# Patient Record
Sex: FEMALE | Race: WHITE | NOT HISPANIC OR LATINO | Employment: FULL TIME | URBAN - METROPOLITAN AREA
[De-identification: names, ages, dates, MRNs, and addresses within clinical notes are randomized per-mention and may not be internally consistent; named-entity substitution may affect disease eponyms.]

---

## 2017-01-17 ENCOUNTER — ALLSCRIPTS OFFICE VISIT (OUTPATIENT)
Dept: OTHER | Facility: OTHER | Age: 27
End: 2017-01-17

## 2017-02-14 ENCOUNTER — ALLSCRIPTS OFFICE VISIT (OUTPATIENT)
Dept: OTHER | Facility: OTHER | Age: 27
End: 2017-02-14

## 2017-02-14 DIAGNOSIS — E66.01 MORBID (SEVERE) OBESITY DUE TO EXCESS CALORIES (HCC): ICD-10-CM

## 2017-02-14 DIAGNOSIS — E04.9 NONTOXIC GOITER: ICD-10-CM

## 2017-02-14 DIAGNOSIS — N97.9 FEMALE INFERTILITY: ICD-10-CM

## 2017-02-14 DIAGNOSIS — R60.9 EDEMA: ICD-10-CM

## 2017-02-14 DIAGNOSIS — F41.9 ANXIETY DISORDER: ICD-10-CM

## 2017-02-14 DIAGNOSIS — R74.8 ABNORMAL LEVELS OF OTHER SERUM ENZYMES: ICD-10-CM

## 2017-02-14 DIAGNOSIS — Z11.1 ENCOUNTER FOR SCREENING FOR RESPIRATORY TUBERCULOSIS: ICD-10-CM

## 2017-02-14 DIAGNOSIS — R53.83 OTHER FATIGUE: ICD-10-CM

## 2017-02-14 DIAGNOSIS — E28.2 POLYCYSTIC OVARIAN SYNDROME: ICD-10-CM

## 2017-02-14 DIAGNOSIS — E78.2 MIXED HYPERLIPIDEMIA: ICD-10-CM

## 2017-02-14 DIAGNOSIS — Z32.00 ENCOUNTER FOR PREGNANCY TEST: ICD-10-CM

## 2017-02-16 ENCOUNTER — ALLSCRIPTS OFFICE VISIT (OUTPATIENT)
Dept: OTHER | Facility: OTHER | Age: 27
End: 2017-02-16

## 2017-03-16 ENCOUNTER — APPOINTMENT (OUTPATIENT)
Dept: LAB | Facility: HOSPITAL | Age: 27
End: 2017-03-16
Attending: OBSTETRICS & GYNECOLOGY
Payer: COMMERCIAL

## 2017-03-16 ENCOUNTER — TRANSCRIBE ORDERS (OUTPATIENT)
Dept: LAB | Facility: HOSPITAL | Age: 27
End: 2017-03-16

## 2017-03-16 DIAGNOSIS — Z32.00 ENCOUNTER FOR PREGNANCY TEST: ICD-10-CM

## 2017-03-16 LAB — B-HCG SERPL-ACNC: <2 MIU/ML

## 2017-03-16 PROCEDURE — 84702 CHORIONIC GONADOTROPIN TEST: CPT

## 2017-03-16 PROCEDURE — 36415 COLL VENOUS BLD VENIPUNCTURE: CPT

## 2017-03-21 ENCOUNTER — ALLSCRIPTS OFFICE VISIT (OUTPATIENT)
Dept: OTHER | Facility: OTHER | Age: 27
End: 2017-03-21

## 2017-04-01 ENCOUNTER — APPOINTMENT (EMERGENCY)
Dept: RADIOLOGY | Facility: HOSPITAL | Age: 27
End: 2017-04-01
Payer: COMMERCIAL

## 2017-04-01 ENCOUNTER — HOSPITAL ENCOUNTER (EMERGENCY)
Facility: HOSPITAL | Age: 27
Discharge: HOME | End: 2017-04-01
Attending: EMERGENCY MEDICINE | Admitting: EMERGENCY MEDICINE
Payer: COMMERCIAL

## 2017-04-01 VITALS
OXYGEN SATURATION: 97 % | RESPIRATION RATE: 18 BRPM | SYSTOLIC BLOOD PRESSURE: 113 MMHG | BODY MASS INDEX: 38.51 KG/M2 | TEMPERATURE: 98 F | HEIGHT: 69 IN | DIASTOLIC BLOOD PRESSURE: 53 MMHG | WEIGHT: 260 LBS | HEART RATE: 92 BPM

## 2017-04-01 DIAGNOSIS — R11.2 NAUSEA AND VOMITING: ICD-10-CM

## 2017-04-01 DIAGNOSIS — R10.9 FLANK PAIN: ICD-10-CM

## 2017-04-01 DIAGNOSIS — R10.32 LEFT LOWER QUADRANT PAIN: Primary | ICD-10-CM

## 2017-04-01 LAB
ALBUMIN SERPL BCP-MCNC: 3.9 G/DL (ref 3.5–5)
ALP SERPL-CCNC: 51 U/L (ref 46–116)
ALT SERPL W P-5'-P-CCNC: 47 U/L (ref 12–78)
ANION GAP SERPL CALCULATED.3IONS-SCNC: 8 MMOL/L (ref 4–13)
AST SERPL W P-5'-P-CCNC: 29 U/L (ref 5–45)
BACTERIA UR QL AUTO: ABNORMAL /HPF
BASOPHILS # BLD AUTO: 0.09 THOUSANDS/ΜL (ref 0–0.1)
BASOPHILS NFR BLD AUTO: 1 % (ref 0–1)
BILIRUB SERPL-MCNC: 1.37 MG/DL (ref 0.2–1)
BILIRUB UR QL STRIP: NEGATIVE
BUN SERPL-MCNC: 11 MG/DL (ref 5–25)
CALCIUM SERPL-MCNC: 9.2 MG/DL (ref 8.3–10.1)
CHLORIDE SERPL-SCNC: 106 MMOL/L (ref 100–108)
CLARITY UR: ABNORMAL
CO2 SERPL-SCNC: 27 MMOL/L (ref 21–32)
COLOR UR: YELLOW
CREAT SERPL-MCNC: 0.74 MG/DL (ref 0.6–1.3)
EOSINOPHIL # BLD AUTO: 0.24 THOUSAND/ΜL (ref 0–0.61)
EOSINOPHIL NFR BLD AUTO: 2 % (ref 0–6)
ERYTHROCYTE [DISTWIDTH] IN BLOOD BY AUTOMATED COUNT: 12.4 % (ref 11.6–15.1)
GFR SERPL CREATININE-BSD FRML MDRD: >60 ML/MIN/1.73SQ M
GLUCOSE SERPL-MCNC: 91 MG/DL (ref 65–140)
GLUCOSE UR STRIP-MCNC: NEGATIVE MG/DL
HCG UR QL: NEGATIVE
HCT VFR BLD AUTO: 39.5 % (ref 34.8–46.1)
HGB BLD-MCNC: 13.5 G/DL (ref 11.5–15.4)
HGB UR QL STRIP.AUTO: ABNORMAL
HYALINE CASTS #/AREA URNS LPF: ABNORMAL /LPF
KETONES UR STRIP-MCNC: NEGATIVE MG/DL
LEUKOCYTE ESTERASE UR QL STRIP: ABNORMAL
LIPASE SERPL-CCNC: 152 U/L (ref 73–393)
LYMPHOCYTES # BLD AUTO: 2.82 THOUSANDS/ΜL (ref 0.6–4.47)
LYMPHOCYTES NFR BLD AUTO: 22 % (ref 14–44)
MCH RBC QN AUTO: 29.4 PG (ref 26.8–34.3)
MCHC RBC AUTO-ENTMCNC: 34.2 G/DL (ref 31.4–37.4)
MCV RBC AUTO: 86 FL (ref 82–98)
MONOCYTES # BLD AUTO: 0.84 THOUSAND/ΜL (ref 0.17–1.22)
MONOCYTES NFR BLD AUTO: 7 % (ref 4–12)
NEUTROPHILS # BLD AUTO: 8.81 THOUSANDS/ΜL (ref 1.85–7.62)
NEUTS SEG NFR BLD AUTO: 68 % (ref 43–75)
NITRITE UR QL STRIP: NEGATIVE
NON-SQ EPI CELLS URNS QL MICRO: ABNORMAL /HPF
NRBC BLD AUTO-RTO: 0 /100 WBCS
PH UR STRIP.AUTO: 6.5 [PH] (ref 4.5–8)
PLATELET # BLD AUTO: 378 THOUSANDS/UL (ref 149–390)
PMV BLD AUTO: 9.8 FL (ref 8.9–12.7)
POTASSIUM SERPL-SCNC: 3.8 MMOL/L (ref 3.5–5.3)
PROT SERPL-MCNC: 8.1 G/DL (ref 6.4–8.2)
PROT UR STRIP-MCNC: ABNORMAL MG/DL
RBC # BLD AUTO: 4.59 MILLION/UL (ref 3.81–5.12)
RBC #/AREA URNS AUTO: ABNORMAL /HPF
SODIUM SERPL-SCNC: 141 MMOL/L (ref 136–145)
SP GR UR STRIP.AUTO: 1.02 (ref 1–1.03)
UROBILINOGEN UR QL STRIP.AUTO: 0.2 E.U./DL
WBC # BLD AUTO: 12.83 THOUSAND/UL (ref 4.31–10.16)
WBC #/AREA URNS AUTO: ABNORMAL /HPF

## 2017-04-01 PROCEDURE — 36415 COLL VENOUS BLD VENIPUNCTURE: CPT | Performed by: EMERGENCY MEDICINE

## 2017-04-01 PROCEDURE — 74177 CT ABD & PELVIS W/CONTRAST: CPT

## 2017-04-01 PROCEDURE — 87086 URINE CULTURE/COLONY COUNT: CPT

## 2017-04-01 PROCEDURE — 83690 ASSAY OF LIPASE: CPT | Performed by: EMERGENCY MEDICINE

## 2017-04-01 PROCEDURE — 93005 ELECTROCARDIOGRAM TRACING: CPT | Performed by: EMERGENCY MEDICINE

## 2017-04-01 PROCEDURE — 96375 TX/PRO/DX INJ NEW DRUG ADDON: CPT

## 2017-04-01 PROCEDURE — 96361 HYDRATE IV INFUSION ADD-ON: CPT

## 2017-04-01 PROCEDURE — 81025 URINE PREGNANCY TEST: CPT | Performed by: EMERGENCY MEDICINE

## 2017-04-01 PROCEDURE — 81001 URINALYSIS AUTO W/SCOPE: CPT

## 2017-04-01 PROCEDURE — 96374 THER/PROPH/DIAG INJ IV PUSH: CPT

## 2017-04-01 PROCEDURE — 80053 COMPREHEN METABOLIC PANEL: CPT | Performed by: EMERGENCY MEDICINE

## 2017-04-01 PROCEDURE — 85025 COMPLETE CBC W/AUTO DIFF WBC: CPT | Performed by: EMERGENCY MEDICINE

## 2017-04-01 PROCEDURE — 99284 EMERGENCY DEPT VISIT MOD MDM: CPT

## 2017-04-01 PROCEDURE — 81002 URINALYSIS NONAUTO W/O SCOPE: CPT | Performed by: EMERGENCY MEDICINE

## 2017-04-01 RX ORDER — METOCLOPRAMIDE HYDROCHLORIDE 5 MG/ML
10 INJECTION INTRAMUSCULAR; INTRAVENOUS ONCE
Status: COMPLETED | OUTPATIENT
Start: 2017-04-01 | End: 2017-04-01

## 2017-04-01 RX ORDER — FUROSEMIDE 20 MG/1
20 TABLET ORAL DAILY PRN
COMMUNITY
End: 2018-05-14 | Stop reason: SDUPTHER

## 2017-04-01 RX ORDER — KETOROLAC TROMETHAMINE 30 MG/ML
15 INJECTION, SOLUTION INTRAMUSCULAR; INTRAVENOUS ONCE
Status: COMPLETED | OUTPATIENT
Start: 2017-04-01 | End: 2017-04-01

## 2017-04-01 RX ADMIN — IOHEXOL 100 ML: 350 INJECTION, SOLUTION INTRAVENOUS at 19:12

## 2017-04-01 RX ADMIN — METOCLOPRAMIDE 10 MG: 5 INJECTION, SOLUTION INTRAMUSCULAR; INTRAVENOUS at 18:07

## 2017-04-01 RX ADMIN — SODIUM CHLORIDE 1000 ML: 0.9 INJECTION, SOLUTION INTRAVENOUS at 17:40

## 2017-04-01 RX ADMIN — KETOROLAC TROMETHAMINE 15 MG: 30 INJECTION, SOLUTION INTRAMUSCULAR at 18:07

## 2017-04-02 LAB — BACTERIA UR CULT: NORMAL

## 2017-04-03 LAB
ATRIAL RATE: 107 BPM
P AXIS: 58 DEGREES
PR INTERVAL: 142 MS
QRS AXIS: 58 DEGREES
QRSD INTERVAL: 90 MS
QT INTERVAL: 334 MS
QTC INTERVAL: 445 MS
T WAVE AXIS: 9 DEGREES
VENTRICULAR RATE: 107 BPM

## 2017-04-28 DIAGNOSIS — N97.9 FEMALE INFERTILITY: ICD-10-CM

## 2017-07-25 ENCOUNTER — ALLSCRIPTS OFFICE VISIT (OUTPATIENT)
Dept: OTHER | Facility: OTHER | Age: 27
End: 2017-07-25

## 2017-07-25 DIAGNOSIS — M25.473 EFFUSION OF ANKLE: ICD-10-CM

## 2017-07-25 DIAGNOSIS — E66.01 MORBID (SEVERE) OBESITY DUE TO EXCESS CALORIES (HCC): ICD-10-CM

## 2017-07-25 DIAGNOSIS — E78.2 MIXED HYPERLIPIDEMIA: ICD-10-CM

## 2017-07-25 DIAGNOSIS — M79.89 OTHER DISORDERS OF SOFT TISSUE: ICD-10-CM

## 2017-09-05 ENCOUNTER — ALLSCRIPTS OFFICE VISIT (OUTPATIENT)
Dept: OTHER | Facility: OTHER | Age: 27
End: 2017-09-05

## 2017-09-05 DIAGNOSIS — E04.9 NONTOXIC GOITER: ICD-10-CM

## 2017-10-10 ENCOUNTER — APPOINTMENT (OUTPATIENT)
Dept: LAB | Facility: HOSPITAL | Age: 27
End: 2017-10-10
Payer: COMMERCIAL

## 2017-10-10 ENCOUNTER — TRANSCRIBE ORDERS (OUTPATIENT)
Dept: LAB | Facility: HOSPITAL | Age: 27
End: 2017-10-10

## 2017-10-10 DIAGNOSIS — M25.473 EFFUSION OF ANKLE: ICD-10-CM

## 2017-10-10 DIAGNOSIS — E78.2 MIXED HYPERLIPIDEMIA: ICD-10-CM

## 2017-10-10 DIAGNOSIS — M79.89 OTHER DISORDERS OF SOFT TISSUE: ICD-10-CM

## 2017-10-10 DIAGNOSIS — E66.01 MORBID (SEVERE) OBESITY DUE TO EXCESS CALORIES (HCC): ICD-10-CM

## 2017-10-10 LAB
ALBUMIN SERPL BCP-MCNC: 3.5 G/DL (ref 3.5–5)
ALP SERPL-CCNC: 56 U/L (ref 46–116)
ALT SERPL W P-5'-P-CCNC: 36 U/L (ref 12–78)
ANION GAP SERPL CALCULATED.3IONS-SCNC: 6 MMOL/L (ref 4–13)
AST SERPL W P-5'-P-CCNC: 29 U/L (ref 5–45)
BILIRUB SERPL-MCNC: 1.05 MG/DL (ref 0.2–1)
BUN SERPL-MCNC: 11 MG/DL (ref 5–25)
CALCIUM SERPL-MCNC: 8.4 MG/DL (ref 8.3–10.1)
CHLORIDE SERPL-SCNC: 106 MMOL/L (ref 100–108)
CHOLEST SERPL-MCNC: 129 MG/DL (ref 50–200)
CO2 SERPL-SCNC: 26 MMOL/L (ref 21–32)
CREAT SERPL-MCNC: 0.78 MG/DL (ref 0.6–1.3)
GFR SERPL CREATININE-BSD FRML MDRD: 103 ML/MIN/1.73SQ M
GLUCOSE P FAST SERPL-MCNC: 91 MG/DL (ref 65–99)
HDLC SERPL-MCNC: 30 MG/DL (ref 40–60)
LDLC SERPL CALC-MCNC: 54 MG/DL (ref 0–100)
POTASSIUM SERPL-SCNC: 4 MMOL/L (ref 3.5–5.3)
PROT SERPL-MCNC: 7.7 G/DL (ref 6.4–8.2)
SODIUM SERPL-SCNC: 138 MMOL/L (ref 136–145)
TRIGL SERPL-MCNC: 224 MG/DL
TSH SERPL DL<=0.05 MIU/L-ACNC: 2.19 UIU/ML (ref 0.36–3.74)

## 2017-10-10 PROCEDURE — 36415 COLL VENOUS BLD VENIPUNCTURE: CPT

## 2017-10-10 PROCEDURE — 80053 COMPREHEN METABOLIC PANEL: CPT

## 2017-10-10 PROCEDURE — 84443 ASSAY THYROID STIM HORMONE: CPT

## 2017-10-10 PROCEDURE — 80061 LIPID PANEL: CPT

## 2017-10-19 ENCOUNTER — APPOINTMENT (OUTPATIENT)
Dept: LAB | Facility: HOSPITAL | Age: 27
End: 2017-10-19
Payer: COMMERCIAL

## 2017-10-19 ENCOUNTER — ALLSCRIPTS OFFICE VISIT (OUTPATIENT)
Dept: OTHER | Facility: OTHER | Age: 27
End: 2017-10-19

## 2017-10-19 DIAGNOSIS — R31.9 HEMATURIA: ICD-10-CM

## 2017-10-19 LAB
BILIRUB UR QL STRIP: NORMAL
CLARITY UR: NORMAL
COLOR UR: YELLOW
GLUCOSE (HISTORICAL): NORMAL
HGB UR QL STRIP.AUTO: NORMAL
KETONES UR STRIP-MCNC: NORMAL MG/DL
LEUKOCYTE ESTERASE UR QL STRIP: NORMAL
NITRITE UR QL STRIP: NORMAL
PH UR STRIP.AUTO: 6 [PH]
PROT UR STRIP-MCNC: NORMAL MG/DL
SP GR UR STRIP.AUTO: 1.03
UROBILINOGEN UR QL STRIP.AUTO: NORMAL

## 2017-10-19 PROCEDURE — 87086 URINE CULTURE/COLONY COUNT: CPT

## 2017-10-20 ENCOUNTER — GENERIC CONVERSION - ENCOUNTER (OUTPATIENT)
Dept: OTHER | Facility: OTHER | Age: 27
End: 2017-10-20

## 2017-10-20 LAB — BACTERIA UR CULT: NORMAL

## 2017-10-20 NOTE — PROGRESS NOTES
Assessment  1  Blood in urine (889 27) (R31 9)    Plan  Ankle swelling, Swelling of both hands    · Furosemide 20 MG Oral Tablet; TAKE 1 TABLET BY MOUTH ONCE DAILY  Anxiety    · ALPRAZolam 0 25 MG Oral Tablet; TAKE ONE TABLET BY MOUTH EVERY  DAY AS NEEDED FOR ANXIETY  Blood in urine    · Ciprofloxacin HCl - 250 MG Oral Tablet; TAKE 1 TABLET EVERY 12 HOURS DAILY   · (1) URINE CULTURE; Source:Urine, Clean Catch; Status:Active; Requested  WLB:75OVV2031;    · * XR ABDOMEN 1 VIEW KUB; Status:Active; Requested YRL:24BML5552;    · Urine Dip Automated- POC; Status:Complete - Retrospective Authorization;   Done:  18GCX6138 03:40PM  Hyperlipidemia, mixed    · Atorvastatin Calcium 10 MG Oral Tablet; TAKE 1 TABLET DAILY AS  DIRECTED   · Fish Oil 1000 MG Oral Capsule; Take 1 caps BID  Mild vitamin D deficiency    · Vitamin D 2000 UNIT Oral Tablet; One tablet daily  Polycystic ovarian syndrome    · MetFORMIN HCl - 1000 MG Oral Tablet; TAKE 1 TABLET TWICE DAILY   · MetFORMIN HCl  MG Oral Tablet Extended Release 24 Hour    Discussion/Summary    UA today showed +/- leuk and 3+ blood  send for culture  check KUB r/o kidney stones  cipro  SE educated pt  office if symptoms no improving or worse  Possible side effects of new medications were reviewed with the patient/guardian today  The treatment plan was reviewed with the patient/guardian  The patient/guardian understands and agrees with the treatment plan      Chief Complaint  Patient presents due to blood in her urine, both sides of her back hurt  It started today and it hurt her to void  History of Present Illness  HPI: Pt is here by herself  blood in urine today  Burning sensation while urinate  Saw bright red blood while wiping this morning  back spasm  fever, SOB, CP, nausea, vomiting or abdominal pain  hx of kidney stone  Mother has kidney stone  10/6/2017        Review of Systems    Constitutional: No fever, no chills, feels well, no tiredness, no recent weight gain or loss  Cardiovascular: no complaints of slow or fast heart rate, no chest pain, no palpitations, no leg claudication or lower extremity edema  Respiratory: no complaints of shortness of breath, no wheezing, no dyspnea on exertion, no orthopnea or PND  Gastrointestinal: no complaints of abdominal pain, no constipation, no nausea or diarrhea, no vomiting, no bloody stools  Genitourinary: as noted in HPI  Musculoskeletal: as noted in HPI  Active Problems  1  Abnormal Pap smear of cervix (795 00) (R87 619)   2  Ankle swelling (719 07) (M25 473)   3  Anxiety (300 00) (F41 9)   4  Edema (782 3) (R60 9)   5  Elevated liver enzymes (790 5) (R74 8)   6  Encounter for gynecological examination (V72 31) (Z01 419)   7  Eustachian tube dysfunction (381 81) (H69 80)   8  Fatigue (780 79) (R53 83)   9  Hyperlipidemia, mixed (272 2) (E78 2)   10  Infertility, female (628 9) (N97 9)   6  Mild vitamin D deficiency (268 9) (E55 9)   12  Morbid obesity (278 01) (E66 01)   13  Nontoxic goiter, 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)   14  Polycystic ovarian syndrome (256 4) (E28 2)   15  Polycystic ovarian syndrome (256 4) (E28 2)   16  Screening for tuberculosis (V74 1) (Z11 1)   17  Swelling of both hands (729 81) (M79 89)   18  Unconfirmed pregnancy (V72 40) (Z32 00)    Past Medical History  1  History of Acute suppurative otitis media of left ear without spontaneous rupture of   tympanic membrane, recurrence not specified (382 00) (H66 002)   2  History of Chronic diarrhea of unknown origin (787 91) (K52 9)   3  History of Dyspepsia (536 8) (K30)   4  History of Early satiety (780 94) (R68 81)   5  History of abdominal pain (V13 89) (Z87 898)   6  History of conjunctivitis (V12 49) (Z86 69)   7  History of hidradenitis suppurativa (V13 3) (Z87 2)   8  History of obesity (V13 89) (Z86 39)   9  History of Other acute sinusitis (461 8) (J01 80)    Family History  Father    1   No pertinent family history  Maternal Grandmother 2  Family history of Thyroid Disorder (V18 19)  Paternal Grandmother    3  Family history of Diabetes Mellitus (V18 0)    Social History   · Being A Social Drinker   · Caffeine use (V49 89) (F15 90)   · Denied: History of Current Smoker   · Former smoker (V1 82) (N11 006)   · No drug use   · Occupation   · Stress at work (V62 1) (Z56 6)    Surgical History  1  History of  Section    Current Meds   1  ALPRAZolam 0 25 MG Oral Tablet; TAKE ONE TABLET BY MOUTH EVERY DAY AS   NEEDED FOR ANXIETY; Therapy: 32FPR2815 to (Last Rx:18Qas5280)  Requested for: 59Tne5570 Ordered   2  Atorvastatin Calcium 10 MG Oral Tablet; TAKE 1 TABLET DAILY AS DIRECTED; Therapy: 57Lvy0636 to (Evaluate:76Okp9790)  Requested for: 64GWE0648; Last   Rx:81Jkz7131; Status: ACTIVE - Renewal Denied Ordered   3  Fish Oil 1000 MG Oral Capsule; Take 1 caps BID; Therapy: 45FHU0287 to (Last Rx:2017) Ordered   4  Furosemide 20 MG Oral Tablet; TAKE 1 TABLET BY MOUTH ONCE DAILY; Therapy: 64XIK0877 to (YCDKPRBN:94MBC0980)  Requested for: 14SHA4739; Last   Rx:76Ueq7785 Ordered   5  MetFORMIN HCl - 1000 MG Oral Tablet; TAKE 1 TABLET TWICE DAILY; Therapy: 75JQM0368 to (Derrick Alex)  Requested for: 80Wlr1864; Last   Rx:07Pvw9420 Ordered   6  MetFORMIN HCl  MG Oral Tablet Extended Release 24 Hour; TAKE TWO   TABLETS BY MOUTH TWICE DAILY; Therapy: 84XFC5255 to (Evaluate:86Yhu0978)  Requested for: 22EBJ5655; Last   Rx:40Wbc3663 Ordered   7  Vitamin D 2000 UNIT Oral Tablet; One tablet daily; Therapy: 96Swz9725 to (Last Rx:61Hrb5425) Ordered    Allergies  1  A + D Personal Care Lotion LOTN   2  Calamine LOTN   3  Caladryl   4  Zinc  Denied    5   Womens Multi CAPS    Vitals   Recorded: 86YQG5717 03:21PM   Temperature 98 6 F, Tympanic   Heart Rate 74, R Radial   Pulse Quality Normal, R Radial   Respiration Quality Normal   Respiration 16   Systolic 288, LUE, Sitting   Diastolic 70, LUE, Sitting   Height 5 ft 9 in   Weight 270 lb BMI Calculated 39 87   BSA Calculated 2 35   Pain Scale 5     Physical Exam    Constitutional   General appearance: No acute distress, well appearing and well nourished  Pulmonary   Respiratory effort: No increased work of breathing or signs of respiratory distress  Auscultation of lungs: Clear to auscultation  Cardiovascular   Auscultation of heart: Normal rate and rhythm, normal S1 and S2, without murmurs  Examination of extremities for edema and/or varicosities: Normal     Carotid pulses: Normal     Abdomen   Abdomen: Non-tender, no masses  -- No CVA tenderness  Liver and spleen: No hepatomegaly or splenomegaly  Lymphatic   Palpation of lymph nodes in neck: No lymphadenopathy  Musculoskeletal   Gait and station: Normal          Future Appointments    Date/Time Provider Specialty Site   03/06/2018 01:00 PM Favio Rosado     Signatures   Electronically signed by :  Ingrid Harris MD; Oct 19 2017  3:41PM EST                       (Author)

## 2018-01-09 NOTE — PROGRESS NOTES
Assessment    1  Screening for tuberculosis (V74 1) (Z11 1)   2  Encounter for preventive health examination (V70 0) (Z00 00)   3  Anxiety (300 00) (F41 9)   4  Hyperlipidemia, mixed (272 2) (E78 2)   5  Infertility, female (628 9) (N97 9)   6  Morbid obesity (278 01) (E66 01)   7  Polycystic ovarian syndrome (256 4) (E28 2)   8  Acute suppurative otitis media of left ear without spontaneous rupture of tympanic   membrane, recurrence not specified (382 00) (H66 002)    Plan  Acute suppurative otitis media of left ear without spontaneous rupture of tympanic  membrane, recurrence not specified    · Amoxicillin-Pot Clavulanate 875-125 MG Oral Tablet; Take one tablet by mouth  Q12 hours  Anxiety, Edema, Elevated liver enzymes, Fatigue, Hyperlipidemia, mixed, Infertility,  female, Polycystic ovarian syndrome    · (1) COMPREHENSIVE METABOLIC PANEL; Status:Active; Requested for:99Lnd2421; Anxiety, Edema, Fatigue, Hyperlipidemia, mixed, Thyromegaly    · (1) TSH WITH FT4 REFLEX; Status:Active; Requested for:92Szh1472;   Edema, Elevated liver enzymes, Hyperlipidemia, mixed, Morbid obesity, Polycystic  ovarian syndrome, Screening for tuberculosis, Thyromegaly    · (1) CBC/PLT/DIFF; Status:Active; Requested for:48Qth5142;   Elevated liver enzymes, Hyperlipidemia, mixed, Morbid obesity, Screening for  tuberculosis    · (1) LIPID PANEL FASTING W DIRECT LDL REFLEX; Status:Active; Requested  for:29Hsu9914;   Screening for tuberculosis    · PPD  Unlinked    · Spironolactone TABS    Discussion/Summary  health maintenance visit Currently, she eats a poor diet and has an inadequate exercise regimen  cervical cancer screening is managed by Dr Lara Khanna, GYN Breast cancer screening: self breast exam technique was taught and monthly self breast exam was advised  Colorectal cancer screening: colorectal cancer screening is not indicated  Osteoporosis screening: bone mineral density testing is not indicated   Screening lab work includes hemoglobin, glucose, lipid profile and thyroid function testing  She was advised to be evaluated by a dentist  Advice and education were given regarding nutrition, aerobic exercise, weight bearing exercise, weight loss, reproductive health, cardiovascular risk reduction, sunscreen use, self skin examination and seat belt use  Patient discussion: discussed with the patient  1  PE- forms completed today  PPD was administered today, will need to be read in 48-72 hours  2  Anxiety- stable  Report she has Xanax at home from 2015, rarely ever uses  3  Hyperlipidemia- lipid panel ordered today  Advised to follow up with endocrinology  Follow a low fat/ low cholesterol diet  4  Infertility/ PCOS- follow up with endocrinology and GYN as planned  Continue Metformin and Clomid per endocrinology and GYN  She was advised to stop Spironolactone as it can cause birth defects (see endocrinology note 8/23/16)  5  Obesity- advised to exercise 30 minutes 5 x per week, weight loss  6  Left AOM- to start Augmentin as discussed  Follow up with ENT as she reports recurrent ear infections  No water directly hitting ear, no Qtips in inner ear canals  Call office if symptoms worsen or do not improve  To get blood work done now- we will call with results  F/u 6 months  Possible side effects of new medications were reviewed with the patient/guardian today  The treatment plan was reviewed with the patient/guardian  The patient/guardian understands and agrees with the treatment plan      Chief Complaint  patient is here for a physical to be able to do foster care, patient needs a PPD test, possible med refills      History of Present Illness  HM, Adult Female: The patient is being seen for a health maintenance evaluation  The last health maintenance visit was several years ago  Social History: Household members include spouse and children  She is   Work status: working full time   The patient is a former cigarette smoker and Quit one year ago, smoked on and off for several years, 4-5 cigarettes per day  She reports never drinking alcohol  She has never used illicit drugs  General Health: The patient's health since the last visit is described as good  She does not have regular dental visits  The patient reports her last dental visit was > 1 year ago and Reports appt next week  She denies vision problems  She denies hearing loss  Immunizations status: up to date   last Tdap during pregnancies (children ages 3 and 11)  Lifestyle:  She has weight concerns  Weight control issues: obesity, but no serious weight loss attempts  She does not exercise regularly  She does not use tobacco  She denies alcohol use  She denies drug use  Reproductive health:  she reports normal menses  she uses no contraception  she is sexually active  pregnancy history: G 2P 2, 2  Screening: Cervical cancer screening includes a pap smear performed within the past year  Breast cancer screening includes no previous mammogram, a clinical breast exam performed witin past year and irregular breast self-exams performed  She hasn't been previously screened for colorectal cancer  Metabolic screening includes lipid profile performed 6/2016, glucose screening performed 6/2016, thyroid function test performed 6/2016 and no previous DEXA  Cardiovascular risk factors: high LDL cholesterol, low HDL cholesterol, obesity and sedentary lifestyle, but no hypertension, no diabetes, no stress, no tobacco use, no illicit drug use and no family history of cardiovascular disease  General health risks: abnormal cervical cytology  Safety elements used: seat belt, sunscreen and smoke detector  HPI: Pt presents by herself today for a physical exam to become a   She is planning to foster her stepdaughter's half brother who is 4 year ago  She has two children herself, ages 3 and 11  Anxiety- stable   Was taking Xanax prn, last filled 6/2015  Family is supportive  Hyperlipidemia- was following with endocrinology, Dr Conchita Ambrose, last seen 8/2016  Was taking Atorvastatin but was not refilled via endo d/t her missing her follow up appointment/ not getting blood work done    PCOS/ infertility- endo is following this as well  Also sees GYN, Dr Leif Wilde  Is taking Metformin, Spironolactone, Clomid  She reports she isn't overly compliant with the Metformin, forgets to take at least twice per week  Per endocrinology's last note, she was advised to stop Spironolactone as it can cause birth defects and she is actively trying to conceive- she reports she has not yet stopped this medication     Left ear pain- reports she gets frequent ear infections  Has seen ENT in the past but she does not recall who she has seen  Last seen several months ago per patient  No ear drainage  Feels like water is in her ear  No F/C, radiation of pain  Review of Systems    Constitutional: no fever, not feeling poorly, no chills and not feeling tired  ENT: earache, but as noted in HPI, no sore throat and no nasal discharge  Cardiovascular: no chest pain, no palpitations and no lower extremity edema  Respiratory: no shortness of breath, no cough, no wheezing and no shortness of breath during exertion  Gastrointestinal: no abdominal pain, no nausea, no constipation and no blood in stools    The patient presents with complaints of diarrhea (when restarting Metformin after she misses a few days)  Genitourinary: no dysuria  Integumentary: no rashes and no skin wound  Neurological: no headache, no numbness, no tingling and no dizziness  Psychiatric: anxiety, but not suicidal and no sleep disturbances  no feelings of weakness   Hematologic/Lymphatic: no swollen glands, no tendency for easy bleeding and no tendency for easy bruising  ROS reviewed  Active Problems    1  Abnormal Pap smear of cervix (795 00) (R87 619)   2  Anxiety (300 00) (F41 9)   3   Chronic diarrhea of unknown origin (787 91) (K52 9)   4  Edema (782 3) (R60 9)   5  Elevated liver enzymes (790 5) (R74 8)   6  Encounter for gynecological examination (V72 31) (Z01 419)   7  Eustachian tube dysfunction (381 81) (H69 80)   8  Fatigue (780 79) (R53 83)   9  Hidradenitis suppurativa (705 83) (L73 2)   10  Hyperlipidemia, mixed (272 2) (E78 2)   11  Infertility, female (628 9) (N97 9)   15  Mild vitamin D deficiency (268 9) (E55 9)   13  Morbid obesity (278 01) (E66 01)   14  Polycystic ovarian syndrome (256 4) (E28 2)   15  Polycystic ovarian syndrome (256 4) (E28 2)   16  Thyromegaly (240 9) (E04 9)    Past Medical History    · History of Dyspepsia (536 8) (K30)   · History of Early satiety (780 94) (R68 81)   · History of abdominal pain (V13 89) (B93 338)   · History of conjunctivitis (V12 49) (Z86 69)   · History of obesity (V13 89) (Z86 39)   · History of Other acute sinusitis (461 8) (J01 80)    Surgical History    · History of  Section    Family History  Maternal Grandmother    · Family history of Thyroid Disorder (V18 19)  Paternal Grandmother    · Family history of Diabetes Mellitus (V18 0)    Social History    · Being A Social Drinker   · Caffeine use (V49 89) (F15 90)   · Current Smoker (305 1)   · Former smoker (V15 82) (M54 390)   · No drug use   · Occupation   · Stress at work (V62 1) (Z56 6)    Current Meds   1  ALPRAZolam 0 25 MG Oral Tablet; TAKE 1 TABLET EVERY 6 HOURS AS NEEDED FOR   ANXIETY; Therapy: 39MMS0522 to (Evaluate:39Lhd8432)  Requested for: 60XHU7444; Last   Rx:2015 Ordered   2  Atorvastatin Calcium 10 MG Oral Tablet; TAKE 1 TABLET DAILY AS DIRECTED; Therapy: 73Jiv9701 to (Evaluate:42Zmc9763)  Requested for: 85HPO9290; Last   Rx:02Xsq4726; Status: ACTIVE - Renewal Denied Ordered   3  ClomiPHENE Citrate 50 MG Oral Tablet; TAKE 1 TABLET Twice daily Please take one   tablet twice a day beginning on day 4 of menstrual cycle;    Therapy: 84BZP1924 to (Evaluate:38Aoh3721) Requested for: 64KCU2222; Last   Rx:17Jan2017 Ordered   4  Furosemide 20 MG Oral Tablet; TAKE 1/2 TABLET DAILY AS NEEDED ANKLE   SWELLING; Therapy: 21Jun2016 to (Evaluate:18Feb2017)  Requested for: 20Dec2016; Last   Rx:20Dec2016 Ordered   5  MetFORMIN HCl  MG Oral Tablet Extended Release 24 Hour; TAKE TWO   TABLETS BY MOUTH TWICE DAILY; Therapy: 29THF4621 to (Evaluate:49Zzm1245)  Requested for: 21Jun2016; Last   Rx:21Jun2016 Ordered   6  Spironolactone TABS; Therapy: (Recorded:99Tkx0730) to Recorded   7  Vitamin D 2000 UNIT Oral Tablet; One tablet daily; Therapy: 01Sep2016 to (Last Rx:01Sep2016) Ordered    Allergies    1  A + D Personal Care Lotion LOTN   2  Calamine LOTN   3  Caladryl   4  Zinc  Denied    5  Womens Multi CAPS    Vitals   Recorded: 48CEK5930 01:02PM   Temperature 98 4 F   Heart Rate 80   Respiration 16   Systolic 961   Diastolic 68   Height 5 ft 9 in   Weight 265 lb    BMI Calculated 39 13   BSA Calculated 2 33     Physical Exam    Constitutional   General appearance: No acute distress, well appearing and well nourished  obese  Head and Face   Head and face: Normal     Palpation of the face and sinuses: No sinus tenderness  Eyes   Conjunctiva and lids: No swelling, erythema or discharge  Pupils and irises: Equal, round, reactive to light  Ears, Nose, Mouth, and Throat   External inspection of ears and nose: Normal     Otoscopic examination: Abnormal   The right tympanic membrane was normal  The left tympanic membrane was red and was bulging  Exam of the left middle ear showed a middle ear effusion that was mucoid  Hearing: Normal     Nasal mucosa, septum, and turbinates: Normal without edema or erythema  Lips, teeth, and gums: Normal, good dentition  Oropharynx: Normal with no erythema, edema, exudate or lesions  Neck   Neck: Supple, symmetric, trachea midline, no masses  Thyroid: Normal, no thyromegaly      Pulmonary   Respiratory effort: No increased work of breathing or signs of respiratory distress  Auscultation of lungs: Clear to auscultation  Cardiovascular   Auscultation of heart: Normal rate and rhythm, normal S1 and S2, no murmurs  Carotid pulses: 2+ bilaterally  Peripheral vascular exam: Normal     Examination of extremities for edema and/or varicosities: Normal     Abdomen   Abdomen: Non-tender, no masses  The abdomen was obese  Bowel sounds were normal  The abdomen was soft and nontender  Liver and spleen: No hepatomegaly or splenomegaly  Lymphatic   Palpation of lymph nodes in neck: No lymphadenopathy  Musculoskeletal   Gait and station: Normal     Digits and nails: Normal without clubbing or cyanosis  Joints, bones, and muscles: Normal     Skin   Skin and subcutaneous tissue: Normal without rashes or lesions  Neurologic   Cranial nerves: Cranial nerves II-XII intact  Sensation: No sensory loss  Psychiatric   Judgment and insight: Normal     Orientation to person, place, and time: Normal     Mood and affect: Normal        Attending Note  Collaborating Physician Note: Collaborating Physician: I did not interview and examine the patient and I agree with the Advanced Practitioner note  Future Appointments    Date/Time Provider Specialty Site   08/15/2017 01:00 PM Terrace Dancer, [address]   02/16/2017 06:30 PM [facility], Nurse Schedule  [address] [facility]   03/21/2017 01:15 PM JESSENIA Dickson  Obstetrics/Gynecology [facility] OB[facility]GYN [facility]     Signatures   Electronically signed by : Tank Rangel [facility]; Feb 14 2017  2:08PM EST                       (Author)    Electronically signed by :  Brando French MD; Feb 14 2017  3:23PM EST                       (Author)

## 2018-01-09 NOTE — RESULT NOTES
Message   Testosterone is high d/t PCOS  Start vitamin D 2000 units a day  Thyroid function is normal and abs are negative  Repeat am cortisol before next visit  A1c is good 5 5%, cont metformin, other hormones are wnl  Verified Results  (1) T4, FREE 07Srp4051 09:11AM [facility] Order Number: [id number]     Test Name Result Flag Reference   T4,FREE 1 30 ng/dL  0 76-1 46     (1) THYROID MICROSOMAL ANTIBODY 75Rdi4101 09:11AM [facility] Order Number: [id number]     Test Name Result Flag Reference   [facility] MICROSOM AB 14 IU/mL  0 - 34   Performed at:  [facility] [address]  [id number]  : Marika Weir MD, Phone:  [phone]     (1) TSH 62KSW2113 09:11AM Dee Dee Vika   TW Order Number: [id number]     Test Name Result Flag Reference   TSH 3 080 uIU/mL  0 358-3 740   - Patient Instructions: This bloodwork is non-fasting  Please drink two glasses of water morning of bloodwork  - Patient Instructions: This is a non fasting blood test  Please drink two glasses of water the morning of test - Patient Instructions: This bloodwork is non-fasting  Please drink two glasses of water morning of bloodwork  Patients undergoing fluorescein dye angiography may retain small amounts of fluorescein in the body for 48-72 hours post procedure  Samples containing fluorescein can produce falsely depressed TSH values  If the patient had this procedure,a specimen should be resubmitted post fluorescein clearance            The recommended reference ranges for TSH during pregnancy are as follows:  First trimester 0 1 to 2 5 uIU/mL  Second trimester  0 2 to 3 0 uIU/mL  Third trimester 0 3 to 3 0 uIU/m     (1) DHEA-S 30Aug2016 09:11AM Dee [facility] [place] [facility] Order Number: [id number]     Test Name Result Flag Reference   DHEA-SULFATE 230 7 ug/dL  84 8 - 378 0   Performed at:  [facility] [address]  [id number]  : Holly Baez MD, Phone:  [phone]     (1) ESTRADIOL 53VKS8072 09:11AM [facility] Order Number: [id number]     Test Name Result Flag Reference   ESTRADIOL 165 0 pg/mL     ESTRADIOL:    Mentruating Females: Follicular phase:  03 1-853 3  pg/mL      Mid-cycle phase:   49 9- 367 2 pg/mL      Luteal phase:      40 2-259    pg/mL     Postmenopausal females (untreated):  <11-58 3  pg/mL  On Menopausal Hormone Therapy (MHT): < 1 pg/mL    Women taking the drug Fulvestrant (Faslodex) may have falsely elevated Estradiol results  Suggest ordering [facility] Estradiol, LC/MS -test number [id number], to monitor these patients  - Patient Instructions: This is a non fasting blood test  Please drink two glasses of water the morning of test - Patient Instructions: This bloodwork is non-fasting  Please drink two glasses of water morning of bloodwork  (1) TESTOSTERONE, FREE (DIRECT) AND TOTAL 30Aug2016 09:11AM [facility] Order Number: [id number]     Test Name Result Flag Reference   FREE TESTOSTERONE, DIRECT 3 8 pg/mL  0 0 - 4 2   TESTOSTERONE (TOTAL) 68 ng/dL H 8 - 48   Performed at:  [address] [place] [address]  [id number]  : Holly Baez MD, Phone:  [phone]     (1) INSULIN 59OSN7026 09:11AM [facility] Order Number: [id number]     Test Name Result Flag Reference   INSULIN 53 7 mU/L H 3 0-25 0     (1) HEMOGLOBIN A1C 55Wma1768 09:11AM [facility] Order Number: [id number]     Test Name Result Flag Reference   HEMOGLOBIN A1C 5 5 %  4 2-6 3   EST  AVG  GLUCOSE 111 mg/dl       (1) HEPATIC FUNCTION PANEL 29Bgl3129 09:11AM [facility] Order Number: [id number]     Test Name Result Flag Reference   ALBUMIN 3 8 g/dL  3 5-5 0   - Patient Instructions: This is a non fasting blood test  Please drink two glasses of water the morning of test     - Patient Instructions:  This is a non fasting blood test  Please drink two glasses of water the morning of test - Patient Instructions: This bloodwork is non-fasting  Please drink two glasses of water morning of bloodwork  ALK PHOSPHATAS 50 U/L     ALT (SGPT) 29 U/L  12-78   AST(SGOT) 19 U/L  5-45   BILI, DIRECT 0 31 mg/dL H 0 00-0 20   BILI, TOTAL 1 54 mg/dL H 0 20-1 00   TOTAL PROTEIN 7 3 g/dL  6 4-8 2     (1) RENAL FUNCTION PANEL 30Aug2016 09:11AM  [employer]    Order Number: [id number]     Test Name Result Flag Reference   ANION GAP (CALC) 10 mmol/L  4-13   BLOOD UREA NITROGEN 11 mg/dL  5-25   CALCIUM 9 1 mg/dL  8 3-10 1   CHLORIDE 102 mmol/L  100-108   CARBON DIOXIDE 25 mmol/L  21-32   CREATININE 0 79 mg/dL  0 60-1 30   Standardized to IDMS reference method   GLUCOSE,RANDM 90 mg/dL     If the patient is fasting, the ADA then defines impaired fasting glucose as > 100 mg/dL and diabetes as > or equal to 123 mg/dL  POTASSIUM 4 1 mmol/L  3 5-5 3   eGFR Non-African American      >60 0 ml/min/1 73sq m   - Patient Instructions: This is a non fasting blood test  Please drink two glasses of water the morning of test - Patient Instructions: This bloodwork is non-fasting  Please drink two glasses of water morning of bloodwork  National Kidney Disease Education Program recommendations are as follows:  GFR calculation is accurate only with a steady state creatinine  Chronic Kidney disease less than 60 ml/min/1 73 sq  meters  Kidney failure less than 15 ml/min/1 73 sq  meters  SODIUM 137 mmol/L  136-145   PHOSPHORUS 3 5 mg/dL  2 7-4 5   - Patient Instructions: This is a non fasting blood test  Please drink two glasses of water the morning of test - Patient Instructions: This bloodwork is non-fasting  Please drink two glasses of water morning of bloodwork       (1) PTH N-TERMINAL (INTACT) 30Aug2016 09:11AM [facility]    Order Number: [id number]     Test Name Result Flag Reference   PARATHYROID HORMONE INTACT 42 7 pg/mL  14 0-72 0     (1) VITAMIN D 25-HYDROXY 30Aug2016 09:11AM Nancy Gambino    Order Number: [id number]     Test Name Result Flag Reference   VIT D 25-HYDROX 27 5 ng/mL L 30 0-100 0   This assay is a certified procedure of the CDC Vitamin D Standardization Certification Program (VDSCP)     Deficiency <20ng/ml   Insufficiency 20-30ng/ml   Sufficient  ng/ml     *Patients undergoing fluorescein dye angiography may retain small amounts of fluorescein in the body for 48-72 hours post procedure  Samples containing fluorescein can produce falsely elevated Vitamin D values  If the patient had this procedure, a specimen should be resubmitted post fluorescein clearance  (1) [facility] 30Aug2016 09:11AM Nacny Gambino    Order Number: [id number]     Test Name Result Flag Reference   FOLLICLE STIMULATING HORMONE 8 1 mIU/mL     - Patient Instructions: This is a non fasting blood test  Please drink two glasses of water the morning of test - Patient Instructions: This bloodwork is non-fasting  Please drink two glasses of water morning of bloodwork  FSH:  Menstruating Females: Follicular Phase  6 3-15 7 mIU/mL    Mid-Cycle Phase   5 2-17 5 mIU/mL    Luteal Phase      1 7-9 5  mIU/mL  Postmenopausal Females    On Menopausal Hormone Therapy(HRT) 5 9-72 8   mIU/mL    Untreated                          12 7-132 2 mIU/mL     (1) LH (LEUTINIZING HORMONE) 30Aug2016 09:11AM [facility] Number: [id number]     Test Name Result Flag Reference   LUTEINIZING HORMONE 34 0 mIU/mL     LH:   Menstruating Females:   ? Follicular Phase ? 1 9-12 8 ? mIU/mL   ? Mid-Cycle Phase ? 22 8-76 1 mIU/mL   ? Luteal Phase ? ? ?0 6-13 5 ? mIU/mL   Postmenopausal Females:   ? On Menopausal Hormone Therapy(MHT) 1 1-52 4 mIU/mL   ? Untreated ? ? ? ? ? ? ? ? ? ? ? ? ?8 6-61 8 mIU/mL    - Patient Instructions: This is a non fasting blood test  Please drink two glasses of water the morning of test - Patient Instructions:  This bloodwork is non-fasting  Please drink two glasses of water morning of bloodwork  (1) PROGESTERONE 30Aug2016 09:11AM [facility] Order Number: [id number]     Test Name Result Flag Reference   PROGESTERONE 0 80 ng/mL     PROGESTERONE:     Serum progesterone 5-25 ng/mL should not be the sole determinant in excluding pregnancy  Menstruating Females: Follicular phase 9 418-3 06 ng/mL   Luteal phase 2 25-24 2 ng/mL   Mid-luteal phase 8 76-21 6 ng/mL   Postmenopausal Females <0 200-0 901 ng/mL     Pregnant Females:   First trimester of pregnancy 11 4-41 0 ng/mL   Second trimester of pregnancy 13 8-156 ng/mL   Third trimester of pregnancy 51 4->200 ng/mL     - Patient Instructions: This is a non fasting blood test  Please drink two glasses of water the morning of test - Patient Instructions: This bloodwork is non-fasting  Please drink two glasses of water morning of bloodwork  (1) PROLACTIN 30Aug2016 09:11AM [facility] Order Number: [id number]     Test Name Result Flag Reference   PROLACTIN 12 6 ng/mL     PROLACTIN:     Females:   ? Non-pregnant ? ?2 2-30 3 ?ng/mL   ? Pregnant ? ? ? ?8 1-347 6 ng/mL   ? Post-menopausal 0 7-31 5 ?ng/mL     (1) CORTISOL AM SPECIMEN 30Aug2016 09:11AM [facility] Order Number: [id number]     Test Name Result Flag Reference   CORTISO AM SPEC 7 7 ug/mL  4 2-22 4   Reference ranges established for specimens drawn between 7 and 9 am  Results may be inaccurate if timing is not correct  (1) ADRENOCORTICOTROPHIN 30Aug2016 09:11AM [facility] Order Number: [id number]     Test Name Result Flag Reference   ADRENOCORT  TROP 14 2 pg/mL  7 2 - 63 3   ACTH reference interval for samples collected between 7 and 10 AM     Performed at:  [address] [place] [address]  [id number]  : Lino Costello MD, Phone:  [phone]

## 2018-01-10 NOTE — PROGRESS NOTES
Chief Complaint  Patient here to have PPD read: Negative 0 mm  Active Problems    1  Abnormal Pap smear of cervix (795 00) (R87 619)   2  Acute suppurative otitis media of left ear without spontaneous rupture of tympanic   membrane, recurrence not specified (382 00) (H66 002)   3  Anxiety (300 00) (F41 9)   4  Chronic diarrhea of unknown origin (787 91) (K52 9)   5  Edema (782 3) (R60 9)   6  Elevated liver enzymes (790 5) (R74 8)   7  Encounter for gynecological examination (V72 31) (Z01 419)   8  Eustachian tube dysfunction (381 81) (H69 80)   9  Fatigue (780 79) (R53 83)   10  Hidradenitis suppurativa (705 83) (L73 2)   11  Hyperlipidemia, mixed (272 2) (E78 2)   12  Infertility, female (628 9) (N97 9)   15  Mild vitamin D deficiency (268 9) (E55 9)   14  Morbid obesity (278 01) (E66 01)   15  Polycystic ovarian syndrome (256 4) (E28 2)   16  Polycystic ovarian syndrome (256 4) (E28 2)   17  Screening for tuberculosis (V74 1) (Z11 1)   18  Thyromegaly (240 9) (E04 9)    Current Meds   1  ALPRAZolam 0 25 MG Oral Tablet; TAKE 1 TABLET EVERY 6 HOURS AS NEEDED FOR   ANXIETY; Therapy: 82EKX7219 to (Evaluate:58Zkx9356)  Requested for: 56UJT3964; Last   Rx:26Jun2015 Ordered   2  Amoxicillin-Pot Clavulanate 875-125 MG Oral Tablet; Take one tablet by mouth Q12   hours; Therapy: 76OFA9304 to (Evaluate:59Yuo6334)  Requested for: 35ZRH7215; Last   Rx:41Fix7985 Ordered   3  Atorvastatin Calcium 10 MG Oral Tablet; TAKE 1 TABLET DAILY AS DIRECTED; Therapy: 62Jrh4640 to (Evaluate:19Yds4238)  Requested for: 50VOG8699; Last   Rx:08Rzh5895; Status: ACTIVE - Renewal Denied Ordered   4  ClomiPHENE Citrate 50 MG Oral Tablet; TAKE 1 TABLET Twice daily Please take one   tablet twice a day beginning on day 4 of menstrual cycle; Therapy: 49EEJ6960 to (Evaluate:94Ist5551)  Requested for: 95EBR8869; Last   Rx:17Jan2017 Ordered   5  Furosemide 20 MG Oral Tablet; TAKE 1/2 TABLET DAILY AS NEEDED ANKLE   SWELLING;    Therapy: 38ZZE5716 to (Evaluate:18Feb2017)  Requested for: 80Vvg2245; Last   Rx:11Myl5147 Ordered   6  MetFORMIN HCl  MG Oral Tablet Extended Release 24 Hour; TAKE TWO   TABLETS BY MOUTH TWICE DAILY; Therapy: 34WBV3738 to (Evaluate:34Utl8867)  Requested for: 21Jun2016; Last   Rx:21Jun2016 Ordered   7  Vitamin D 2000 UNIT Oral Tablet; One tablet daily; Therapy: 43Tty0700 to (Last Rx:70Mkp1349) Ordered    Allergies    1  A + D Personal Care Lotion LOTN   2  Calamine LOTN   3  Caladryl   4  Zinc  Denied    5  Womens Multi CAPS    Plan  Screening for tuberculosis    · PPD    Future Appointments    Date/Time Provider Specialty Site   08/15/2017 01:00 PM Chikis Mariano   03/21/2017 01:15 PM JESSENIA Selby  Obstetrics/Gynecology [facility] [address]     Signatures   Electronically signed by : ABI Lozano; Feb 17 2017  8:58AM EST                       (Author)    Electronically signed by :  Francisca Napier MD; Feb 17 2017 11:57AM EST                       (Review)

## 2018-01-10 NOTE — RESULT NOTES
Message   Please let the patient know I reviewed her blood work  Her total cholesterol was normal at 151  Triglycerides were a little elevated at 208 (should be less than 208)  HDL (good cholesterol) was ok at 39  LDL (bad cholesterol) was good at 70  Try to follow a low fat diet to help triglycerides  Electrolytes, kidney function were all normal    Liver enzymes were normal    Totol bilirubin was slighlty elevated at 1 3 (should be <1 2)  We will monitor this  Blood counts (WBC, hemoglobin, platelets, etc ) were all normal       Thank you  Verified Results  (1) CBC/PLT/DIFF 21Jun2016 11:01AM Maria Esther Aspen     Test Name Result Flag Reference   WBC 7 5 x10E3/uL  3 4-10 8   RBC 4 61 x10E6/uL  3 77-5 28   Hemoglobin 12 9 g/dL  11 1-15 9   Hematocrit 39 0 %  34 0-46  6   MCV 85 fL  79-97   MCH 28 0 pg  26 6-33 0   MCHC 33 1 g/dL  31 5-35 7   RDW 12 8 %  12 3-15 4   Platelets 909 F26C0/DI  150-379   Neutrophils 60 %     Lymphs 32 %     Monocytes 5 %     Eos 2 %     Basos 1 %     Neutrophils (Absolute) 4 5 x10E3/uL  1 4-7 0   Lymphs (Absolute) 2 4 x10E3/uL  0 7-3 1   Monocytes(Absolute) 0 4 x10E3/uL  0 1-0 9   Eos (Absolute) 0 2 x10E3/uL  0 0-0 4   Baso (Absolute) 0 1 x10E3/uL  0 0-0 2   Immature Granulocytes 0 %     Immature Grans (Abs) 0 0 x10E3/uL  0 0-0 1     (1) COMPREHENSIVE METABOLIC PANEL 84WJK0786 36:92XD Maria Esther Aspen     Test Name Result Flag Reference   Glucose, Serum 89 mg/dL  65-99   BUN 11 mg/dL  6-20   Creatinine, Serum 0 69 mg/dL  0 57-1 00   eGFR If NonAfricn Am 119 mL/min/1 73  >59   eGFR If Africn Am 137 mL/min/1 73  >59   BUN/Creatinine Ratio 16  8-20   Sodium, Serum 139 mmol/L  134-144   Potassium, Serum 4 5 mmol/L  3 5-5 2   Chloride, Serum 99 mmol/L     Carbon Dioxide, Total 21 mmol/L  18-29   Calcium, Serum 9 2 mg/dL  8 7-10 2   Protein, Total, Serum 7 2 g/dL  6 0-8 5   Albumin, Serum 4 3 g/dL  3 5-5 5   Globulin, Total 2 9 g/dL  1 5-4 5   A/G Ratio 1 5  1 1-2 5   Bilirubin, Total 1 3 mg/dL H 0 0-1 2   Alkaline Phosphatase, S 48 IU/L     AST (SGOT) 21 IU/L  0-40   ALT (SGPT) 19 IU/L  0-32     (LC) LP 05PKV8949 11:01AM Venkat Hamilton     Test Name Result Flag Reference   Cholesterol, Total 151 mg/dL  100-199   Triglycerides 208 mg/dL H 0-149   HDL Cholesterol 39 mg/dL L >39   According to ATP-III Guidelines, HDL-C >59 mg/dL is considered a  negative risk factor for CHD     VLDL Cholesterol Rocky 42 mg/dL H 5-40   LDL Cholesterol Calc 70 mg/dL  0-99

## 2018-01-10 NOTE — RESULT NOTES
Verified Results  (1) URINE CULTURE 19Oct2017 04:54PM Andree AGUILAR Order Number: [id number]     Test Name Result Flag Reference   CLINICAL REPORT (Report)     Test:        Urine culture  Specimen Type:   Urine  Specimen Date:   10/19/2017 4:54 PM  Result Date:    10/20/2017 2:24 PM  Result Status:   Final result  Resulting Lab:   [place] [address]            Tel: [phone]      CULTURE                                       ------------------                                   20,000-29,000 cfu/ml      *** Mixed Contaminants X3 ***

## 2018-01-11 NOTE — RESULT NOTES
Verified Results  (LC) TSH Rfx on Abnormal to Free T4 21Jun2016 11:01AM General Syed     Test Name Result Flag Reference   TSH 2 350 uIU/mL  0 450-4 500

## 2018-01-11 NOTE — MISCELLANEOUS
Provider Comments  Provider Comments:   Patient no show, reschedule follow-up        Signatures   Electronically signed by : Tank Stewart; Nov 2 2016  2:35PM EST                       (Author)

## 2018-01-12 VITALS
RESPIRATION RATE: 16 BRPM | WEIGHT: 273.25 LBS | SYSTOLIC BLOOD PRESSURE: 118 MMHG | HEIGHT: 69 IN | DIASTOLIC BLOOD PRESSURE: 86 MMHG | HEART RATE: 84 BPM | TEMPERATURE: 98.2 F | BODY MASS INDEX: 40.47 KG/M2

## 2018-01-12 VITALS
RESPIRATION RATE: 16 BRPM | WEIGHT: 265 LBS | TEMPERATURE: 98.4 F | SYSTOLIC BLOOD PRESSURE: 130 MMHG | HEART RATE: 80 BPM | BODY MASS INDEX: 39.25 KG/M2 | HEIGHT: 69 IN | DIASTOLIC BLOOD PRESSURE: 68 MMHG

## 2018-01-12 VITALS
WEIGHT: 268 LBS | DIASTOLIC BLOOD PRESSURE: 86 MMHG | SYSTOLIC BLOOD PRESSURE: 120 MMHG | HEIGHT: 69 IN | BODY MASS INDEX: 39.69 KG/M2

## 2018-01-13 VITALS
DIASTOLIC BLOOD PRESSURE: 90 MMHG | HEIGHT: 69 IN | SYSTOLIC BLOOD PRESSURE: 132 MMHG | WEIGHT: 273.38 LBS | BODY MASS INDEX: 40.49 KG/M2

## 2018-01-14 VITALS
SYSTOLIC BLOOD PRESSURE: 128 MMHG | WEIGHT: 263 LBS | BODY MASS INDEX: 38.95 KG/M2 | HEIGHT: 69 IN | DIASTOLIC BLOOD PRESSURE: 84 MMHG

## 2018-01-14 VITALS
HEART RATE: 74 BPM | WEIGHT: 270 LBS | BODY MASS INDEX: 39.99 KG/M2 | SYSTOLIC BLOOD PRESSURE: 122 MMHG | TEMPERATURE: 98.6 F | RESPIRATION RATE: 16 BRPM | DIASTOLIC BLOOD PRESSURE: 70 MMHG | HEIGHT: 69 IN

## 2018-01-15 VITALS
HEIGHT: 69 IN | DIASTOLIC BLOOD PRESSURE: 82 MMHG | HEART RATE: 76 BPM | TEMPERATURE: 98.2 F | WEIGHT: 271.13 LBS | SYSTOLIC BLOOD PRESSURE: 118 MMHG | RESPIRATION RATE: 16 BRPM | BODY MASS INDEX: 40.16 KG/M2

## 2018-01-17 NOTE — MISCELLANEOUS
Message  Return to work or school:   Venkat Cowan is under my professional care  She was seen in my office on 10/19/2017   She is able to return to work on  10/20/2017       JESSENIA Echevarria /keturah  Signatures   Electronically signed by :  Katlin Vail MD; Oct 19 2017  3:48PM EST                       (Author)

## 2018-01-23 NOTE — PROCEDURES
Assessment    1  Abnormal Pap smear of cervix (795 00) (F86 674)    Discussion/Summary  Discussion Summary: This is a 61-year-old female  Her last Pap smear showed low-grade ELLE  She is now consented to having a colposcopy performed  She denies any history of abnormal Pap smears  After consent was obtained  The entire transformation zone was seen  There is evidence of leukoplakia/white epithelium  Labs were taken at the 6:00 9:00 and 12:00 positions respectively  The bleeding was well-controlled with Monsel solution  Also noted an ECC was performed  Impression probably RAMSEY-1  The patient return the office in 2 weeks for discussion of pathology  She will also have a discussion about her infertility  It should be noted there was evidence of recent ovulation doing the colposcopy  There was cervical dilatation and increasing vaginal secretions  Active Problems    1  Abnormal Pap smear of cervix (795 00) (R87 619)   2  Anxiety (300 00) (F41 9)   3  Chronic diarrhea of unknown origin (787 91) (K52 9)   4  Edema (782 3) (R60 9)   5  Elevated liver enzymes (790 5) (R74 8)   6  Encounter for gynecological examination (V72 31) (Z01 419)   7  Eustachian tube dysfunction (381 81) (H69 80)   8  Fatigue (780 79) (R53 83)   9  Hidradenitis suppurativa (705 83) (L73 2)   10  Hyperlipidemia, mixed (272 2) (E78 2)   11  Infertility, female (628 9) (N97 9)   15  Mild vitamin D deficiency (268 9) (E55 9)   13  Morbid obesity (278 01) (E66 01)   14  Polycystic ovarian syndrome (256 4) (E28 2)   15  Thyromegaly (240 9) (E04 9)    Current Meds    1  ALPRAZolam 0 25 MG Oral Tablet; TAKE 1 TABLET EVERY 6 HOURS AS NEEDED FOR   ANXIETY; Therapy: 13FPR6199 to (Evaluate:43Jlc5851)  Requested for: 16OMY1195; Last   Rx:26Jun2015 Ordered    2  Furosemide 20 MG Oral Tablet; TAKE 0 5 TABLET Daily PRN ankle swelling; Therapy: 21Jun2016 to (Evaluate:44Szd3635)  Requested for: 21Jun2016; Last   Rx:21Jun2016 Ordered    3   Atorvastatin Calcium 10 MG Oral Tablet; TAKE 1 TABLET DAILY AS DIRECTED; Therapy: 03Qrz7193 to (Evaluate:97Ezm2561)  Requested for: 24Ebg0422; Last   Rx:14Rdd1310 Ordered    4  Vitamin D 2000 UNIT Oral Tablet; One tablet daily; Therapy: 84Wvn5652 to (Last Rx:21Upz5366) Ordered    5  MetFORMIN HCl  MG Oral Tablet Extended Release 24 Hour; TAKE TWO   TABLETS BY MOUTH TWICE DAILY; Therapy: 19MBN7361 to (Evaluate:68Elp9854)  Requested for: 21Jun2016; Last   Rx:21Jun2016 Ordered    6  Spironolactone TABS; Therapy: (Recorded:68Ztb9079) to Recorded    Allergies    1  A + D Personal Care Lotion LOTN   2  Calamine LOTN   3  Caladryl   4  Zinc  Denied    5  Womens Multi CAPS    Procedure    Procedure: colposcopy  Indication: low grade squamous intraepithelial lesion  Were discussed with the patient  Written consent was obtained prior to the procedure  Procedure Note:  The squamocolumnar junction was fully visualized  Observation without staining showed leukoplakia at, but no mosaicism and no atypical vessels  After bathing the cervix in acetic acid, evaluation showed no acetowhite changes, no mosaicism and no atypical vessels  Vaginal Vault: no abnormalities seen  Cervical Biopsy: 3 biopsies taken of the cervix  the biopsies were taken at 6,9,12 o'clock  Hemostasis was obtained with Monsel's solution  Patient Status: the patient tolerated the procedure well  Complications: there were no complications          Future Appointments    Date/Time Provider Specialty Site   11/01/2016 11:15 AM [facility]     Signatures   Electronically signed by : JESSENIA Olmstead ; Sep 27 2016  9:29AM EST                       (Author)

## 2018-03-05 RX ORDER — CHLORAL HYDRATE 500 MG
CAPSULE ORAL 2 TIMES DAILY
COMMUNITY
Start: 2017-10-10 | End: 2020-04-21 | Stop reason: ALTCHOICE

## 2018-03-05 RX ORDER — ALPRAZOLAM 0.25 MG/1
TABLET ORAL
COMMUNITY
Start: 2012-01-11 | End: 2018-03-13 | Stop reason: SDUPTHER

## 2018-03-05 RX ORDER — CHOLECALCIFEROL (VITAMIN D3) 50 MCG
1 TABLET ORAL DAILY
COMMUNITY
Start: 2016-09-01 | End: 2018-09-11 | Stop reason: ALTCHOICE

## 2018-03-05 RX ORDER — ATORVASTATIN CALCIUM 10 MG/1
1 TABLET, FILM COATED ORAL DAILY
COMMUNITY
Start: 2016-08-23 | End: 2018-03-13 | Stop reason: ALTCHOICE

## 2018-03-09 RX ORDER — TOBRAMYCIN 3 MG/ML
SOLUTION/ DROPS OPHTHALMIC
Refills: 0 | COMMUNITY
Start: 2018-02-06 | End: 2018-09-11 | Stop reason: ALTCHOICE

## 2018-03-13 ENCOUNTER — OFFICE VISIT (OUTPATIENT)
Dept: FAMILY MEDICINE CLINIC | Facility: CLINIC | Age: 28
End: 2018-03-13
Payer: COMMERCIAL

## 2018-03-13 VITALS
TEMPERATURE: 97.9 F | RESPIRATION RATE: 16 BRPM | HEART RATE: 64 BPM | BODY MASS INDEX: 40.61 KG/M2 | HEIGHT: 69 IN | WEIGHT: 274.2 LBS | DIASTOLIC BLOOD PRESSURE: 88 MMHG | SYSTOLIC BLOOD PRESSURE: 124 MMHG

## 2018-03-13 DIAGNOSIS — E66.01 MORBID OBESITY (HCC): ICD-10-CM

## 2018-03-13 DIAGNOSIS — E55.9 VITAMIN D DEFICIENCY: ICD-10-CM

## 2018-03-13 DIAGNOSIS — F41.9 ANXIETY: ICD-10-CM

## 2018-03-13 DIAGNOSIS — E04.9 NONTOXIC GOITER, UNSPECIFIED: ICD-10-CM

## 2018-03-13 DIAGNOSIS — R60.0 LOCALIZED EDEMA: ICD-10-CM

## 2018-03-13 DIAGNOSIS — E78.2 HYPERLIPIDEMIA, MIXED: Primary | ICD-10-CM

## 2018-03-13 DIAGNOSIS — E28.2 POLYCYSTIC OVARIAN SYNDROME: ICD-10-CM

## 2018-03-13 PROCEDURE — 99214 OFFICE O/P EST MOD 30 MIN: CPT | Performed by: NURSE PRACTITIONER

## 2018-03-13 RX ORDER — ALPRAZOLAM 0.25 MG/1
0.25 TABLET ORAL DAILY PRN
Qty: 30 TABLET | Refills: 0 | Status: SHIPPED | OUTPATIENT
Start: 2018-03-13 | End: 2019-05-21 | Stop reason: SDUPTHER

## 2018-03-13 NOTE — PATIENT INSTRUCTIONS
Polycystic Ovarian Syndrome   WHAT YOU NEED TO KNOW:   What is polycystic ovarian syndrome? Polycystic ovarian syndrome (PCOS) is a hormone disorder that may cause cysts to form on your ovaries  Cysts are bumps that are filled with fluid  The cysts can prevent your ovaries from working correctly  What causes PCOS? The exact cause of PCOS is not known  It is believed that increased insulin levels may cause the ovaries to produce higher than normal amounts of male hormones  Insulin is produced in the pancreas and helps your body use sugar  Your risk may be increased if you have a family member with PCOS or other ovarian disease  What are the signs and symptoms of PCOS? · Irregular or absent monthly periods    · Increased hair growth on the face, chest, around the nipples, or lower abdomen    · Thinning of scalp hair    · Weight gain and fatigue    · High blood sugar levels or high blood pressure    · Infertility (problem getting pregnant)    · Acne, darkening of the skin, or skin tags    · Pelvic or abdominal pain  How is PCOS diagnosed? Your healthcare provider will ask about your symptoms and when they began  He will ask if you have any family members with PCOS  He may ask about your menstrual history, pregnancies, and medicines  You may also have one or more of the following tests:  · Blood tests: These can test your hormone and blood sugar levels  · Pelvic exam:  This exam lets your healthcare provider check the size and shape of your uterus, cervix, and ovaries  · Vaginal ultrasound: An ultrasound uses sound waves to show pictures of your ovaries on a monitor so your healthcare provider can check for cysts  A small tube is placed into your vagina  How is PCOS treated? · Medicines:      ¨ Birth control pills: These medicines have female hormones, and may decrease male hormone levels  Birth control pills may control your periods, prevent cysts, or cause them to shrink   They also help decrease your risk of endometrial cancer and correct abnormal bleeding  ¨ Hypoglycemic medicines: These help to lower your blood sugar levels and decrease insulin resistance  They are also used to lower male hormone levels and help you ovulate  ¨ Antiandrogen medicines: These may help decrease male hormone levels, excess hair growth, and thinning scalp hair  ¨ Steroids: These may help lower the release of male hormones  ¨ NSAIDs:  These medicines decrease swelling and pain  You can buy NSAIDs without a doctor's order  Ask your healthcare provider which medicine is right for you, and how much to take  Take as directed  NSAIDs can cause stomach bleeding or kidney problems if not taken correctly  · Surgery: Your healthcare provider may do surgery to see your ovaries or to take a biopsy (tissue sample)  He may remove cysts or part of your ovaries  What are the risks of PCOS? You may get an infection or bleed too much after surgery to remove the cysts on your ovaries  Even with treatment, PCOS may return or get worse  PCOS may increase your risk of diabetes, high blood pressure, or heart disease  PCOS may decrease your chances of getting pregnant  Problems with your ovulation may further lead to abnormal uterine bleeding or endometrial cancer  How can I manage my symptoms? · Manage your blood sugar and blood pressure: Your healthcare provider may want you to check your blood sugar levels and blood pressure at home  Keep a record and bring this to your follow-up visits  Blood sugar is measured with a glucose monitor  The monitor tests a small drop of blood  Blood pressure is measured with a cuff that you put on your arm and tighten  Ask for more information on how to measure your blood sugar and blood pressure  · Maintain a healthy weight:  Ask your healthcare provider how much you should weigh  Ask him to help you create a weight loss plan if you are overweight   Weight loss may help reduce the complications of PCOS  · Exercise:  Ask your healthcare provider about the best exercise plan for you  Exercise can help decrease blood sugar and blood pressure  It may also help with weight loss  · Eat a variety of healthy foods:  Healthy foods include fruits, vegetables, whole-grain breads, low-fat dairy products, beans, lean meats, and fish  A dietitian may help you plan meals that are lower in carbohydrates to help you manage your blood sugar levels  Too much carbohydrate at one time can raise your blood sugar to a high level  Where can I find more information? · [employer] [facility] [employer]   O  [address]  Phone: [phone]- [phone]  Phone: 4- [phone]  Web Address: http://[employer]/  org  · [address]  [place] [address]  Web Address: www hormone  org  When should I contact my healthcare provider? · You have a fever  · You feel weak or tired  · You have pain during sex  · Your pain is worse or does not go away after you take your pain medicine  · You have trouble urinating or emptying your bladder completely  · You have questions or concerns about your condition or care  When should I seek immediate care or call 911? · You have a severe headache or feel dizzy  · You vomit multiple times and cannot keep food or liquids down  · You have blurred or double vision  · Your breath has a fruity sweet smell, or you feel short of breath  · You have severe lower abdominal or pelvic pain  CARE AGREEMENT:   You have the right to help plan your care  Learn about your health condition and how it may be treated  Discuss treatment options with your caregivers to decide what care you want to receive  You always have the right to refuse treatment  The above information is an  only  It is not intended as medical advice for individual conditions or treatments   Talk to your doctor, nurse or pharmacist before following any medical regimen to see if it is safe and effective for you  © 2017 [employer] Information is for End User's use only and may not be sold, redistributed or otherwise used for commercial purposes  All illustrations and images included in CareNotes® are the copyrighted property of [employer]  or Barney Zendejas

## 2018-03-13 NOTE — PROGRESS NOTES
Chief Complaint   Patient presents with    Follow-up     6 month follow up for Edema, Elevated liver enzymes, Hyperlipidemia, mixed, Morbid obesity, Nontoxic goiter, unspecified, and Polycystic ovarian syndrome  Assessment/Plan:      Diagnoses and all orders for this visit:    Hyperlipidemia, mixed  Comments:  Lipid panel ordered  Atorvastatin D/C'd by Miky in 2016 per pt? Follow a low fat/ low cholesterol diet  Orders:  -     CBC and differential; Future  -     Comprehensive metabolic panel; Future  -     Lipid panel; Future  -     TSH, 3rd generation with T4 reflex; Future  -     Vitamin D 25 hydroxy; Future    Polycystic ovarian syndrome  Comments:  May continue Metformin 1000mg BID  She was referred back to Endocrinology (last seen 2016)  She does not want to see infertility specialists at this time  Orders:  -     Ambulatory referral to Endocrinology; Future  -     CBC and differential; Future  -     Comprehensive metabolic panel; Future  -     TSH, 3rd generation with T4 reflex; Future    Nontoxic goiter, unspecified  Comments:  Thyroid U/S order reprinted today  I recommended she have this done prior to seeing Endocrinology  TSH ordered   Orders:  -     Ambulatory referral to Endocrinology; Future  -     CBC and differential; Future  -     Comprehensive metabolic panel; Future  -     TSH, 3rd generation with T4 reflex; Future    Morbid obesity ([address] )  Comments:  Advised regular exercise, work on weight loss  Orders:  -     CBC and differential; Future  -     Comprehensive metabolic panel; Future  -     Lipid panel; Future  -     TSH, 3rd generation with T4 reflex; Future    Localized edema  Comments:  No edema today  May continue Lasix 20mg 1/2 to 1 tab daily prn  Follow a low sodium diet   Orders:  -     CBC and differential; Future  -     Comprehensive metabolic panel; Future  -     TSH, 3rd generation with T4 reflex; Future    Anxiety  Comments:  Stable  Xanax refilled today    The [place]MP site was checked and is appropriate  Orders:  -     CBC and differential; Future  -     Comprehensive metabolic panel; Future  -     TSH, 3rd generation with T4 reflex; Future  -     ALPRAZolam (XANAX) 0 25 mg tablet; Take 1 tablet (0 25 mg total) by mouth daily as needed for anxiety    Vitamin D deficiency  Comments:  Vitamin D lab ordered today  Not on any current supplements   Orders:  -     Vitamin D 25 hydroxy; Future      Advised to schedule a f/u with GYN, Dr Dorena Osgood (abnormal PAP from 9/2016 with no repeats following this)    Subjective:      Patient ID: Olive Washington is a 25 y o  female  HPI   Pt presents by herself today for a routine follow up of chronic medical conditions    Hyperlipidemia- lipid panel from 10/2017 with / LDL 54/ / HDL 30  She reports taking Atorvastatin in the past but this was stopped by Endocrinology? LE Edema- takes Lasix 20 mg 1/2 to 1 tab prn which does help her LE edema  Edema is worse after being on her feet all day at work       Morbid Obesity- no current exercise but she is trying to do a low carb, high protein diet for the past few weeks     Nontoxic thyroid goiter-  Was told by Endocrinology she needed a thyroid U/S, but she never had this done  Most recent TSH from 10/2017 at 2 190    PCOS/ Infertility- She was following with Endocrinology, Dr Camilo Baca, last seen 2016  Currently taking Metformin 1000 mg BID  She did a few rounds of Clomid with GYN, Dr Dorena Osgood but was unsuccesful  She does not want to see an infertility specialist yet    Anxiety- taking Xanax 0 25 prn, last filled 9/2017  Mood is stable  Has some anxiety related to her PCOS and irregular menstrual cycles  Denies depression, SI/HI     GYN - Dr Dorena Osgood, last seen 7/2017    Her last PAP is from 9/2016 which was abnormal   She notes she was supposed to return 6 months from that time for a repeat PAP which she has not done     The following portions of the patient's history were reviewed and updated as appropriate: allergies, current medications, past medical history, past social history and problem list     Review of Systems   Constitutional: Negative for chills, fatigue and fever  Respiratory: Negative for cough, shortness of breath and wheezing  Cardiovascular: Positive for leg swelling  Negative for chest pain and palpitations  Gastrointestinal: Negative for abdominal pain, constipation and diarrhea  Genitourinary: Negative for dysuria  Musculoskeletal: Negative for myalgias  Skin: Negative for rash and wound  Neurological: Negative for dizziness and headaches  Hematological: Negative for adenopathy  Does not bruise/bleed easily  Psychiatric/Behavioral: Negative for sleep disturbance  The patient is nervous/anxious  Objective:      /88 (BP Location: Left arm, Patient Position: Sitting, Cuff Size: Large)   Pulse 64   Temp 97 9 °F (36 6 °C) (Tympanic)   Resp 16   Ht 5' 9" (1 753 m)   Wt 124 kg (274 lb 3 2 oz)   BMI 40 49 kg/m²          Physical Exam   Constitutional: She is oriented to person, place, and time  She appears well-developed  Morbidly obese   HENT:   Head: Normocephalic and atraumatic  Eyes: Pupils are equal, round, and reactive to light  Neck: Normal range of motion  Neck supple  No thyromegaly present  Cardiovascular: Normal rate, regular rhythm and normal heart sounds  No LE edema B/L    Pulmonary/Chest: Effort normal and breath sounds normal  She has no wheezes  Abdominal: Soft  Bowel sounds are normal  There is no tenderness  Lymphadenopathy:     She has no cervical adenopathy  Neurological: She is alert and oriented to person, place, and time  Skin: Skin is warm and dry  Psychiatric: She has a normal mood and affect

## 2018-05-07 ENCOUNTER — TELEPHONE (OUTPATIENT)
Dept: FAMILY MEDICINE CLINIC | Facility: CLINIC | Age: 28
End: 2018-05-07

## 2018-05-07 NOTE — TELEPHONE ENCOUNTER
The referral was just for [address] [place] [address] Endocrinology  I didn't pick a specific provider because it's more difficult to schedule then    The main office phone number is [phone]

## 2018-05-14 DIAGNOSIS — M79.89 BILATERAL HAND SWELLING: ICD-10-CM

## 2018-05-14 DIAGNOSIS — M25.473 ANKLE SWELLING, UNSPECIFIED LATERALITY: Primary | ICD-10-CM

## 2018-05-14 DIAGNOSIS — E28.2 POLYCYSTIC OVARIAN SYNDROME: ICD-10-CM

## 2018-05-14 RX ORDER — FUROSEMIDE 20 MG/1
20 TABLET ORAL DAILY
Qty: 30 TABLET | Refills: 5 | Status: SHIPPED | OUTPATIENT
Start: 2018-05-14 | End: 2018-09-11 | Stop reason: SDUPTHER

## 2018-05-14 RX ORDER — FUROSEMIDE 20 MG/1
TABLET ORAL
Qty: 30 TABLET | Refills: 2 | OUTPATIENT
Start: 2018-05-14

## 2018-05-14 RX ORDER — METFORMIN HYDROCHLORIDE EXTENDED-RELEASE TABLETS 1000 MG/1
1000 TABLET, FILM COATED, EXTENDED RELEASE ORAL 2 TIMES DAILY
Qty: 60 TABLET | Refills: 5 | Status: SHIPPED | OUTPATIENT
Start: 2018-05-14 | End: 2018-09-11 | Stop reason: SDUPTHER

## 2018-06-05 ENCOUNTER — OFFICE VISIT (OUTPATIENT)
Dept: ENDOCRINOLOGY | Facility: CLINIC | Age: 28
End: 2018-06-05
Payer: COMMERCIAL

## 2018-06-05 VITALS
SYSTOLIC BLOOD PRESSURE: 118 MMHG | HEIGHT: 69 IN | DIASTOLIC BLOOD PRESSURE: 80 MMHG | BODY MASS INDEX: 39.41 KG/M2 | HEART RATE: 90 BPM | WEIGHT: 266.1 LBS

## 2018-06-05 DIAGNOSIS — E28.2 PCOS (POLYCYSTIC OVARIAN SYNDROME): Primary | ICD-10-CM

## 2018-06-05 DIAGNOSIS — R63.5 ABNORMAL WEIGHT GAIN: ICD-10-CM

## 2018-06-05 DIAGNOSIS — E01.0 THYROMEGALY: ICD-10-CM

## 2018-06-05 DIAGNOSIS — E55.9 VITAMIN D DEFICIENCY: ICD-10-CM

## 2018-06-05 PROCEDURE — 99214 OFFICE O/P EST MOD 30 MIN: CPT | Performed by: INTERNAL MEDICINE

## 2018-06-05 RX ORDER — DEXAMETHASONE 1 MG
TABLET ORAL
Qty: 1 TABLET | Refills: 0 | Status: SHIPPED | OUTPATIENT
Start: 2018-06-05 | End: 2018-09-11 | Stop reason: ALTCHOICE

## 2018-06-05 NOTE — ASSESSMENT & PLAN NOTE
Patient counseled on diet medications and weight loss, offered nutrition evaluation that she has currently declined  Also discussed weight management program but she has declined    Continue metformin as she is tolerating now

## 2018-06-05 NOTE — PROGRESS NOTES
Renetta Dhillon 25 y o  female MRN: [id number]    Encounter: [id number]      Assessment/Plan     Problem List Items Addressed This Visit     PCOS (polycystic ovarian syndrome) - Primary     Patient counseled on diet medications and weight loss, offered nutrition evaluation that she has currently declined  Also discussed weight management program but she has declined  Continue metformin as she is tolerating now          Relevant Medications    dexamethasone (DECADRON) 1 mg tablet    Other Relevant Orders    T4, free Lab Collect    Vitamin D deficiency     Continue vitamin-D supplementations         Relevant Orders    Vitamin D 25 hydroxy Lab Collect    Abnormal weight gain     Will screen for Cushing's disease-dexamethasone suppression test         Relevant Medications    dexamethasone (DECADRON) 1 mg tablet    Other Relevant Orders    Cortisol Level, AM Specimen Lab Collect    Thyromegaly     Thyroid function test within normal range-will order a thyroid ultrasound         Relevant Medications    dexamethasone (DECADRON) 1 mg tablet    Other Relevant Orders    US thyroid        CC:   PCOS     History of Present Illness     HPI:  The old woman with past medical he lost polycystic ovarian syndrome, obesity, hirsutism and vitamin-D deficiency here for follow-up  She continues to complain irregular cycles, facial hirsutism   She is currently on metformin 1000 mg bid she was on extended-release metformin however states that she was having GI upset so switched to regular metformin   She complains of weight gain, fatigue, sleep disturbances, anxiety  For vitamin-D deficiency-she is on supplementations  Review of Systems   Constitutional: Positive for fatigue and unexpected weight change  Eyes: Negative for visual disturbance  Respiratory: Negative for cough and shortness of breath  Cardiovascular: Negative for palpitations and leg swelling  Gastrointestinal: Positive for diarrhea   Negative for constipation, nausea and vomiting  Endocrine: Negative for polydipsia and polyuria  Skin: Negative for color change, pallor, rash and wound  + hirsutism   Psychiatric/Behavioral: Positive for sleep disturbance  The patient is nervous/anxious          Historical Information   Past Medical History:   Diagnosis Date    Chronic diarrhea of unknown origin     last assessed 6/26/15    Dyspepsia     last assessed  10/29/14    Early satiety     last assessed  10/29/14    Hidradenitis suppurativa     last assessed  14    Obesity     PCOS (polycystic ovarian syndrome)      Past Surgical History:   Procedure Laterality Date    ABDOMINAL SURGERY      c-sections x2     SECTION       and  [facility]    WISDOM TOOTH EXTRACTION      all 4     Social History   History   Alcohol Use No     Comment: rare     History   Drug Use No     History   Smoking Status    Former Smoker    Packs/day: 0 00    Years: 0 00    Quit date: 2014   Smokeless Tobacco    Never Used     Comment: "off and on"     Family History:   Family History   Problem Relation Age of Onset    No Known Problems Father     Thyroid disease Maternal Grandmother     Diabetes Paternal Grandmother        Meds/Allergies   Current Outpatient Prescriptions   Medication Sig Dispense Refill    ALPRAZolam (XANAX) 0 25 mg tablet Take 1 tablet (0 25 mg total) by mouth daily as needed for anxiety 30 tablet 0    Cholecalciferol (VITAMIN D) 2000 units tablet Take 1 tablet by mouth daily      furosemide (LASIX) 20 mg tablet Take 1 tablet (20 mg total) by mouth daily for 30 days 30 tablet 5    metFORMIN (FORTAMET) 1000 MG (OSM) 24 hr tablet Take 1 tablet (1,000 mg total) by mouth 2 (two) times a day for 30 days 60 tablet 5    Omega-3 Fatty Acids (FISH OIL) 1,000 mg Take by mouth 2 (two) times a day      dexamethasone (DECADRON) 1 mg tablet 1 tab at 11 pm the night before labs in morning ( am cortisol) 1 tablet 0    tobramycin (TOBREX) 0 3 % SOLN 2 DROP(S) IN RIGHT EYE EVERY 6 HOURS AROUND THE CLOCK  0     No current facility-administered medications for this visit  Allergies   Allergen Reactions    Pramoxine-Calamine     A + D Personal Care Lotion  [Dimethicone] Rash    Calamine-Zinc Oxide Rash    Zinc Rash     No med alert bracelet no epi pen       Objective   Vitals: Blood pressure 118/80, pulse 90, height 5' 9" (1 753 m), weight 121 kg (266 lb 1 6 oz)  Physical Exam   Constitutional: She is oriented to person, place, and time  She appears well-developed and well-nourished  No distress  HENT:   Head: Normocephalic and atraumatic  Mouth/Throat: No oropharyngeal exudate  Eyes: Conjunctivae and EOM are normal  No scleral icterus  Neck: Normal range of motion  Neck supple  Thyromegaly present  Cardiovascular: Normal rate, regular rhythm and normal heart sounds  No murmur heard  Pulmonary/Chest: Effort normal and breath sounds normal  No respiratory distress  She has no wheezes  She has no rales  Abdominal: Soft  Bowel sounds are normal  She exhibits no distension  There is no tenderness  There is no rebound  Musculoskeletal: Normal range of motion  She exhibits no edema or deformity  Lymphadenopathy:     She has no cervical adenopathy  Neurological: She is alert and oriented to person, place, and time  Skin: Skin is warm and dry  No rash noted  No erythema  No pallor  Psychiatric: Judgment and thought content normal    anxious        The history was obtained from the review of the chart, patient  Lab Results:   Lab Results   Component Value Date/Time    TSH 3RD GENERATON 2 190 10/10/2017 09:02 AM         Portions of the record may have been created with voice recognition software  Occasional wrong word or "sound a like" substitutions may have occurred due to the inherent limitations of voice recognition software   Read the chart carefully and recognize, using context, where substitutions have occurred

## 2018-06-12 ENCOUNTER — TELEPHONE (OUTPATIENT)
Dept: ENDOCRINOLOGY | Facility: CLINIC | Age: 28
End: 2018-06-12

## 2018-06-12 ENCOUNTER — LAB (OUTPATIENT)
Dept: LAB | Facility: HOSPITAL | Age: 28
End: 2018-06-12
Attending: INTERNAL MEDICINE
Payer: COMMERCIAL

## 2018-06-12 ENCOUNTER — TRANSCRIBE ORDERS (OUTPATIENT)
Dept: LAB | Facility: HOSPITAL | Age: 28
End: 2018-06-12

## 2018-06-12 ENCOUNTER — HOSPITAL ENCOUNTER (OUTPATIENT)
Dept: RADIOLOGY | Facility: HOSPITAL | Age: 28
Discharge: HOME | End: 2018-06-12
Attending: INTERNAL MEDICINE
Payer: COMMERCIAL

## 2018-06-12 DIAGNOSIS — E01.0 THYROMEGALY: ICD-10-CM

## 2018-06-12 DIAGNOSIS — R63.5 ABNORMAL WEIGHT GAIN: ICD-10-CM

## 2018-06-12 LAB — CORTIS AM PEAK SERPL-MCNC: <0.5 UG/DL (ref 4.2–22.4)

## 2018-06-12 PROCEDURE — 76536 US EXAM OF HEAD AND NECK: CPT

## 2018-06-12 PROCEDURE — 82533 TOTAL CORTISOL: CPT

## 2018-06-12 PROCEDURE — 36415 COLL VENOUS BLD VENIPUNCTURE: CPT

## 2018-06-12 NOTE — TELEPHONE ENCOUNTER
----- Message from Kymberly Archibald MD sent at 6/12/2018  9:52 AM EDT -----  Please call the patient regarding her abnormal result   Low Cortisol - Appropriate response to dexamethasone suppression test

## 2018-06-12 NOTE — PROGRESS NOTES
Please call the patient regarding her abnormal result   Low Cortisol - Appropriate response to dexamethasone suppression test

## 2018-06-14 ENCOUNTER — TELEPHONE (OUTPATIENT)
Dept: ENDOCRINOLOGY | Facility: CLINIC | Age: 28
End: 2018-06-14

## 2018-06-14 NOTE — TELEPHONE ENCOUNTER
----- Message from Andrey Rodrigues MD sent at 6/14/2018  2:27 PM EDT -----  Please call the patient regarding her abnormal result   Small thyroid nodule - which doesn't meet criteria for biopsy - will monitor and repeat sono in 6 months

## 2018-06-14 NOTE — PROGRESS NOTES
Please call the patient regarding her abnormal result   Small thyroid nodule - which doesn't meet criteria for biopsy - will monitor and repeat sono in 6 months

## 2018-06-15 ENCOUNTER — TELEPHONE (OUTPATIENT)
Dept: FAMILY MEDICINE CLINIC | Facility: CLINIC | Age: 28
End: 2018-06-15

## 2018-06-15 ENCOUNTER — APPOINTMENT (OUTPATIENT)
Dept: LAB | Facility: HOSPITAL | Age: 28
End: 2018-06-15
Payer: COMMERCIAL

## 2018-06-15 DIAGNOSIS — R60.0 LOCALIZED EDEMA: ICD-10-CM

## 2018-06-15 DIAGNOSIS — E78.2 HYPERLIPIDEMIA, MIXED: ICD-10-CM

## 2018-06-15 DIAGNOSIS — E55.9 VITAMIN D DEFICIENCY: ICD-10-CM

## 2018-06-15 DIAGNOSIS — E28.2 POLYCYSTIC OVARIAN SYNDROME: ICD-10-CM

## 2018-06-15 DIAGNOSIS — F41.9 ANXIETY: ICD-10-CM

## 2018-06-15 DIAGNOSIS — E04.9 NONTOXIC GOITER, UNSPECIFIED: ICD-10-CM

## 2018-06-15 DIAGNOSIS — E28.2 PCOS (POLYCYSTIC OVARIAN SYNDROME): ICD-10-CM

## 2018-06-15 DIAGNOSIS — E66.01 MORBID OBESITY (HCC): ICD-10-CM

## 2018-06-15 LAB
25(OH)D3 SERPL-MCNC: 21.3 NG/ML (ref 30–100)
ALBUMIN SERPL BCP-MCNC: 3.4 G/DL (ref 3.5–5)
ALP SERPL-CCNC: 51 U/L (ref 46–116)
ALT SERPL W P-5'-P-CCNC: 35 U/L (ref 12–78)
ANION GAP SERPL CALCULATED.3IONS-SCNC: 7 MMOL/L (ref 4–13)
AST SERPL W P-5'-P-CCNC: 22 U/L (ref 5–45)
BASOPHILS # BLD AUTO: 0.11 THOUSANDS/ΜL (ref 0–0.1)
BASOPHILS NFR BLD AUTO: 1 % (ref 0–1)
BILIRUB SERPL-MCNC: 1.71 MG/DL (ref 0.2–1)
BUN SERPL-MCNC: 11 MG/DL (ref 5–25)
CALCIUM SERPL-MCNC: 9 MG/DL (ref 8.3–10.1)
CHLORIDE SERPL-SCNC: 105 MMOL/L (ref 100–108)
CHOLEST SERPL-MCNC: 136 MG/DL (ref 50–200)
CO2 SERPL-SCNC: 26 MMOL/L (ref 21–32)
CREAT SERPL-MCNC: 0.85 MG/DL (ref 0.6–1.3)
EOSINOPHIL # BLD AUTO: 0.24 THOUSAND/ΜL (ref 0–0.61)
EOSINOPHIL NFR BLD AUTO: 2 % (ref 0–6)
ERYTHROCYTE [DISTWIDTH] IN BLOOD BY AUTOMATED COUNT: 11.8 % (ref 11.6–15.1)
GFR SERPL CREATININE-BSD FRML MDRD: 92 ML/MIN/1.73SQ M
GLUCOSE P FAST SERPL-MCNC: 91 MG/DL (ref 65–99)
HCT VFR BLD AUTO: 40.4 % (ref 34.8–46.1)
HDLC SERPL-MCNC: 35 MG/DL (ref 40–60)
HGB BLD-MCNC: 13.4 G/DL (ref 11.5–15.4)
IMM GRANULOCYTES # BLD AUTO: 0.03 THOUSAND/UL (ref 0–0.2)
IMM GRANULOCYTES NFR BLD AUTO: 0 % (ref 0–2)
LDLC SERPL CALC-MCNC: 48 MG/DL (ref 0–100)
LYMPHOCYTES # BLD AUTO: 2.44 THOUSANDS/ΜL (ref 0.6–4.47)
LYMPHOCYTES NFR BLD AUTO: 23 % (ref 14–44)
MCH RBC QN AUTO: 28.9 PG (ref 26.8–34.3)
MCHC RBC AUTO-ENTMCNC: 33.2 G/DL (ref 31.4–37.4)
MCV RBC AUTO: 87 FL (ref 82–98)
MONOCYTES # BLD AUTO: 0.57 THOUSAND/ΜL (ref 0.17–1.22)
MONOCYTES NFR BLD AUTO: 6 % (ref 4–12)
NEUTROPHILS # BLD AUTO: 7.04 THOUSANDS/ΜL (ref 1.85–7.62)
NEUTS SEG NFR BLD AUTO: 68 % (ref 43–75)
NONHDLC SERPL-MCNC: 101 MG/DL
NRBC BLD AUTO-RTO: 0 /100 WBCS
PLATELET # BLD AUTO: 289 THOUSANDS/UL (ref 149–390)
PMV BLD AUTO: 9.9 FL (ref 8.9–12.7)
POTASSIUM SERPL-SCNC: 4.2 MMOL/L (ref 3.5–5.3)
PROT SERPL-MCNC: 7.5 G/DL (ref 6.4–8.2)
RBC # BLD AUTO: 4.64 MILLION/UL (ref 3.81–5.12)
SODIUM SERPL-SCNC: 138 MMOL/L (ref 136–145)
T4 FREE SERPL-MCNC: 1.35 NG/DL (ref 0.76–1.46)
TRIGL SERPL-MCNC: 267 MG/DL
TSH SERPL DL<=0.05 MIU/L-ACNC: 2.92 UIU/ML (ref 0.36–3.74)
WBC # BLD AUTO: 10.43 THOUSAND/UL (ref 4.31–10.16)

## 2018-06-15 PROCEDURE — 80061 LIPID PANEL: CPT

## 2018-06-15 PROCEDURE — 85025 COMPLETE CBC W/AUTO DIFF WBC: CPT

## 2018-06-15 PROCEDURE — 84443 ASSAY THYROID STIM HORMONE: CPT

## 2018-06-15 PROCEDURE — 80053 COMPREHEN METABOLIC PANEL: CPT

## 2018-06-15 PROCEDURE — 82306 VITAMIN D 25 HYDROXY: CPT

## 2018-06-15 PROCEDURE — 36415 COLL VENOUS BLD VENIPUNCTURE: CPT

## 2018-06-15 PROCEDURE — 84439 ASSAY OF FREE THYROXINE: CPT

## 2018-06-15 NOTE — TELEPHONE ENCOUNTER
Patient returned call for bloodwork results  Also, the message said she is deficient in vitamin D and should take 5,000 mg a day-she already takes that amount  Will taking 5,000 more help her level? Please call patient at [phone]

## 2018-09-11 ENCOUNTER — OFFICE VISIT (OUTPATIENT)
Dept: FAMILY MEDICINE CLINIC | Facility: CLINIC | Age: 28
End: 2018-09-11
Payer: COMMERCIAL

## 2018-09-11 ENCOUNTER — OFFICE VISIT (OUTPATIENT)
Dept: ENDOCRINOLOGY | Facility: CLINIC | Age: 28
End: 2018-09-11
Payer: COMMERCIAL

## 2018-09-11 VITALS
BODY MASS INDEX: 40.46 KG/M2 | SYSTOLIC BLOOD PRESSURE: 138 MMHG | WEIGHT: 273.2 LBS | HEIGHT: 69 IN | HEART RATE: 102 BPM | DIASTOLIC BLOOD PRESSURE: 88 MMHG

## 2018-09-11 VITALS
RESPIRATION RATE: 16 BRPM | TEMPERATURE: 98.5 F | WEIGHT: 274.8 LBS | HEIGHT: 69 IN | BODY MASS INDEX: 40.7 KG/M2 | HEART RATE: 80 BPM | DIASTOLIC BLOOD PRESSURE: 70 MMHG | SYSTOLIC BLOOD PRESSURE: 120 MMHG

## 2018-09-11 DIAGNOSIS — R60.0 LOCALIZED EDEMA: ICD-10-CM

## 2018-09-11 DIAGNOSIS — E66.01 MORBID OBESITY (HCC): ICD-10-CM

## 2018-09-11 DIAGNOSIS — F41.9 ANXIETY: ICD-10-CM

## 2018-09-11 DIAGNOSIS — E04.1 THYROID NODULE: ICD-10-CM

## 2018-09-11 DIAGNOSIS — E28.2 PCOS (POLYCYSTIC OVARIAN SYNDROME): Primary | ICD-10-CM

## 2018-09-11 DIAGNOSIS — E55.9 VITAMIN D DEFICIENCY: ICD-10-CM

## 2018-09-11 DIAGNOSIS — E28.2 PCOS (POLYCYSTIC OVARIAN SYNDROME): ICD-10-CM

## 2018-09-11 DIAGNOSIS — E04.9 NONTOXIC GOITER, UNSPECIFIED: Primary | ICD-10-CM

## 2018-09-11 DIAGNOSIS — E78.2 HYPERLIPIDEMIA, MIXED: ICD-10-CM

## 2018-09-11 PROCEDURE — 99213 OFFICE O/P EST LOW 20 MIN: CPT | Performed by: NURSE PRACTITIONER

## 2018-09-11 PROCEDURE — 99214 OFFICE O/P EST MOD 30 MIN: CPT | Performed by: NURSE PRACTITIONER

## 2018-09-11 RX ORDER — METFORMIN HYDROCHLORIDE EXTENDED-RELEASE TABLETS 1000 MG/1
1000 TABLET, FILM COATED, EXTENDED RELEASE ORAL 2 TIMES DAILY
COMMUNITY
Start: 2018-08-26 | End: 2019-04-23 | Stop reason: SDUPTHER

## 2018-09-11 RX ORDER — MELATONIN
5000 DAILY
COMMUNITY
End: 2020-06-13 | Stop reason: HOSPADM

## 2018-09-11 RX ORDER — FUROSEMIDE 20 MG/1
TABLET ORAL
COMMUNITY
Start: 2018-08-20 | End: 2018-09-11 | Stop reason: CLARIF

## 2018-09-11 RX ORDER — SPIRONOLACTONE 50 MG/1
50 TABLET, FILM COATED ORAL DAILY
Qty: 30 TABLET | Refills: 6 | Status: SHIPPED | OUTPATIENT
Start: 2018-09-11 | End: 2019-04-23 | Stop reason: SDUPTHER

## 2018-09-11 NOTE — PROGRESS NOTES
Chief Complaint   Patient presents with    Follow-up     6 month follow up     Assessment/Plan:    Nontoxic goiter, unspecified  Managed by Endocrinology   U/S from 6/2018 with mild thyromegaly, 1 cm right lower pole nodule    PCOS (polycystic ovarian syndrome)  Managed by Endocrinology   Continue Metformin   She will be seeing Endo today and will inquire about restarting Spironolactone (rather that Lasix)    Anxiety  Well controlled  Continue Xanax 0 25 mg prn    Edema  Stable  Continue Lasix (if Endo agrees to start Spironolactone for PCOS symptoms, would discontinue Lasix)  Elevate legs  Limit sodium intake  Work on weight loss     Hyperlipidemia, mixed  Lipid panel from June 2018 with / LDL 48/ / HDL 35  Continue Omega 3   Repeat lipid panel ordered  Follow a low fat/ low cholesterol diet  Work on weight loss    Morbid obesity ([address] )  Advised regular exercise 30 minutes 5 x per week, work on weight loss  Declined a referral to our  Weight Management Program     Vitamin D deficiency  Continue Vitamin D 5000IU one tab daily  Vitamin D level ordered today     Advised to follow up with Dr Araceli Flores, GYN for routine GYN exam/ PAP  Declined the flu vaccination today   Labs ordered today   RTO 6 months     Diagnoses and all orders for this visit:    Nontoxic goiter, unspecified  -     CBC and differential; Future  -     Comprehensive metabolic panel; Future  -     TSH, 3rd generation with Free T4 reflex; Future    PCOS (polycystic ovarian syndrome)  -     CBC and differential; Future  -     Comprehensive metabolic panel; Future  -     Lipid panel; Future  -     TSH, 3rd generation with Free T4 reflex; Future    Anxiety  -     CBC and differential; Future  -     Comprehensive metabolic panel; Future  -     TSH, 3rd generation with Free T4 reflex; Future    Localized edema  -     CBC and differential; Future  -     Comprehensive metabolic panel;  Future    Hyperlipidemia, mixed  -     CBC and differential; Future  -     Comprehensive metabolic panel; Future  -     Lipid panel; Future    Morbid obesity (HCC)  -     CBC and differential; Future  -     Comprehensive metabolic panel; Future  -     Lipid panel; Future    Vitamin D deficiency  -     CBC and differential; Future  -     Comprehensive metabolic panel; Future  -     Vitamin D 25 hydroxy; Future    Other orders  -     furosemide (LASIX) 20 mg tablet;   -     metFORMIN (FORTAMET) 1000 MG (OSM) 24 hr tablet;   -     cholecalciferol (VITAMIN D3) 1,000 units tablet; Take 5,000 Units by mouth daily          Subjective:      Patient ID: Jarek Denise is a 25 y o  female      HPI     Pt presents by herself today for a 6 month follow up of chronic medical conditions    Hyperlipidemia- Lipid panel from June 2018 with / LDL 48/ / HDL 35  Currently taking Omega 3, 2000 mg daily     PCOS- managed by Endocrinology  Currently taking Metformin 1000mg BID  Last Menstrual Cycle was 67 days ago   Reports menstrual cycles range from 30-70 days   She is considering going back on OCP to regulate this   Follows with GYN, Dr Tevin Cotto   Last PAP 8/2016 which was abnormal with low grade squamous intraepithelial lesion  Endometrial biopsies taken 9/2016 with CIN1  No f/u PAPs since then     Thyroid Goiter- also managed by Endocrinology   U/S from June 2018 with mild thyromegaly, a 1 cm nodule in the right lower pole     Edema- takes Lasix 20 mg 1/2 tab prn   Reports she doesn't take this very often   She is inquiring about switching back to Spironolactone to help manage her PCOS and also the edema (she will be seeing Endocrinology this afternoon)    Morbid Obesity- no current, regular exercise  BMI at 40 58     Anxiety- takes Xanax 0 25 mg sparingly, about once every few weeks   Feels her anxiety is well controlled  Denies depression     Vitamin D deficiency- last vitamin D level at 21 3 from June 2018, currently taking 5000IU daily       The following portions of the patient's history were reviewed and updated as appropriate: allergies, current medications, past medical history, past social history and problem list     Review of Systems   Constitutional: Negative for activity change, appetite change, chills, diaphoresis, fatigue, fever and unexpected weight change  HENT: Negative for tinnitus, trouble swallowing and voice change  Eyes: Negative for visual disturbance  Respiratory: Negative for cough, chest tightness, shortness of breath and wheezing  Cardiovascular: Negative for chest pain, palpitations and leg swelling  Gastrointestinal: Negative for abdominal pain, blood in stool, constipation, diarrhea and nausea  Occasional GI upset with Metformin   Genitourinary: Positive for menstrual problem  Negative for dysuria  Musculoskeletal: Negative for arthralgias  Skin: Negative for rash and wound  Neurological: Negative for dizziness and headaches  Hematological: Negative for adenopathy  Does not bruise/bleed easily  Psychiatric/Behavioral: Negative for dysphoric mood, self-injury, sleep disturbance and suicidal ideas  The patient is nervous/anxious  Objective:      /70   Pulse 80   Temp 98 5 °F (36 9 °C) (Tympanic)   Resp 16   Ht 5' 9" (1 753 m)   Wt 125 kg (274 lb 12 8 oz)   LMP  (LMP Unknown)   BMI 40 58 kg/m²          Physical Exam   Constitutional: She is oriented to person, place, and time  She appears well-developed and well-nourished  No distress  HENT:   Head: Normocephalic and atraumatic  Eyes: Conjunctivae are normal  Pupils are equal, round, and reactive to light  Neck: Normal range of motion  Neck supple  Cardiovascular: Normal rate, regular rhythm and normal heart sounds  No murmur heard  No LE edema B/L  No carotid bruits   Pulmonary/Chest: Effort normal and breath sounds normal  No respiratory distress  She has no wheezes  Abdominal: Soft  Bowel sounds are normal  She exhibits no distension   There is no tenderness  Musculoskeletal: Normal range of motion  Lymphadenopathy:     She has no cervical adenopathy  Neurological: She is alert and oriented to person, place, and time  Skin: Skin is warm and dry  No rash noted  She is not diaphoretic  Psychiatric: She has a normal mood and affect

## 2018-09-11 NOTE — ASSESSMENT & PLAN NOTE
Continue metformin  Will d/c lasix and resume her spironolactone 50 mg daily for her hirsutism  Will check cmp  She was given lab slips from PCP today   Referral given for weight management program

## 2018-09-11 NOTE — PROGRESS NOTES
Established Patient Progress Note       Chief Complaint   Patient presents with    Polycystic Ovary Syndrome        History of Present Illness:     Tabitha Hebert is a 25 y o  female with a history of PCOS, vitamin d deficiency, and thyroid nodule  For the PCOS she is currently taking Metformin 1,000 mg BID  She reports she is currently on lasix but would like to go back on spironolactone as it helped with her hirsutism  She reports she her PCP would be ok with the switch  She reports she was previously on 50 mg daily  For the vitamin d deficiency she is currently taking 5,000 units daily  For the thyroid nodules she recently had thyroid US performed due to thyromegaly which showed a 1 cm right lower pole thyroid nodule  She denies family history of thyroid cancer or thyroid nodules  She reports family history of hypothyroidism  Her thyroid function tests have been normal  She denies neck pain, dysphonia, dysphagia, or dyspnea            Patient Active Problem List   Diagnosis    PCOS (polycystic ovarian syndrome)    Nontoxic goiter, unspecified    Morbid obesity ([address] )    Vitamin D deficiency    Infertility, female    Hyperlipidemia, mixed    Elevated liver enzymes    Edema    Anxiety    Abnormal weight gain    Thyromegaly    Thyroid nodule      Past Medical History:   Diagnosis Date    Chronic diarrhea of unknown origin     last assessed 6/26/15    Dyspepsia     last assessed  10/29/14    Early satiety     last assessed  10/29/14    Hidradenitis suppurativa     last assessed  14    Obesity     PCOS (polycystic ovarian syndrome)       Past Surgical History:   Procedure Laterality Date    ABDOMINAL SURGERY      c-sections x2     SECTION       and  [address]    WISDOM TOOTH EXTRACTION      all 4      Family History   Problem Relation Age of Onset    No Known Problems Father     Thyroid disease Maternal Grandmother     Diabetes Paternal Grandmother Social History   Substance Use Topics    Smoking status: Former Smoker     Packs/day: 0 00     Years: 0 00     Quit date: 03/2014    Smokeless tobacco: Never Used      Comment: "off and on"    Alcohol use No      Comment: rare     Allergies   Allergen Reactions    Pramoxine-Calamine     A + D Personal Care Lotion  [Dimethicone] Rash    Calamine-Zinc Oxide Rash    Zinc Rash     No med alert bracelet no epi pen       Current Outpatient Prescriptions:     ALPRAZolam (XANAX) 0 25 mg tablet, Take 1 tablet (0 25 mg total) by mouth daily as needed for anxiety, Disp: 30 tablet, Rfl: 0    cholecalciferol (VITAMIN D3) 1,000 units tablet, Take 5,000 Units by mouth daily, Disp: , Rfl:     metFORMIN (FORTAMET) 1000 MG (OSM) 24 hr tablet, , Disp: , Rfl:     Omega-3 Fatty Acids (FISH OIL) 1,000 mg, Take by mouth 2 (two) times a day, Disp: , Rfl:     spironolactone (ALDACTONE) 50 mg tablet, Take 1 tablet (50 mg total) by mouth daily for 30 days, Disp: 30 tablet, Rfl: 6    Review of Systems   Constitutional: Negative for chills and fever  HENT: Negative for sore throat and trouble swallowing  Eyes: Negative for visual disturbance  Respiratory: Negative for shortness of breath  Cardiovascular: Negative for chest pain and palpitations  Gastrointestinal: Negative for abdominal pain, constipation and diarrhea  Endocrine: Negative for cold intolerance, heat intolerance, polydipsia, polyphagia and polyuria  Genitourinary: Negative for frequency  Musculoskeletal: Negative for arthralgias and myalgias  Skin: Negative for rash  Neurological: Negative for dizziness and tremors  Hematological: Negative for adenopathy  Psychiatric/Behavioral: Negative for sleep disturbance  All other systems reviewed and are negative  Physical Exam:  Body mass index is 40 34 kg/m²    /88   Pulse 102   Ht 5' 9" (1 753 m)   Wt 124 kg (273 lb 3 2 oz)   LMP  (LMP Unknown)   BMI 40 34 kg/m²    Wt Readings from Last 3 Encounters:   09/11/18 124 kg (273 lb 3 2 oz)   09/11/18 125 kg (274 lb 12 8 oz)   06/05/18 121 kg (266 lb 1 6 oz)       Physical Exam   Constitutional: She is oriented to person, place, and time  She appears well-developed and well-nourished  No distress  HENT:   Head: Normocephalic and atraumatic  Eyes: Conjunctivae are normal  Pupils are equal, round, and reactive to light  Neck: Normal range of motion  Neck supple  No thyromegaly present  Cardiovascular: Normal rate, regular rhythm and normal heart sounds  Pulmonary/Chest: Effort normal and breath sounds normal  No respiratory distress  She has no wheezes  She has no rales  Abdominal: Soft  Bowel sounds are normal  She exhibits no distension  There is no tenderness  Musculoskeletal: Normal range of motion  She exhibits no edema  Neurological: She is alert and oriented to person, place, and time  Skin: Skin is warm and dry  Psychiatric: She has a normal mood and affect  Vitals reviewed        Labs:     Component      Latest Ref Rng & Units 6/15/2018   Vit D, 25-Hydroxy      30 0 - 100 0 ng/mL 21 3 (L)       Component      Latest Ref Rng & Units 6/15/2018   TSH 3RD GENERATON      0 358 - 3 740 uIU/mL 2 920   Free T4      0 76 - 1 46 ng/dL 1 35     Component      Latest Ref Rng & Units 6/15/2018   Sodium      136 - 145 mmol/L 138   Potassium      3 5 - 5 3 mmol/L 4 2   Chloride      100 - 108 mmol/L 105   CO2      21 - 32 mmol/L 26   Anion Gap      4 - 13 mmol/L 7   BUN      5 - 25 mg/dL 11   Creatinine      0 60 - 1 30 mg/dL 0 85   GLUCOSE FASTING      65 - 99 mg/dL 91   Calcium      8 3 - 10 1 mg/dL 9 0   AST (SGOT)      5 - 45 U/L 22   ALT      12 - 78 U/L 35   ALK PHOS      46 - 116 U/L 51   Total Protein      6 4 - 8 2 g/dL 7 5   Albumin      3 5 - 5 0 g/dL 3 4 (L)   TOTAL BILIRUBIN      0 20 - 1 00 mg/dL 1 71 (H)   eGFR      ml/min/1 73sq m 92       THYROID ULTRASOUND     INDICATION:    E01 0: Iodine-deficiency related diffuse (endemic) goiter  Enlarged gland on exam       COMPARISON:  None     TECHNIQUE:   Ultrasound of the thyroid was performed with a high frequency linear transducer in transverse and sagittal planes including volumetric imaging sweeps as well as traditional still imaging technique      FINDINGS:  Mild thyromegaly  Overall mildly heterogeneous echotexture and relative decreased echogenicity  Running suggesting underlying thyroiditis      Right lobe:  5 9 x 1 6 x 1 6 cm  Left lobe:  6 0 x 1 3 x 1 6 cm  Isthmus:  0 4 cm      Nodule #1  RIGHT lower pole nodule measuring 1 0 x 0 7 x 0 8 cm  COMPOSITION:  2 points, solid or almost completely solid   ECHOGENICITY:  1 point, hyperechoic or isoechoic axial     SHAPE:  0 points, wider-than-tall  MARGIN: 0 points, ill-defined   ECHOGENIC FOCI:  0 points, none or large comet-tail artifacts  TI-RADS Score: TR 3 (3 points), Mildly suspicious  FNA if >2 5 cm  Follow if >1 5 cm      IMPRESSION:     Mild thyromegaly  Ti-RAD 3 nodule 1 cm in size, does not meet criteria for sampling or follow-up at this time  Impression & Plan:    Problem List Items Addressed This Visit     PCOS (polycystic ovarian syndrome) - Primary     Continue metformin  Will d/c lasix and resume her spironolactone 50 mg daily for her hirsutism  Will check cmp  She was given lab slips from PCP today  Referral given for weight management program           Relevant Medications    spironolactone (ALDACTONE) 50 mg tablet    Other Relevant Orders    Ambulatory referral to [address] [place] [address] [address] and [employer]    Vitamin D deficiency     Continue vitamin d supplementation          Thyroid nodule     Repeat US in 6 months for continued surveillance          Relevant Orders    US thyroid          Orders Placed This Encounter   Procedures    US thyroid     Standing Status:   Future     Standing Expiration Date:   9/11/2019     Scheduling Instructions:      No prep required   Please bring your physician order, insurance cards, a form of photo ID and a list of your medications with you  Arrive 15 minutes prior to your appointment time in order to register  To schedule appointment please call Central Scheduling at [phone]  Order Specific Question:   Reason for Exam:     Answer:   thyroid nodule     Order Specific Question:   Is the patient pregnant? Answer:   No    Ambulatory referral to [address] and [employer]     Standing Status:   Future     Standing Expiration Date:   3/11/2019     Referral Priority:   Routine     Referral Type:   Program     Referral Reason:   Specialty Services Required     Number of Visits Requested:   1     Expiration Date:   9/11/2019       Discussed with the patient and all questioned fully answered  She will call me if any problems arise  Follow-up appointment in 6 months       Counseled patient on diagnostic results, prognosis, risk and benefit of treatment options, instruction for management, importance of treatment compliance, Risk  factor reduction and impressions      Stephanie Tolentino [address]

## 2018-09-11 NOTE — ASSESSMENT & PLAN NOTE
Lipid panel from June 2018 with / LDL 48/ / HDL 35  Continue Omega 3   Repeat lipid panel ordered  Follow a low fat/ low cholesterol diet  Work on weight loss

## 2018-09-11 NOTE — ASSESSMENT & PLAN NOTE
Managed by Endocrinology   Continue Metformin   She will be seeing Endo today and will inquire about restarting Spironolactone (rather that Lasix)

## 2018-09-11 NOTE — ASSESSMENT & PLAN NOTE
Stable  Continue Lasix (if Endo agrees to start Spironolactone for PCOS symptoms, would discontinue Lasix)  Elevate legs  Limit sodium intake  Work on weight loss

## 2018-09-11 NOTE — ASSESSMENT & PLAN NOTE
Advised regular exercise 30 minutes 5 x per week, work on weight loss  Declined a referral to our [address] [place] [address] Weight Management [facility]

## 2018-09-14 ENCOUNTER — OFFICE VISIT (OUTPATIENT)
Dept: OBGYN CLINIC | Facility: CLINIC | Age: 28
End: 2018-09-14
Payer: COMMERCIAL

## 2018-09-14 ENCOUNTER — APPOINTMENT (OUTPATIENT)
Dept: LAB | Facility: HOSPITAL | Age: 28
End: 2018-09-14
Payer: COMMERCIAL

## 2018-09-14 VITALS
WEIGHT: 268 LBS | BODY MASS INDEX: 39.69 KG/M2 | DIASTOLIC BLOOD PRESSURE: 80 MMHG | SYSTOLIC BLOOD PRESSURE: 122 MMHG | HEIGHT: 69 IN

## 2018-09-14 DIAGNOSIS — E66.01 MORBID OBESITY (HCC): ICD-10-CM

## 2018-09-14 DIAGNOSIS — F41.9 ANXIETY: ICD-10-CM

## 2018-09-14 DIAGNOSIS — R60.0 LOCALIZED EDEMA: ICD-10-CM

## 2018-09-14 DIAGNOSIS — E55.9 VITAMIN D DEFICIENCY: ICD-10-CM

## 2018-09-14 DIAGNOSIS — N91.2 AMENORRHEA: Primary | ICD-10-CM

## 2018-09-14 DIAGNOSIS — E04.9 NONTOXIC GOITER, UNSPECIFIED: ICD-10-CM

## 2018-09-14 DIAGNOSIS — Z32.00 POSSIBLE PREGNANCY: ICD-10-CM

## 2018-09-14 DIAGNOSIS — E28.2 PCOS (POLYCYSTIC OVARIAN SYNDROME): ICD-10-CM

## 2018-09-14 DIAGNOSIS — E78.2 HYPERLIPIDEMIA, MIXED: ICD-10-CM

## 2018-09-14 LAB
25(OH)D3 SERPL-MCNC: 27.4 NG/ML (ref 30–100)
ALBUMIN SERPL BCP-MCNC: 3.6 G/DL (ref 3.5–5)
ALP SERPL-CCNC: 50 U/L (ref 46–116)
ALT SERPL W P-5'-P-CCNC: 55 U/L (ref 12–78)
ANION GAP SERPL CALCULATED.3IONS-SCNC: 9 MMOL/L (ref 4–13)
AST SERPL W P-5'-P-CCNC: 38 U/L (ref 5–45)
BASOPHILS # BLD AUTO: 0.1 THOUSANDS/ΜL (ref 0–0.1)
BASOPHILS NFR BLD AUTO: 1 % (ref 0–1)
BILIRUB SERPL-MCNC: 1.69 MG/DL (ref 0.2–1)
BUN SERPL-MCNC: 10 MG/DL (ref 5–25)
CALCIUM SERPL-MCNC: 8.9 MG/DL (ref 8.3–10.1)
CHLORIDE SERPL-SCNC: 106 MMOL/L (ref 100–108)
CHOLEST SERPL-MCNC: 127 MG/DL (ref 50–200)
CO2 SERPL-SCNC: 24 MMOL/L (ref 21–32)
CREAT SERPL-MCNC: 0.78 MG/DL (ref 0.6–1.3)
EOSINOPHIL # BLD AUTO: 0.18 THOUSAND/ΜL (ref 0–0.61)
EOSINOPHIL NFR BLD AUTO: 2 % (ref 0–6)
ERYTHROCYTE [DISTWIDTH] IN BLOOD BY AUTOMATED COUNT: 11.8 % (ref 11.6–15.1)
GFR SERPL CREATININE-BSD FRML MDRD: 102 ML/MIN/1.73SQ M
GLUCOSE P FAST SERPL-MCNC: 91 MG/DL (ref 65–99)
HCT VFR BLD AUTO: 40.2 % (ref 34.8–46.1)
HDLC SERPL-MCNC: 42 MG/DL (ref 40–60)
HGB BLD-MCNC: 13.3 G/DL (ref 11.5–15.4)
IMM GRANULOCYTES # BLD AUTO: 0.04 THOUSAND/UL (ref 0–0.2)
IMM GRANULOCYTES NFR BLD AUTO: 0 % (ref 0–2)
LDLC SERPL CALC-MCNC: 62 MG/DL (ref 0–100)
LYMPHOCYTES # BLD AUTO: 2.27 THOUSANDS/ΜL (ref 0.6–4.47)
LYMPHOCYTES NFR BLD AUTO: 25 % (ref 14–44)
MCH RBC QN AUTO: 29 PG (ref 26.8–34.3)
MCHC RBC AUTO-ENTMCNC: 33.1 G/DL (ref 31.4–37.4)
MCV RBC AUTO: 88 FL (ref 82–98)
MONOCYTES # BLD AUTO: 0.51 THOUSAND/ΜL (ref 0.17–1.22)
MONOCYTES NFR BLD AUTO: 6 % (ref 4–12)
NEUTROPHILS # BLD AUTO: 6.09 THOUSANDS/ΜL (ref 1.85–7.62)
NEUTS SEG NFR BLD AUTO: 66 % (ref 43–75)
NONHDLC SERPL-MCNC: 85 MG/DL
NRBC BLD AUTO-RTO: 0 /100 WBCS
PLATELET # BLD AUTO: 343 THOUSANDS/UL (ref 149–390)
PMV BLD AUTO: 10 FL (ref 8.9–12.7)
POTASSIUM SERPL-SCNC: 4 MMOL/L (ref 3.5–5.3)
PROT SERPL-MCNC: 7.8 G/DL (ref 6.4–8.2)
RBC # BLD AUTO: 4.59 MILLION/UL (ref 3.81–5.12)
SL AMB POCT URINE HCG: NEGATIVE
SODIUM SERPL-SCNC: 139 MMOL/L (ref 136–145)
TRIGL SERPL-MCNC: 114 MG/DL
TSH SERPL DL<=0.05 MIU/L-ACNC: 2.92 UIU/ML (ref 0.36–3.74)
WBC # BLD AUTO: 9.19 THOUSAND/UL (ref 4.31–10.16)

## 2018-09-14 PROCEDURE — 82306 VITAMIN D 25 HYDROXY: CPT

## 2018-09-14 PROCEDURE — 84443 ASSAY THYROID STIM HORMONE: CPT

## 2018-09-14 PROCEDURE — 81025 URINE PREGNANCY TEST: CPT | Performed by: OBSTETRICS & GYNECOLOGY

## 2018-09-14 PROCEDURE — 80053 COMPREHEN METABOLIC PANEL: CPT

## 2018-09-14 PROCEDURE — 36415 COLL VENOUS BLD VENIPUNCTURE: CPT

## 2018-09-14 PROCEDURE — 80061 LIPID PANEL: CPT

## 2018-09-14 PROCEDURE — 99213 OFFICE O/P EST LOW 20 MIN: CPT | Performed by: OBSTETRICS & GYNECOLOGY

## 2018-09-14 PROCEDURE — 85025 COMPLETE CBC W/AUTO DIFF WBC: CPT

## 2018-09-14 RX ORDER — MEDROXYPROGESTERONE ACETATE 10 MG/1
10 TABLET ORAL EVERY 12 HOURS
Qty: 10 TABLET | Refills: 0 | Status: SHIPPED | OUTPATIENT
Start: 2018-09-14 | End: 2019-01-21 | Stop reason: ALTCHOICE

## 2018-09-14 RX ORDER — NORGESTIMATE AND ETHINYL ESTRADIOL 0.25-0.035
1 KIT ORAL DAILY
Qty: 28 TABLET | Refills: 6 | Status: SHIPPED | OUTPATIENT
Start: 2018-09-14 | End: 2019-01-21 | Stop reason: ALTCHOICE

## 2018-09-14 NOTE — PROGRESS NOTES
GYN PROBLEM VISIT    HPI:   Inocencia Wren is a 25 y o  female  who presents for evaluation of amenorrhea  LMP 70 days  Bleeding was heavy  Periods were not regular in the past occurring every 30-41 days  Patient has history of morbid obesity, PCOS, and hirsutism  Is there a chance of pregnancy? yes  HCG lab test done? yes, negative  Factors that may be contributory to menstrual abnormalities include: diet (low carb) and discontinuation of her spironolactone  Previous treatments for menstrual abnormalities include: OCP's and clomid  Currently NOT trying to conceive; trying to "get my body back in order "  Notes conceiving her two children without difficulty after discontinuation of pill  Menstrual History:  As above; no new sexual partners       OB History      Para Term  AB Living    2 2       2    SAB TAB Ectopic Multiple Live Births            2              The following portions of the patient's history were reviewed and updated as appropriate: allergies, current medications, past family history, past medical history, past social history, past surgical history and problem list     Review of Systems:  +cramping and dull LLQ pain  No vaginal discharge  No galactorrhea  Objective      /80   Ht 5' 9" (1 753 m)   Wt 122 kg (268 lb)   LMP 2018   Breastfeeding? No   BMI 39 58 kg/m²     General:   alert and oriented, in no acute distress           Abdomen: soft, non-tender, without masses or organomegaly   Vulva: normal   Vagina: normal mucosa   Cervix: wnl, no blood   Uterus: normal size, anteverted, mobile, non-tender   Adnexa: normal adnexa     Lab Review  Urine pregnancy test negative      Assessment/Plan:  1 )  Amenorrhea - urine hCG negative  Plan provera withdrawal   2 )  Oligomenorrhea/PCOS - continue metformin and spironolactone per Endocrine  Will restart Sprintec  Has order for labs per Endocrine      Follow up with me in two months for annual/Pap and review of labs/menstrual diary

## 2019-01-21 ENCOUNTER — OFFICE VISIT (OUTPATIENT)
Dept: FAMILY MEDICINE CLINIC | Facility: CLINIC | Age: 29
End: 2019-01-21
Payer: COMMERCIAL

## 2019-01-21 VITALS
DIASTOLIC BLOOD PRESSURE: 80 MMHG | TEMPERATURE: 98.5 F | HEART RATE: 110 BPM | OXYGEN SATURATION: 97 % | SYSTOLIC BLOOD PRESSURE: 124 MMHG | RESPIRATION RATE: 16 BRPM | BODY MASS INDEX: 41.05 KG/M2 | WEIGHT: 278 LBS

## 2019-01-21 DIAGNOSIS — B37.2 YEAST DERMATITIS: ICD-10-CM

## 2019-01-21 DIAGNOSIS — Z23 NEED FOR INFLUENZA VACCINATION: ICD-10-CM

## 2019-01-21 DIAGNOSIS — G56.03 BILATERAL CARPAL TUNNEL SYNDROME: Primary | ICD-10-CM

## 2019-01-21 PROCEDURE — 90471 IMMUNIZATION ADMIN: CPT

## 2019-01-21 PROCEDURE — 99214 OFFICE O/P EST MOD 30 MIN: CPT | Performed by: FAMILY MEDICINE

## 2019-01-21 PROCEDURE — 90682 RIV4 VACC RECOMBINANT DNA IM: CPT

## 2019-01-21 RX ORDER — CLOTRIMAZOLE AND BETAMETHASONE DIPROPIONATE 10; .64 MG/G; MG/G
CREAM TOPICAL 2 TIMES DAILY
Qty: 30 G | Refills: 0 | Status: SHIPPED | OUTPATIENT
Start: 2019-01-21 | End: 2019-05-21

## 2019-01-22 NOTE — PROGRESS NOTES
Chief Complaint   Patient presents with    Numbness     Left arm x1 week     Health Maintenance   Topic Date Due    DTaP,Tdap,and Td Vaccines (1 - Tdap) 01/12/2009    INFLUENZA VACCINE  07/01/2018    Depression Screening PHQ  09/11/2019     Assessment/Plan:    Carpal tunnel syndrome---will check EMG and refer to orthopedics  Yeast dermatitis---give lotrisone cream    Give flu shot today  Call office if symptoms no improving or worse  Diagnoses and all orders for this visit:    Bilateral carpal tunnel syndrome  -     EMG 2 Limb Upper Extremity; Future  -     Ambulatory referral to Orthopedic Surgery; Future    Yeast dermatitis  -     clotrimazole-betamethasone (LOTRISONE) 1-0 05 % cream; Apply topically 2 (two) times a day    Need for influenza vaccination  -     PREFERRED: influenza vaccine, 4316-3133, quadrivalent, recombinant, PF, 0 5 mL (FLUBLOK)          Subjective:      Patient ID: Justen Mckenzie is a 30 y o  female  HPI    Pt is here with her   C/o b/l hands and fingers numbness for a while  Worse in last week  Numbness/tingling spreading from fingers to hands to lower arm  Left side worse than right side  Constant  Worse if driving or holding a phone  Denies fever, SOB, chest pain, n/v/abd pain  Hx of carpal tunnel syndrome years ago  C/o rash in left armpit for 1 week  Itchy  Pt had antibiotics recently for a cold  Pt states she usually got yeast infection after she took antibiotics  Denies new lotion, cream or soap etc              The following portions of the patient's history were reviewed and updated as appropriate: allergies, current medications, past family history, past medical history, past social history, past surgical history and problem list     Review of Systems   Constitutional: Negative for appetite change, chills and fever  HENT: Negative for congestion, ear pain, sinus pain and sore throat  Eyes: Negative for discharge and itching  Respiratory: Negative for apnea, cough, chest tightness, shortness of breath and wheezing  Cardiovascular: Negative for chest pain, palpitations and leg swelling  Gastrointestinal: Negative for abdominal pain, anal bleeding, constipation, diarrhea, nausea and vomiting  Endocrine: Negative for cold intolerance, heat intolerance and polyuria  Genitourinary: Negative for difficulty urinating and dysuria  Musculoskeletal: Positive for arthralgias  Negative for back pain and myalgias  Skin: Positive for rash  Neurological: Negative for dizziness and headaches  Psychiatric/Behavioral: Negative for agitation  Objective:      /80 (BP Location: Left arm, Patient Position: Sitting, Cuff Size: Adult)   Pulse (!) 110   Temp 98 5 °F (36 9 °C) (Tympanic)   Resp 16   Wt 126 kg (278 lb)   SpO2 97%   BMI 41 05 kg/m²          Physical Exam   Constitutional: She appears well-developed  No distress  Neck: Normal range of motion  No thyromegaly present  Cardiovascular: Normal rate, regular rhythm and normal heart sounds  Exam reveals no gallop and no friction rub  No murmur heard  Pulmonary/Chest: Effort normal and breath sounds normal  No respiratory distress  She has no wheezes  She has no rales  She exhibits no tenderness  Abdominal: Soft  Bowel sounds are normal    Musculoskeletal: Normal range of motion  Positive Tinel's and Phalen's   Lymphadenopathy:     She has no cervical adenopathy  Neurological: She is alert  Skin: Rash noted  Macular lesions under left armpit, size 5cm, mild erythema, no exudate   Psychiatric: She has a normal mood and affect

## 2019-02-05 ENCOUNTER — ANNUAL EXAM (OUTPATIENT)
Dept: OBGYN CLINIC | Facility: CLINIC | Age: 29
End: 2019-02-05
Payer: COMMERCIAL

## 2019-02-05 VITALS
BODY MASS INDEX: 41.47 KG/M2 | HEIGHT: 69 IN | SYSTOLIC BLOOD PRESSURE: 130 MMHG | DIASTOLIC BLOOD PRESSURE: 80 MMHG | WEIGHT: 280 LBS

## 2019-02-05 DIAGNOSIS — Z01.419 WOMEN'S ANNUAL ROUTINE GYNECOLOGICAL EXAMINATION: Primary | ICD-10-CM

## 2019-02-05 PROCEDURE — 99395 PREV VISIT EST AGE 18-39: CPT | Performed by: NURSE PRACTITIONER

## 2019-02-05 RX ORDER — NORGESTIMATE AND ETHINYL ESTRADIOL 0.25-0.035
KIT ORAL
COMMUNITY
Start: 2018-10-02 | End: 2019-02-05 | Stop reason: ALTCHOICE

## 2019-02-05 NOTE — PROGRESS NOTES
Subjective  HPI:     Esperanza Ruiz is a 30 y o  female  She is a  2 Para 2, with 2 prior  section  Her menstrual cycles are every 24 days  Her current method of contraception includes none  They would like to have another baby  She has tried to contact [facility] and has not received a return call  She denies issues with intimacy  She denies /GI complaints  She complains of recurrent yeast infections for which she uses monistat otc 7 days  She complains of anxiety due to PCOS  She was prescribed xanax and rarely takes it  She complains of family stress  A thyroid nodule was diagnosed she is following with serial US  There are no changes to surgical or family history  Her dental care is not done  She is not happy with her weight  Gynecologic History    Patient's last menstrual period was 2019  Last Pap: 2016   Results were: abnormal (LGSIL)  Colpo was done in 2016 - LGSIL RAMSEY I; Mild dysplasia with HPV effect  Patient never returned for repeat pap  Obstetric History    OB History    Para Term  AB Living   2 2       2   SAB TAB Ectopic Multiple Live Births           2      # Outcome Date GA Lbr Yuriy/2nd Weight Sex Delivery Anes PTL Lv   2 Para            1 Para                   The following portions of the patient's history were reviewed and updated as appropriate: allergies, current medications, past family history, past medical history, past social history, past surgical history and problem list     Review of Systems    Pertinent items are noted in HPI  Objective    Physical Exam   Constitutional: Vital signs are normal  She appears well-developed and well-nourished  Genitourinary: Vagina normal and uterus normal  Pelvic exam was performed with patient supine  There is no rash, tenderness, lesion or Bartholin's cyst on the right labia  There is no rash, tenderness, lesion or Bartholin's cyst on the left labia   Right adnexum does not display mass, does not display tenderness and does not display fullness  Left adnexum does not display mass, does not display tenderness and does not display fullness  Cervix is not parous  Cervix does not exhibit motion tenderness, lesion, discharge, friability, polyp or nabothian cyst      Uterus is anteverted  HENT:   Head: Normocephalic and atraumatic  Neck: Neck supple  No thyromegaly present  Cardiovascular: Normal rate, regular rhythm, S1 normal, S2 normal and normal heart sounds  Pulmonary/Chest: Effort normal and breath sounds normal  Right breast exhibits no inverted nipple, no mass, no nipple discharge, no skin change and no tenderness  Left breast exhibits no inverted nipple, no mass, no nipple discharge, no skin change and no tenderness  Abdominal: Soft  Bowel sounds are normal  She exhibits no distension and no mass  There is no tenderness  There is no guarding  Lymphadenopathy:     She has no cervical adenopathy  She has no axillary adenopathy  Neurological: She is alert  Skin: Skin is warm  Psychiatric: She has a normal mood and affect  Nursing note and vitals reviewed  Assessment and Plan    Lisa Orozco was seen today for gynecologic exam     Diagnoses and all orders for this visit:    Women's annual routine gynecological examination  -     Thinprep Tis Pap and HPV mRNA E6/E7 Reflex HPV 16,18/45  -     Sureswab(R), Vaginosis/Vaginitis Plus      Patient informed of a Stable GYN exam  A pap smear was performed  We will call her with results of her pap  She was provided the information for [facility] and Dr Baxter Procornelio  She will return to the office as indicated pending pap results  All questions and concerns addressed and patient verbalized understanding

## 2019-02-11 DIAGNOSIS — B37.3 VAGINAL CANDIDA: Primary | ICD-10-CM

## 2019-02-11 LAB
A VAGINAE DNA VAG NAA+PROBE-LOG#: NOT DETECTED
C GLABRATA DNA VAG QL NAA+PROBE: NOT DETECTED
C TRACH RRNA SPEC QL NAA+PROBE: NOT DETECTED
CANDIDA DNA VAG QL NAA+PROBE: DETECTED
CLINICAL INFO: NORMAL
CYTO CVX: NORMAL
CYTOLOGY CMNT CVX/VAG CYTO-IMP: NORMAL
DATE PREVIOUS BX: NORMAL
G VAGINALIS DNA VAG NAA+PROBE-LOG#: <4.7 LOG (CELLS/ML)
HPV E6+E7 MRNA CVX QL NAA+PROBE: NOT DETECTED
LACTOBACILLUS DNA VAG NAA+PROBE-LOG#: 6.6 LOG (CELLS/ML)
LMP START DATE: NORMAL
MEGASPHAERA SP DNA VAG NAA+PROBE-LOG#: NOT DETECTED
MICROORGANISM CVX/VAG CYTO: NORMAL
N GONORRHOEA RRNA SPEC QL NAA+PROBE: NOT DETECTED
SL AMB BV CATEGORY:: ABNORMAL
SL AMB C. PARAPSILOSIS, DNA: NOT DETECTED
SL AMB C. TROPICALIS, DNA: NOT DETECTED
SL AMB PREV. PAP:: NORMAL
SPECIMEN SOURCE CVX/VAG CYTO: NORMAL
T VAGINALIS RRNA SPEC QL NAA+PROBE: NOT DETECTED

## 2019-02-11 RX ORDER — FLUCONAZOLE 100 MG/1
100 TABLET ORAL DAILY
Qty: 2 TABLET | Refills: 0 | Status: SHIPPED | OUTPATIENT
Start: 2019-02-11 | End: 2019-02-13

## 2019-02-13 ENCOUNTER — TELEPHONE (OUTPATIENT)
Dept: OBGYN CLINIC | Facility: CLINIC | Age: 29
End: 2019-02-13

## 2019-04-23 ENCOUNTER — APPOINTMENT (OUTPATIENT)
Dept: LAB | Facility: CLINIC | Age: 29
End: 2019-04-23
Payer: COMMERCIAL

## 2019-04-23 ENCOUNTER — OFFICE VISIT (OUTPATIENT)
Dept: ENDOCRINOLOGY | Facility: CLINIC | Age: 29
End: 2019-04-23
Payer: COMMERCIAL

## 2019-04-23 ENCOUNTER — HOSPITAL ENCOUNTER (OUTPATIENT)
Dept: NEUROLOGY | Facility: AMBULATORY SURGERY CENTER | Age: 29
Discharge: HOME | End: 2019-04-23
Payer: COMMERCIAL

## 2019-04-23 VITALS
HEIGHT: 69 IN | BODY MASS INDEX: 40.88 KG/M2 | DIASTOLIC BLOOD PRESSURE: 100 MMHG | WEIGHT: 276 LBS | HEART RATE: 89 BPM | SYSTOLIC BLOOD PRESSURE: 132 MMHG

## 2019-04-23 DIAGNOSIS — E55.9 VITAMIN D DEFICIENCY: ICD-10-CM

## 2019-04-23 DIAGNOSIS — B37.9 YEAST INFECTION: ICD-10-CM

## 2019-04-23 DIAGNOSIS — G56.03 BILATERAL CARPAL TUNNEL SYNDROME: ICD-10-CM

## 2019-04-23 DIAGNOSIS — E28.2 PCOS (POLYCYSTIC OVARIAN SYNDROME): Primary | ICD-10-CM

## 2019-04-23 DIAGNOSIS — E04.1 THYROID NODULE: ICD-10-CM

## 2019-04-23 LAB
25(OH)D3 SERPL-MCNC: 34.9 NG/ML (ref 30–100)
ALBUMIN SERPL BCP-MCNC: 4.2 G/DL (ref 3.5–5)
ALP SERPL-CCNC: 57 U/L (ref 46–116)
ALT SERPL W P-5'-P-CCNC: 49 U/L (ref 12–78)
ANION GAP SERPL CALCULATED.3IONS-SCNC: 1 MMOL/L (ref 4–13)
AST SERPL W P-5'-P-CCNC: 32 U/L (ref 5–45)
BASOPHILS # BLD AUTO: 0.13 THOUSANDS/ΜL (ref 0–0.1)
BASOPHILS NFR BLD AUTO: 1 % (ref 0–1)
BILIRUB SERPL-MCNC: 1.72 MG/DL (ref 0.2–1)
BUN SERPL-MCNC: 13 MG/DL (ref 5–25)
CALCIUM SERPL-MCNC: 9.5 MG/DL (ref 8.3–10.1)
CHLORIDE SERPL-SCNC: 104 MMOL/L (ref 100–108)
CO2 SERPL-SCNC: 29 MMOL/L (ref 21–32)
CREAT SERPL-MCNC: 0.87 MG/DL (ref 0.6–1.3)
EOSINOPHIL # BLD AUTO: 0.22 THOUSAND/ΜL (ref 0–0.61)
EOSINOPHIL NFR BLD AUTO: 2 % (ref 0–6)
ERYTHROCYTE [DISTWIDTH] IN BLOOD BY AUTOMATED COUNT: 11.9 % (ref 11.6–15.1)
EST. AVERAGE GLUCOSE BLD GHB EST-MCNC: 111 MG/DL
GFR SERPL CREATININE-BSD FRML MDRD: 89 ML/MIN/1.73SQ M
GLUCOSE P FAST SERPL-MCNC: 97 MG/DL (ref 65–99)
HBA1C MFR BLD: 5.5 % (ref 4.2–6.3)
HCT VFR BLD AUTO: 42.6 % (ref 34.8–46.1)
HGB BLD-MCNC: 14.1 G/DL (ref 11.5–15.4)
IMM GRANULOCYTES # BLD AUTO: 0.02 THOUSAND/UL (ref 0–0.2)
IMM GRANULOCYTES NFR BLD AUTO: 0 % (ref 0–2)
LYMPHOCYTES # BLD AUTO: 2.55 THOUSANDS/ΜL (ref 0.6–4.47)
LYMPHOCYTES NFR BLD AUTO: 25 % (ref 14–44)
MCH RBC QN AUTO: 28.8 PG (ref 26.8–34.3)
MCHC RBC AUTO-ENTMCNC: 33.1 G/DL (ref 31.4–37.4)
MCV RBC AUTO: 87 FL (ref 82–98)
MONOCYTES # BLD AUTO: 0.62 THOUSAND/ΜL (ref 0.17–1.22)
MONOCYTES NFR BLD AUTO: 6 % (ref 4–12)
NEUTROPHILS # BLD AUTO: 6.59 THOUSANDS/ΜL (ref 1.85–7.62)
NEUTS SEG NFR BLD AUTO: 66 % (ref 43–75)
NRBC BLD AUTO-RTO: 0 /100 WBCS
PLATELET # BLD AUTO: 296 THOUSANDS/UL (ref 149–390)
PMV BLD AUTO: 10.6 FL (ref 8.9–12.7)
POTASSIUM SERPL-SCNC: 4.7 MMOL/L (ref 3.5–5.3)
PROT SERPL-MCNC: 8.4 G/DL (ref 6.4–8.2)
RBC # BLD AUTO: 4.89 MILLION/UL (ref 3.81–5.12)
SODIUM SERPL-SCNC: 134 MMOL/L (ref 136–145)
T4 FREE SERPL-MCNC: 1.56 NG/DL (ref 0.76–1.46)
TSH SERPL DL<=0.05 MIU/L-ACNC: 3.11 UIU/ML (ref 0.36–3.74)
WBC # BLD AUTO: 10.13 THOUSAND/UL (ref 4.31–10.16)

## 2019-04-23 PROCEDURE — 95886 MUSC TEST DONE W/N TEST COMP: CPT | Performed by: PSYCHIATRY & NEUROLOGY

## 2019-04-23 PROCEDURE — 99214 OFFICE O/P EST MOD 30 MIN: CPT | Performed by: NURSE PRACTITIONER

## 2019-04-23 PROCEDURE — 85025 COMPLETE CBC W/AUTO DIFF WBC: CPT

## 2019-04-23 PROCEDURE — 3044F HG A1C LEVEL LT 7.0%: CPT | Performed by: NURSE PRACTITIONER

## 2019-04-23 PROCEDURE — 82306 VITAMIN D 25 HYDROXY: CPT | Performed by: NURSE PRACTITIONER

## 2019-04-23 PROCEDURE — 95911 NRV CNDJ TEST 9-10 STUDIES: CPT | Performed by: PSYCHIATRY & NEUROLOGY

## 2019-04-23 PROCEDURE — 84443 ASSAY THYROID STIM HORMONE: CPT | Performed by: NURSE PRACTITIONER

## 2019-04-23 PROCEDURE — 83036 HEMOGLOBIN GLYCOSYLATED A1C: CPT | Performed by: NURSE PRACTITIONER

## 2019-04-23 PROCEDURE — 80053 COMPREHEN METABOLIC PANEL: CPT | Performed by: NURSE PRACTITIONER

## 2019-04-23 PROCEDURE — 84439 ASSAY OF FREE THYROXINE: CPT | Performed by: NURSE PRACTITIONER

## 2019-04-23 PROCEDURE — 36415 COLL VENOUS BLD VENIPUNCTURE: CPT | Performed by: NURSE PRACTITIONER

## 2019-04-23 RX ORDER — METFORMIN HYDROCHLORIDE EXTENDED-RELEASE TABLETS 1000 MG/1
1000 TABLET, FILM COATED, EXTENDED RELEASE ORAL 2 TIMES DAILY
Qty: 60 TABLET | Refills: 6 | Status: SHIPPED | OUTPATIENT
Start: 2019-04-23 | End: 2019-11-05 | Stop reason: SDUPTHER

## 2019-04-23 RX ORDER — SPIRONOLACTONE 50 MG/1
50 TABLET, FILM COATED ORAL DAILY
Qty: 30 TABLET | Refills: 6 | Status: SHIPPED | OUTPATIENT
Start: 2019-04-23 | End: 2019-11-05 | Stop reason: SDUPTHER

## 2019-04-25 ENCOUNTER — TELEPHONE (OUTPATIENT)
Dept: ENDOCRINOLOGY | Facility: CLINIC | Age: 29
End: 2019-04-25

## 2019-05-08 DIAGNOSIS — E04.1 THYROID NODULE: Primary | ICD-10-CM

## 2019-05-21 ENCOUNTER — OFFICE VISIT (OUTPATIENT)
Dept: FAMILY MEDICINE CLINIC | Facility: CLINIC | Age: 29
End: 2019-05-21
Payer: COMMERCIAL

## 2019-05-21 VITALS
RESPIRATION RATE: 16 BRPM | HEIGHT: 69 IN | SYSTOLIC BLOOD PRESSURE: 122 MMHG | HEART RATE: 68 BPM | TEMPERATURE: 98 F | WEIGHT: 272 LBS | BODY MASS INDEX: 40.29 KG/M2 | DIASTOLIC BLOOD PRESSURE: 88 MMHG

## 2019-05-21 DIAGNOSIS — E78.2 HYPERLIPIDEMIA, MIXED: ICD-10-CM

## 2019-05-21 DIAGNOSIS — E28.2 PCOS (POLYCYSTIC OVARIAN SYNDROME): ICD-10-CM

## 2019-05-21 DIAGNOSIS — E55.9 VITAMIN D DEFICIENCY: ICD-10-CM

## 2019-05-21 DIAGNOSIS — E66.01 MORBID OBESITY (HCC): ICD-10-CM

## 2019-05-21 DIAGNOSIS — L30.9 DERMATITIS: Primary | ICD-10-CM

## 2019-05-21 DIAGNOSIS — E04.9 NONTOXIC GOITER, UNSPECIFIED: ICD-10-CM

## 2019-05-21 DIAGNOSIS — R60.0 LOCALIZED EDEMA: ICD-10-CM

## 2019-05-21 DIAGNOSIS — F41.9 ANXIETY: ICD-10-CM

## 2019-05-21 PROCEDURE — 1036F TOBACCO NON-USER: CPT | Performed by: NURSE PRACTITIONER

## 2019-05-21 PROCEDURE — 99214 OFFICE O/P EST MOD 30 MIN: CPT | Performed by: NURSE PRACTITIONER

## 2019-05-21 PROCEDURE — 3008F BODY MASS INDEX DOCD: CPT | Performed by: NURSE PRACTITIONER

## 2019-05-21 RX ORDER — TRIAMCINOLONE ACETONIDE 1 MG/G
CREAM TOPICAL 2 TIMES DAILY
Qty: 30 G | Refills: 0 | Status: SHIPPED | OUTPATIENT
Start: 2019-05-21 | End: 2019-12-23 | Stop reason: ALTCHOICE

## 2019-05-21 RX ORDER — ALPRAZOLAM 0.25 MG/1
0.25 TABLET ORAL DAILY PRN
Qty: 30 TABLET | Refills: 0 | Status: SHIPPED | OUTPATIENT
Start: 2019-05-21 | End: 2020-04-21 | Stop reason: ALTCHOICE

## 2019-05-21 RX ORDER — ALPRAZOLAM 0.25 MG/1
0.25 TABLET ORAL DAILY PRN
Qty: 30 TABLET | Refills: 0 | Status: SHIPPED | OUTPATIENT
Start: 2019-05-21 | End: 2019-05-21 | Stop reason: SDUPTHER

## 2019-05-28 ENCOUNTER — HOSPITAL ENCOUNTER (OUTPATIENT)
Dept: ULTRASOUND IMAGING | Facility: HOSPITAL | Age: 29
Discharge: HOME | End: 2019-05-28
Payer: COMMERCIAL

## 2019-05-28 DIAGNOSIS — E04.1 THYROID NODULE: ICD-10-CM

## 2019-05-28 PROCEDURE — 76536 US EXAM OF HEAD AND NECK: CPT

## 2019-05-31 ENCOUNTER — TELEPHONE (OUTPATIENT)
Dept: ENDOCRINOLOGY | Facility: CLINIC | Age: 29
End: 2019-05-31

## 2019-06-13 ENCOUNTER — TELEPHONE (OUTPATIENT)
Dept: OBGYN CLINIC | Facility: CLINIC | Age: 29
End: 2019-06-13

## 2019-07-17 ENCOUNTER — OFFICE VISIT (OUTPATIENT)
Dept: FAMILY MEDICINE CLINIC | Facility: CLINIC | Age: 29
End: 2019-07-17
Payer: COMMERCIAL

## 2019-07-17 VITALS
SYSTOLIC BLOOD PRESSURE: 120 MMHG | WEIGHT: 273 LBS | RESPIRATION RATE: 16 BRPM | HEIGHT: 69 IN | TEMPERATURE: 98.6 F | DIASTOLIC BLOOD PRESSURE: 80 MMHG | BODY MASS INDEX: 40.43 KG/M2 | HEART RATE: 80 BPM

## 2019-07-17 DIAGNOSIS — Z23 NEED FOR TDAP VACCINATION: ICD-10-CM

## 2019-07-17 DIAGNOSIS — S91.312A FOOT LACERATION, LEFT, INITIAL ENCOUNTER: Primary | ICD-10-CM

## 2019-07-17 PROCEDURE — 3008F BODY MASS INDEX DOCD: CPT | Performed by: NURSE PRACTITIONER

## 2019-07-17 PROCEDURE — 90471 IMMUNIZATION ADMIN: CPT

## 2019-07-17 PROCEDURE — 99213 OFFICE O/P EST LOW 20 MIN: CPT | Performed by: NURSE PRACTITIONER

## 2019-07-17 PROCEDURE — 1036F TOBACCO NON-USER: CPT | Performed by: NURSE PRACTITIONER

## 2019-07-17 PROCEDURE — 90715 TDAP VACCINE 7 YRS/> IM: CPT

## 2019-07-17 RX ORDER — CEPHALEXIN 500 MG/1
CAPSULE ORAL
COMMUNITY
Start: 2019-07-14 | End: 2019-12-23 | Stop reason: ALTCHOICE

## 2019-07-17 NOTE — PROGRESS NOTES
Assessment/Plan:     Left foot laceration- the wound was cleaned and redressed today  Bacitracin ointment applied  No signs of infection today  Keep site clean and dry, clean daily with a gentle soap and warm water  May apply an OTC triple antibiotic ointment to site  Complete course of Keflex  She is aware that she can return to our office (10 days following suture placement) for suture removal   OTC Tylenol prn, max of 3g/24 hours for pain  Tdap administered today     Diagnoses and all orders for this visit:    Foot laceration, left, initial encounter  -     TDAP VACCINE GREATER THAN OR EQUAL TO 6YO IM    Need for Tdap vaccination  -     TDAP VACCINE GREATER THAN OR EQUAL TO 6YO IM    Other orders  -     cephalexin (KEFLEX) 500 mg capsule          Subjective:     Patient ID: Estrada Wall is a 30 y o  female  HPI     Pt presents by herself today for an  follow up   Evaluated at [facility] on 7/14/19 ([id number]) after stepping backwards onto a broken ryan flower pot   The wound was cleaned and had 9 sutures placed while at   She was started on Keflex 500 mg one tab BID x 7 days  Has instructions to return to  in 10-12 days for suture removal    She has been cleaning the wound daily, small amount of yellowish drainage which seems to be stopping  No bleeding, redness, warmth  No F/C  The site is painful as it is located in the arch of her foot  Taking OTC Tylenol prn  She is requesting a work note as she works on her feet all day and is required to use a ladder occasionally     She thought her Tdap was UTD but now she isn't sure     Review of Systems   Constitutional: Negative for chills, fatigue and fever  Respiratory: Negative for cough, shortness of breath and wheezing  Cardiovascular: Negative for chest pain, palpitations and leg swelling  Gastrointestinal: Negative for abdominal pain, blood in stool, constipation, diarrhea and nausea  Genitourinary: Negative for dysuria  Musculoskeletal: Negative for arthralgias and myalgias  Skin: Positive for wound  Negative for rash  Neurological: Negative for dizziness, weakness, numbness and headaches  Psychiatric/Behavioral: Negative for sleep disturbance and suicidal ideas  Objective:     Physical Exam   Constitutional: She is oriented to person, place, and time  She appears well-developed and well-nourished  No distress  HENT:   Head: Normocephalic and atraumatic  Eyes: Pupils are equal, round, and reactive to light  Conjunctivae are normal    Neck: Normal range of motion  Neck supple  Cardiovascular: Normal rate, regular rhythm and normal heart sounds  No murmur heard  Pulmonary/Chest: Effort normal and breath sounds normal  No respiratory distress  She has no wheezes  Abdominal: Soft  Bowel sounds are normal  She exhibits no distension  There is no tenderness  Musculoskeletal: Normal range of motion  Neurological: She is alert and oriented to person, place, and time  Skin: Skin is warm and dry  She is not diaphoretic    5 cm laceration to the plantar aspect of the left foot  Scant amount of dried purulent drainage, no bleeding  9 intact sutures in place  No erythema, warmth  Area is tender to palpation    Psychiatric: She has a normal mood and affect

## 2019-07-17 NOTE — LETTER
July 17, 2019     Patient: Valentine Matthews   YOB: 1990   Date of Visit: 7/17/2019       To Whom it May Concern:    Valentine Matthews is under my professional care  She was seen in my office on 7/17/2019  She may return to work on 7/24/19  If you have any questions or concerns, please don't hesitate to call           Sincerely,          ABI Marley        CC: No Recipients

## 2019-08-20 ENCOUNTER — APPOINTMENT (OUTPATIENT)
Dept: LAB | Facility: CLINIC | Age: 29
End: 2019-08-20
Payer: COMMERCIAL

## 2019-08-20 DIAGNOSIS — E04.1 THYROID NODULE: ICD-10-CM

## 2019-08-20 LAB
T4 FREE SERPL-MCNC: 1.45 NG/DL (ref 0.76–1.46)
TSH SERPL DL<=0.05 MIU/L-ACNC: 2 UIU/ML (ref 0.36–3.74)

## 2019-08-20 PROCEDURE — 84439 ASSAY OF FREE THYROXINE: CPT

## 2019-08-20 PROCEDURE — 36415 COLL VENOUS BLD VENIPUNCTURE: CPT

## 2019-08-20 PROCEDURE — 84443 ASSAY THYROID STIM HORMONE: CPT

## 2019-08-21 ENCOUNTER — TELEPHONE (OUTPATIENT)
Dept: ENDOCRINOLOGY | Facility: CLINIC | Age: 29
End: 2019-08-21

## 2019-08-21 NOTE — TELEPHONE ENCOUNTER
----- Message from [place] sent at 8/21/2019  9:46 AM EDT -----  Please call the patient regarding her abnormal result   Normal thyroid function test

## 2019-10-31 NOTE — PROGRESS NOTES
Established Patient Progress Note       Chief Complaint   Patient presents with    Polycystic Ovary Syndrome        History of Present Illness:     Teri Padilla is a 30 y o  female with a history of PCOS, vitamin d deficiency, and thyroid nodule  For the PCOS she is currently taking Metformin 1,000 mg BID and Spironolactone 50 mg  She reports her hirsutism has not been improving, feels it has worsened  For the vitamin d deficiency she is currently taking 5,000 units daily  For the thyroid nodule her most recent showed stable right lower pole nodule  She denies family history of thyroid cancer or thyroid nodules  She reports family history of hypothyroidism   Her thyroid function tests have been normal  She denies neck pain, dysphonia, dysphagia, or dyspnea         Patient Active Problem List   Diagnosis    PCOS (polycystic ovarian syndrome)    Nontoxic goiter, unspecified    Morbid obesity ([address] )    Vitamin D deficiency    Infertility, female    Hyperlipidemia, mixed    Elevated liver enzymes    Edema    Anxiety    Abnormal weight gain    Thyromegaly    Thyroid nodule    Amenorrhea    Yeast infection    Bilateral carpal tunnel syndrome      Past Medical History:   Diagnosis Date    Chronic diarrhea of unknown origin     last assessed 6/26/15    Dyspepsia     last assessed  10/29/14    Early satiety     last assessed  10/29/14    Hidradenitis suppurativa     last assessed  14    Obesity     PCOS (polycystic ovarian syndrome)       Past Surgical History:   Procedure Laterality Date    ABDOMINAL SURGERY      c-sections x2     SECTION       and  [address]    WISDOM TOOTH EXTRACTION      all 4      Family History   Problem Relation Age of Onset    No Known Problems Father     Thyroid disease Maternal Grandmother     Diabetes Paternal Grandmother      Social History     Tobacco Use    Smoking status: Former Smoker     Packs/day: 0 00     Years: 0 00 Pack years: 0 00     Last attempt to quit: 2014     Years since quittin 6    Smokeless tobacco: Never Used    Tobacco comment: "off and on"   Substance Use Topics    Alcohol use: No     Comment: rare     Allergies   Allergen Reactions    Pramoxine-Calamine     A + D Personal Care Lotion  [Dimethicone] Rash    Calamine-Zinc Oxide Rash    Zinc Rash     No med alert bracelet no epi pen       Current Outpatient Medications:     ALPRAZolam (XANAX) 0 25 mg tablet, Take 1 tablet (0 25 mg total) by mouth daily as needed for anxiety, Disp: 30 tablet, Rfl: 0    cholecalciferol (VITAMIN D3) 1,000 units tablet, Take 5,000 Units by mouth daily, Disp: , Rfl:     metFORMIN (FORTAMET) 1000 MG (OSM) 24 hr tablet, Take 1 tablet (1,000 mg total) by mouth 2 (two) times a day, Disp: 60 tablet, Rfl: 6    Omega-3 Fatty Acids (FISH OIL) 1,000 mg, Take by mouth 2 (two) times a day, Disp: , Rfl:     spironolactone (ALDACTONE) 50 mg tablet, Take 1 tablet (50 mg total) by mouth 2 (two) times a day, Disp: 60 tablet, Rfl: 6    cephalexin (KEFLEX) 500 mg capsule, , Disp: , Rfl:     triamcinolone (KENALOG) 0 1 % cream, Apply topically 2 (two) times a day, Disp: 30 g, Rfl: 0    Review of Systems   Constitutional: Negative for chills and fever  HENT: Negative for sore throat and trouble swallowing  Eyes: Negative for visual disturbance  Respiratory: Negative for shortness of breath  Cardiovascular: Negative for chest pain and palpitations  Gastrointestinal: Negative for abdominal pain, constipation and diarrhea  Endocrine: Negative for cold intolerance, heat intolerance, polydipsia, polyphagia and polyuria  Genitourinary: Negative for frequency  Musculoskeletal: Negative for arthralgias and myalgias  Skin: Negative for rash  Neurological: Negative for dizziness and tremors  Hematological: Negative for adenopathy  Psychiatric/Behavioral: Negative for sleep disturbance     All other systems reviewed and are negative  Physical Exam:  Body mass index is 41 32 kg/m²  /98   Pulse 96   Ht 5' 9" (1 753 m)   Wt 127 kg (279 lb 12 8 oz)   BMI 41 32 kg/m²    Wt Readings from Last 3 Encounters:   11/05/19 127 kg (279 lb 12 8 oz)   07/17/19 124 kg (273 lb)   05/21/19 123 kg (272 lb)       Physical Exam   Constitutional: She is oriented to person, place, and time  She appears well-developed and well-nourished  No distress  HENT:   Head: Normocephalic and atraumatic  Eyes: Pupils are equal, round, and reactive to light  Conjunctivae are normal    Neck: Normal range of motion  Neck supple  No thyromegaly present  Cardiovascular: Normal rate, regular rhythm and normal heart sounds  Pulmonary/Chest: Effort normal and breath sounds normal  No respiratory distress  She has no wheezes  She has no rales  Abdominal: Soft  Bowel sounds are normal  She exhibits no distension  There is no tenderness  Musculoskeletal: Normal range of motion  She exhibits no edema  Neurological: She is alert and oriented to person, place, and time  Skin: Skin is warm and dry  Psychiatric: She has a normal mood and affect  Vitals reviewed        Labs:     Component      Latest Ref Rng & Units 4/23/2019 8/20/2019   Sodium      136 - 145 mmol/L 134 (L)    Potassium      3 5 - 5 3 mmol/L 4 7    Chloride      100 - 108 mmol/L 104    CO2      21 - 32 mmol/L 29    Anion Gap      4 - 13 mmol/L 1 (L)    BUN      5 - 25 mg/dL 13    Creatinine      0 60 - 1 30 mg/dL 0 87    GLUCOSE FASTING      65 - 99 mg/dL 97    Calcium      8 3 - 10 1 mg/dL 9 5    AST      5 - 45 U/L 32    ALT      12 - 78 U/L 49    Alkaline Phosphatase      46 - 116 U/L 57    Total Protein      6 4 - 8 2 g/dL 8 4 (H)    Albumin      3 5 - 5 0 g/dL 4 2    TOTAL BILIRUBIN      0 20 - 1 00 mg/dL 1 72 (H)    eGFR      ml/min/1 73sq m 89    Hemoglobin A1C      4 2 - 6 3 % 5 5    EAG      mg/dl 111    TSH 3RD GENERATON      0 358 - 3 740 uIU/mL 3 110 2 000   Free T4 0 76 - 1 46 ng/dL 1 56 (H) 1 45   Vit D, 25-Hydroxy      30 0 - 100 0 ng/mL 34 9      THYROID ULTRASOUND     INDICATION:  Evaluate thyroid nodule  E04 1: Nontoxic single thyroid nodule          COMPARISON:  Prior thyroid ultrasound dated 6/12/2018 was reviewed      TECHNIQUE:   Ultrasound of the thyroid was performed with a high frequency linear transducer in transverse and sagittal planes including volumetric imaging sweeps as well as traditional still imaging technique      FINDINGS:  Thyroid parenchyma is diffusely heterogeneous in echotexture with focal nodule as described below      Right lobe:  5 8 x 2 1 x 1 9 cm  Left lobe:  6 1 x 1 7 x 1 9 cm  Isthmus:  0 5 cm      Nodule #1  Image 7 / 8  RIGHT lower pole nodule measuring 1 1 x 0 9 x 1 0 cm  Given differences in measuring technique, no significant change from prior dated 6/12/2018 when it measured 1 0 x 0 7 x 0 8 cm  COMPOSITION:  2 points, solid or almost completely solid   ECHOGENICITY:  2 points, hypoechoic  SHAPE:  0 points, wider-than-tall  MARGIN: 0 points, smooth  ECHOGENIC FOCI:  0 points, none or large comet-tail artifacts  TI-RADS Classification: TR 4 (4-6 points), Moderately suspicious  FNA if > 1 5 cm  Follow if > 1cm  Recommend follow-up ultrasound in 1 year      IMPRESSION:     Diffusely heterogeneous thyroid gland with a stable solitary right lower pole nodule which does not meet current ACR criteria for requiring biopsy  Follow-up ultrasound is recommended in 1 year           Impression & Plan:    Problem List Items Addressed This Visit        Endocrine    PCOS (polycystic ovarian syndrome) - Primary     Continue current dose of Metformin  Increase Spironolactone to 50 mg BID for treatment of her hirsutism  CMP has been stable  Focus on dietary and lifestyle modifications             Relevant Medications    metFORMIN (FORTAMET) 1000 MG (OSM) 24 hr tablet    spironolactone (ALDACTONE) 50 mg tablet    Other Relevant Orders Comprehensive metabolic panel    Thyroid nodule     Stable nodule does not require biopsy  Recommend repeat US in one year          Relevant Orders    T4, free    TSH, 3rd generation       Other    Vitamin D deficiency     Continue vitamin d supplementation          Relevant Orders    Vitamin D 25 hydroxy          Orders Placed This Encounter   Procedures    Comprehensive metabolic panel     This is a patient instruction: Patient fasting for 8 hours or longer recommended   T4, free    TSH, 3rd generation     This is a patient instruction: This test is non-fasting  Please drink two glasses of water morning of bloodwork   Vitamin D 25 hydroxy         Discussed with the patient and all questioned fully answered  She will call me if any problems arise        Counseled patient on diagnostic results, prognosis, risk and benefit of treatment options, instruction for management, importance of treatment compliance, Risk  factor reduction and impressions      Gemma Paz

## 2019-11-05 ENCOUNTER — OFFICE VISIT (OUTPATIENT)
Dept: ENDOCRINOLOGY | Facility: CLINIC | Age: 29
End: 2019-11-05
Payer: COMMERCIAL

## 2019-11-05 VITALS
HEIGHT: 69 IN | SYSTOLIC BLOOD PRESSURE: 120 MMHG | DIASTOLIC BLOOD PRESSURE: 98 MMHG | HEART RATE: 96 BPM | BODY MASS INDEX: 41.44 KG/M2 | WEIGHT: 279.8 LBS

## 2019-11-05 DIAGNOSIS — E28.2 PCOS (POLYCYSTIC OVARIAN SYNDROME): Primary | ICD-10-CM

## 2019-11-05 DIAGNOSIS — E55.9 VITAMIN D DEFICIENCY: ICD-10-CM

## 2019-11-05 DIAGNOSIS — E04.1 THYROID NODULE: ICD-10-CM

## 2019-11-05 PROCEDURE — 99214 OFFICE O/P EST MOD 30 MIN: CPT | Performed by: NURSE PRACTITIONER

## 2019-11-05 RX ORDER — SPIRONOLACTONE 50 MG/1
50 TABLET, FILM COATED ORAL 2 TIMES DAILY
Qty: 60 TABLET | Refills: 6 | Status: SHIPPED | OUTPATIENT
Start: 2019-11-05 | End: 2019-12-23

## 2019-11-05 RX ORDER — METFORMIN HYDROCHLORIDE EXTENDED-RELEASE TABLETS 1000 MG/1
1000 TABLET, FILM COATED, EXTENDED RELEASE ORAL 2 TIMES DAILY
Qty: 60 TABLET | Refills: 6 | Status: SHIPPED | OUTPATIENT
Start: 2019-11-05 | End: 2020-03-20 | Stop reason: CLARIF

## 2019-11-05 NOTE — ASSESSMENT & PLAN NOTE
Continue current dose of Metformin  Increase Spironolactone to 50 mg BID for treatment of her hirsutism  CMP has been stable  Focus on dietary and lifestyle modifications

## 2019-11-14 DIAGNOSIS — E28.2 PCOS (POLYCYSTIC OVARIAN SYNDROME): Primary | ICD-10-CM

## 2019-11-14 DIAGNOSIS — N97.9 INFERTILITY, FEMALE: ICD-10-CM

## 2019-11-19 ENCOUNTER — APPOINTMENT (OUTPATIENT)
Dept: LAB | Facility: CLINIC | Age: 29
End: 2019-11-19
Payer: COMMERCIAL

## 2019-11-19 DIAGNOSIS — E28.2 PCOS (POLYCYSTIC OVARIAN SYNDROME): ICD-10-CM

## 2019-11-19 DIAGNOSIS — N97.9 INFERTILITY, FEMALE: ICD-10-CM

## 2019-11-19 LAB
25(OH)D3 SERPL-MCNC: 20.1 NG/ML (ref 30–100)
ALBUMIN SERPL BCP-MCNC: 3.5 G/DL (ref 3.5–5)
ALP SERPL-CCNC: 45 U/L (ref 46–116)
ALT SERPL W P-5'-P-CCNC: 22 U/L (ref 12–78)
ANION GAP SERPL CALCULATED.3IONS-SCNC: 7 MMOL/L (ref 4–13)
AST SERPL W P-5'-P-CCNC: 16 U/L (ref 5–45)
BILIRUB SERPL-MCNC: 1.3 MG/DL (ref 0.2–1)
BUN SERPL-MCNC: 8 MG/DL (ref 5–25)
CALCIUM SERPL-MCNC: 8.9 MG/DL (ref 8.3–10.1)
CHLORIDE SERPL-SCNC: 108 MMOL/L (ref 100–108)
CO2 SERPL-SCNC: 23 MMOL/L (ref 21–32)
CREAT SERPL-MCNC: 0.69 MG/DL (ref 0.6–1.3)
GFR SERPL CREATININE-BSD FRML MDRD: 116 ML/MIN/1.73SQ M
GLUCOSE P FAST SERPL-MCNC: 88 MG/DL (ref 65–99)
POTASSIUM SERPL-SCNC: 3.9 MMOL/L (ref 3.5–5.3)
PROLACTIN SERPL-MCNC: 32.6 NG/ML
PROT SERPL-MCNC: 7.7 G/DL (ref 6.4–8.2)
SODIUM SERPL-SCNC: 138 MMOL/L (ref 136–145)
T4 FREE SERPL-MCNC: 1.46 NG/DL (ref 0.76–1.46)
TSH SERPL DL<=0.05 MIU/L-ACNC: 1.71 UIU/ML (ref 0.36–3.74)

## 2019-11-19 PROCEDURE — 82306 VITAMIN D 25 HYDROXY: CPT | Performed by: NURSE PRACTITIONER

## 2019-11-19 PROCEDURE — 84146 ASSAY OF PROLACTIN: CPT

## 2019-11-19 PROCEDURE — 84439 ASSAY OF FREE THYROXINE: CPT | Performed by: NURSE PRACTITIONER

## 2019-11-19 PROCEDURE — 36415 COLL VENOUS BLD VENIPUNCTURE: CPT | Performed by: NURSE PRACTITIONER

## 2019-11-19 PROCEDURE — 84443 ASSAY THYROID STIM HORMONE: CPT | Performed by: NURSE PRACTITIONER

## 2019-11-19 PROCEDURE — 80053 COMPREHEN METABOLIC PANEL: CPT | Performed by: NURSE PRACTITIONER

## 2019-11-21 ENCOUNTER — TELEPHONE (OUTPATIENT)
Dept: ENDOCRINOLOGY | Facility: CLINIC | Age: 29
End: 2019-11-21

## 2019-11-21 DIAGNOSIS — R79.89 ELEVATED PROLACTIN LEVEL: Primary | ICD-10-CM

## 2019-11-21 NOTE — TELEPHONE ENCOUNTER
Prolactin level slightly elevated  Repeat Prolactin level in three days  Morning fasting, no sexual intercourse  If on spirolactone showed be on two forms of oral contraceptive  Spirolactone can increase risk for birth defects   LM for patient

## 2019-11-26 ENCOUNTER — TELEPHONE (OUTPATIENT)
Dept: ENDOCRINOLOGY | Facility: CLINIC | Age: 29
End: 2019-11-26

## 2019-11-26 ENCOUNTER — APPOINTMENT (OUTPATIENT)
Dept: LAB | Facility: CLINIC | Age: 29
End: 2019-11-26
Payer: COMMERCIAL

## 2019-11-26 LAB — PROLACTIN SERPL-MCNC: 32.9 NG/ML

## 2019-11-26 PROCEDURE — 84146 ASSAY OF PROLACTIN: CPT | Performed by: NURSE PRACTITIONER

## 2019-11-26 PROCEDURE — 36415 COLL VENOUS BLD VENIPUNCTURE: CPT | Performed by: NURSE PRACTITIONER

## 2019-11-26 NOTE — TELEPHONE ENCOUNTER
----- Message from [place] sent at 11/26/2019 12:30 PM EST -----  Please call the patient regarding her abnormal result  Prolactin remains slightly elevated   Saw it was completed at 9 am  Usually recommend between 7-8 am  Please confirm she fasted and abstained from any form of sexual intercourse for three days prior and day of test

## 2019-11-26 NOTE — TELEPHONE ENCOUNTER
Patient called and left message stating that she was fasting and did not have any form of sexual intercourse for three days prior and day of test  She was not able to have blood work during that time frame due to having to take her kids to school

## 2019-11-26 NOTE — TELEPHONE ENCOUNTER
Left message for pt and asked her to call back and let us know if she did test fasting, and abstained from sexual intercourse for 3 days prior to test

## 2019-12-03 ENCOUNTER — TELEPHONE (OUTPATIENT)
Dept: ENDOCRINOLOGY | Facility: CLINIC | Age: 29
End: 2019-12-03

## 2019-12-03 NOTE — TELEPHONE ENCOUNTER
----- Message from [place] sent at 12/3/2019  3:05 PM EST -----  Regarding: FW: Test Results Question  Contact: [phone]  Prolactin level remains mildly elevated  At this point recommend patient scheduling appointment with Dr Mago Ng for exam      ----- Message -----  From: Loretta Barber  Sent: 12/3/2019   1:45 PM EST  To: ABI Esposito  Subject: FW: Test Results Question                            ----- Message -----  From: Taylor Swain  Sent: 12/3/2019   1:44 PM EST  To: , #  Subject: Test Results Question                            Hello! I called back to confirm that I did fast for the prolactin test as well as not have sex for 3 days prior  I have not heard anything since  Is there any follow up needed for the test results being higher? Thanks!

## 2019-12-20 ENCOUNTER — INITIAL PRENATAL (OUTPATIENT)
Dept: OBGYN CLINIC | Facility: CLINIC | Age: 29
End: 2019-12-20

## 2019-12-20 VITALS
SYSTOLIC BLOOD PRESSURE: 122 MMHG | DIASTOLIC BLOOD PRESSURE: 84 MMHG | WEIGHT: 270.6 LBS | HEIGHT: 69 IN | BODY MASS INDEX: 40.08 KG/M2

## 2019-12-20 DIAGNOSIS — O21.9 VOMITING PREGNANCY: ICD-10-CM

## 2019-12-20 DIAGNOSIS — Z3A.12 12 WEEKS GESTATION OF PREGNANCY: ICD-10-CM

## 2019-12-20 DIAGNOSIS — Z34.03 ENCOUNTER FOR SUPERVISION OF NORMAL FIRST PREGNANCY IN THIRD TRIMESTER: Primary | ICD-10-CM

## 2019-12-20 DIAGNOSIS — Z36.9 ANTENATAL SCREENING ENCOUNTER: ICD-10-CM

## 2019-12-20 PROCEDURE — OBC: Performed by: OBSTETRICS & GYNECOLOGY

## 2019-12-20 RX ORDER — ONDANSETRON 4 MG/1
4 TABLET, FILM COATED ORAL EVERY 8 HOURS PRN
Qty: 20 TABLET | Refills: 0 | Status: SHIPPED | OUTPATIENT
Start: 2019-12-20 | End: 2020-04-21 | Stop reason: ALTCHOICE

## 2019-12-20 NOTE — PROGRESS NOTES
INITIAL PRENATAL NURSE VISIT:        LMP 19 - 12 5/7 wk by yury - [address] 2020  Hx PCOS, elevated prolactin, increased BMI  Prev on Metformin but d/c 2019 - conceived spont - also coneived spont prev preg x 2  Hx vit D deficiency - was taking supp but not consistently  Hx L/T C/S x 2 (1st preg fetal intol, then repeat C/S 2nd preg) - plans repeat C/S, not interested in TL  Hx abn pap  - LGSIL - had colpo - LGSIL with hpv effect  Last pap 19 - wnl  Reviewed nutrition, CF testing, SQS testing, Level 2 U/S - pt will call [facility] to schedule appt for since OB physical not until 19 (will be 14 2/7 wk)  Unable to schedule earlier appt 19 offered  (+) nausea, vomiting 3-4 times - will escribe Zofran, (+) nipple tenderness, (+) urinary freq, no vag bleeding  Aware of delivery @ [facility]  Initial prenatal labs + 1 hr glucose + vit D ordered ([facility])  Pt wears seat belt  Pt has q 6 months dental cleanings - up to date  No travel plans during preg  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

## 2019-12-23 ENCOUNTER — ROUTINE PRENATAL (OUTPATIENT)
Dept: PERINATAL CARE | Facility: OTHER | Age: 29
End: 2019-12-23
Payer: COMMERCIAL

## 2019-12-23 VITALS
SYSTOLIC BLOOD PRESSURE: 130 MMHG | HEART RATE: 112 BPM | BODY MASS INDEX: 39.87 KG/M2 | DIASTOLIC BLOOD PRESSURE: 80 MMHG | HEIGHT: 69 IN | WEIGHT: 269.2 LBS

## 2019-12-23 DIAGNOSIS — O99.210 MATERNAL MORBID OBESITY, ANTEPARTUM (HCC): Primary | ICD-10-CM

## 2019-12-23 DIAGNOSIS — E66.01 MATERNAL MORBID OBESITY, ANTEPARTUM (HCC): Primary | ICD-10-CM

## 2019-12-23 DIAGNOSIS — O34.219 HISTORY OF CESAREAN DELIVERY, ANTEPARTUM: ICD-10-CM

## 2019-12-23 DIAGNOSIS — Z36.82 ENCOUNTER FOR ANTENATAL SCREENING FOR NUCHAL TRANSLUCENCY: ICD-10-CM

## 2019-12-23 PROCEDURE — 76813 OB US NUCHAL MEAS 1 GEST: CPT | Performed by: OBSTETRICS & GYNECOLOGY

## 2019-12-23 PROCEDURE — 76801 OB US < 14 WKS SINGLE FETUS: CPT | Performed by: OBSTETRICS & GYNECOLOGY

## 2019-12-23 PROCEDURE — 99241 PR OFFICE CONSULTATION NEW/ESTAB PATIENT 15 MIN: CPT | Performed by: OBSTETRICS & GYNECOLOGY

## 2019-12-23 NOTE — LETTER
December 23, 2019     Josy Rodrigues MD  [address]  [address]  [address] [place] [address]    Patient: Levada Nageotte   YOB: 1990   Date of Visit: 12/23/2019       Dear Dr Josefa Shepherd: Thank you for referring Levada Nageotte to me for evaluation  Below are my notes for this consultation  If you have questions, please do not hesitate to call me  I look forward to following your patient along with you  Sincerely,        Lili Moody MD        CC: No Recipients  Lili Moody MD  12/23/2019  3:18 PM  Sign at close encounter  [address]: Ms Ousmane Mallory was seen today at 13w1d for nuchal translucency ultrasound  See ultrasound report under "OB Procedures" tab  Please don't hesitate to contact our office with any concerns or questions    Lili Moody MD

## 2019-12-23 NOTE — PROGRESS NOTES
[address]: Ms Shanna Hernandez was seen today at 13w1d for nuchal translucency ultrasound  See ultrasound report under "OB Procedures" tab  Please don't hesitate to contact our office with any concerns or questions    Delfin Francisco MD

## 2019-12-23 NOTE — PATIENT INSTRUCTIONS
Thank you for choosing us for your  care today  If you have any questions about your ultrasound or care, please do not hesitate to contact us or your primary obstetrician  Please consider purchasing an automated blood pressure cuff to check your blood pressure at home daily  Write down the top number (systolic) and bottom number (diastolic)  This will help your providers follow your blood pressure and decide if any additional testing or monitoring is necessary  A top number (systolic) of at or above 111LLMX OR a bottom number (diastolic) of at or above 90 mmHg is considered elevated  Report any elevations to your doctors or call Labor and Delivery  Some general instructions for your pregnancy are:     Exercise: we encourage most pregnant women to get regular physical activity in pregnancy  Exercise has been shown to reduce the risk of several pregnancy-related complications  Unless instructed otherwise, you can aim for 22 minutes per day (150 minutes per week! )   Nutrition: aim for calcium-rich and iron-rich foods as well as healthy sources of protein   Weight: ask your doctor what is the appropriate amount of weight for you to gain in pregnancy  We have nutritionists here if you would like to meet with them   Protection from influenza: get yourself and your entire household vaccinated against influenza  Tell your partner to get vaccinated as well  Good hand hygiene can reduce the spread of this potentially deadly virus  Insist that everyone who is going to hold or be around your baby get vaccinated   Educate yourself about preeclampsia: preeclampsia is a common, serious complication in pregnancy  A blood pressure of 140mmHg (top number or systolic) OR 57HIFF (bottom number or diastolic) is elevated and needs evaluation by your doctor  Ask your doctor early in pregnancy if you should take aspirin (not motrin or tylenol) to prevent preeclampsia    If you were advised to take aspirin to prevent preeclampsia, a daily dose of 162mg or 81mg is advised  One resource to learn more is www  preeclampsia org    If you smoke, try to reduce how many cigarettes you smoke or quit completely  Do not vape   Other warning signs to watch out for in pregnancy or postpartum: chest pain, obstructed breathing or shortness of breath, seizures, thoughts of hurting yourself or your baby, bleeding, a painful or swollen leg, fever, or headache ([facility] POST-BIRTH Warning Signs campaign)  If these happen call 911  Itching is also not normal in pregnancy and if you experience this, especially over your hands and feet, potentially worse at night, notify your doctors

## 2019-12-27 ENCOUNTER — APPOINTMENT (OUTPATIENT)
Dept: LAB | Facility: CLINIC | Age: 29
End: 2019-12-27
Payer: COMMERCIAL

## 2019-12-27 ENCOUNTER — TELEPHONE (OUTPATIENT)
Dept: PERINATAL CARE | Facility: CLINIC | Age: 29
End: 2019-12-27

## 2019-12-27 DIAGNOSIS — Z36.9 ANTENATAL SCREENING ENCOUNTER: ICD-10-CM

## 2019-12-27 LAB
25(OH)D3 SERPL-MCNC: 11.2 NG/ML (ref 30–100)
ABO GROUP BLD: NORMAL
BASOPHILS # BLD AUTO: 0.06 THOUSANDS/ΜL (ref 0–0.1)
BASOPHILS NFR BLD AUTO: 1 % (ref 0–1)
BILIRUB UR QL STRIP: NEGATIVE
BLD GP AB SCN SERPL QL: NEGATIVE
CLARITY UR: NORMAL
COLOR UR: YELLOW
EOSINOPHIL # BLD AUTO: 0.18 THOUSAND/ΜL (ref 0–0.61)
EOSINOPHIL NFR BLD AUTO: 2 % (ref 0–6)
ERYTHROCYTE [DISTWIDTH] IN BLOOD BY AUTOMATED COUNT: 11.9 % (ref 11.6–15.1)
GLUCOSE 1H P 50 G GLC PO SERPL-MCNC: 111 MG/DL
GLUCOSE UR STRIP-MCNC: NEGATIVE MG/DL
HBV SURFACE AG SER QL: NORMAL
HCT VFR BLD AUTO: 38.5 % (ref 34.8–46.1)
HGB BLD-MCNC: 12.7 G/DL (ref 11.5–15.4)
HGB UR QL STRIP.AUTO: NEGATIVE
IMM GRANULOCYTES # BLD AUTO: 0.04 THOUSAND/UL (ref 0–0.2)
IMM GRANULOCYTES NFR BLD AUTO: 0 % (ref 0–2)
KETONES UR STRIP-MCNC: NEGATIVE MG/DL
LEUKOCYTE ESTERASE UR QL STRIP: NEGATIVE
LYMPHOCYTES # BLD AUTO: 2.04 THOUSANDS/ΜL (ref 0.6–4.47)
LYMPHOCYTES NFR BLD AUTO: 18 % (ref 14–44)
MCH RBC QN AUTO: 28.9 PG (ref 26.8–34.3)
MCHC RBC AUTO-ENTMCNC: 33 G/DL (ref 31.4–37.4)
MCV RBC AUTO: 88 FL (ref 82–98)
MONOCYTES # BLD AUTO: 0.48 THOUSAND/ΜL (ref 0.17–1.22)
MONOCYTES NFR BLD AUTO: 4 % (ref 4–12)
NEUTROPHILS # BLD AUTO: 8.6 THOUSANDS/ΜL (ref 1.85–7.62)
NEUTS SEG NFR BLD AUTO: 75 % (ref 43–75)
NITRITE UR QL STRIP: NEGATIVE
NRBC BLD AUTO-RTO: 0 /100 WBCS
PH UR STRIP.AUTO: 7 [PH]
PLATELET # BLD AUTO: 311 THOUSANDS/UL (ref 149–390)
PMV BLD AUTO: 10.1 FL (ref 8.9–12.7)
PROT UR STRIP-MCNC: NEGATIVE MG/DL
RBC # BLD AUTO: 4.39 MILLION/UL (ref 3.81–5.12)
RH BLD: POSITIVE
RPR SER QL: NORMAL
RUBV IGG SERPL IA-ACNC: >175 IU/ML
SP GR UR STRIP.AUTO: 1.03 (ref 1–1.03)
SPECIMEN EXPIRATION DATE: NORMAL
UROBILINOGEN UR QL STRIP.AUTO: 0.2 E.U./DL
WBC # BLD AUTO: 11.4 THOUSAND/UL (ref 4.31–10.16)

## 2019-12-27 PROCEDURE — 81003 URINALYSIS AUTO W/O SCOPE: CPT

## 2019-12-27 PROCEDURE — 36415 COLL VENOUS BLD VENIPUNCTURE: CPT

## 2019-12-27 PROCEDURE — 82306 VITAMIN D 25 HYDROXY: CPT

## 2019-12-27 PROCEDURE — 87086 URINE CULTURE/COLONY COUNT: CPT

## 2019-12-27 PROCEDURE — 80081 OBSTETRIC PANEL INC HIV TSTG: CPT

## 2019-12-27 PROCEDURE — 82950 GLUCOSE TEST: CPT

## 2019-12-28 LAB — BACTERIA UR CULT: NORMAL

## 2019-12-29 LAB — HIV 1+2 AB+HIV1 P24 AG SERPL QL IA: NORMAL

## 2019-12-30 ENCOUNTER — ROUTINE PRENATAL (OUTPATIENT)
Dept: OBGYN CLINIC | Facility: CLINIC | Age: 29
End: 2019-12-30

## 2019-12-30 VITALS — BODY MASS INDEX: 40.32 KG/M2 | SYSTOLIC BLOOD PRESSURE: 132 MMHG | WEIGHT: 273 LBS | DIASTOLIC BLOOD PRESSURE: 82 MMHG

## 2019-12-30 DIAGNOSIS — Z34.82 PRENATAL CARE, SUBSEQUENT PREGNANCY, SECOND TRIMESTER: Primary | ICD-10-CM

## 2019-12-30 DIAGNOSIS — Z36.9 ANTENATAL SCREENING ENCOUNTER: ICD-10-CM

## 2019-12-30 PROCEDURE — G0145 SCR C/V CYTO,THINLAYER,RESCR: HCPCS | Performed by: OBSTETRICS & GYNECOLOGY

## 2019-12-30 PROCEDURE — 87070 CULTURE OTHR SPECIMN AEROBIC: CPT | Performed by: OBSTETRICS & GYNECOLOGY

## 2019-12-30 PROCEDURE — 87106 FUNGI IDENTIFICATION YEAST: CPT | Performed by: OBSTETRICS & GYNECOLOGY

## 2019-12-30 PROCEDURE — 87624 HPV HI-RISK TYP POOLED RSLT: CPT | Performed by: OBSTETRICS & GYNECOLOGY

## 2019-12-30 PROCEDURE — PNV: Performed by: OBSTETRICS & GYNECOLOGY

## 2019-12-30 NOTE — PROGRESS NOTES
Do report for Maternal-Fetal medicine suggest low-dose aspirin 162 mg daily to watch chronic hypertension  Her normal Glucola test was noted  Return to office 4 weeks    She will have repeat  section

## 2019-12-30 NOTE — PATIENT INSTRUCTIONS
Viable intrauterine pregnancy heart rate 162 suggest low-dose aspirin of 62 mg daily history of increased BMI prior  section will be requesting repeat a 3rd  section

## 2019-12-31 LAB
HPV HR 12 DNA CVX QL NAA+PROBE: NEGATIVE
HPV16 DNA CVX QL NAA+PROBE: NEGATIVE
HPV18 DNA CVX QL NAA+PROBE: NEGATIVE

## 2020-01-01 LAB
BACTERIA GENITAL AEROBE CULT: ABNORMAL
BACTERIA GENITAL AEROBE CULT: ABNORMAL

## 2020-01-02 LAB
LAB AP GYN PRIMARY INTERPRETATION: NORMAL
LAB AP LMP: NORMAL
Lab: NORMAL
PATH INTERP SPEC-IMP: NORMAL

## 2020-01-03 ENCOUNTER — TELEPHONE (OUTPATIENT)
Dept: PERINATAL CARE | Facility: CLINIC | Age: 30
End: 2020-01-03

## 2020-01-03 NOTE — TELEPHONE ENCOUNTER
Received message from Dr Richard Ramsey 2 call patient to discuss her 1st trimester sequential screening test results which though screen negative had a significantly increased risk for Down syndrome over the patient's age related risk  Telephone call to patient at number listed on communication consent  Left message for patient to return call to discuss test results

## 2020-01-06 ENCOUNTER — TELEPHONE (OUTPATIENT)
Dept: PERINATAL CARE | Facility: CLINIC | Age: 30
End: 2020-01-06

## 2020-01-06 NOTE — TELEPHONE ENCOUNTER
Received in coming telephone call from patient  Reported sequential screening part 1 results which were screen negative for Down syndrome and trisomy 18  Advised patient that though her results are screen negative the modified risk for Down syndrome was increased over age related risk at 1/80  Patient advised that she could proceed with completing part 2 to get a final risk assessment or could consider coming in to discuss additional testing options that are available  Patient opted to proceed with part 2 and will consider further testing if results indicate an increased risk  Patient expressed that she has no additional questions at this time    Alerted nurses to proceed with mailing patient part to test requisition

## 2020-01-06 NOTE — TELEPHONE ENCOUNTER
Telephone call to patient at number listed on communication consent  Left message for patient to return call to discuss sequential screening test results  Second attempt to reach patient

## 2020-01-09 ENCOUNTER — TELEPHONE (OUTPATIENT)
Dept: PERINATAL CARE | Facility: CLINIC | Age: 30
End: 2020-01-09

## 2020-01-09 LAB
C TRACH DNA SPEC QL NAA+PROBE: ABNORMAL
N GONORRHOEA DNA SPEC QL NAA+PROBE: ABNORMAL

## 2020-01-09 NOTE — TELEPHONE ENCOUNTER
I spoke to Debbie today to notify her of the dating to get her part 2 sequential screen drawn  I also notified her of where to go to get this drawn  TRF mailed   She denies any questions,

## 2020-01-10 ENCOUNTER — TELEPHONE (OUTPATIENT)
Dept: OBGYN CLINIC | Facility: CLINIC | Age: 30
End: 2020-01-10

## 2020-01-10 NOTE — TELEPHONE ENCOUNTER
OB - Pt's GC/chlamydia culture results 1/28/20 - failed results due to interfering substances  Recollect next appt

## 2020-01-13 ENCOUNTER — TELEPHONE (OUTPATIENT)
Dept: OBGYN CLINIC | Facility: CLINIC | Age: 30
End: 2020-01-13

## 2020-01-13 NOTE — TELEPHONE ENCOUNTER
----- Message from Riya Brown MD sent at 1/12/2020  2:37 PM EST -----  Please inform this patient of positive yeast infection may treat with Monistat

## 2020-01-13 NOTE — TELEPHONE ENCOUNTER
OB - pt informed to use Monistat 3 or 7 for (+) yeast on pap & culture results 12/30/19  Pt aware will have repeat GC & chlamydia culture next appt

## 2020-01-14 ENCOUNTER — TRANSCRIBE ORDERS (OUTPATIENT)
Dept: LAB | Facility: HOSPITAL | Age: 30
End: 2020-01-14

## 2020-01-14 ENCOUNTER — APPOINTMENT (OUTPATIENT)
Dept: LAB | Facility: HOSPITAL | Age: 30
End: 2020-01-14
Attending: OBSTETRICS & GYNECOLOGY
Payer: COMMERCIAL

## 2020-01-14 DIAGNOSIS — Z33.1 PREGNANT STATE, INCIDENTAL: Primary | ICD-10-CM

## 2020-01-14 DIAGNOSIS — Z36.9 UNSPECIFIED ANTENATAL SCREENING: ICD-10-CM

## 2020-01-14 DIAGNOSIS — Z33.1 PREGNANT STATE, INCIDENTAL: ICD-10-CM

## 2020-01-14 PROCEDURE — 36415 COLL VENOUS BLD VENIPUNCTURE: CPT

## 2020-01-15 LAB — SCAN RESULT: NORMAL

## 2020-01-20 ENCOUNTER — TELEPHONE (OUTPATIENT)
Dept: PERINATAL CARE | Facility: OTHER | Age: 30
End: 2020-01-20

## 2020-01-20 NOTE — TELEPHONE ENCOUNTER
----- Message from Heaven Swanson MD sent at 1/19/2020 11:30 AM EST -----  [facility] RN staff, I've reviewed this Sequential Part 2 result which is normal, can you call her regarding this result? NINO-A is low and she should have a third trimester ultrasound however this will already be advised for BMI>40  Thank you    Heaven Swanson MD

## 2020-01-20 NOTE — TELEPHONE ENCOUNTER
----- Message from Lloyd Pack MD sent at 1/19/2020 11:30 AM EST -----  [facility] RN staff, I've reviewed this Sequential Part 2 result which is normal, can you call her regarding this result? NINO-A is low and she should have a third trimester ultrasound however this will already be advised for BMI>40  Thank you    Lloyd Pack MD

## 2020-01-28 ENCOUNTER — ROUTINE PRENATAL (OUTPATIENT)
Dept: OBGYN CLINIC | Facility: CLINIC | Age: 30
End: 2020-01-28

## 2020-01-28 VITALS
WEIGHT: 272 LBS | SYSTOLIC BLOOD PRESSURE: 124 MMHG | HEIGHT: 69 IN | BODY MASS INDEX: 40.29 KG/M2 | DIASTOLIC BLOOD PRESSURE: 80 MMHG

## 2020-01-28 DIAGNOSIS — Z36.9 ANTENATAL SCREENING ENCOUNTER: ICD-10-CM

## 2020-01-28 DIAGNOSIS — Z34.82 PRENATAL CARE, SUBSEQUENT PREGNANCY, SECOND TRIMESTER: Primary | ICD-10-CM

## 2020-01-28 PROCEDURE — PNV: Performed by: NURSE PRACTITIONER

## 2020-01-28 RX ORDER — ASPIRIN 81 MG/1
162 TABLET ORAL DAILY
COMMUNITY
End: 2020-06-13 | Stop reason: HOSPADM

## 2020-01-28 NOTE — PROGRESS NOTES
Patient is doing well  Denies LOF/Bleeding/Cramping  Sequential screening is normal     Level 2 scheduled for       RTO in 4 weeks      Diagnoses and all orders for this visit:    Prenatal care, subsequent pregnancy, second trimester     screening encounter  -     Chlamydia/GC amplified DNA by PCR

## 2020-01-29 LAB
C TRACH RRNA SPEC QL NAA+PROBE: NOT DETECTED
N GONORRHOEA RRNA SPEC QL NAA+PROBE: NOT DETECTED

## 2020-02-12 ENCOUNTER — ROUTINE PRENATAL (OUTPATIENT)
Dept: PERINATAL CARE | Facility: OTHER | Age: 30
End: 2020-02-12
Payer: COMMERCIAL

## 2020-02-12 VITALS
DIASTOLIC BLOOD PRESSURE: 82 MMHG | HEIGHT: 69 IN | HEART RATE: 114 BPM | SYSTOLIC BLOOD PRESSURE: 130 MMHG | WEIGHT: 275.8 LBS | BODY MASS INDEX: 40.85 KG/M2

## 2020-02-12 DIAGNOSIS — Z36.89 ENCOUNTER FOR FETAL ANATOMIC SURVEY: ICD-10-CM

## 2020-02-12 DIAGNOSIS — Z36.86 ENCOUNTER FOR ANTENATAL SCREENING FOR CERVICAL LENGTH: ICD-10-CM

## 2020-02-12 DIAGNOSIS — E66.01 MORBID OBESITY (HCC): ICD-10-CM

## 2020-02-12 DIAGNOSIS — Z3A.20 20 WEEKS GESTATION OF PREGNANCY: Primary | ICD-10-CM

## 2020-02-12 DIAGNOSIS — R03.0 ELEVATED BLOOD-PRESSURE READING, WITHOUT DIAGNOSIS OF HYPERTENSION: ICD-10-CM

## 2020-02-12 DIAGNOSIS — Z3A.12 12 WEEKS GESTATION OF PREGNANCY: ICD-10-CM

## 2020-02-12 PROBLEM — R63.5 ABNORMAL WEIGHT GAIN: Status: RESOLVED | Noted: 2018-06-05 | Resolved: 2020-02-12

## 2020-02-12 PROBLEM — O09.899 HIGH RISK PREGNANCY WITH LOW PAPPA (PREGNANCY-ASSOCIATED PLASMA PROTEIN A): Status: ACTIVE | Noted: 2020-02-12

## 2020-02-12 PROBLEM — O34.219 HISTORY OF CESAREAN DELIVERY, ANTEPARTUM: Status: ACTIVE | Noted: 2020-02-12

## 2020-02-12 PROBLEM — O28.0 HIGH RISK PREGNANCY WITH LOW PAPPA (PREGNANCY-ASSOCIATED PLASMA PROTEIN A): Status: ACTIVE | Noted: 2020-02-12

## 2020-02-12 PROBLEM — N91.2 AMENORRHEA: Status: RESOLVED | Noted: 2018-09-14 | Resolved: 2020-02-12

## 2020-02-12 PROBLEM — E01.0 THYROMEGALY: Status: RESOLVED | Noted: 2018-06-05 | Resolved: 2020-02-12

## 2020-02-12 PROCEDURE — 76811 OB US DETAILED SNGL FETUS: CPT | Performed by: OBSTETRICS & GYNECOLOGY

## 2020-02-12 PROCEDURE — 99211 OFF/OP EST MAY X REQ PHY/QHP: CPT | Performed by: OBSTETRICS & GYNECOLOGY

## 2020-02-12 PROCEDURE — 76817 TRANSVAGINAL US OBSTETRIC: CPT | Performed by: OBSTETRICS & GYNECOLOGY

## 2020-02-12 NOTE — PROGRESS NOTES
[address] [employer] [facility] Ms Poncho Anderson was seen today at 20w3d for anatomic survey and cervical length screening ultrasound  See ultrasound report under "OB Procedures" tab    Please don't hesitate to contact our office with any concerns or questions   -Fernanda Martinez MD

## 2020-02-12 NOTE — PROGRESS NOTES
A transvaginal ultrasound was performed  Sonographer note on use of High Level Disinfection Process (Trophon) for transvaginal probe# 2 used, serial [id number]    Evon Deng RDMS

## 2020-02-25 ENCOUNTER — ROUTINE PRENATAL (OUTPATIENT)
Dept: OBGYN CLINIC | Facility: CLINIC | Age: 30
End: 2020-02-25

## 2020-02-25 VITALS — SYSTOLIC BLOOD PRESSURE: 110 MMHG | WEIGHT: 277.2 LBS | DIASTOLIC BLOOD PRESSURE: 80 MMHG | BODY MASS INDEX: 40.94 KG/M2

## 2020-02-25 DIAGNOSIS — Z34.82 PRENATAL CARE, SUBSEQUENT PREGNANCY, SECOND TRIMESTER: Primary | ICD-10-CM

## 2020-02-25 PROCEDURE — PNV: Performed by: OBSTETRICS & GYNECOLOGY

## 2020-02-25 NOTE — PATIENT INSTRUCTIONS
NORMAL LEVEL 2 ULTRASOUND, SUGGEST FETAL SURVEILLANCE AT 36 WEEKS FOR HISTORY OF DECREASED NINO -A, PATIENT IS FOR REPEAT  SECTION DECLINES TUBAL LIGATION

## 2020-02-25 NOTE — PROGRESS NOTES
Normal level 2 ultrasound in February, history of of low NINO-A, SUGGEST FETAL SURVEILLANCE BEGINNING AT 36 WEEKS GESTATION

## 2020-03-20 ENCOUNTER — DOCUMENTATION (OUTPATIENT)
Dept: ENDOCRINOLOGY | Facility: CLINIC | Age: 30
End: 2020-03-20

## 2020-03-20 DIAGNOSIS — E28.2 PCOS (POLYCYSTIC OVARIAN SYNDROME): Primary | ICD-10-CM

## 2020-03-20 NOTE — TELEPHONE ENCOUNTER
Reason for documentation: Per discussion with PCP request, will convert patient from generic Fortamet to generic Glucophage XR d/t cost savings  Will pend rx for signature/concurrence

## 2020-03-23 RX ORDER — METFORMIN HYDROCHLORIDE 500 MG/1
1000 TABLET, EXTENDED RELEASE ORAL 2 TIMES DAILY
Qty: 360 TABLET | Refills: 1 | Status: SHIPPED | OUTPATIENT
Start: 2020-03-23 | End: 2020-04-21 | Stop reason: ALTCHOICE

## 2020-03-24 ENCOUNTER — ROUTINE PRENATAL (OUTPATIENT)
Dept: OBGYN CLINIC | Facility: CLINIC | Age: 30
End: 2020-03-24

## 2020-03-24 VITALS
HEIGHT: 69 IN | BODY MASS INDEX: 41.77 KG/M2 | WEIGHT: 282 LBS | SYSTOLIC BLOOD PRESSURE: 118 MMHG | DIASTOLIC BLOOD PRESSURE: 76 MMHG

## 2020-03-24 DIAGNOSIS — Z36.9 ANTENATAL SCREENING ENCOUNTER: ICD-10-CM

## 2020-03-24 DIAGNOSIS — Z34.82 PRENATAL CARE, SUBSEQUENT PREGNANCY, SECOND TRIMESTER: Primary | ICD-10-CM

## 2020-03-24 PROCEDURE — PNV: Performed by: NURSE PRACTITIONER

## 2020-03-24 NOTE — PROGRESS NOTES
Patient is doing well  Denies LOF/Bleeding/Cramping  +FM    Has MFM appt on 2020 for growth and anatomy      RTO in 4 weeks    Diagnoses and all orders for this visit:    Prenatal care, subsequent pregnancy, second trimester     screening encounter  -     CBC and differential  -     Glucose, 1H PG  -     RPR

## 2020-03-27 ENCOUNTER — APPOINTMENT (OUTPATIENT)
Dept: LAB | Facility: HOSPITAL | Age: 30
End: 2020-03-27
Payer: COMMERCIAL

## 2020-03-27 LAB
BASOPHILS # BLD AUTO: 0.06 THOUSANDS/ΜL (ref 0–0.1)
BASOPHILS NFR BLD AUTO: 1 % (ref 0–1)
EOSINOPHIL # BLD AUTO: 0.16 THOUSAND/ΜL (ref 0–0.61)
EOSINOPHIL NFR BLD AUTO: 1 % (ref 0–6)
ERYTHROCYTE [DISTWIDTH] IN BLOOD BY AUTOMATED COUNT: 12.5 % (ref 11.6–15.1)
GLUCOSE 1H P 50 G GLC PO SERPL-MCNC: 120 MG/DL
HCT VFR BLD AUTO: 35.3 % (ref 34.8–46.1)
HGB BLD-MCNC: 11.4 G/DL (ref 11.5–15.4)
IMM GRANULOCYTES # BLD AUTO: 0.1 THOUSAND/UL (ref 0–0.2)
IMM GRANULOCYTES NFR BLD AUTO: 1 % (ref 0–2)
LYMPHOCYTES # BLD AUTO: 1.66 THOUSANDS/ΜL (ref 0.6–4.47)
LYMPHOCYTES NFR BLD AUTO: 13 % (ref 14–44)
MCH RBC QN AUTO: 28.4 PG (ref 26.8–34.3)
MCHC RBC AUTO-ENTMCNC: 32.3 G/DL (ref 31.4–37.4)
MCV RBC AUTO: 88 FL (ref 82–98)
MONOCYTES # BLD AUTO: 0.46 THOUSAND/ΜL (ref 0.17–1.22)
MONOCYTES NFR BLD AUTO: 4 % (ref 4–12)
NEUTROPHILS # BLD AUTO: 10.07 THOUSANDS/ΜL (ref 1.85–7.62)
NEUTS SEG NFR BLD AUTO: 80 % (ref 43–75)
NRBC BLD AUTO-RTO: 0 /100 WBCS
PLATELET # BLD AUTO: 298 THOUSANDS/UL (ref 149–390)
PMV BLD AUTO: 9.8 FL (ref 8.9–12.7)
RBC # BLD AUTO: 4.01 MILLION/UL (ref 3.81–5.12)
WBC # BLD AUTO: 12.51 THOUSAND/UL (ref 4.31–10.16)

## 2020-03-27 PROCEDURE — 85025 COMPLETE CBC W/AUTO DIFF WBC: CPT | Performed by: NURSE PRACTITIONER

## 2020-03-27 PROCEDURE — 82950 GLUCOSE TEST: CPT | Performed by: NURSE PRACTITIONER

## 2020-03-27 PROCEDURE — 36415 COLL VENOUS BLD VENIPUNCTURE: CPT | Performed by: NURSE PRACTITIONER

## 2020-03-27 PROCEDURE — 86592 SYPHILIS TEST NON-TREP QUAL: CPT | Performed by: NURSE PRACTITIONER

## 2020-03-30 ENCOUNTER — TELEPHONE (OUTPATIENT)
Dept: PERINATAL CARE | Facility: CLINIC | Age: 30
End: 2020-03-30

## 2020-03-30 LAB — RPR SER QL: NORMAL

## 2020-03-30 NOTE — TELEPHONE ENCOUNTER
WING Telephone Note:    Attempted to reach patient by phone and voicemail to confirm appointment for her ultrasound  At this time we are seeing regularly scheduled appointments  Patient was asked that if they were not feeling well, have cough, fever or Shortness of Breath to call and reschedule their appointment once all symptoms have resolved  If you have any of these symptoms please contact your primary care physician or [phone]  Pt was notified that there are NO visitors or support people to be present for their appointment and notified of the phone check in process          WE MOVED APPT FROM CARL mireles with new appt for Ramirez

## 2020-04-07 ENCOUNTER — ULTRASOUND (OUTPATIENT)
Dept: PERINATAL CARE | Facility: CLINIC | Age: 30
End: 2020-04-07
Payer: COMMERCIAL

## 2020-04-07 VITALS
HEIGHT: 69 IN | HEART RATE: 121 BPM | WEIGHT: 289 LBS | DIASTOLIC BLOOD PRESSURE: 80 MMHG | BODY MASS INDEX: 42.8 KG/M2 | SYSTOLIC BLOOD PRESSURE: 123 MMHG

## 2020-04-07 DIAGNOSIS — O10.919 CHRONIC HYPERTENSION AFFECTING PREGNANCY: ICD-10-CM

## 2020-04-07 DIAGNOSIS — IMO0002 EVALUATE ANATOMY NOT SEEN ON PRIOR SONOGRAM: ICD-10-CM

## 2020-04-07 DIAGNOSIS — E66.01 MATERNAL MORBID OBESITY, ANTEPARTUM (HCC): Primary | ICD-10-CM

## 2020-04-07 DIAGNOSIS — O99.210 MATERNAL MORBID OBESITY, ANTEPARTUM (HCC): Primary | ICD-10-CM

## 2020-04-07 DIAGNOSIS — Z36.89 ENCOUNTER FOR ULTRASOUND TO CHECK FETAL GROWTH: ICD-10-CM

## 2020-04-07 PROCEDURE — 76816 OB US FOLLOW-UP PER FETUS: CPT | Performed by: OBSTETRICS & GYNECOLOGY

## 2020-04-07 PROCEDURE — PNV: Performed by: OBSTETRICS & GYNECOLOGY

## 2020-04-21 ENCOUNTER — TELEMEDICINE (OUTPATIENT)
Dept: OBGYN CLINIC | Facility: CLINIC | Age: 30
End: 2020-04-21

## 2020-04-21 DIAGNOSIS — Z01.30 BLOOD PRESSURE CHECK: ICD-10-CM

## 2020-04-21 DIAGNOSIS — Z34.83 PRENATAL CARE, SUBSEQUENT PREGNANCY, THIRD TRIMESTER: Primary | ICD-10-CM

## 2020-04-21 PROCEDURE — PNV: Performed by: NURSE PRACTITIONER

## 2020-04-21 RX ORDER — ADHESIVE BANDAGE 3/4"
BANDAGE TOPICAL DAILY
Qty: 1 EACH | Refills: 0 | Status: SHIPPED | OUTPATIENT
Start: 2020-04-21 | End: 2021-01-11

## 2020-04-30 ENCOUNTER — HOSPITAL ENCOUNTER (OUTPATIENT)
Facility: HOSPITAL | Age: 30
Discharge: HOME | End: 2020-04-30
Attending: OBSTETRICS & GYNECOLOGY | Admitting: OBSTETRICS & GYNECOLOGY
Payer: COMMERCIAL

## 2020-04-30 VITALS
RESPIRATION RATE: 18 BRPM | HEART RATE: 113 BPM | WEIGHT: 289 LBS | TEMPERATURE: 98.3 F | HEIGHT: 69 IN | DIASTOLIC BLOOD PRESSURE: 82 MMHG | OXYGEN SATURATION: 98 % | SYSTOLIC BLOOD PRESSURE: 126 MMHG | BODY MASS INDEX: 42.8 KG/M2

## 2020-04-30 PROBLEM — Z3A.31 31 WEEKS GESTATION OF PREGNANCY: Status: ACTIVE | Noted: 2020-04-30

## 2020-04-30 LAB
ALBUMIN SERPL BCP-MCNC: 2.7 G/DL (ref 3.5–5)
ALP SERPL-CCNC: 58 U/L (ref 46–116)
ALT SERPL W P-5'-P-CCNC: 20 U/L (ref 12–78)
ANION GAP SERPL CALCULATED.3IONS-SCNC: 10 MMOL/L (ref 4–13)
AST SERPL W P-5'-P-CCNC: 23 U/L (ref 5–45)
BACTERIA UR QL AUTO: ABNORMAL /HPF
BILIRUB SERPL-MCNC: 0.52 MG/DL (ref 0.2–1)
BILIRUB UR QL STRIP: NEGATIVE
BUN SERPL-MCNC: 7 MG/DL (ref 5–25)
CALCIUM SERPL-MCNC: 8.5 MG/DL (ref 8.3–10.1)
CHLORIDE SERPL-SCNC: 104 MMOL/L (ref 100–108)
CLARITY UR: CLEAR
CO2 SERPL-SCNC: 24 MMOL/L (ref 21–32)
COLOR UR: YELLOW
CREAT SERPL-MCNC: 0.68 MG/DL (ref 0.6–1.3)
CREAT UR-MCNC: 129 MG/DL
ERYTHROCYTE [DISTWIDTH] IN BLOOD BY AUTOMATED COUNT: 12.9 % (ref 11.6–15.1)
GFR SERPL CREATININE-BSD FRML MDRD: 116 ML/MIN/1.73SQ M
GLUCOSE SERPL-MCNC: 96 MG/DL (ref 65–140)
GLUCOSE UR STRIP-MCNC: NEGATIVE MG/DL
HCT VFR BLD AUTO: 36.4 % (ref 34.8–46.1)
HGB BLD-MCNC: 12 G/DL (ref 11.5–15.4)
HGB UR QL STRIP.AUTO: NEGATIVE
KETONES UR STRIP-MCNC: NEGATIVE MG/DL
LEUKOCYTE ESTERASE UR QL STRIP: ABNORMAL
MCH RBC QN AUTO: 28.9 PG (ref 26.8–34.3)
MCHC RBC AUTO-ENTMCNC: 33 G/DL (ref 31.4–37.4)
MCV RBC AUTO: 88 FL (ref 82–98)
NITRITE UR QL STRIP: NEGATIVE
NON-SQ EPI CELLS URNS QL MICRO: ABNORMAL /HPF
PH UR STRIP.AUTO: 6 [PH]
PLATELET # BLD AUTO: 278 THOUSANDS/UL (ref 149–390)
PMV BLD AUTO: 9.5 FL (ref 8.9–12.7)
POTASSIUM SERPL-SCNC: 4.2 MMOL/L (ref 3.5–5.3)
PROT SERPL-MCNC: 6.9 G/DL (ref 6.4–8.2)
PROT UR STRIP-MCNC: NEGATIVE MG/DL
PROT UR-MCNC: 16 MG/DL
PROT/CREAT UR: 0.12 MG/G{CREAT} (ref 0–0.1)
RBC # BLD AUTO: 4.15 MILLION/UL (ref 3.81–5.12)
RBC #/AREA URNS AUTO: ABNORMAL /HPF
SODIUM SERPL-SCNC: 138 MMOL/L (ref 136–145)
SP GR UR STRIP.AUTO: 1.01 (ref 1–1.03)
UROBILINOGEN UR QL STRIP.AUTO: 0.2 E.U./DL
WBC # BLD AUTO: 11.66 THOUSAND/UL (ref 4.31–10.16)
WBC #/AREA URNS AUTO: ABNORMAL /HPF

## 2020-04-30 PROCEDURE — 80053 COMPREHEN METABOLIC PANEL: CPT | Performed by: OBSTETRICS & GYNECOLOGY

## 2020-04-30 PROCEDURE — 99213 OFFICE O/P EST LOW 20 MIN: CPT

## 2020-04-30 PROCEDURE — 87086 URINE CULTURE/COLONY COUNT: CPT | Performed by: STUDENT IN AN ORGANIZED HEALTH CARE EDUCATION/TRAINING PROGRAM

## 2020-04-30 PROCEDURE — 81001 URINALYSIS AUTO W/SCOPE: CPT | Performed by: OBSTETRICS & GYNECOLOGY

## 2020-04-30 PROCEDURE — G0463 HOSPITAL OUTPT CLINIC VISIT: HCPCS

## 2020-04-30 PROCEDURE — 85027 COMPLETE CBC AUTOMATED: CPT | Performed by: OBSTETRICS & GYNECOLOGY

## 2020-04-30 PROCEDURE — 82570 ASSAY OF URINE CREATININE: CPT | Performed by: OBSTETRICS & GYNECOLOGY

## 2020-04-30 PROCEDURE — 84156 ASSAY OF PROTEIN URINE: CPT | Performed by: OBSTETRICS & GYNECOLOGY

## 2020-05-02 LAB — BACTERIA UR CULT: NORMAL

## 2020-05-04 ENCOUNTER — ROUTINE PRENATAL (OUTPATIENT)
Dept: OBGYN CLINIC | Facility: CLINIC | Age: 30
End: 2020-05-04
Payer: COMMERCIAL

## 2020-05-04 VITALS — BODY MASS INDEX: 42.97 KG/M2 | DIASTOLIC BLOOD PRESSURE: 66 MMHG | SYSTOLIC BLOOD PRESSURE: 120 MMHG | WEIGHT: 291 LBS

## 2020-05-04 DIAGNOSIS — Z23 NEED FOR TDAP VACCINATION: ICD-10-CM

## 2020-05-04 DIAGNOSIS — Z34.83 PRENATAL CARE, SUBSEQUENT PREGNANCY, THIRD TRIMESTER: Primary | ICD-10-CM

## 2020-05-04 PROCEDURE — 90471 IMMUNIZATION ADMIN: CPT | Performed by: OBSTETRICS & GYNECOLOGY

## 2020-05-04 PROCEDURE — PNV: Performed by: NURSE PRACTITIONER

## 2020-05-04 PROCEDURE — 90715 TDAP VACCINE 7 YRS/> IM: CPT | Performed by: OBSTETRICS & GYNECOLOGY

## 2020-05-19 ENCOUNTER — TELEMEDICINE (OUTPATIENT)
Dept: OBGYN CLINIC | Facility: CLINIC | Age: 30
End: 2020-05-19

## 2020-05-19 VITALS — DIASTOLIC BLOOD PRESSURE: 83 MMHG | SYSTOLIC BLOOD PRESSURE: 126 MMHG

## 2020-05-19 DIAGNOSIS — Z34.83 PRENATAL CARE, SUBSEQUENT PREGNANCY, THIRD TRIMESTER: Primary | ICD-10-CM

## 2020-05-19 PROCEDURE — PNV: Performed by: NURSE PRACTITIONER

## 2020-05-22 ENCOUNTER — TELEPHONE (OUTPATIENT)
Dept: PERINATAL CARE | Facility: CLINIC | Age: 30
End: 2020-05-22

## 2020-05-26 ENCOUNTER — ULTRASOUND (OUTPATIENT)
Dept: PERINATAL CARE | Facility: CLINIC | Age: 30
End: 2020-05-26
Payer: COMMERCIAL

## 2020-05-26 ENCOUNTER — HOSPITAL ENCOUNTER (OUTPATIENT)
Facility: HOSPITAL | Age: 30
Discharge: HOME | End: 2020-05-26
Attending: OBSTETRICS & GYNECOLOGY | Admitting: OBSTETRICS & GYNECOLOGY
Payer: COMMERCIAL

## 2020-05-26 VITALS — HEART RATE: 110 BPM | DIASTOLIC BLOOD PRESSURE: 80 MMHG | SYSTOLIC BLOOD PRESSURE: 121 MMHG

## 2020-05-26 VITALS
DIASTOLIC BLOOD PRESSURE: 78 MMHG | TEMPERATURE: 97.9 F | BODY MASS INDEX: 43.4 KG/M2 | WEIGHT: 293 LBS | SYSTOLIC BLOOD PRESSURE: 142 MMHG | HEART RATE: 135 BPM | HEIGHT: 69 IN

## 2020-05-26 DIAGNOSIS — O09.899 HIGH RISK PREGNANCY WITH LOW PAPPA (PREGNANCY-ASSOCIATED PLASMA PROTEIN A): ICD-10-CM

## 2020-05-26 DIAGNOSIS — O99.210 MATERNAL MORBID OBESITY, ANTEPARTUM (HCC): Primary | ICD-10-CM

## 2020-05-26 DIAGNOSIS — Z3A.35 35 WEEKS GESTATION OF PREGNANCY: ICD-10-CM

## 2020-05-26 DIAGNOSIS — Z36.89 ENCOUNTER FOR ULTRASOUND TO CHECK FETAL GROWTH: ICD-10-CM

## 2020-05-26 DIAGNOSIS — O13.3 GESTATIONAL HYPERTENSION, THIRD TRIMESTER: ICD-10-CM

## 2020-05-26 DIAGNOSIS — O28.0 HIGH RISK PREGNANCY WITH LOW PAPPA (PREGNANCY-ASSOCIATED PLASMA PROTEIN A): ICD-10-CM

## 2020-05-26 DIAGNOSIS — E66.01 MATERNAL MORBID OBESITY, ANTEPARTUM (HCC): Primary | ICD-10-CM

## 2020-05-26 PROBLEM — O13.9 GESTATIONAL HYPERTENSION: Status: ACTIVE | Noted: 2020-04-07

## 2020-05-26 LAB
ALBUMIN SERPL BCP-MCNC: 2.5 G/DL (ref 3.5–5)
ALP SERPL-CCNC: 70 U/L (ref 46–116)
ALT SERPL W P-5'-P-CCNC: 20 U/L (ref 12–78)
ANION GAP SERPL CALCULATED.3IONS-SCNC: 10 MMOL/L (ref 4–13)
AST SERPL W P-5'-P-CCNC: 36 U/L (ref 5–45)
BACTERIA UR QL AUTO: ABNORMAL /HPF
BASOPHILS # BLD AUTO: 0.06 THOUSANDS/ΜL (ref 0–0.1)
BASOPHILS NFR BLD AUTO: 1 % (ref 0–1)
BILIRUB SERPL-MCNC: 0.7 MG/DL (ref 0.2–1)
BILIRUB UR QL STRIP: NEGATIVE
BUN SERPL-MCNC: 8 MG/DL (ref 5–25)
CALCIUM SERPL-MCNC: 8.7 MG/DL (ref 8.3–10.1)
CAOX CRY URNS QL MICRO: ABNORMAL /HPF
CHLORIDE SERPL-SCNC: 106 MMOL/L (ref 100–108)
CLARITY UR: CLEAR
CO2 SERPL-SCNC: 22 MMOL/L (ref 21–32)
COLOR UR: YELLOW
CREAT SERPL-MCNC: 0.58 MG/DL (ref 0.6–1.3)
CREAT UR-MCNC: 214 MG/DL
EOSINOPHIL # BLD AUTO: 0.14 THOUSAND/ΜL (ref 0–0.61)
EOSINOPHIL NFR BLD AUTO: 1 % (ref 0–6)
ERYTHROCYTE [DISTWIDTH] IN BLOOD BY AUTOMATED COUNT: 13.1 % (ref 11.6–15.1)
GFR SERPL CREATININE-BSD FRML MDRD: 122 ML/MIN/1.73SQ M
GLUCOSE SERPL-MCNC: 85 MG/DL (ref 65–140)
GLUCOSE UR STRIP-MCNC: NEGATIVE MG/DL
HCT VFR BLD AUTO: 36.1 % (ref 34.8–46.1)
HGB BLD-MCNC: 12.1 G/DL (ref 11.5–15.4)
HGB UR QL STRIP.AUTO: NEGATIVE
IMM GRANULOCYTES # BLD AUTO: 0.08 THOUSAND/UL (ref 0–0.2)
IMM GRANULOCYTES NFR BLD AUTO: 1 % (ref 0–2)
KETONES UR STRIP-MCNC: NEGATIVE MG/DL
LEUKOCYTE ESTERASE UR QL STRIP: NEGATIVE
LYMPHOCYTES # BLD AUTO: 1.71 THOUSANDS/ΜL (ref 0.6–4.47)
LYMPHOCYTES NFR BLD AUTO: 15 % (ref 14–44)
MCH RBC QN AUTO: 29 PG (ref 26.8–34.3)
MCHC RBC AUTO-ENTMCNC: 33.5 G/DL (ref 31.4–37.4)
MCV RBC AUTO: 87 FL (ref 82–98)
MONOCYTES # BLD AUTO: 0.84 THOUSAND/ΜL (ref 0.17–1.22)
MONOCYTES NFR BLD AUTO: 8 % (ref 4–12)
NEUTROPHILS # BLD AUTO: 8.35 THOUSANDS/ΜL (ref 1.85–7.62)
NEUTS SEG NFR BLD AUTO: 74 % (ref 43–75)
NITRITE UR QL STRIP: NEGATIVE
NON-SQ EPI CELLS URNS QL MICRO: ABNORMAL /HPF
NRBC BLD AUTO-RTO: 0 /100 WBCS
PH UR STRIP.AUTO: 6 [PH]
PLATELET # BLD AUTO: 294 THOUSANDS/UL (ref 149–390)
PMV BLD AUTO: 9.9 FL (ref 8.9–12.7)
POTASSIUM SERPL-SCNC: 4 MMOL/L (ref 3.5–5.3)
PROT SERPL-MCNC: 6.6 G/DL (ref 6.4–8.2)
PROT UR STRIP-MCNC: NEGATIVE MG/DL
PROT UR-MCNC: 18 MG/DL
PROT/CREAT UR: 0.08 MG/G{CREAT} (ref 0–0.1)
RBC # BLD AUTO: 4.17 MILLION/UL (ref 3.81–5.12)
RBC #/AREA URNS AUTO: ABNORMAL /HPF
SODIUM SERPL-SCNC: 138 MMOL/L (ref 136–145)
SP GR UR STRIP.AUTO: >=1.03 (ref 1–1.03)
UROBILINOGEN UR QL STRIP.AUTO: 0.2 E.U./DL
WBC # BLD AUTO: 11.18 THOUSAND/UL (ref 4.31–10.16)
WBC #/AREA URNS AUTO: ABNORMAL /HPF

## 2020-05-26 PROCEDURE — 81001 URINALYSIS AUTO W/SCOPE: CPT | Performed by: OBSTETRICS & GYNECOLOGY

## 2020-05-26 PROCEDURE — 80053 COMPREHEN METABOLIC PANEL: CPT | Performed by: OBSTETRICS & GYNECOLOGY

## 2020-05-26 PROCEDURE — NC001 PR NO CHARGE: Performed by: OBSTETRICS & GYNECOLOGY

## 2020-05-26 PROCEDURE — 84156 ASSAY OF PROTEIN URINE: CPT | Performed by: OBSTETRICS & GYNECOLOGY

## 2020-05-26 PROCEDURE — 99212 OFFICE O/P EST SF 10 MIN: CPT | Performed by: OBSTETRICS & GYNECOLOGY

## 2020-05-26 PROCEDURE — 76816 OB US FOLLOW-UP PER FETUS: CPT | Performed by: OBSTETRICS & GYNECOLOGY

## 2020-05-26 PROCEDURE — 1036F TOBACCO NON-USER: CPT | Performed by: OBSTETRICS & GYNECOLOGY

## 2020-05-26 PROCEDURE — 99213 OFFICE O/P EST LOW 20 MIN: CPT

## 2020-05-26 PROCEDURE — 82570 ASSAY OF URINE CREATININE: CPT | Performed by: OBSTETRICS & GYNECOLOGY

## 2020-05-26 PROCEDURE — 85025 COMPLETE CBC W/AUTO DIFF WBC: CPT | Performed by: OBSTETRICS & GYNECOLOGY

## 2020-05-26 PROCEDURE — G0463 HOSPITAL OUTPT CLINIC VISIT: HCPCS

## 2020-05-28 ENCOUNTER — TELEPHONE (OUTPATIENT)
Dept: PERINATAL CARE | Facility: CLINIC | Age: 30
End: 2020-05-28

## 2020-05-28 NOTE — TELEPHONE ENCOUNTER
-------------------------------------------------------------    Attempted to reach patient by phone and left voicemail to confirm appointment for MFM ultrasound  1 support person ( must be over the age of 15) may accompany you for your appointment  Please wear masks ( [place] [facility])  You and your support person will be screened upon arrival   IF not feeling well- cough, fever, shortness of breath or any flu like symptoms contact your primary care physician or [address] [place] [address]  Please call our office prior to entering the building  Check in and rooming questions will be done via phone  Inside office # provided:    [facility] line:  [phone]  [facility] line:  [phone]  [facility] line:  [phone]  [facility] line:  [phone]  [place] line:  [phone]    [facility] does not allow cell phone use, recording device or streaming during ultrasound     Any questions with these instructions please call Maternal Fetal Medicine nurse line today @ # [phone]

## 2020-05-29 ENCOUNTER — ROUTINE PRENATAL (OUTPATIENT)
Dept: PERINATAL CARE | Facility: CLINIC | Age: 30
End: 2020-05-29
Payer: COMMERCIAL

## 2020-05-29 VITALS
WEIGHT: 293 LBS | HEIGHT: 69 IN | DIASTOLIC BLOOD PRESSURE: 84 MMHG | TEMPERATURE: 97.5 F | BODY MASS INDEX: 43.4 KG/M2 | SYSTOLIC BLOOD PRESSURE: 128 MMHG

## 2020-05-29 DIAGNOSIS — O13.3 GESTATIONAL HYPERTENSION, THIRD TRIMESTER: Primary | ICD-10-CM

## 2020-05-29 DIAGNOSIS — Z3A.35 35 WEEKS GESTATION OF PREGNANCY: ICD-10-CM

## 2020-05-29 PROBLEM — R03.0 ELEVATED BLOOD-PRESSURE READING, WITHOUT DIAGNOSIS OF HYPERTENSION: Status: RESOLVED | Noted: 2020-02-12 | Resolved: 2020-05-29

## 2020-05-29 PROCEDURE — 59025 FETAL NON-STRESS TEST: CPT | Performed by: OBSTETRICS & GYNECOLOGY

## 2020-06-01 ENCOUNTER — ULTRASOUND (OUTPATIENT)
Dept: PERINATAL CARE | Facility: CLINIC | Age: 30
End: 2020-06-01
Payer: COMMERCIAL

## 2020-06-01 VITALS
DIASTOLIC BLOOD PRESSURE: 96 MMHG | WEIGHT: 293 LBS | SYSTOLIC BLOOD PRESSURE: 132 MMHG | HEIGHT: 69 IN | BODY MASS INDEX: 43.4 KG/M2 | HEART RATE: 106 BPM | TEMPERATURE: 96 F

## 2020-06-01 DIAGNOSIS — O13.3 GESTATIONAL HYPERTENSION, THIRD TRIMESTER: ICD-10-CM

## 2020-06-01 DIAGNOSIS — Z3A.36 36 WEEKS GESTATION OF PREGNANCY: Primary | ICD-10-CM

## 2020-06-01 PROCEDURE — 76815 OB US LIMITED FETUS(S): CPT | Performed by: OBSTETRICS & GYNECOLOGY

## 2020-06-01 PROCEDURE — 59025 FETAL NON-STRESS TEST: CPT | Performed by: OBSTETRICS & GYNECOLOGY

## 2020-06-02 ENCOUNTER — ROUTINE PRENATAL (OUTPATIENT)
Dept: OBGYN CLINIC | Facility: CLINIC | Age: 30
End: 2020-06-02

## 2020-06-02 VITALS
WEIGHT: 293 LBS | BODY MASS INDEX: 43.95 KG/M2 | DIASTOLIC BLOOD PRESSURE: 90 MMHG | SYSTOLIC BLOOD PRESSURE: 130 MMHG | TEMPERATURE: 98.1 F

## 2020-06-02 DIAGNOSIS — Z36.85 ANTENATAL SCREENING FOR STREPTOCOCCUS B: Primary | ICD-10-CM

## 2020-06-02 PROCEDURE — PNV: Performed by: OBSTETRICS & GYNECOLOGY

## 2020-06-04 ENCOUNTER — TELEPHONE (OUTPATIENT)
Dept: PERINATAL CARE | Facility: CLINIC | Age: 30
End: 2020-06-04

## 2020-06-08 ENCOUNTER — TELEPHONE (OUTPATIENT)
Dept: PERINATAL CARE | Facility: CLINIC | Age: 30
End: 2020-06-08

## 2020-06-09 ENCOUNTER — ROUTINE PRENATAL (OUTPATIENT)
Dept: OBGYN CLINIC | Facility: CLINIC | Age: 30
End: 2020-06-09

## 2020-06-09 ENCOUNTER — ULTRASOUND (OUTPATIENT)
Dept: PERINATAL CARE | Facility: CLINIC | Age: 30
End: 2020-06-09
Payer: COMMERCIAL

## 2020-06-09 VITALS
WEIGHT: 293 LBS | TEMPERATURE: 96.9 F | HEIGHT: 69 IN | SYSTOLIC BLOOD PRESSURE: 122 MMHG | BODY MASS INDEX: 43.4 KG/M2 | HEART RATE: 110 BPM | DIASTOLIC BLOOD PRESSURE: 84 MMHG

## 2020-06-09 VITALS
DIASTOLIC BLOOD PRESSURE: 84 MMHG | WEIGHT: 293 LBS | BODY MASS INDEX: 43.4 KG/M2 | HEIGHT: 69 IN | TEMPERATURE: 98.9 F | SYSTOLIC BLOOD PRESSURE: 122 MMHG

## 2020-06-09 DIAGNOSIS — Z3A.37 37 WEEKS GESTATION OF PREGNANCY: ICD-10-CM

## 2020-06-09 DIAGNOSIS — Z36.85 ENCOUNTER FOR ANTENATAL SCREENING FOR STREPTOCOCCUS B: ICD-10-CM

## 2020-06-09 DIAGNOSIS — Z34.83 PRENATAL CARE, SUBSEQUENT PREGNANCY, THIRD TRIMESTER: Primary | ICD-10-CM

## 2020-06-09 DIAGNOSIS — O99.213 OBESITY COMPLICATING PREGNANCY, THIRD TRIMESTER: Primary | ICD-10-CM

## 2020-06-09 PROCEDURE — 59025 FETAL NON-STRESS TEST: CPT | Performed by: OBSTETRICS & GYNECOLOGY

## 2020-06-09 PROCEDURE — 76815 OB US LIMITED FETUS(S): CPT | Performed by: OBSTETRICS & GYNECOLOGY

## 2020-06-09 PROCEDURE — PNV: Performed by: NURSE PRACTITIONER

## 2020-06-11 ENCOUNTER — HOSPITAL ENCOUNTER (INPATIENT)
Facility: HOSPITAL | Age: 30
LOS: 2 days | Discharge: HOME | End: 2020-06-13
Attending: OBSTETRICS & GYNECOLOGY | Admitting: OBSTETRICS & GYNECOLOGY
Payer: COMMERCIAL

## 2020-06-11 ENCOUNTER — ANESTHESIA EVENT (INPATIENT)
Dept: LABOR AND DELIVERY | Facility: HOSPITAL | Age: 30
End: 2020-06-11
Payer: COMMERCIAL

## 2020-06-11 ENCOUNTER — ANESTHESIA (INPATIENT)
Dept: LABOR AND DELIVERY | Facility: HOSPITAL | Age: 30
End: 2020-06-11
Payer: COMMERCIAL

## 2020-06-11 DIAGNOSIS — Z98.891 S/P CESAREAN SECTION: Primary | ICD-10-CM

## 2020-06-11 DIAGNOSIS — O34.219 PREVIOUS CESAREAN SECTION COMPLICATING PREGNANCY: ICD-10-CM

## 2020-06-11 PROBLEM — Z3A.37 37 WEEKS GESTATION OF PREGNANCY: Status: ACTIVE | Noted: 2020-04-30

## 2020-06-11 LAB
ABO GROUP BLD: NORMAL
BASE EXCESS BLDCOA CALC-SCNC: -3.4 MMOL/L (ref 3–11)
BASE EXCESS BLDCOV CALC-SCNC: -4.2 MMOL/L (ref 1–9)
BLD GP AB SCN SERPL QL: NEGATIVE
ERYTHROCYTE [DISTWIDTH] IN BLOOD BY AUTOMATED COUNT: 13.2 % (ref 11.6–15.1)
GP B STREP DNA SPEC QL NAA+PROBE: NEGATIVE
HCO3 BLDCOA-SCNC: 24.9 MMOL/L (ref 17.3–27.3)
HCO3 BLDCOV-SCNC: 22.8 MMOL/L (ref 12.2–28.6)
HCT VFR BLD AUTO: 36.9 % (ref 34.8–46.1)
HGB BLD-MCNC: 12.2 G/DL (ref 11.5–15.4)
MCH RBC QN AUTO: 28.8 PG (ref 26.8–34.3)
MCHC RBC AUTO-ENTMCNC: 33.1 G/DL (ref 31.4–37.4)
MCV RBC AUTO: 87 FL (ref 82–98)
O2 CT VFR BLDCOA CALC: 7.6 ML/DL
OXYHGB MFR BLDCOA: 37.6 %
OXYHGB MFR BLDCOV: 62 %
PCO2 BLDCOA: 59 MM[HG] (ref 30–60)
PCO2 BLDCOV: 49.1 MM HG (ref 27–43)
PH BLDCOA: 7.24 [PH] (ref 7.23–7.43)
PH BLDCOV: 7.29 [PH] (ref 7.19–7.49)
PLATELET # BLD AUTO: 252 THOUSANDS/UL (ref 149–390)
PMV BLD AUTO: 10.4 FL (ref 8.9–12.7)
PO2 BLDCOA: 18.6 MM HG (ref 5–25)
PO2 BLDCOV: 26.1 MM HG (ref 15–45)
RBC # BLD AUTO: 4.24 MILLION/UL (ref 3.81–5.12)
RH BLD: POSITIVE
RPR SER QL: NORMAL
SAO2 % BLDCOV: 13 ML/DL
SPECIMEN EXPIRATION DATE: NORMAL
WBC # BLD AUTO: 11.57 THOUSAND/UL (ref 4.31–10.16)

## 2020-06-11 PROCEDURE — 86900 BLOOD TYPING SEROLOGIC ABO: CPT | Performed by: OBSTETRICS & GYNECOLOGY

## 2020-06-11 PROCEDURE — 86850 RBC ANTIBODY SCREEN: CPT | Performed by: OBSTETRICS & GYNECOLOGY

## 2020-06-11 PROCEDURE — 85027 COMPLETE CBC AUTOMATED: CPT | Performed by: OBSTETRICS & GYNECOLOGY

## 2020-06-11 PROCEDURE — 86592 SYPHILIS TEST NON-TREP QUAL: CPT | Performed by: OBSTETRICS & GYNECOLOGY

## 2020-06-11 PROCEDURE — 59510 CESAREAN DELIVERY: CPT | Performed by: OBSTETRICS & GYNECOLOGY

## 2020-06-11 PROCEDURE — 4A1HXCZ MONITORING OF PRODUCTS OF CONCEPTION, CARDIAC RATE, EXTERNAL APPROACH: ICD-10-PCS | Performed by: OBSTETRICS & GYNECOLOGY

## 2020-06-11 PROCEDURE — 86901 BLOOD TYPING SEROLOGIC RH(D): CPT | Performed by: OBSTETRICS & GYNECOLOGY

## 2020-06-11 PROCEDURE — 82805 BLOOD GASES W/O2 SATURATION: CPT | Performed by: OBSTETRICS & GYNECOLOGY

## 2020-06-11 PROCEDURE — 99024 POSTOP FOLLOW-UP VISIT: CPT | Performed by: OBSTETRICS & GYNECOLOGY

## 2020-06-11 RX ORDER — BUPIVACAINE HYDROCHLORIDE 7.5 MG/ML
INJECTION, SOLUTION INTRASPINAL AS NEEDED
Status: DISCONTINUED | OUTPATIENT
Start: 2020-06-11 | End: 2020-06-11 | Stop reason: SURG

## 2020-06-11 RX ORDER — ONDANSETRON 2 MG/ML
4 INJECTION INTRAMUSCULAR; INTRAVENOUS ONCE AS NEEDED
Status: DISCONTINUED | OUTPATIENT
Start: 2020-06-11 | End: 2020-06-13 | Stop reason: HOSPADM

## 2020-06-11 RX ORDER — DIPHENHYDRAMINE HYDROCHLORIDE 50 MG/ML
25 INJECTION INTRAMUSCULAR; INTRAVENOUS EVERY 6 HOURS PRN
Status: DISPENSED | OUTPATIENT
Start: 2020-06-11 | End: 2020-06-12

## 2020-06-11 RX ORDER — OXYCODONE HYDROCHLORIDE 10 MG/1
10 TABLET ORAL EVERY 4 HOURS PRN
Status: DISCONTINUED | OUTPATIENT
Start: 2020-06-12 | End: 2020-06-13 | Stop reason: HOSPADM

## 2020-06-11 RX ORDER — ONDANSETRON 2 MG/ML
4 INJECTION INTRAMUSCULAR; INTRAVENOUS EVERY 4 HOURS PRN
Status: ACTIVE | OUTPATIENT
Start: 2020-06-11 | End: 2020-06-12

## 2020-06-11 RX ORDER — SODIUM CHLORIDE, SODIUM LACTATE, POTASSIUM CHLORIDE, CALCIUM CHLORIDE 600; 310; 30; 20 MG/100ML; MG/100ML; MG/100ML; MG/100ML
20 INJECTION, SOLUTION INTRAVENOUS CONTINUOUS
Status: DISCONTINUED | OUTPATIENT
Start: 2020-06-11 | End: 2020-06-13 | Stop reason: HOSPADM

## 2020-06-11 RX ORDER — HYDROMORPHONE HCL/PF 1 MG/ML
1 SYRINGE (ML) INJECTION EVERY 2 HOUR PRN
Status: DISCONTINUED | OUTPATIENT
Start: 2020-06-11 | End: 2020-06-11

## 2020-06-11 RX ORDER — DIPHENHYDRAMINE HCL 25 MG
25 TABLET ORAL EVERY 6 HOURS PRN
Status: DISCONTINUED | OUTPATIENT
Start: 2020-06-12 | End: 2020-06-13 | Stop reason: HOSPADM

## 2020-06-11 RX ORDER — NALOXONE HYDROCHLORIDE 0.4 MG/ML
0.1 INJECTION, SOLUTION INTRAMUSCULAR; INTRAVENOUS; SUBCUTANEOUS
Status: ACTIVE | OUTPATIENT
Start: 2020-06-11 | End: 2020-06-12

## 2020-06-11 RX ORDER — HYDROMORPHONE HCL/PF 1 MG/ML
1 SYRINGE (ML) INJECTION EVERY 2 HOUR PRN
Status: DISCONTINUED | OUTPATIENT
Start: 2020-06-12 | End: 2020-06-13 | Stop reason: HOSPADM

## 2020-06-11 RX ORDER — SODIUM CHLORIDE, SODIUM LACTATE, POTASSIUM CHLORIDE, CALCIUM CHLORIDE 600; 310; 30; 20 MG/100ML; MG/100ML; MG/100ML; MG/100ML
125 INJECTION, SOLUTION INTRAVENOUS CONTINUOUS
Status: DISCONTINUED | OUTPATIENT
Start: 2020-06-11 | End: 2020-06-11

## 2020-06-11 RX ORDER — CALCIUM CARBONATE 200(500)MG
1000 TABLET,CHEWABLE ORAL DAILY PRN
Status: DISCONTINUED | OUTPATIENT
Start: 2020-06-11 | End: 2020-06-13 | Stop reason: HOSPADM

## 2020-06-11 RX ORDER — FENTANYL CITRATE 50 UG/ML
INJECTION, SOLUTION INTRAMUSCULAR; INTRAVENOUS AS NEEDED
Status: DISCONTINUED | OUTPATIENT
Start: 2020-06-11 | End: 2020-06-11 | Stop reason: SURG

## 2020-06-11 RX ORDER — HYDROMORPHONE HCL/PF 1 MG/ML
0.5 SYRINGE (ML) INJECTION EVERY 2 HOUR PRN
Status: ACTIVE | OUTPATIENT
Start: 2020-06-11 | End: 2020-06-12

## 2020-06-11 RX ORDER — CEFAZOLIN SODIUM 2 G/50ML
2000 SOLUTION INTRAVENOUS ONCE
Status: COMPLETED | OUTPATIENT
Start: 2020-06-11 | End: 2020-06-11

## 2020-06-11 RX ORDER — OXYCODONE HYDROCHLORIDE 5 MG/1
5 TABLET ORAL EVERY 4 HOURS PRN
Status: DISCONTINUED | OUTPATIENT
Start: 2020-06-12 | End: 2020-06-13 | Stop reason: HOSPADM

## 2020-06-11 RX ORDER — FENTANYL CITRATE/PF 50 MCG/ML
50 SYRINGE (ML) INJECTION
Status: DISCONTINUED | OUTPATIENT
Start: 2020-06-11 | End: 2020-06-13 | Stop reason: HOSPADM

## 2020-06-11 RX ORDER — ONDANSETRON 2 MG/ML
4 INJECTION INTRAMUSCULAR; INTRAVENOUS EVERY 8 HOURS PRN
Status: DISCONTINUED | OUTPATIENT
Start: 2020-06-12 | End: 2020-06-13 | Stop reason: HOSPADM

## 2020-06-11 RX ORDER — EPHEDRINE SULFATE 50 MG/ML
INJECTION INTRAVENOUS AS NEEDED
Status: DISCONTINUED | OUTPATIENT
Start: 2020-06-11 | End: 2020-06-11 | Stop reason: SURG

## 2020-06-11 RX ORDER — OXYTOCIN/RINGER'S LACTATE 30/500 ML
PLASTIC BAG, INJECTION (ML) INTRAVENOUS CONTINUOUS PRN
Status: DISCONTINUED | OUTPATIENT
Start: 2020-06-11 | End: 2020-06-11 | Stop reason: SURG

## 2020-06-11 RX ORDER — ONDANSETRON 2 MG/ML
INJECTION INTRAMUSCULAR; INTRAVENOUS AS NEEDED
Status: DISCONTINUED | OUTPATIENT
Start: 2020-06-11 | End: 2020-06-11 | Stop reason: SURG

## 2020-06-11 RX ORDER — MORPHINE SULFATE 0.5 MG/ML
INJECTION, SOLUTION EPIDURAL; INTRATHECAL; INTRAVENOUS AS NEEDED
Status: DISCONTINUED | OUTPATIENT
Start: 2020-06-11 | End: 2020-06-11 | Stop reason: SURG

## 2020-06-11 RX ORDER — OXYTOCIN/RINGER'S LACTATE 30/500 ML
62.5 PLASTIC BAG, INJECTION (ML) INTRAVENOUS CONTINUOUS
Status: DISPENSED | OUTPATIENT
Start: 2020-06-11 | End: 2020-06-11

## 2020-06-11 RX ORDER — SODIUM CHLORIDE, SODIUM LACTATE, POTASSIUM CHLORIDE, CALCIUM CHLORIDE 600; 310; 30; 20 MG/100ML; MG/100ML; MG/100ML; MG/100ML
125 INJECTION, SOLUTION INTRAVENOUS CONTINUOUS
Status: DISCONTINUED | OUTPATIENT
Start: 2020-06-11 | End: 2020-06-13 | Stop reason: HOSPADM

## 2020-06-11 RX ORDER — KETOROLAC TROMETHAMINE 30 MG/ML
INJECTION, SOLUTION INTRAMUSCULAR; INTRAVENOUS AS NEEDED
Status: DISCONTINUED | OUTPATIENT
Start: 2020-06-11 | End: 2020-06-11 | Stop reason: SURG

## 2020-06-11 RX ORDER — KETOROLAC TROMETHAMINE 30 MG/ML
30 INJECTION, SOLUTION INTRAMUSCULAR; INTRAVENOUS EVERY 6 HOURS
Status: DISPENSED | OUTPATIENT
Start: 2020-06-11 | End: 2020-06-12

## 2020-06-11 RX ORDER — ACETAMINOPHEN 325 MG/1
650 TABLET ORAL EVERY 6 HOURS
Status: DISCONTINUED | OUTPATIENT
Start: 2020-06-11 | End: 2020-06-13 | Stop reason: HOSPADM

## 2020-06-11 RX ORDER — IBUPROFEN 600 MG/1
600 TABLET ORAL EVERY 6 HOURS PRN
Status: DISCONTINUED | OUTPATIENT
Start: 2020-06-12 | End: 2020-06-13 | Stop reason: HOSPADM

## 2020-06-11 RX ADMIN — SODIUM CHLORIDE, SODIUM LACTATE, POTASSIUM CHLORIDE, AND CALCIUM CHLORIDE 125 ML/HR: .6; .31; .03; .02 INJECTION, SOLUTION INTRAVENOUS at 10:42

## 2020-06-11 RX ADMIN — CEFAZOLIN SODIUM 2000 MG: 2 SOLUTION INTRAVENOUS at 11:48

## 2020-06-11 RX ADMIN — DIPHENHYDRAMINE HYDROCHLORIDE 25 MG: 50 INJECTION, SOLUTION INTRAMUSCULAR; INTRAVENOUS at 20:52

## 2020-06-11 RX ADMIN — ONDANSETRON 4 MG: 2 INJECTION INTRAMUSCULAR; INTRAVENOUS at 11:55

## 2020-06-11 RX ADMIN — MORPHINE SULFATE 0.2 MG: 0.5 INJECTION, SOLUTION EPIDURAL; INTRATHECAL; INTRAVENOUS at 12:15

## 2020-06-11 RX ADMIN — KETOROLAC TROMETHAMINE 30 MG: 30 INJECTION, SOLUTION INTRAMUSCULAR at 13:11

## 2020-06-11 RX ADMIN — FENTANYL CITRATE 10 MCG: 50 INJECTION INTRAMUSCULAR; INTRAVENOUS at 12:15

## 2020-06-11 RX ADMIN — ACETAMINOPHEN 650 MG: 325 TABLET, FILM COATED ORAL at 23:49

## 2020-06-11 RX ADMIN — MORPHINE SULFATE 4.8 MG: 0.5 INJECTION, SOLUTION EPIDURAL; INTRATHECAL; INTRAVENOUS at 13:10

## 2020-06-11 RX ADMIN — SODIUM CHLORIDE, SODIUM LACTATE, POTASSIUM CHLORIDE, AND CALCIUM CHLORIDE: .6; .31; .03; .02 INJECTION, SOLUTION INTRAVENOUS at 13:04

## 2020-06-11 RX ADMIN — Medication 250 MILLI-UNITS/MIN: at 12:41

## 2020-06-11 RX ADMIN — BUPIVACAINE HYDROCHLORIDE IN DEXTROSE 1.7 ML: 7.5 INJECTION, SOLUTION SUBARACHNOID at 12:15

## 2020-06-11 RX ADMIN — PHENYLEPHRINE HYDROCHLORIDE 40 MCG/MIN: 10 INJECTION INTRAVENOUS at 12:15

## 2020-06-11 RX ADMIN — SODIUM CHLORIDE, SODIUM LACTATE, POTASSIUM CHLORIDE, AND CALCIUM CHLORIDE 1000 ML: .6; .31; .03; .02 INJECTION, SOLUTION INTRAVENOUS at 07:27

## 2020-06-11 RX ADMIN — Medication 62.5 MILLI-UNITS/MIN: at 14:49

## 2020-06-11 RX ADMIN — EPHEDRINE SULFATE 10 MG: 50 INJECTION, SOLUTION INTRAVENOUS at 12:21

## 2020-06-11 RX ADMIN — KETOROLAC TROMETHAMINE 30 MG: 30 INJECTION, SOLUTION INTRAMUSCULAR at 20:00

## 2020-06-11 RX ADMIN — SODIUM CHLORIDE, SODIUM LACTATE, POTASSIUM CHLORIDE, AND CALCIUM CHLORIDE 125 ML/HR: .6; .31; .03; .02 INJECTION, SOLUTION INTRAVENOUS at 21:22

## 2020-06-11 RX ADMIN — ACETAMINOPHEN 650 MG: 325 TABLET, FILM COATED ORAL at 17:51

## 2020-06-11 RX ADMIN — PHENYLEPHRINE HYDROCHLORIDE 200 MCG: 10 INJECTION INTRAVENOUS at 12:22

## 2020-06-11 RX ADMIN — SODIUM CHLORIDE, SODIUM LACTATE, POTASSIUM CHLORIDE, AND CALCIUM CHLORIDE 125 ML/HR: .6; .31; .03; .02 INJECTION, SOLUTION INTRAVENOUS at 17:52

## 2020-06-12 LAB
ERYTHROCYTE [DISTWIDTH] IN BLOOD BY AUTOMATED COUNT: 13.4 % (ref 11.6–15.1)
HCT VFR BLD AUTO: 32.7 % (ref 34.8–46.1)
HGB BLD-MCNC: 10.6 G/DL (ref 11.5–15.4)
MCH RBC QN AUTO: 29 PG (ref 26.8–34.3)
MCHC RBC AUTO-ENTMCNC: 32.4 G/DL (ref 31.4–37.4)
MCV RBC AUTO: 90 FL (ref 82–98)
PLATELET # BLD AUTO: 239 THOUSANDS/UL (ref 149–390)
PMV BLD AUTO: 10.6 FL (ref 8.9–12.7)
RBC # BLD AUTO: 3.65 MILLION/UL (ref 3.81–5.12)
WBC # BLD AUTO: 10.58 THOUSAND/UL (ref 4.31–10.16)

## 2020-06-12 PROCEDURE — 99024 POSTOP FOLLOW-UP VISIT: CPT | Performed by: OBSTETRICS & GYNECOLOGY

## 2020-06-12 PROCEDURE — 85027 COMPLETE CBC AUTOMATED: CPT | Performed by: OBSTETRICS & GYNECOLOGY

## 2020-06-12 RX ORDER — SIMETHICONE 80 MG
80 TABLET,CHEWABLE ORAL EVERY 6 HOURS PRN
Status: DISCONTINUED | OUTPATIENT
Start: 2020-06-12 | End: 2020-06-13 | Stop reason: HOSPADM

## 2020-06-12 RX ORDER — DOCUSATE SODIUM 100 MG/1
100 CAPSULE, LIQUID FILLED ORAL 2 TIMES DAILY
Status: DISCONTINUED | OUTPATIENT
Start: 2020-06-12 | End: 2020-06-13 | Stop reason: HOSPADM

## 2020-06-12 RX ADMIN — DOCUSATE SODIUM 100 MG: 100 CAPSULE, LIQUID FILLED ORAL at 13:30

## 2020-06-12 RX ADMIN — ACETAMINOPHEN 650 MG: 325 TABLET, FILM COATED ORAL at 23:42

## 2020-06-12 RX ADMIN — KETOROLAC TROMETHAMINE 30 MG: 30 INJECTION, SOLUTION INTRAMUSCULAR at 02:48

## 2020-06-12 RX ADMIN — IBUPROFEN 600 MG: 600 TABLET ORAL at 13:26

## 2020-06-12 RX ADMIN — ENOXAPARIN SODIUM 40 MG: 40 INJECTION SUBCUTANEOUS at 16:55

## 2020-06-12 RX ADMIN — DOCUSATE SODIUM 100 MG: 100 CAPSULE, LIQUID FILLED ORAL at 19:37

## 2020-06-12 RX ADMIN — ACETAMINOPHEN 650 MG: 325 TABLET, FILM COATED ORAL at 05:37

## 2020-06-12 RX ADMIN — ACETAMINOPHEN 650 MG: 325 TABLET, FILM COATED ORAL at 17:01

## 2020-06-13 VITALS
SYSTOLIC BLOOD PRESSURE: 134 MMHG | TEMPERATURE: 98 F | BODY MASS INDEX: 43.4 KG/M2 | DIASTOLIC BLOOD PRESSURE: 83 MMHG | OXYGEN SATURATION: 98 % | RESPIRATION RATE: 20 BRPM | HEART RATE: 106 BPM | HEIGHT: 69 IN | WEIGHT: 293 LBS

## 2020-06-13 PROCEDURE — 99024 POSTOP FOLLOW-UP VISIT: CPT | Performed by: OBSTETRICS & GYNECOLOGY

## 2020-06-13 RX ORDER — IBUPROFEN 600 MG/1
600 TABLET ORAL EVERY 6 HOURS PRN
Qty: 30 TABLET | Refills: 1 | Status: SHIPPED | OUTPATIENT
Start: 2020-06-13 | End: 2021-01-11

## 2020-06-13 RX ORDER — OXYCODONE HYDROCHLORIDE 5 MG/1
5 TABLET ORAL EVERY 4 HOURS PRN
Qty: 12 TABLET | Refills: 0 | Status: SHIPPED | OUTPATIENT
Start: 2020-06-13 | End: 2020-06-23

## 2020-06-13 RX ORDER — ACETAMINOPHEN 325 MG/1
650 TABLET ORAL EVERY 6 HOURS
Qty: 30 TABLET | Refills: 0 | Status: SHIPPED | OUTPATIENT
Start: 2020-06-13 | End: 2021-01-11

## 2020-06-13 RX ADMIN — DOCUSATE SODIUM 100 MG: 100 CAPSULE, LIQUID FILLED ORAL at 10:00

## 2020-06-13 RX ADMIN — ACETAMINOPHEN 650 MG: 325 TABLET, FILM COATED ORAL at 05:14

## 2020-06-13 RX ADMIN — SIMETHICONE CHEW TAB 80 MG 80 MG: 80 TABLET ORAL at 00:00

## 2020-06-13 RX ADMIN — IBUPROFEN 600 MG: 600 TABLET ORAL at 01:49

## 2020-06-13 RX ADMIN — ENOXAPARIN SODIUM 40 MG: 40 INJECTION SUBCUTANEOUS at 09:59

## 2020-06-16 ENCOUNTER — TRANSITIONAL CARE MANAGEMENT (OUTPATIENT)
Dept: FAMILY MEDICINE CLINIC | Facility: CLINIC | Age: 30
End: 2020-06-16

## 2020-06-18 LAB — PLACENTA IN STORAGE: NORMAL

## 2020-06-19 ENCOUNTER — POSTPARTUM VISIT (OUTPATIENT)
Dept: OBGYN CLINIC | Facility: CLINIC | Age: 30
End: 2020-06-19

## 2020-06-19 VITALS
BODY MASS INDEX: 43.4 KG/M2 | DIASTOLIC BLOOD PRESSURE: 104 MMHG | TEMPERATURE: 99 F | WEIGHT: 293 LBS | HEIGHT: 69 IN | SYSTOLIC BLOOD PRESSURE: 156 MMHG

## 2020-06-19 DIAGNOSIS — O13.9 GESTATIONAL HYPERTENSION, ANTEPARTUM: Primary | ICD-10-CM

## 2020-06-19 PROCEDURE — 99024 POSTOP FOLLOW-UP VISIT: CPT | Performed by: OBSTETRICS & GYNECOLOGY

## 2020-06-19 PROCEDURE — 1111F DSCHRG MED/CURRENT MED MERGE: CPT | Performed by: OBSTETRICS & GYNECOLOGY

## 2020-06-19 RX ORDER — NIFEDIPINE 30 MG/1
TABLET, EXTENDED RELEASE ORAL
Qty: 30 TABLET | Refills: 3 | Status: SHIPPED | OUTPATIENT
Start: 2020-06-19 | End: 2021-01-11

## 2020-06-22 ENCOUNTER — POSTPARTUM VISIT (OUTPATIENT)
Dept: OBGYN CLINIC | Facility: CLINIC | Age: 30
End: 2020-06-22

## 2020-06-22 VITALS — BODY MASS INDEX: 40 KG/M2 | TEMPERATURE: 98.7 F | WEIGHT: 279.4 LBS | HEIGHT: 70 IN

## 2020-06-22 PROCEDURE — 99024 POSTOP FOLLOW-UP VISIT: CPT | Performed by: OBSTETRICS & GYNECOLOGY

## 2020-06-22 PROCEDURE — 1111F DSCHRG MED/CURRENT MED MERGE: CPT | Performed by: OBSTETRICS & GYNECOLOGY

## 2020-06-26 ENCOUNTER — OFFICE VISIT (OUTPATIENT)
Dept: FAMILY MEDICINE CLINIC | Facility: CLINIC | Age: 30
End: 2020-06-26
Payer: COMMERCIAL

## 2020-06-26 VITALS
WEIGHT: 279 LBS | DIASTOLIC BLOOD PRESSURE: 84 MMHG | RESPIRATION RATE: 16 BRPM | BODY MASS INDEX: 39.94 KG/M2 | SYSTOLIC BLOOD PRESSURE: 126 MMHG | HEART RATE: 68 BPM | TEMPERATURE: 99 F | HEIGHT: 70 IN

## 2020-06-26 DIAGNOSIS — Z09 HOSPITAL DISCHARGE FOLLOW-UP: Primary | ICD-10-CM

## 2020-06-26 DIAGNOSIS — Z98.891 S/P CESAREAN SECTION: ICD-10-CM

## 2020-06-26 PROCEDURE — 99495 TRANSJ CARE MGMT MOD F2F 14D: CPT | Performed by: NURSE PRACTITIONER

## 2020-06-26 PROCEDURE — 1111F DSCHRG MED/CURRENT MED MERGE: CPT | Performed by: NURSE PRACTITIONER

## 2020-07-10 ENCOUNTER — POSTPARTUM VISIT (OUTPATIENT)
Dept: OBGYN CLINIC | Facility: CLINIC | Age: 30
End: 2020-07-10

## 2020-07-10 VITALS
TEMPERATURE: 98.8 F | SYSTOLIC BLOOD PRESSURE: 124 MMHG | HEIGHT: 69 IN | BODY MASS INDEX: 41.15 KG/M2 | DIASTOLIC BLOOD PRESSURE: 72 MMHG | WEIGHT: 277.8 LBS

## 2020-07-10 PROCEDURE — 1111F DSCHRG MED/CURRENT MED MERGE: CPT | Performed by: OBSTETRICS & GYNECOLOGY

## 2020-07-10 PROCEDURE — 99024 POSTOP FOLLOW-UP VISIT: CPT | Performed by: OBSTETRICS & GYNECOLOGY

## 2020-07-31 ENCOUNTER — POSTPARTUM VISIT (OUTPATIENT)
Dept: OBGYN CLINIC | Facility: CLINIC | Age: 30
End: 2020-07-31

## 2020-07-31 VITALS
WEIGHT: 276.2 LBS | HEIGHT: 69 IN | TEMPERATURE: 97.7 F | BODY MASS INDEX: 40.91 KG/M2 | DIASTOLIC BLOOD PRESSURE: 84 MMHG | SYSTOLIC BLOOD PRESSURE: 116 MMHG

## 2020-07-31 PROCEDURE — 1111F DSCHRG MED/CURRENT MED MERGE: CPT | Performed by: OBSTETRICS & GYNECOLOGY

## 2020-07-31 PROCEDURE — 99024 POSTOP FOLLOW-UP VISIT: CPT | Performed by: OBSTETRICS & GYNECOLOGY

## 2020-07-31 NOTE — PATIENT INSTRUCTIONS
Physically the patient is doing well 6 weeks status post repeat  section  Was still concerned about her hypertension  She is off her medication as of today  Return to office on a weekly basis to maintain their she is normotensive  If she stays normotensive we may letter consider using the birth control pills after she starts to wean her baby  It all depends aware blood pressure shows

## 2020-07-31 NOTE — PROGRESS NOTES
This is a 75-year-old white female, she is approximately 6 weeks status post repeat  section  She had problems with gestational hypertension  She was on nifedipine  She did not take her medication this morning  Blood pressure today is 116/84  She denies any headaches blurred vision or chest pain  She is still nursing  There is no evidence postpartum depression baby blues  Inspection of the incision showed is completely heal on the outside doing well  Pelvic examination shows uterus is back to normal size cervix is closed  We had a discussion about methods of contraception  I said she could not being on the NuvaRing on the birth control pills all being treated for hypertension  She will use condoms for contraception  Were now a ladder stay off the nifedipine and she return my office once a week for the next 2 weeks  Her blood pressure stays normal we may consider allowing her to use another method of contraception  She has against the IUD  The patient return my office on  to be evaluated by my nurse for a gestational hypertension

## 2020-08-05 ENCOUNTER — POSTPARTUM VISIT (OUTPATIENT)
Dept: OBGYN CLINIC | Facility: CLINIC | Age: 30
End: 2020-08-05

## 2020-08-05 VITALS
DIASTOLIC BLOOD PRESSURE: 78 MMHG | HEIGHT: 69 IN | WEIGHT: 273.4 LBS | TEMPERATURE: 97.5 F | SYSTOLIC BLOOD PRESSURE: 108 MMHG | BODY MASS INDEX: 40.49 KG/M2

## 2020-08-05 PROCEDURE — 99024 POSTOP FOLLOW-UP VISIT: CPT | Performed by: OBSTETRICS & GYNECOLOGY

## 2020-08-05 PROCEDURE — 1111F DSCHRG MED/CURRENT MED MERGE: CPT | Performed by: OBSTETRICS & GYNECOLOGY

## 2020-08-05 PROCEDURE — 3008F BODY MASS INDEX DOCD: CPT | Performed by: OBSTETRICS & GYNECOLOGY

## 2020-08-05 NOTE — PROGRESS NOTES
BP recheck - 7 wk postpartum  BP today =106/78  No c/o h/a, visual disturbances or edema  Has been off Nifedepine since 7/31/2020  JSW prev informed pt to have BP recheck once more before plan to start [address]  Present;y using condoms for birth control

## 2020-08-12 ENCOUNTER — POSTPARTUM VISIT (OUTPATIENT)
Dept: OBGYN CLINIC | Facility: CLINIC | Age: 30
End: 2020-08-12

## 2020-08-12 VITALS
TEMPERATURE: 98 F | SYSTOLIC BLOOD PRESSURE: 110 MMHG | BODY MASS INDEX: 40.4 KG/M2 | DIASTOLIC BLOOD PRESSURE: 78 MMHG | WEIGHT: 273.6 LBS

## 2020-08-12 PROCEDURE — 1111F DSCHRG MED/CURRENT MED MERGE: CPT | Performed by: OBSTETRICS & GYNECOLOGY

## 2020-08-12 PROCEDURE — 99024 POSTOP FOLLOW-UP VISIT: CPT | Performed by: OBSTETRICS & GYNECOLOGY

## 2020-08-12 NOTE — PROGRESS NOTES
8 WK POSTPARTUM BP RECHECK:    BP today = 110/78 (lg cuff)  Pt has been off Nifedipine since 7/31/2020  No c/o h/a, visual disturbances or edema  Using condoms for birth control (has had intercourse x 2 since delivery)  Interested in using [address] for birth control

## 2020-08-14 ENCOUNTER — TELEPHONE (OUTPATIENT)
Dept: OBGYN CLINIC | Facility: CLINIC | Age: 30
End: 2020-08-14

## 2020-08-14 DIAGNOSIS — Z30.011 ENCOUNTER FOR INITIAL PRESCRIPTION OF CONTRACEPTIVE PILLS: Primary | ICD-10-CM

## 2020-08-14 RX ORDER — ACETAMINOPHEN AND CODEINE PHOSPHATE 120; 12 MG/5ML; MG/5ML
1 SOLUTION ORAL DAILY
Qty: 28 TABLET | Refills: 2 | Status: SHIPPED | OUTPATIENT
Start: 2020-08-14 | End: 2021-05-27

## 2020-08-14 NOTE — TELEPHONE ENCOUNTER
F/u call re: birth control - pt is breastfeeding  Informed JSW prefers for pt not to use Nuvaring - OK to take progesterone only ocp/JSW  Pt informed needs to be precise with taking progesterone only ocp (w/in 1 hr)  Will do HPT (has had intercourse x 2 - last time 2+ wks ago)  OK to monitor BP @ home/JSW  If neg HPT, can start Micronor on 8/16/2020  Please sign off on presc to [facility] (Debbie)

## 2020-10-12 NOTE — RESULT NOTES
Message   Normal value of this hormone rules out non-classic adrenal hyperplasia as a reason for her problems  Verified Results  (1) 40-Y-OG-PROGESTERONE [id number] 09:11AM Blu Clemons    Order Number: [id number]     Test Name Result Flag Reference   17-A(OH)PROGEST 166 ng/dL     Adult Female                             Follicular        15 -  70                             Luteal            35 - 290  This test was developed and its performance characteristics  determined by [facility]  It has not been cleared or approved  by the Food and Drug Administration      Performed at:  [address]  [id number]  : Jesenia Mattson MD, Phone:  [phone] [facility] Text: This medication is Pregnancy Category C and it isn't known if it is safe during pregnancy. It is unknown if it is excreted in breast milk.

## 2021-01-11 ENCOUNTER — OFFICE VISIT (OUTPATIENT)
Dept: FAMILY MEDICINE CLINIC | Facility: CLINIC | Age: 31
End: 2021-01-11
Payer: COMMERCIAL

## 2021-01-11 VITALS
WEIGHT: 280 LBS | RESPIRATION RATE: 16 BRPM | TEMPERATURE: 99.2 F | HEIGHT: 69 IN | BODY MASS INDEX: 41.47 KG/M2 | DIASTOLIC BLOOD PRESSURE: 80 MMHG | SYSTOLIC BLOOD PRESSURE: 130 MMHG | HEART RATE: 68 BPM

## 2021-01-11 DIAGNOSIS — E66.01 MORBID OBESITY (HCC): ICD-10-CM

## 2021-01-11 DIAGNOSIS — E04.1 THYROID NODULE: ICD-10-CM

## 2021-01-11 DIAGNOSIS — Z23 ENCOUNTER FOR ADMINISTRATION OF VACCINE: ICD-10-CM

## 2021-01-11 DIAGNOSIS — R09.89 GLOBUS SENSATION: Primary | ICD-10-CM

## 2021-01-11 PROCEDURE — 90471 IMMUNIZATION ADMIN: CPT

## 2021-01-11 PROCEDURE — 90686 IIV4 VACC NO PRSV 0.5 ML IM: CPT

## 2021-01-11 PROCEDURE — 3725F SCREEN DEPRESSION PERFORMED: CPT | Performed by: NURSE PRACTITIONER

## 2021-01-11 PROCEDURE — 3008F BODY MASS INDEX DOCD: CPT | Performed by: NURSE PRACTITIONER

## 2021-01-11 PROCEDURE — 1036F TOBACCO NON-USER: CPT | Performed by: NURSE PRACTITIONER

## 2021-01-11 PROCEDURE — 99214 OFFICE O/P EST MOD 30 MIN: CPT | Performed by: NURSE PRACTITIONER

## 2021-01-11 NOTE — PROGRESS NOTES
Assessment/Plan:    Globus sensation- Discussed possible etiologies today  ? Possibly due to thyroid nodule  Update thyroid US and lab work as ordered  If symptoms persist, consider a swallow evaluation and GI referral    Thyroid nodule  Overdue for repeat US which was due 5/2020  This was ordered today  TSH with reflex to free T4 also ordered     Morbid obesity ([facility] [address] )  Encouraged lifestyle modifications  Patient is currently 7 months postpartum       Diagnoses and all orders for this visit:    Globus sensation    Thyroid nodule  -     US thyroid; Future  -     TSH, 3rd generation with Free T4 reflex; Future  -     CBC and differential; Future  -     Comprehensive metabolic panel; Future    Morbid obesity ([facility] [address] )  -     US thyroid; Future  -     TSH, 3rd generation with Free T4 reflex; Future  -     CBC and differential; Future  -     Comprehensive metabolic panel; Future    Encounter for administration of vaccine  -     influenza vaccine, quadrivalent, 0 5 mL, preservative-free, for adult and pediatric patients 6 mos+ (AFLURIA, FLUARIX, FLULAVAL, FLUZONE)          Subjective:      Patient ID: Aldo Gonzalez is a 26 y o  female  HPI     C/o a globus sensation for the past few weeks  Not painful but annoying and noticeable  Still able to swallow without difficulty   Happens with food and beverages, even saliva     She does have a thyroid nodule which was being managed by Endocrinology, last seen 11/2019  Last thyroid US from 5/2019 which showed a diffusely heterogeneous thyroid gland with a stable solitary right lower pole nodule which did not meet the current ACR criteria for biopsy    Repeat US was due 5/2020 when patient was pregnant   TSH at 1 710 back in 11/2019    Denies reflux symptoms, coughing, lymphadenopathy, F/C    The following portions of the patient's history were reviewed and updated as appropriate: allergies, current medications, past family history, past medical history, past social history, past surgical history and problem list     Review of Systems   Constitutional: Negative for chills, fatigue, fever and unexpected weight change  HENT: Positive for trouble swallowing (as noted in HPI)  Negative for congestion, ear pain, hearing loss, postnasal drip, rhinorrhea, sinus pressure, sore throat and voice change  Respiratory: Negative for cough, shortness of breath and wheezing  Cardiovascular: Negative for chest pain, palpitations and leg swelling  Gastrointestinal: Negative for abdominal pain, blood in stool, constipation, diarrhea, nausea and vomiting  Musculoskeletal: Negative for arthralgias and myalgias  Skin: Negative for rash and wound  Neurological: Negative for dizziness, weakness, numbness and headaches  Psychiatric/Behavioral: Negative for sleep disturbance and suicidal ideas  Objective:      /80   Pulse 68   Temp 99 2 °F (37 3 °C) (Tympanic)   Resp 16   Ht 5' 9" (1 753 m)   Wt 127 kg (280 lb)   Breastfeeding Yes   BMI 41 35 kg/m²          Physical Exam  Constitutional:       General: She is not in acute distress  Appearance: She is well-developed  She is obese  She is not ill-appearing, toxic-appearing or diaphoretic  HENT:      Head: Normocephalic and atraumatic  Eyes:      Conjunctiva/sclera: Conjunctivae normal       Pupils: Pupils are equal, round, and reactive to light  Neck:      Musculoskeletal: Normal range of motion and neck supple  No neck rigidity or muscular tenderness  Thyroid: No thyromegaly  Vascular: No carotid bruit  Cardiovascular:      Rate and Rhythm: Normal rate and regular rhythm  Heart sounds: Normal heart sounds  No murmur  Pulmonary:      Effort: Pulmonary effort is normal  No respiratory distress  Breath sounds: Normal breath sounds  No wheezing  Musculoskeletal: Normal range of motion  Lymphadenopathy:      Cervical: No cervical adenopathy  Skin:     General: Skin is warm and dry  Neurological:      General: No focal deficit present  Mental Status: She is alert and oriented to person, place, and time  Psychiatric:         Mood and Affect: Mood normal          Behavior: Behavior normal          Thought Content:  Thought content normal          Judgment: Judgment normal

## 2021-01-11 NOTE — ASSESSMENT & PLAN NOTE
Overdue for repeat US which was due 5/2020  This was ordered today  TSH with reflex to free T4 also ordered

## 2021-01-25 ENCOUNTER — LAB (OUTPATIENT)
Dept: LAB | Facility: HOSPITAL | Age: 31
End: 2021-01-25
Payer: COMMERCIAL

## 2021-01-25 DIAGNOSIS — E04.1 THYROID NODULE: ICD-10-CM

## 2021-01-25 DIAGNOSIS — E66.01 MORBID OBESITY (HCC): ICD-10-CM

## 2021-01-25 LAB
ALBUMIN SERPL BCP-MCNC: 3.7 G/DL (ref 3.5–5)
ALP SERPL-CCNC: 80 U/L (ref 46–116)
ALT SERPL W P-5'-P-CCNC: 48 U/L (ref 12–78)
ANION GAP SERPL CALCULATED.3IONS-SCNC: 4 MMOL/L (ref 4–13)
AST SERPL W P-5'-P-CCNC: 34 U/L (ref 5–45)
BASOPHILS # BLD AUTO: 0.14 THOUSANDS/ΜL (ref 0–0.1)
BASOPHILS NFR BLD AUTO: 1 % (ref 0–1)
BILIRUB SERPL-MCNC: 0.91 MG/DL (ref 0.2–1)
BUN SERPL-MCNC: 11 MG/DL (ref 5–25)
CALCIUM SERPL-MCNC: 9.1 MG/DL (ref 8.3–10.1)
CHLORIDE SERPL-SCNC: 108 MMOL/L (ref 100–108)
CO2 SERPL-SCNC: 26 MMOL/L (ref 21–32)
CREAT SERPL-MCNC: 0.76 MG/DL (ref 0.6–1.3)
EOSINOPHIL # BLD AUTO: 0.23 THOUSAND/ΜL (ref 0–0.61)
EOSINOPHIL NFR BLD AUTO: 2 % (ref 0–6)
ERYTHROCYTE [DISTWIDTH] IN BLOOD BY AUTOMATED COUNT: 12.3 % (ref 11.6–15.1)
GFR SERPL CREATININE-BSD FRML MDRD: 103 ML/MIN/1.73SQ M
GLUCOSE P FAST SERPL-MCNC: 93 MG/DL (ref 65–99)
HCT VFR BLD AUTO: 42.5 % (ref 34.8–46.1)
HGB BLD-MCNC: 13.5 G/DL (ref 11.5–15.4)
IMM GRANULOCYTES # BLD AUTO: 0.03 THOUSAND/UL (ref 0–0.2)
IMM GRANULOCYTES NFR BLD AUTO: 0 % (ref 0–2)
LYMPHOCYTES # BLD AUTO: 2.69 THOUSANDS/ΜL (ref 0.6–4.47)
LYMPHOCYTES NFR BLD AUTO: 26 % (ref 14–44)
MCH RBC QN AUTO: 27.6 PG (ref 26.8–34.3)
MCHC RBC AUTO-ENTMCNC: 31.8 G/DL (ref 31.4–37.4)
MCV RBC AUTO: 87 FL (ref 82–98)
MONOCYTES # BLD AUTO: 0.63 THOUSAND/ΜL (ref 0.17–1.22)
MONOCYTES NFR BLD AUTO: 6 % (ref 4–12)
NEUTROPHILS # BLD AUTO: 6.66 THOUSANDS/ΜL (ref 1.85–7.62)
NEUTS SEG NFR BLD AUTO: 65 % (ref 43–75)
NRBC BLD AUTO-RTO: 0 /100 WBCS
PLATELET # BLD AUTO: 323 THOUSANDS/UL (ref 149–390)
PMV BLD AUTO: 9.5 FL (ref 8.9–12.7)
POTASSIUM SERPL-SCNC: 4.3 MMOL/L (ref 3.5–5.3)
PROT SERPL-MCNC: 7.9 G/DL (ref 6.4–8.2)
RBC # BLD AUTO: 4.9 MILLION/UL (ref 3.81–5.12)
SODIUM SERPL-SCNC: 138 MMOL/L (ref 136–145)
T4 FREE SERPL-MCNC: 1.01 NG/DL (ref 0.76–1.46)
TSH SERPL DL<=0.05 MIU/L-ACNC: 24.4 UIU/ML (ref 0.36–3.74)
WBC # BLD AUTO: 10.38 THOUSAND/UL (ref 4.31–10.16)

## 2021-01-25 PROCEDURE — 84443 ASSAY THYROID STIM HORMONE: CPT

## 2021-01-25 PROCEDURE — 84439 ASSAY OF FREE THYROXINE: CPT

## 2021-01-25 PROCEDURE — 80053 COMPREHEN METABOLIC PANEL: CPT

## 2021-01-25 PROCEDURE — 85025 COMPLETE CBC W/AUTO DIFF WBC: CPT

## 2021-01-25 PROCEDURE — 36415 COLL VENOUS BLD VENIPUNCTURE: CPT

## 2021-01-26 DIAGNOSIS — E03.9 HYPOTHYROIDISM, UNSPECIFIED TYPE: Primary | ICD-10-CM

## 2021-01-26 RX ORDER — LEVOTHYROXINE SODIUM 0.03 MG/1
25 TABLET ORAL
Qty: 30 TABLET | Refills: 5 | Status: SHIPPED | OUTPATIENT
Start: 2021-01-26 | End: 2021-02-03

## 2021-01-28 ENCOUNTER — HOSPITAL ENCOUNTER (OUTPATIENT)
Dept: ULTRASOUND IMAGING | Facility: HOSPITAL | Age: 31
Discharge: HOME | End: 2021-01-28
Payer: COMMERCIAL

## 2021-01-28 DIAGNOSIS — E66.01 MORBID OBESITY (HCC): ICD-10-CM

## 2021-01-28 DIAGNOSIS — E04.1 THYROID NODULE: ICD-10-CM

## 2021-01-28 PROCEDURE — 76536 US EXAM OF HEAD AND NECK: CPT

## 2021-02-03 ENCOUNTER — OFFICE VISIT (OUTPATIENT)
Dept: ENDOCRINOLOGY | Facility: CLINIC | Age: 31
End: 2021-02-03
Payer: COMMERCIAL

## 2021-02-03 VITALS
DIASTOLIC BLOOD PRESSURE: 98 MMHG | HEART RATE: 98 BPM | SYSTOLIC BLOOD PRESSURE: 130 MMHG | WEIGHT: 286.4 LBS | BODY MASS INDEX: 42.42 KG/M2 | HEIGHT: 69 IN

## 2021-02-03 DIAGNOSIS — E55.9 VITAMIN D DEFICIENCY: ICD-10-CM

## 2021-02-03 DIAGNOSIS — E03.9 HYPOTHYROIDISM, UNSPECIFIED TYPE: Primary | ICD-10-CM

## 2021-02-03 DIAGNOSIS — E28.2 PCOS (POLYCYSTIC OVARIAN SYNDROME): ICD-10-CM

## 2021-02-03 DIAGNOSIS — E04.1 THYROID NODULE: ICD-10-CM

## 2021-02-03 PROCEDURE — 99214 OFFICE O/P EST MOD 30 MIN: CPT | Performed by: INTERNAL MEDICINE

## 2021-02-03 RX ORDER — LEVOTHYROXINE SODIUM 0.05 MG/1
50 TABLET ORAL DAILY
Qty: 30 TABLET | Refills: 3 | Status: SHIPPED | OUTPATIENT
Start: 2021-02-03 | End: 2021-03-09 | Stop reason: DRUGHIGH

## 2021-02-03 NOTE — PATIENT INSTRUCTIONS
Eflornithine (On the skin)   Eflornithine ([id number]-on-theen)  Reduces unwanted facial hair in women  Brand Name(s): Saqib   There may be other brand names for this medicine  When This Medicine Should Not Be Used: You should not use this medicine if you have had an allergic reaction to eflornithine  This medicine is not to be used by girls under the age of 15  How to Use This Medicine:   Cream  · Your doctor will tell you how much of this medicine to apply and how often  Do not use more medicine or apply it more often than your doctor tells you to  · This medicine is for use on the skin only  Do not get it in your eyes, nose, or mouth  Do not use on skin areas that have cuts or scrapes  · Wash your hands with soap and water before and after using this medicine  · Apply a thin layer to the affected area twice daily  Rub it in gently  · Wait at least 5 minutes before applying sunscreen, makeup, or other cosmetics to the face  · Do not wash the treated facial areas for at least 4 hours after applying this medicine  · Never share your medicine with anyone  If a dose is missed:   · If you miss a dose or forget to use your medicine, apply it as soon as you can  Then wait at least 8 hours before using the medicine again  · Do not apply extra medicine to make up for a missed dose  How to Store and Dispose of This Medicine:   · Store the medicine at room temperature, away from heat and direct light  Do not freeze  · Keep all medicine out of the reach of children  Drugs and Foods to Avoid:   Ask your doctor or pharmacist before using any other medicine, including over-the-counter medicines, vitamins, and herbal products  · Tell your doctor about any other skin care products you are using on your face  Warnings While Using This Medicine:   · Make sure your doctor knows if you are pregnant or breastfeeding  · This medicine will slow down the growth of new hair, but will not remove hair from the face   Keep using your regular method of hair removal, such as plucking or shaving  · You may need to use this medicine for several weeks before you see a decrease in hair growth  Do not use the medicine longer than 6 months if it does not seem to be working  · The effects of this medicine are not permanent  After you stop using the cream, your hair growth will likely return to the same condition as before treatment  Possible Side Effects While Using This Medicine:   Call your doctor right away if you notice any of these side effects:  · Bleeding skin  · Swollen lips  If you notice these less serious side effects, talk with your doctor:   · Mild burning, redness, stinging, or tingling where the cream is applied  · Rash  If you notice other side effects that you think are caused by this medicine, tell your doctor  Call your doctor for medical advice about side effects  You may report side effects to FDA at 2-211-FDA-2845  © Copyright [address] [place] [address] Drive Information is for End User's use only and may not be sold, redistributed or otherwise used for commercial purposes  The above information is an  only  It is not intended as medical advice for individual conditions or treatments  Talk to your doctor, nurse or pharmacist before following any medical regimen to see if it is safe and effective for you

## 2021-02-03 NOTE — ASSESSMENT & PLAN NOTE
Hashimoto thyroiditis versus postpartum thyroiditis-for now I have advised her to take levothyroxine 50 mcg daily and repeat thyroid function test in the next 4 weeks  She has been screened for Cushing's in  past and workup was negative    Consider repeating it

## 2021-02-03 NOTE — PROGRESS NOTES
Taylor Swain 33 y o  female MRN: [id number]    Encounter: [id number]      Assessment/Plan     Problem List Items Addressed This Visit        Endocrine    Hypothyroidism - Primary     Hashimoto thyroiditis versus postpartum thyroiditis-for now I have advised her to take levothyroxine 50 mcg daily and repeat thyroid function test in the next 4 weeks  She has been screened for Cushing's in  past and workup was negative  Consider repeating it         Relevant Medications    levothyroxine 50 mcg tablet    Other Relevant Orders    T4, free Lab Collect    TSH, 3rd generation Lab Collect    Thyroid Antibodies Panel Lab Collect    PCOS (polycystic ovarian syndrome)    Thyroid nodule     Continue monitor and repeat a thyroid ultrasound next year         Relevant Medications    levothyroxine 50 mcg tablet       Other    Vitamin D deficiency     Continue supplementations-will check vitamin-D 25 hydroxy         Relevant Orders    Vitamin D 25 hydroxy Lab Collect        CC:   polycystic ovarian syndrome, vitamin-D deficiency    History of Present Illness     HPI:  33-year-old female with PCOS, obesity, vitamin-D deficiency and thyroid nodule here for follow up     She is currently 7 months post partum , who was complaining of Dry itchy skin and fatigue a few months back,  Hair loss   Weight gain - 20 lbs during pregnancy and lost 15 lbs since she delivered - and gained 15 lbs in the past couple of months   Has IBS - usually has episodes of diarrhea depending on whats she is eating   Currently breast feeding   Complains of Mental fog     Recent labs showed elevated TSH and she was started on lt4 25 mcg 1 tab daily for 1 week - has been taking it regularly and properly       Review of Systems   Constitutional: Positive for fatigue and unexpected weight change  Respiratory: Negative for cough and shortness of breath  Cardiovascular: Positive for leg swelling  Negative for palpitations     Gastrointestinal: Negative for nausea and vomiting  Musculoskeletal: Positive for arthralgias  Negative for gait problem  Skin: Negative for wound  Neurological: Negative for tremors and weakness  Psychiatric/Behavioral: Positive for sleep disturbance  All other systems reviewed and are negative        Historical Information   Past Medical History:   Diagnosis Date    Abnormal Pap smear of cervix     LGSIL - colpo - LGSIL, HPV effect    Chronic diarrhea of unknown origin     last assessed 6/26/15    Disease of thyroid gland     nodule    Dyspepsia     last assessed  10/29/14    Early satiety     last assessed  10/29/14    Hidradenitis suppurativa     last assessed  14    Hypertension     Obesity     Polycystic ovary syndrome     Varicella     childhood     Past Surgical History:   Procedure Laterality Date     SECTION       and  [address]    [place]  DELIV[place] N/A 2020    Procedure:  SECTION () REPEAT;  Surgeon: Lore Santiago MD;  Location: [facility] ;  Service: Obstetrics    WISDOM TOOTH EXTRACTION      all 4     Social History   Social History     Substance and Sexual Activity   Alcohol Use No     Social History     Substance and Sexual Activity   Drug Use No     Social History     Tobacco Use   Smoking Status Former Smoker    Packs/day: 0 00    Years: 0 00    Pack years: 0 00    Quit date: 2014    Years since quittin 9   Smokeless Tobacco Never Used   Tobacco Comment     sometimes 1 cig once weekly - none since preg     Family History:   Family History   Problem Relation Age of Onset    No Known Problems Father     Thyroid disease Maternal Grandmother     Diabetes Paternal Grandmother     Other Paternal Grandmother         pulmonary embolism    No Known Problems Mother     No Known Problems Maternal Grandfather     No Known Problems Paternal Grandfather     No Known Problems Son     No Known Problems Daughter     No Known Problems Sister Meds/Allergies   Current Outpatient Medications   Medication Sig Dispense Refill    Cholecalciferol (DIALYVITE VITAMIN D 5000 PO) Take 1 tablet by mouth daily      norethindrone (MICRONOR) 0 35 MG tablet Take 1 tablet (0 35 mg total) by mouth daily for 28 days 28 tablet 2    Prenatal MV-Min-Fe Fum-FA-DHA (PRENATAL+DHA PO) Take 2 tablets by mouth      levothyroxine 50 mcg tablet Take 1 tablet (50 mcg total) by mouth daily 30 tablet 3     No current facility-administered medications for this visit  Allergies   Allergen Reactions    A + D Personal Care Lotion  [Dimethicone] Rash    Calamine-Zinc Oxide Rash    Keflex [Cephalexin] GI Intolerance    Pramoxine-Calamine     Zinc Rash     No med alert bracelet no epi pen       Objective   Vitals: Blood pressure 130/98, pulse 98, height 5' 9" (1 753 m), weight 130 kg (286 lb 6 4 oz), currently breastfeeding  Physical Exam  Constitutional:       Appearance: She is obese  She is not ill-appearing or diaphoretic  HENT:      Head: Normocephalic and atraumatic  Eyes:      General: No scleral icterus  Extraocular Movements: Extraocular movements intact  Neck:      Musculoskeletal: Neck supple  Comments: Acanthosis nigricans around her neck, supraclavicular fullness  Cardiovascular:      Rate and Rhythm: Normal rate and regular rhythm  Heart sounds: Normal heart sounds  No murmur  Pulmonary:      Effort: Pulmonary effort is normal       Breath sounds: Normal breath sounds  Abdominal:      General: There is no distension  Palpations: Abdomen is soft  Tenderness: There is no abdominal tenderness  There is no guarding  Lymphadenopathy:      Cervical: No cervical adenopathy  Skin:     General: Skin is warm and dry  Neurological:      General: No focal deficit present  Mental Status: She is alert and oriented to person, place, and time     Psychiatric:         Mood and Affect: Mood normal          Behavior: Behavior normal          Thought Content: Thought content normal          Judgment: Judgment normal          The history was obtained from the review of the chart, patient  Lab Results:   Lab Results   Component Value Date/Time    TSH 3RD GENERATON 24 400 (H) 01/25/2021 09:24 AM    Free T4 1 01 01/25/2021 09:24 AM       Imaging Studies:   Results for orders placed during the hospital encounter of 01/28/21   US thyroid    Impression Interval enlargement and increasing heterogeneity and hypervascularity within the entire thyroid gland  Findings likely representing thyroiditis  Interval decrease in previous solitary right lower pole nodule which does not meet current ACR criteria for requiring biopsy or follow-up  Reference: ACR Thyroid Imaging, Reporting and Data System (TI-RADS): White Paper of the [employer]  J AM Emma Radiol 6377;28:531-076  (additional recommendations based on American Thyroid Association 2015 guidelines )      Workstation performed: [id number]         I have personally reviewed pertinent reports  Portions of the record may have been created with voice recognition software  Occasional wrong word or "sound a like" substitutions may have occurred due to the inherent limitations of voice recognition software  Read the chart carefully and recognize, using context, where substitutions have occurred

## 2021-03-05 ENCOUNTER — LAB (OUTPATIENT)
Dept: LAB | Facility: HOSPITAL | Age: 31
End: 2021-03-05
Payer: COMMERCIAL

## 2021-03-05 DIAGNOSIS — E03.9 HYPOTHYROIDISM, UNSPECIFIED TYPE: ICD-10-CM

## 2021-03-05 DIAGNOSIS — E55.9 VITAMIN D DEFICIENCY: ICD-10-CM

## 2021-03-05 LAB
25(OH)D3 SERPL-MCNC: 26.6 NG/ML (ref 30–100)
T4 FREE SERPL-MCNC: 1.32 NG/DL (ref 0.76–1.46)
TSH SERPL DL<=0.05 MIU/L-ACNC: 10 UIU/ML (ref 0.36–3.74)

## 2021-03-05 PROCEDURE — 82306 VITAMIN D 25 HYDROXY: CPT

## 2021-03-05 PROCEDURE — 84443 ASSAY THYROID STIM HORMONE: CPT

## 2021-03-05 PROCEDURE — 36415 COLL VENOUS BLD VENIPUNCTURE: CPT

## 2021-03-05 PROCEDURE — 86376 MICROSOMAL ANTIBODY EACH: CPT

## 2021-03-05 PROCEDURE — 86800 THYROGLOBULIN ANTIBODY: CPT

## 2021-03-05 PROCEDURE — 84439 ASSAY OF FREE THYROXINE: CPT

## 2021-03-06 LAB
THYROGLOB AB SERPL-ACNC: 2.2 IU/ML (ref 0–0.9)
THYROPEROXIDASE AB SERPL-ACNC: 19 IU/ML (ref 0–34)

## 2021-03-09 ENCOUNTER — OFFICE VISIT (OUTPATIENT)
Dept: ENDOCRINOLOGY | Facility: CLINIC | Age: 31
End: 2021-03-09
Payer: COMMERCIAL

## 2021-03-09 VITALS
SYSTOLIC BLOOD PRESSURE: 132 MMHG | DIASTOLIC BLOOD PRESSURE: 82 MMHG | BODY MASS INDEX: 42.57 KG/M2 | HEIGHT: 69 IN | WEIGHT: 287.4 LBS | HEART RATE: 91 BPM

## 2021-03-09 DIAGNOSIS — E28.2 PCOS (POLYCYSTIC OVARIAN SYNDROME): ICD-10-CM

## 2021-03-09 DIAGNOSIS — E03.9 HYPOTHYROIDISM, UNSPECIFIED TYPE: Primary | ICD-10-CM

## 2021-03-09 DIAGNOSIS — E55.9 VITAMIN D DEFICIENCY: ICD-10-CM

## 2021-03-09 DIAGNOSIS — E04.1 THYROID NODULE: ICD-10-CM

## 2021-03-09 PROCEDURE — 1036F TOBACCO NON-USER: CPT | Performed by: NURSE PRACTITIONER

## 2021-03-09 PROCEDURE — 99214 OFFICE O/P EST MOD 30 MIN: CPT | Performed by: NURSE PRACTITIONER

## 2021-03-09 PROCEDURE — 3008F BODY MASS INDEX DOCD: CPT | Performed by: NURSE PRACTITIONER

## 2021-03-09 RX ORDER — LEVOTHYROXINE SODIUM 0.07 MG/1
75 TABLET ORAL DAILY
Qty: 30 TABLET | Refills: 6 | Status: SHIPPED | OUTPATIENT
Start: 2021-03-09 | End: 2021-09-29 | Stop reason: CLARIF

## 2021-03-09 RX ORDER — ERGOCALCIFEROL 1.25 MG/1
50000 CAPSULE ORAL WEEKLY
Qty: 4 CAPSULE | Refills: 6 | Status: SHIPPED | OUTPATIENT
Start: 2021-03-09 | End: 2021-10-12

## 2021-03-09 NOTE — PROGRESS NOTES
Established Patient Progress Note       Chief Complaint   Patient presents with    Hypothyroidism        History of Present Illness:     Tai Lopez is a 33 y o  female with a history of hypothyroidism, PCOS, vitamin d deficiency, and thyroid nodule  She is 9 months postpartum  For the hypothyroidism she is currently taking Levothyroxine 50 mcg daily  She is taking this regularly and properly  She reports hair loss and achy joints  For the vitamin d deficiency she is currently taking 5,000 units daily   For the thyroid nodule she denies neck pain, dysphonia, dysphagia, or dyspnea         Patient Active Problem List   Diagnosis    PCOS (polycystic ovarian syndrome)    Nontoxic goiter, unspecified    Morbid obesity ([address] )    Vitamin D deficiency    Infertility, female    Hyperlipidemia, mixed    Elevated liver enzymes    Anxiety    Thyroid nodule    Yeast infection    Bilateral carpal tunnel syndrome    Elevated prolactin level    History of  delivery, antepartum    High risk pregnancy with low PAPPA (pregnancy-associated plasma protein A)    Maternal morbid obesity, antepartum ([address] )    Gestational hypertension    Evaluate anatomy not seen on prior sonogram    Encounter for ultrasound to check fetal growth    S/P  section    Hypothyroidism      Past Medical History:   Diagnosis Date    Abnormal Pap smear of cervix     LGSIL - colpo - LGSIL, HPV effect    Chronic diarrhea of unknown origin     last assessed 6/26/15    Disease of thyroid gland     nodule    Dyspepsia     last assessed  10/29/14    Early satiety     last assessed  10/29/14    Hidradenitis suppurativa     last assessed  14    Hypertension     Obesity     Polycystic ovary syndrome     Varicella     childhood      Past Surgical History:   Procedure Laterality Date     SECTION       and  [address] [facility]  DELIVERY ONLY N/A 2020    Procedure:  SECTION () REPEAT;  Surgeon: Ahmet Kuar MD;  Location: [facility] ;  Service: Obstetrics    WISDOM TOOTH EXTRACTION      all 4      Family History   Problem Relation Age of Onset    No Known Problems Father     Thyroid disease Maternal Grandmother     Diabetes Paternal Grandmother     Other Paternal Grandmother         pulmonary embolism    No Known Problems Mother     No Known Problems Maternal Grandfather     No Known Problems Paternal Grandfather     No Known Problems Son     No Known Problems Daughter     No Known Problems Sister      Social History     Tobacco Use    Smoking status: Former Smoker     Packs/day: 0 00     Years: 0 00     Pack years: 0 00     Quit date: 2014     Years since quittin 0    Smokeless tobacco: Never Used    Tobacco comment:  sometimes 1 cig once weekly - none since preg   Substance Use Topics    Alcohol use: No     Allergies   Allergen Reactions    A + D Personal Care Lotion  [Dimethicone] Rash    Calamine-Zinc Oxide Rash    Keflex [Cephalexin] GI Intolerance    Pramoxine-Calamine     Zinc Rash     No med alert bracelet no epi pen       Current Outpatient Medications:     Cholecalciferol (DIALYVITE VITAMIN D 5000 PO), Take 1 tablet by mouth daily, Disp: , Rfl:     Prenatal MV-Min-Fe Fum-FA-DHA (PRENATAL+DHA PO), Take 2 tablets by mouth, Disp: , Rfl:     ergocalciferol (VITAMIN D2) 50,000 units, Take 1 capsule (50,000 Units total) by mouth once a week, Disp: 4 capsule, Rfl: 6    levothyroxine 75 mcg tablet, Take 1 tablet (75 mcg total) by mouth daily, Disp: 30 tablet, Rfl: 6    norethindrone (MICRONOR) 0 35 MG tablet, Take 1 tablet (0 35 mg total) by mouth daily for 28 days, Disp: 28 tablet, Rfl: 2    Review of Systems   Constitutional: Negative for chills and fever  Hair loss   HENT: Negative for sore throat and trouble swallowing  Eyes: Negative for visual disturbance  Respiratory: Negative for shortness of breath      Cardiovascular: Negative for chest pain and palpitations  Gastrointestinal: Negative for abdominal pain, constipation and diarrhea  Endocrine: Negative for cold intolerance, heat intolerance, polydipsia, polyphagia and polyuria  Genitourinary: Negative for frequency  Musculoskeletal: Positive for arthralgias  Negative for myalgias  Skin: Negative for rash  Neurological: Negative for dizziness and tremors  Hematological: Negative for adenopathy  Psychiatric/Behavioral: Negative for sleep disturbance  All other systems reviewed and are negative  Physical Exam:  Body mass index is 42 44 kg/m²  /82   Pulse 91   Ht 5' 9" (1 753 m)   Wt 130 kg (287 lb 6 4 oz)   BMI 42 44 kg/m²    Wt Readings from Last 3 Encounters:   03/09/21 130 kg (287 lb 6 4 oz)   02/03/21 130 kg (286 lb 6 4 oz)   01/11/21 127 kg (280 lb)       Physical Exam  Vitals signs reviewed  Constitutional:       General: She is not in acute distress  Appearance: She is well-developed  HENT:      Head: Normocephalic and atraumatic  Eyes:      Conjunctiva/sclera: Conjunctivae normal       Pupils: Pupils are equal, round, and reactive to light  Neck:      Musculoskeletal: Normal range of motion and neck supple  Thyroid: No thyromegaly  Cardiovascular:      Rate and Rhythm: Normal rate and regular rhythm  Heart sounds: Normal heart sounds  Pulmonary:      Effort: Pulmonary effort is normal  No respiratory distress  Breath sounds: Normal breath sounds  No wheezing or rales  Abdominal:      General: Bowel sounds are normal  There is no distension  Palpations: Abdomen is soft  Tenderness: There is no abdominal tenderness  Musculoskeletal: Normal range of motion  Skin:     General: Skin is warm and dry  Neurological:      Mental Status: She is alert and oriented to person, place, and time           Labs:     Component      Latest Ref Rng & Units 3/5/2021   Free T4      0 76 - 1 46 ng/dL 1 32   TSH 3RD GENERATON      0 358 - 3 740 uIU/mL 10 000 (H)   Vit D, 25-Hydroxy      30 0 - 100 0 ng/mL 26 6 (L)   THYROID MICROSOMAL ANTIBODY      0 - 34 IU/mL 19   THYROGLOBULIN AB      0 0 - 0 9 IU/mL 2 2 (H)         Impression & Plan:    Problem List Items Addressed This Visit        Endocrine    PCOS (polycystic ovarian syndrome)    Thyroid nodule     Stable, will continue to monitor          Relevant Medications    levothyroxine 75 mcg tablet    Hypothyroidism - Primary     TSH elevated with normal free T4  Increase Levothyroxine to 75 mcg daily  Repeat thyroid function test in 6 weeks  Relevant Medications    levothyroxine 75 mcg tablet    Other Relevant Orders    T4, free    TSH, 3rd generation       Other    Vitamin D deficiency     Vitamin d chronically low on 5,000 units daily  Will start 50,000 units weekly  Relevant Medications    ergocalciferol (VITAMIN D2) 50,000 units          Orders Placed This Encounter   Procedures    T4, free     Standing Status:   Future     Standing Expiration Date:   3/9/2022    TSH, 3rd generation     This is a patient instruction: This test is non-fasting  Please drink two glasses of water morning of bloodwork  Standing Status:   Future     Standing Expiration Date:   3/9/2022       Discussed with the patient and all questioned fully answered  She will call me if any problems arise        Counseled patient on diagnostic results, prognosis, risk and benefit of treatment options, instruction for management, importance of treatment compliance, Risk  factor reduction and impressions      Stephanie Tolentino [phone]

## 2021-03-09 NOTE — ASSESSMENT & PLAN NOTE
TSH elevated with normal free T4  Increase Levothyroxine to 75 mcg daily  Repeat thyroid function test in 6 weeks

## 2021-03-09 NOTE — RESULT ENCOUNTER NOTE
I reviewed the results with patient, she had seen Endocrinology today and they will recheck her labs in 6 weeks

## 2021-04-09 DIAGNOSIS — Z23 ENCOUNTER FOR IMMUNIZATION: ICD-10-CM

## 2021-04-22 DIAGNOSIS — E55.9 VITAMIN D DEFICIENCY: Primary | ICD-10-CM

## 2021-04-23 ENCOUNTER — APPOINTMENT (OUTPATIENT)
Dept: LAB | Facility: HOSPITAL | Age: 31
End: 2021-04-23
Payer: COMMERCIAL

## 2021-04-23 ENCOUNTER — TRANSCRIBE ORDERS (OUTPATIENT)
Dept: LAB | Facility: HOSPITAL | Age: 31
End: 2021-04-23

## 2021-04-23 DIAGNOSIS — E03.9 HYPOTHYROIDISM, UNSPECIFIED TYPE: ICD-10-CM

## 2021-04-23 LAB
25(OH)D3 SERPL-MCNC: 30.8 NG/ML (ref 30–100)
T4 FREE SERPL-MCNC: 1.38 NG/DL (ref 0.76–1.46)
TSH SERPL DL<=0.05 MIU/L-ACNC: 2.87 UIU/ML (ref 0.36–3.74)

## 2021-04-23 PROCEDURE — 82306 VITAMIN D 25 HYDROXY: CPT | Performed by: NURSE PRACTITIONER

## 2021-04-23 PROCEDURE — 84443 ASSAY THYROID STIM HORMONE: CPT

## 2021-04-23 PROCEDURE — 36415 COLL VENOUS BLD VENIPUNCTURE: CPT | Performed by: NURSE PRACTITIONER

## 2021-04-23 PROCEDURE — 84439 ASSAY OF FREE THYROXINE: CPT

## 2021-04-25 ENCOUNTER — IMMUNIZATIONS (OUTPATIENT)
Dept: FAMILY MEDICINE CLINIC | Facility: HOSPITAL | Age: 31
End: 2021-04-25

## 2021-04-25 DIAGNOSIS — Z23 ENCOUNTER FOR IMMUNIZATION: Primary | ICD-10-CM

## 2021-04-25 PROCEDURE — 91300 SARS-COV-2 / COVID-19 MRNA VACCINE (PFIZER-BIONTECH) 30 MCG: CPT

## 2021-04-25 PROCEDURE — 0001A SARS-COV-2 / COVID-19 MRNA VACCINE (PFIZER-BIONTECH) 30 MCG: CPT

## 2021-04-28 ENCOUNTER — TELEPHONE (OUTPATIENT)
Dept: ENDOCRINOLOGY | Facility: CLINIC | Age: 31
End: 2021-04-28

## 2021-04-28 NOTE — TELEPHONE ENCOUNTER
----- Message from Markus Vela sent at 4/27/2021  1:36 PM EDT -----  Please call patient her TSH has normalized  Continue current dose of Levothyroxine   Vitamin d normalized

## 2021-05-17 ENCOUNTER — IMMUNIZATIONS (OUTPATIENT)
Dept: FAMILY MEDICINE CLINIC | Facility: HOSPITAL | Age: 31
End: 2021-05-17

## 2021-05-17 DIAGNOSIS — Z23 ENCOUNTER FOR IMMUNIZATION: Primary | ICD-10-CM

## 2021-05-17 PROCEDURE — 0002A SARS-COV-2 / COVID-19 MRNA VACCINE (PFIZER-BIONTECH) 30 MCG: CPT

## 2021-05-17 PROCEDURE — 91300 SARS-COV-2 / COVID-19 MRNA VACCINE (PFIZER-BIONTECH) 30 MCG: CPT

## 2021-05-27 DIAGNOSIS — Z30.011 ENCOUNTER FOR INITIAL PRESCRIPTION OF CONTRACEPTIVE PILLS: ICD-10-CM

## 2021-05-27 RX ORDER — ACETAMINOPHEN AND CODEINE PHOSPHATE 120; 12 MG/5ML; MG/5ML
1 SOLUTION ORAL DAILY
Qty: 28 TABLET | Refills: 2 | Status: SHIPPED | OUTPATIENT
Start: 2021-05-27 | End: 2021-08-21

## 2021-07-10 ENCOUNTER — HOSPITAL ENCOUNTER (EMERGENCY)
Facility: HOSPITAL | Age: 31
Discharge: HOME | End: 2021-07-10
Attending: EMERGENCY MEDICINE | Admitting: EMERGENCY MEDICINE
Payer: COMMERCIAL

## 2021-07-10 ENCOUNTER — APPOINTMENT (EMERGENCY)
Dept: RADIOLOGY | Facility: HOSPITAL | Age: 31
End: 2021-07-10
Payer: COMMERCIAL

## 2021-07-10 VITALS
HEIGHT: 69 IN | OXYGEN SATURATION: 97 % | SYSTOLIC BLOOD PRESSURE: 131 MMHG | WEIGHT: 286.6 LBS | BODY MASS INDEX: 42.45 KG/M2 | DIASTOLIC BLOOD PRESSURE: 93 MMHG | TEMPERATURE: 98.1 F | RESPIRATION RATE: 18 BRPM | HEART RATE: 85 BPM

## 2021-07-10 DIAGNOSIS — M54.2 NECK PAIN ON RIGHT SIDE: Primary | ICD-10-CM

## 2021-07-10 DIAGNOSIS — Z98.890 HISTORY OF RECENT DENTAL PROCEDURE: ICD-10-CM

## 2021-07-10 LAB
ALBUMIN SERPL BCP-MCNC: 3.9 G/DL (ref 3.5–5)
ALP SERPL-CCNC: 81 U/L (ref 46–116)
ALT SERPL W P-5'-P-CCNC: 37 U/L (ref 12–78)
ANION GAP SERPL CALCULATED.3IONS-SCNC: 10 MMOL/L (ref 4–13)
AST SERPL W P-5'-P-CCNC: 23 U/L (ref 5–45)
BASOPHILS # BLD AUTO: 0.08 THOUSANDS/ΜL (ref 0–0.1)
BASOPHILS NFR BLD AUTO: 1 % (ref 0–1)
BILIRUB SERPL-MCNC: 0.85 MG/DL (ref 0.2–1)
BUN SERPL-MCNC: 15 MG/DL (ref 5–25)
CALCIUM SERPL-MCNC: 9 MG/DL (ref 8.3–10.1)
CHLORIDE SERPL-SCNC: 103 MMOL/L (ref 100–108)
CO2 SERPL-SCNC: 25 MMOL/L (ref 21–32)
CREAT SERPL-MCNC: 0.78 MG/DL (ref 0.6–1.3)
EOSINOPHIL # BLD AUTO: 0.2 THOUSAND/ΜL (ref 0–0.61)
EOSINOPHIL NFR BLD AUTO: 2 % (ref 0–6)
ERYTHROCYTE [DISTWIDTH] IN BLOOD BY AUTOMATED COUNT: 11.9 % (ref 11.6–15.1)
EXT PREG TEST URINE: NEGATIVE
EXT. CONTROL ED NAV: NORMAL
GFR SERPL CREATININE-BSD FRML MDRD: 100 ML/MIN/1.73SQ M
GLUCOSE SERPL-MCNC: 107 MG/DL (ref 65–140)
HCT VFR BLD AUTO: 43.3 % (ref 34.8–46.1)
HGB BLD-MCNC: 14.2 G/DL (ref 11.5–15.4)
IMM GRANULOCYTES # BLD AUTO: 0.03 THOUSAND/UL (ref 0–0.2)
IMM GRANULOCYTES NFR BLD AUTO: 0 % (ref 0–2)
LYMPHOCYTES # BLD AUTO: 2.58 THOUSANDS/ΜL (ref 0.6–4.47)
LYMPHOCYTES NFR BLD AUTO: 28 % (ref 14–44)
MCH RBC QN AUTO: 28.5 PG (ref 26.8–34.3)
MCHC RBC AUTO-ENTMCNC: 32.8 G/DL (ref 31.4–37.4)
MCV RBC AUTO: 87 FL (ref 82–98)
MONOCYTES # BLD AUTO: 0.69 THOUSAND/ΜL (ref 0.17–1.22)
MONOCYTES NFR BLD AUTO: 7 % (ref 4–12)
NEUTROPHILS # BLD AUTO: 5.78 THOUSANDS/ΜL (ref 1.85–7.62)
NEUTS SEG NFR BLD AUTO: 62 % (ref 43–75)
NRBC BLD AUTO-RTO: 0 /100 WBCS
PLATELET # BLD AUTO: 388 THOUSANDS/UL (ref 149–390)
PMV BLD AUTO: 9.4 FL (ref 8.9–12.7)
POTASSIUM SERPL-SCNC: 3.9 MMOL/L (ref 3.5–5.3)
PROT SERPL-MCNC: 8.4 G/DL (ref 6.4–8.2)
RBC # BLD AUTO: 4.98 MILLION/UL (ref 3.81–5.12)
SODIUM SERPL-SCNC: 138 MMOL/L (ref 136–145)
T4 FREE SERPL-MCNC: 1.53 NG/DL (ref 0.76–1.46)
TSH SERPL DL<=0.05 MIU/L-ACNC: 3.81 UIU/ML (ref 0.36–3.74)
WBC # BLD AUTO: 9.36 THOUSAND/UL (ref 4.31–10.16)

## 2021-07-10 PROCEDURE — 99285 EMERGENCY DEPT VISIT HI MDM: CPT | Performed by: PHYSICIAN ASSISTANT

## 2021-07-10 PROCEDURE — 84439 ASSAY OF FREE THYROXINE: CPT | Performed by: PHYSICIAN ASSISTANT

## 2021-07-10 PROCEDURE — 96374 THER/PROPH/DIAG INJ IV PUSH: CPT

## 2021-07-10 PROCEDURE — 86308 HETEROPHILE ANTIBODY SCREEN: CPT | Performed by: PHYSICIAN ASSISTANT

## 2021-07-10 PROCEDURE — 36415 COLL VENOUS BLD VENIPUNCTURE: CPT | Performed by: PHYSICIAN ASSISTANT

## 2021-07-10 PROCEDURE — 81025 URINE PREGNANCY TEST: CPT | Performed by: PHYSICIAN ASSISTANT

## 2021-07-10 PROCEDURE — 70360 X-RAY EXAM OF NECK: CPT

## 2021-07-10 PROCEDURE — 99284 EMERGENCY DEPT VISIT MOD MDM: CPT

## 2021-07-10 PROCEDURE — 84443 ASSAY THYROID STIM HORMONE: CPT | Performed by: PHYSICIAN ASSISTANT

## 2021-07-10 PROCEDURE — 85025 COMPLETE CBC W/AUTO DIFF WBC: CPT | Performed by: PHYSICIAN ASSISTANT

## 2021-07-10 PROCEDURE — 80053 COMPREHEN METABOLIC PANEL: CPT | Performed by: PHYSICIAN ASSISTANT

## 2021-07-10 PROCEDURE — 96375 TX/PRO/DX INJ NEW DRUG ADDON: CPT

## 2021-07-10 RX ORDER — IBUPROFEN 400 MG/1
400 TABLET ORAL EVERY 6 HOURS PRN
Qty: 12 TABLET | Refills: 0 | Status: SHIPPED | OUTPATIENT
Start: 2021-07-10 | End: 2022-06-13

## 2021-07-10 RX ORDER — AMOXICILLIN AND CLAVULANATE POTASSIUM 875; 125 MG/1; MG/1
1 TABLET, FILM COATED ORAL ONCE
Status: COMPLETED | OUTPATIENT
Start: 2021-07-10 | End: 2021-07-10

## 2021-07-10 RX ORDER — SENNOSIDES 8.6 MG
650 CAPSULE ORAL EVERY 8 HOURS PRN
Qty: 9 TABLET | Refills: 0 | Status: SHIPPED | OUTPATIENT
Start: 2021-07-10 | End: 2021-07-13

## 2021-07-10 RX ORDER — KETOROLAC TROMETHAMINE 30 MG/ML
15 INJECTION, SOLUTION INTRAMUSCULAR; INTRAVENOUS ONCE
Status: COMPLETED | OUTPATIENT
Start: 2021-07-10 | End: 2021-07-10

## 2021-07-10 RX ORDER — AMOXICILLIN AND CLAVULANATE POTASSIUM 875; 125 MG/1; MG/1
1 TABLET, FILM COATED ORAL EVERY 12 HOURS
Qty: 14 TABLET | Refills: 0 | Status: SHIPPED | OUTPATIENT
Start: 2021-07-10 | End: 2021-07-17

## 2021-07-10 RX ORDER — DEXAMETHASONE SODIUM PHOSPHATE 4 MG/ML
10 INJECTION, SOLUTION INTRA-ARTICULAR; INTRALESIONAL; INTRAMUSCULAR; INTRAVENOUS; SOFT TISSUE ONCE
Status: COMPLETED | OUTPATIENT
Start: 2021-07-10 | End: 2021-07-10

## 2021-07-10 RX ADMIN — KETOROLAC TROMETHAMINE 15 MG: 30 INJECTION, SOLUTION INTRAMUSCULAR at 06:33

## 2021-07-10 RX ADMIN — AMOXICILLIN AND CLAVULANATE POTASSIUM 1 TABLET: 875; 125 TABLET, FILM COATED ORAL at 06:33

## 2021-07-10 RX ADMIN — DEXAMETHASONE SODIUM PHOSPHATE 10 MG: 4 INJECTION, SOLUTION INTRAMUSCULAR; INTRAVENOUS at 06:35

## 2021-07-10 NOTE — DISCHARGE INSTRUCTIONS
Take Augmentin, Tylenol, orabase-b, and Motrin is indicated  Follow-up with your dentist  Follow-up with PCP  Follow-up in the emergency department if symptoms persist or exacerbate

## 2021-07-10 NOTE — ED PROVIDER NOTES
History  Chief Complaint   Patient presents with    Neck Pain     Pt c/o states she has multiple inflammed and painful lymph nodes on the right side of her neck  Pt recently got dental work, which she says worsened the lymph nodes  Patient is a 73-year-old female with history of hypertension, hypothyroidism, NST no past surgical history that presented vertebra with aching nonradiating right-sided neck pain for 1 day  Patient denies associated symptomatology  Patient states that she had recently gotten dental work, yesterday and was placed on amoxicillin to which she had taken 2 doses  Patient stated that couple hours prior kidney presentation she had noted that she had right-sided swollen lymph nodes which caused her concern and she considers department for further evaluation  Patient denies dysphagia or dysphonia  Patient denies pelvic fractures with provocative factors or pressure to right side of neck  Patient has significant remembered patient has fevers, chills, nausea, vomiting, diarrhea, constipation, any urinary symptoms  Patient denies sick contacts or recent travel  Patient denies recent fall and recent trauma  Patient denies chest pain, shortness of breath, and abdominal pain  History provided by:  Patient   used: No        Prior to Admission Medications   Prescriptions Last Dose Informant Patient Reported? Taking?    Cholecalciferol (DIALYVITE VITAMIN D 5000 PO)  Self Yes No   Sig: Take 1 tablet by mouth daily   Prenatal MV-Min-Fe Fum-FA-DHA (PRENATAL+DHA PO)  Self Yes No   Sig: Take 2 tablets by mouth   ergocalciferol (VITAMIN D2) 50,000 units   No No   Sig: Take 1 capsule (50,000 Units total) by mouth once a week   levothyroxine 75 mcg tablet   No No   Sig: Take 1 tablet (75 mcg total) by mouth daily   norethindrone (MICRONOR) 0 35 MG tablet   No No   Sig: TAKE 1 TABLET (0 35 MG TOTAL) BY MOUTH DAILY FOR 28 DAYS      Facility-Administered Medications: None Past Medical History:   Diagnosis Date    Abnormal Pap smear of cervix     LGSIL - colpo - LGSIL, HPV effect    Chronic diarrhea of unknown origin     last assessed 6/26/15    Disease of thyroid gland     nodule    Dyspepsia     last assessed  10/29/14    Early satiety     last assessed  10/29/14    Hidradenitis suppurativa     last assessed  14    Hypertension     Obesity     Polycystic ovary syndrome     Varicella     childhood       Past Surgical History:   Procedure Laterality Date     SECTION       and  [address]    MS  DELIVERY ONLY N/A 2020    Procedure:  SECTION () REPEAT;  Surgeon: Simeon Girard MD;  Location: [facility] ;  Service: Obstetrics    WISDOM TOOTH EXTRACTION      all 4       Family History   Problem Relation Age of Onset    No Known Problems Father     Thyroid disease Maternal Grandmother     Diabetes Paternal Grandmother     Other Paternal Grandmother         pulmonary embolism    No Known Problems Mother     No Known Problems Maternal Grandfather     No Known Problems Paternal Grandfather     No Known Problems Son     No Known Problems Daughter     No Known Problems Sister      I have reviewed and agree with the history as documented  E-Cigarette/Vaping    E-Cigarette Use Never User      E-Cigarette/Vaping Substances     Social History     Tobacco Use    Smoking status: Former Smoker     Packs/day: 0 00     Years: 0 00     Pack years: 0 00     Quit date: 2014     Years since quittin 3    Smokeless tobacco: Never Used    Tobacco comment:  sometimes 1 cig once weekly - none since preg   Vaping Use    Vaping Use: Never used   Substance Use Topics    Alcohol use: No    Drug use: No       Review of Systems   Constitutional: Negative for activity change, appetite change, chills and fever     HENT: Negative for congestion, postnasal drip, rhinorrhea, sinus pressure, sinus pain, sore throat and tinnitus  Right-sided neck swelling   Eyes: Negative for photophobia and visual disturbance  Respiratory: Negative for cough, chest tightness and shortness of breath  Cardiovascular: Negative for chest pain and palpitations  Gastrointestinal: Negative for abdominal pain, constipation, diarrhea, nausea and vomiting  Genitourinary: Negative for difficulty urinating, dysuria, flank pain, frequency and urgency  Musculoskeletal: Negative for back pain, gait problem, neck pain and neck stiffness  Skin: Negative for pallor and rash  Allergic/Immunologic: Negative for environmental allergies and food allergies  Neurological: Negative for dizziness, weakness, numbness and headaches  Psychiatric/Behavioral: Negative for confusion  All other systems reviewed and are negative  Physical Exam  Physical Exam  Vitals and nursing note reviewed  Constitutional:       General: She is awake  Appearance: Normal appearance  She is well-developed  She is not ill-appearing, toxic-appearing or diaphoretic  Comments: /93 (BP Location: Right arm)   Pulse 85   Temp 98 1 °F (36 7 °C) (Oral)   Resp 18   Ht 5' 9" (1 753 m)   Wt 130 kg (286 lb 9 6 oz)   SpO2 97%   BMI 42 32 kg/m²      HENT:      Head: Normocephalic and atraumatic  Right Ear: Hearing, tympanic membrane, ear canal and external ear normal  No decreased hearing noted  No drainage, swelling or tenderness  No mastoid tenderness  Left Ear: Hearing, tympanic membrane, ear canal and external ear normal  No decreased hearing noted  No drainage, swelling or tenderness  No mastoid tenderness  Nose: Nose normal       Mouth/Throat:      Lips: Pink  Mouth: Mucous membranes are moist       Palate: No mass  Pharynx: Oropharynx is clear  Uvula midline  Eyes:      General: Lids are normal  Vision grossly intact  Right eye: No discharge  Left eye: No discharge        Extraocular Movements: Extraocular movements intact  Conjunctiva/sclera: Conjunctivae normal       Pupils: Pupils are equal, round, and reactive to light  Neck:      Thyroid: No thyroid mass or thyroid tenderness  Vascular: No JVD  Trachea: Trachea and phonation normal  No tracheal tenderness or tracheal deviation  Cardiovascular:      Rate and Rhythm: Normal rate and regular rhythm  Pulses: Normal pulses  Radial pulses are 2+ on the right side and 2+ on the left side  Posterior tibial pulses are 2+ on the right side and 2+ on the left side  Heart sounds: Normal heart sounds  Pulmonary:      Effort: Pulmonary effort is normal       Breath sounds: Normal breath sounds  No stridor  No decreased breath sounds, wheezing, rhonchi or rales  Chest:      Chest wall: No tenderness  Abdominal:      General: Abdomen is flat  Bowel sounds are normal  There is no distension  Palpations: Abdomen is soft  Abdomen is not rigid  Tenderness: There is no abdominal tenderness  There is no guarding or rebound  Musculoskeletal:         General: Normal range of motion  Cervical back: Full passive range of motion without pain, normal range of motion and neck supple  No rigidity  No spinous process tenderness or muscular tenderness  Normal range of motion  Lymphadenopathy:      Head:      Right side of head: No submental, submandibular, tonsillar, preauricular, posterior auricular or occipital adenopathy  Left side of head: No submental, submandibular, tonsillar, preauricular, posterior auricular or occipital adenopathy  Cervical: Cervical adenopathy present  Right cervical: Superficial cervical adenopathy present  No deep or posterior cervical adenopathy  Left cervical: No superficial, deep or posterior cervical adenopathy  Skin:     General: Skin is warm  Capillary Refill: Capillary refill takes less than 2 seconds  Neurological:      General: No focal deficit present  Mental Status: She is alert and oriented to person, place, and time  GCS: GCS eye subscore is 4  GCS verbal subscore is 5  GCS motor subscore is 6  Sensory: No sensory deficit  Deep Tendon Reflexes: Reflexes are normal and symmetric  Reflex Scores:       Patellar reflexes are 2+ on the right side and 2+ on the left side  Psychiatric:         Mood and Affect: Mood normal          Speech: Speech normal          Behavior: Behavior normal  Behavior is cooperative  Thought Content: Thought content normal          Judgment: Judgment normal          Vital Signs  ED Triage Vitals [07/10/21 0528]   Temperature Pulse Respirations Blood Pressure SpO2   98 1 °F (36 7 °C) 85 18 131/93 97 %      Temp Source Heart Rate Source Patient Position - Orthostatic VS BP Location FiO2 (%)   Oral Monitor Lying Right arm --      Pain Score       --           Vitals:    07/10/21 0528   BP: 131/93   Pulse: 85   Patient Position - Orthostatic VS: Lying         Visual Acuity      ED Medications  Medications   ketorolac (TORADOL) injection 15 mg (15 mg Intravenous Given 7/10/21 0633)   dexamethasone (DECADRON) injection 10 mg (10 mg Intravenous Given 7/10/21 0635)   amoxicillin-clavulanate (AUGMENTIN) 875-125 mg per tablet 1 tablet (1 tablet Oral Given 7/10/21 5307)       Diagnostic Studies  Results Reviewed     Procedure Component Value Units Date/Time    TSH, 3rd generation with Free T4 reflex [[id number]]  (Abnormal) Collected: 07/10/21 0550    Lab Status: Final result Specimen: Blood from Arm, Left Updated: 07/10/21 0636     TSH 3RD GENERATON 3 812 uIU/mL     Narrative:      Patients undergoing fluorescein dye angiography may retain small amounts of fluorescein in the body for 48-72 hours post procedure  Samples containing fluorescein can produce falsely depressed TSH values  If the patient had this procedure,a specimen should be resubmitted post fluorescein clearance        T4, free [id number] Collected: 07/10/21 8349    Lab Status:  In process Specimen: Blood from Arm, Left Updated: 07/10/21 0636    POCT pregnancy, urine [[id number]]  (Normal) Resulted: 07/10/21 0633    Lab Status: Final result Updated: 07/10/21 0633     EXT PREG TEST UR (Ref: Negative) negative     Control valid    Comprehensive metabolic panel [[id number]]  (Abnormal) Collected: 07/10/21 0550    Lab Status: Final result Specimen: Blood from Arm, Left Updated: 07/10/21 0694     Sodium 138 mmol/L      Potassium 3 9 mmol/L      Chloride 103 mmol/L      CO2 25 mmol/L      ANION GAP 10 mmol/L      BUN 15 mg/dL      Creatinine 0 78 mg/dL      Glucose 107 mg/dL      Calcium 9 0 mg/dL      AST 23 U/L      ALT 37 U/L      Alkaline Phosphatase 81 U/L      Total Protein 8 4 g/dL      Albumin 3 9 g/dL      Total Bilirubin 0 85 mg/dL      eGFR 100 ml/min/1 73sq m     Narrative:      National Kidney Disease Foundation guidelines for Chronic Kidney Disease (CKD):     Stage 1 with normal or high GFR (GFR > 90 mL/min/1 73 square meters)    Stage 2 Mild CKD (GFR = 60-89 mL/min/1 73 square meters)    Stage 3A Moderate CKD (GFR = 45-59 mL/min/1 73 square meters)    Stage 3B Moderate CKD (GFR = 30-44 mL/min/1 73 square meters)    Stage 4 Severe CKD (GFR = 15-29 mL/min/1 73 square meters)    Stage 5 End Stage CKD (GFR <15 mL/min/1 73 square meters)  Note: GFR calculation is accurate only with a steady state creatinine    CBC and differential [[id number]] Collected: 07/10/21 0550    Lab Status: Final result Specimen: Blood from Arm, Left Updated: 07/10/21 0616     WBC 9 36 Thousand/uL      RBC 4 98 Million/uL      Hemoglobin 14 2 g/dL      Hematocrit 43 3 %      MCV 87 fL      MCH 28 5 pg      MCHC 32 8 g/dL      RDW 11 9 %      MPV 9 4 fL      Platelets 061 Thousands/uL      nRBC 0 /100 WBCs      Neutrophils Relative 62 %      Immat GRANS % 0 %      Lymphocytes Relative 28 %      Monocytes Relative 7 %      Eosinophils Relative 2 %      Basophils Relative 1 % Neutrophils Absolute 5 78 Thousands/µL      Immature Grans Absolute 0 03 Thousand/uL      Lymphocytes Absolute 2 58 Thousands/µL      Monocytes Absolute 0 69 Thousand/µL      Eosinophils Absolute 0 20 Thousand/µL      Basophils Absolute 0 08 Thousands/µL     Mononucleosis screen [[id number]] Collected: 07/10/21 0550    Lab Status: In process Specimen: Blood from Arm, Left Updated: 07/10/21 0553                 XR neck soft tissue   ED Interpretation by Fatimah Stapleton PA-C (07/10 1970)   No acute pathology noted  Procedures  Procedures         ED Course  ED Course as of Jul 10 0721   Sat Jul 10, 2021   9163 Patient states that she recently went to the dentist for dental work and recently placed on amoxicillin yesterday; and taken taken 2 doses of amoxicillin  Patient also states that she has a history of Hashimoto's thyroiditis  [facility]                                           MDM  Number of Diagnoses or Management Options  History of recent dental procedure: new and does not require workup  Neck pain on right side: new and does not require workup     Amount and/or Complexity of Data Reviewed  Clinical lab tests: ordered and reviewed  Tests in the radiology section of CPT®: ordered and reviewed  Review and summarize past medical records: yes    Risk of Complications, Morbidity, and/or Mortality  Presenting problems: moderate  Diagnostic procedures: moderate  Management options: low    Patient Progress  Patient progress: stable     Patient is a 78-year-old female with history of hypertension, hypothyroidism, NST no past surgical history that presented vertebra with aching nonradiating right-sided neck pain for 1 day  Patient denies associated symptomatology  Patient states that she had recently gotten dental work, yesterday and was placed on amoxicillin to which she had taken 2 doses     Hemodynamically stable and afebrile  TSH elevated; T4 process, patient has history of Hashimoto's thyroiditis reportedly  No leukocytosis, no banding  To the Decadron and Toradol patient provides decrease in throat pain symptoms status post medication over  Soft tissue neck x-ray with no acute pathology noted on initial read  Total patient to stop taking amoxicillin and gave 1st dose of Augmentin in the ED  Prescribed Augmentin, Tylenol, orabase-b, and Motrin and counseled patient on medication administration and side effects  Follow-up with dentistry  Follow up with PCP  Follow-up with the emergency department if symptoms persist or exacerbate  Patient with verbal understanding of all clinical laboratory and imaging findings, discharge instructions, followup and verbalize agreement with the patient current treatment plan  Disposition  Final diagnoses:   Neck pain on right side   History of recent dental procedure     Time reflects when diagnosis was documented in both MDM as applicable and the Disposition within this note     Time User Action Codes Description Comment    7/10/2021  6:59 AM Nadia Nims Add [M54 2] Neck pain on right side     7/10/2021  6:59 AM Nadia Nims Add [Z98 890] History of recent dental procedure       ED Disposition     ED Disposition Condition Date/Time Comment    Discharge Stable Sat Jul 10, 2021  6:58 AM Toño Hinojosa discharge to home/self care              Follow-up Information     Follow up With Specialties Details Why Contact Info Additional Information    Pauly Monsivais MD Family Medicine   [facility] [address] [facility]  [phone]       [address] [facility] Emergency Medicine   [address] Emergency Department,  [address], [place], [place], [address]          Discharge Medication List as of 7/10/2021  7:04 AM      START taking these medications    Details   acetaminophen (TYLENOL) 650 mg CR tablet Take 1 tablet (650 mg total) by mouth every 8 (eight) hours as needed for mild pain for up to 3 days, Starting Sat 7/10/2021, Until Tue 7/13/2021 at 2359, Normal      amoxicillin-clavulanate (AUGMENTIN) 875-125 mg per tablet Take 1 tablet by mouth every 12 (twelve) hours for 7 days, Starting Sat 7/10/2021, Until Sat 7/17/2021, Normal      benzocaine (ORABASE-B) 20 % PSTE Apply 1 application to the mouth or throat 4 (four) times a day as needed for mucositis for up to 3 days, Starting Sat 7/10/2021, Until Tue 7/13/2021 at 2359, Normal      ibuprofen (MOTRIN) 400 mg tablet Take 1 tablet (400 mg total) by mouth every 6 (six) hours as needed for mild pain for up to 3 days, Starting Sat 7/10/2021, Until Tue 7/13/2021 at 2359, Normal         CONTINUE these medications which have NOT CHANGED    Details   Cholecalciferol (DIALYVITE VITAMIN D 5000 PO) Take 1 tablet by mouth daily, Historical Med      ergocalciferol (VITAMIN D2) 50,000 units Take 1 capsule (50,000 Units total) by mouth once a week, Starting Tue 3/9/2021, Normal      levothyroxine 75 mcg tablet Take 1 tablet (75 mcg total) by mouth daily, Starting Tue 3/9/2021, Normal      norethindrone (MICRONOR) 0 35 MG tablet TAKE 1 TABLET (0 35 MG TOTAL) BY MOUTH DAILY FOR 28 DAYS, Starting Thu 5/27/2021, Until Thu 6/24/2021, Normal      Prenatal MV-Min-Fe Fum-FA-DHA (PRENATAL+DHA PO) Take 2 tablets by mouth, Historical Med           No discharge procedures on file      PDMP Review       Value Time User    PDMP Reviewed  Yes 7/10/2021  5:30 AM Geronimo Kussmaul, PA-C          ED Provider  Electronically Signed by           Geronimo Kussmaul, PA-C  07/10/21 0951

## 2021-07-10 NOTE — Clinical Note
Neris Hussein was seen and treated in our emergency department on 7/10/2021  Diagnosis:     Shoaib Kan  is off the rest of the shift today  She may return on this date: If you have any questions or concerns, please don't hesitate to call        Franklin Rojo PA-C    ______________________________           _______________          _______________  Hospital Representative                              Date                                Time

## 2021-07-12 ENCOUNTER — OFFICE VISIT (OUTPATIENT)
Dept: FAMILY MEDICINE CLINIC | Facility: CLINIC | Age: 31
End: 2021-07-12
Payer: COMMERCIAL

## 2021-07-12 VITALS
WEIGHT: 281 LBS | DIASTOLIC BLOOD PRESSURE: 70 MMHG | BODY MASS INDEX: 41.62 KG/M2 | HEART RATE: 90 BPM | HEIGHT: 69 IN | OXYGEN SATURATION: 97 % | TEMPERATURE: 98.9 F | SYSTOLIC BLOOD PRESSURE: 130 MMHG | RESPIRATION RATE: 12 BRPM

## 2021-07-12 DIAGNOSIS — R59.0 ANTERIOR CERVICAL LYMPHADENOPATHY: Primary | ICD-10-CM

## 2021-07-12 LAB — HETEROPH AB SER QL: NEGATIVE

## 2021-07-12 PROCEDURE — 99214 OFFICE O/P EST MOD 30 MIN: CPT | Performed by: NURSE PRACTITIONER

## 2021-07-12 PROCEDURE — 3008F BODY MASS INDEX DOCD: CPT | Performed by: NURSE PRACTITIONER

## 2021-07-12 PROCEDURE — 1036F TOBACCO NON-USER: CPT | Performed by: NURSE PRACTITIONER

## 2021-07-12 NOTE — PROGRESS NOTES
Chief Complaint   Patient presents with    Swollen Glands     Painful Swollen Glands     Health Maintenance   Topic Date Due    Hepatitis C Screening  Never done    Annual Physical  02/05/2020    BMI: Followup Plan  06/26/2021    Influenza Vaccine (1) 09/01/2021    Depression Screening PHQ  01/11/2022    BMI: Adult  07/10/2022    Cervical Cancer Screening  12/30/2024    DTaP,Tdap,and Td Vaccines (3 - Td or Tdap) 05/04/2030    HIV Screening  Completed    COVID-19 Vaccine  Completed    Pneumococcal Vaccine: Pediatrics (0 to 5 Years) and At-Risk Patients (6 to 59 Years)  Aged Out    HIB Vaccine  Aged Out    Hepatitis B Vaccine  Aged Out    IPV Vaccine  Aged Out    Hepatitis A Vaccine  Aged Out    Meningococcal ACWY Vaccine  Aged Out    HPV Vaccine  Aged Out                  Assessment/Plan:    Cervical Lymphadenopathy- ED notes reviewed today  No leukocytosis  XR neck soft tissue with no acute pathology  She did have dental work on 7/9/21 (right lower side)  Just started the Augmentin yesterday  She did receive Decadron in the ED on 7/10/21  We'll check an U/S of the soft tissue neck which she can schedule  Advised to complete entire course of Augmentin as prescribed in the ED  OTC Ibuprofen prn  Avoid touching enlarged lymph nodes  Follow up with Dentist      Diagnoses and all orders for this visit:    Anterior cervical lymphadenopathy  -     US head neck soft tissue; Future          Subjective:      Patient ID: Hira Solano is a 33 y o  female  HPI   Pt presents by herself today for an ED follow up   She was evaluated at [facility] ED on 7/10/21 for painful, swollen lymph nodes in her neck  She notes they have been painful and swollen intermittently for the past 2 weeks  She did have a fractured tooth repaired on Friday, 7/9/21 (right lower tooth)  Denies any sinus pressure, ear pain, PND, sore throat, F/C, coughing, SOB/wheezing, N/V/D  Feels well overall other than her lymph nodes   WBC normal at 9 36  XR of the neck soft tissue with no acute pathology noted  TSH and free T4 mildly elevated (pt has a h/o hashimoto's thyroiditis and is on levothyroxine via Endocrinology)  She received Decadron and Toradol which did help with her discomfort  Started on Augmentin x 7 days and discharged home    Today, she notes no significant improvement in her lymph nodes  Still denies any other symptoms  She just started the Augmentin yesterday       The following portions of the patient's history were reviewed and updated as appropriate: allergies, current medications, past family history, past medical history, past social history, past surgical history and problem list     Review of Systems   Constitutional: Negative for chills and fever  HENT: Negative for ear pain and sore throat  Eyes: Negative for pain and visual disturbance  Respiratory: Negative for cough and shortness of breath  Cardiovascular: Negative for chest pain and palpitations  Gastrointestinal: Negative for abdominal pain and vomiting  Genitourinary: Negative for dysuria and hematuria  Musculoskeletal: Negative for arthralgias and back pain  Skin: Negative for color change and rash  Neurological: Negative for seizures and syncope  Hematological: Positive for adenopathy  Does not bruise/bleed easily  All other systems reviewed and are negative  Objective:      /70 (BP Location: Left arm, Patient Position: Sitting, Cuff Size: Large)   Pulse 90   Temp 98 9 °F (37 2 °C) (Tympanic)   Resp 12   Ht 5' 9" (1 753 m)   Wt 127 kg (281 lb)   SpO2 97%   BMI 41 50 kg/m²          Physical Exam  Constitutional:       General: She is not in acute distress  Appearance: She is well-developed  She is not ill-appearing, toxic-appearing or diaphoretic  HENT:      Head: Normocephalic and atraumatic  Right Ear: Tympanic membrane, ear canal and external ear normal  There is no impacted cerumen        Left Ear: Tympanic membrane, ear canal and external ear normal  There is no impacted cerumen  Nose: Nose normal       Mouth/Throat:      Mouth: Mucous membranes are moist       Pharynx: Oropharynx is clear  Eyes:      Extraocular Movements: Extraocular movements intact  Conjunctiva/sclera: Conjunctivae normal       Pupils: Pupils are equal, round, and reactive to light  Neck:      Thyroid: No thyromegaly  Cardiovascular:      Rate and Rhythm: Normal rate and regular rhythm  Heart sounds: Normal heart sounds  No murmur heard  Pulmonary:      Effort: Pulmonary effort is normal  No respiratory distress  Breath sounds: Normal breath sounds  No wheezing  Abdominal:      General: Bowel sounds are normal  There is no distension  Palpations: Abdomen is soft  Tenderness: There is no abdominal tenderness  Musculoskeletal:         General: Normal range of motion  Cervical back: Normal range of motion and neck supple  No rigidity or tenderness  Lymphadenopathy:      Cervical: Cervical adenopathy present  Right cervical: Superficial cervical adenopathy present  No deep or posterior cervical adenopathy  Left cervical: No superficial, deep or posterior cervical adenopathy  Skin:     General: Skin is warm and dry  Neurological:      General: No focal deficit present  Mental Status: She is alert and oriented to person, place, and time  Psychiatric:         Mood and Affect: Mood normal          Behavior: Behavior normal          Thought Content:  Thought content normal          Judgment: Judgment normal

## 2021-07-16 ENCOUNTER — HOSPITAL ENCOUNTER (OUTPATIENT)
Dept: RADIOLOGY | Facility: HOSPITAL | Age: 31
Discharge: HOME | End: 2021-07-16
Payer: COMMERCIAL

## 2021-07-16 DIAGNOSIS — R59.0 ANTERIOR CERVICAL LYMPHADENOPATHY: ICD-10-CM

## 2021-07-16 PROCEDURE — 76536 US EXAM OF HEAD AND NECK: CPT

## 2021-08-21 DIAGNOSIS — Z30.011 ENCOUNTER FOR INITIAL PRESCRIPTION OF CONTRACEPTIVE PILLS: ICD-10-CM

## 2021-08-21 RX ORDER — ACETAMINOPHEN AND CODEINE PHOSPHATE 120; 12 MG/5ML; MG/5ML
1 SOLUTION ORAL DAILY
Qty: 28 TABLET | Refills: 2 | Status: SHIPPED | OUTPATIENT
Start: 2021-08-21 | End: 2022-06-13

## 2021-09-29 ENCOUNTER — OFFICE VISIT (OUTPATIENT)
Dept: ENDOCRINOLOGY | Facility: CLINIC | Age: 31
End: 2021-09-29
Payer: COMMERCIAL

## 2021-09-29 VITALS
DIASTOLIC BLOOD PRESSURE: 76 MMHG | HEIGHT: 69 IN | HEART RATE: 93 BPM | BODY MASS INDEX: 41.47 KG/M2 | WEIGHT: 280 LBS | SYSTOLIC BLOOD PRESSURE: 130 MMHG

## 2021-09-29 DIAGNOSIS — E55.9 VITAMIN D DEFICIENCY: ICD-10-CM

## 2021-09-29 DIAGNOSIS — E28.2 PCOS (POLYCYSTIC OVARIAN SYNDROME): Primary | ICD-10-CM

## 2021-09-29 DIAGNOSIS — E04.1 THYROID NODULE: ICD-10-CM

## 2021-09-29 DIAGNOSIS — E03.9 HYPOTHYROIDISM, UNSPECIFIED TYPE: ICD-10-CM

## 2021-09-29 PROCEDURE — 99214 OFFICE O/P EST MOD 30 MIN: CPT | Performed by: NURSE PRACTITIONER

## 2021-09-29 RX ORDER — LEVOTHYROXINE SODIUM 75 MCG
75 TABLET ORAL DAILY
Qty: 30 TABLET | Refills: 3 | Status: SHIPPED | OUTPATIENT
Start: 2021-09-29 | End: 2021-10-05

## 2021-09-29 NOTE — PROGRESS NOTES
Established Patient Progress Note       Chief Complaint   Patient presents with    Hypothyroidism        History of Present Illness:     Estrada Wall is a 33 y o  female with a history of hypothyroidism, PCOS, vitamin d deficiency, and thyroid nodule  For the hypothyroidism she is currently taking Levothyroxine 75 mcg daily  She reports taking consistently in the morning has been difficult, often forgets or takes with food  Would prefer taking in evening  She feels the generic Levothyroxine is not adequately controlling her hypothyroidism and recently feels it has been giving her hot flashes  For the vitamin d deficiency she is currently taking 50,000 units weekly   For the thyroid nodule she denies neck pain, dysphonia, dysphagia, or dyspnea      Patient Active Problem List   Diagnosis    PCOS (polycystic ovarian syndrome)    Nontoxic goiter, unspecified    Morbid obesity ([address] )    Vitamin D deficiency    Infertility, female    Hyperlipidemia, mixed    Elevated liver enzymes    Anxiety    Thyroid nodule    Yeast infection    Bilateral carpal tunnel syndrome    Elevated prolactin level    History of  delivery, antepartum    High risk pregnancy with low PAPPA (pregnancy-associated plasma protein A)    Maternal morbid obesity, antepartum ([address] )    Gestational hypertension    Evaluate anatomy not seen on prior sonogram    Encounter for ultrasound to check fetal growth    S/P  section    Hypothyroidism      Past Medical History:   Diagnosis Date    Abnormal Pap smear of cervix     LGSIL - colpo - LGSIL, HPV effect    Chronic diarrhea of unknown origin     last assessed 6/26/15    Disease of thyroid gland     nodule    Dyspepsia     last assessed  10/29/14    Early satiety     last assessed  10/29/14    Hidradenitis suppurativa     last assessed  14    Hypertension     Obesity     Polycystic ovary syndrome     Varicella     childhood      Past Surgical History: Procedure Laterality Date     SECTION       and  [facility]  DELIVERY ONLY N/A 2020    Procedure:  SECTION () REPEAT;  Surgeon: Patrick Silveira MD;  Location: [facility] ;  Service: Obstetrics    WISDOM TOOTH EXTRACTION      all 4      Family History   Problem Relation Age of Onset    No Known Problems Father     Thyroid disease Maternal Grandmother     Diabetes Paternal Grandmother     Other Paternal Grandmother         pulmonary embolism    No Known Problems Mother     No Known Problems Maternal Grandfather     No Known Problems Paternal Grandfather     No Known Problems Son     No Known Problems Daughter     No Known Problems Sister      Social History     Tobacco Use    Smoking status: Former Smoker     Packs/day: 0 00     Years: 0 00     Pack years: 0 00     Quit date: 2014     Years since quittin 5    Smokeless tobacco: Never Used    Tobacco comment:  sometimes 1 cig once weekly - none since preg   Substance Use Topics    Alcohol use: No     Allergies   Allergen Reactions    A + D Personal Care Lotion  [Dimethicone] Rash    Calamine-Zinc Oxide Rash    Keflex [Cephalexin] GI Intolerance    Pramoxine-Calamine     Zinc Rash     No med alert bracelet no epi pen       Current Outpatient Medications:     ergocalciferol (VITAMIN D2) 50,000 units, Take 1 capsule (50,000 Units total) by mouth once a week, Disp: 4 capsule, Rfl: 6    norethindrone (MICRONOR) 0 35 MG tablet, TAKE 1 TABLET (0 35 MG TOTAL) BY MOUTH DAILY FOR 28 DAYS, Disp: 28 tablet, Rfl: 2    Cholecalciferol (DIALYVITE VITAMIN D 5000 PO), Take 1 tablet by mouth daily (Patient not taking: Reported on 2021), Disp: , Rfl:     ibuprofen (MOTRIN) 400 mg tablet, Take 1 tablet (400 mg total) by mouth every 6 (six) hours as needed for mild pain for up to 3 days (Patient not taking: Reported on 2021), Disp: 12 tablet, Rfl: 0    Prenatal MV-Min-Fe Fum-FA-DHA (PRENATAL+DHA PO), Take 2 tablets by mouth (Patient not taking: Reported on 9/29/2021), Disp: , Rfl:     Synthroid 75 MCG tablet, Take 1 tablet (75 mcg total) by mouth daily Synthroid, brand necessary, Disp: 30 tablet, Rfl: 3    Review of Systems   Constitutional: Negative for chills and fever  HENT: Negative for sore throat and trouble swallowing  Eyes: Negative for visual disturbance  Respiratory: Negative for shortness of breath  Cardiovascular: Negative for chest pain and palpitations  Gastrointestinal: Negative for abdominal pain, constipation and diarrhea  Endocrine: Negative for cold intolerance, heat intolerance, polydipsia, polyphagia and polyuria  Genitourinary: Negative for frequency  Musculoskeletal: Negative for arthralgias and myalgias  Skin: Negative for rash  Neurological: Negative for dizziness and tremors  Hematological: Negative for adenopathy  Psychiatric/Behavioral: Negative for sleep disturbance  All other systems reviewed and are negative  Physical Exam:  Body mass index is 41 35 kg/m²  /76   Pulse 93   Ht 5' 9" (1 753 m)   Wt 127 kg (280 lb)   BMI 41 35 kg/m²    Wt Readings from Last 3 Encounters:   09/29/21 127 kg (280 lb)   07/12/21 127 kg (281 lb)   07/10/21 130 kg (286 lb 9 6 oz)       Physical Exam  Vitals reviewed  Constitutional:       General: She is not in acute distress  Appearance: She is well-developed  HENT:      Head: Normocephalic and atraumatic  Eyes:      Conjunctiva/sclera: Conjunctivae normal       Pupils: Pupils are equal, round, and reactive to light  Neck:      Thyroid: No thyromegaly  Cardiovascular:      Rate and Rhythm: Normal rate and regular rhythm  Heart sounds: Normal heart sounds  Pulmonary:      Effort: Pulmonary effort is normal  No respiratory distress  Breath sounds: Normal breath sounds  No wheezing or rales  Abdominal:      General: Bowel sounds are normal  There is no distension  Palpations: Abdomen is soft  Tenderness: There is no abdominal tenderness  Musculoskeletal:         General: Normal range of motion  Cervical back: Normal range of motion and neck supple  Skin:     General: Skin is warm and dry  Neurological:      Mental Status: She is alert and oriented to person, place, and time  Labs:     Component      Latest Ref Rng & Units 7/10/2021   TSH 3RD GENERATON      0 358 - 3 740 uIU/mL 3 812 (H)   Free T4      0 76 - 1 46 ng/dL 1 53 (H)     THYROID ULTRASOUND     INDICATION:    E04 1: Nontoxic single thyroid nodule  E66 01: Morbid (severe) obesity due to excess calories      COMPARISON:  May 28, 2019     TECHNIQUE:   Ultrasound of the thyroid was performed with a high frequency linear transducer in transverse and sagittal planes including volumetric imaging sweeps as well as traditional still imaging technique      FINDINGS:  Parenchymal echotexture is diffusely heterogeneous and hypervascular      RIGHT LOBE: Enlarged measuring 6 8 x 1 8 x 2 4 cm = 21 mL, previously 5 8 x 2 1 x 1 9 cm = 11 2 mL  LEFT LOBE: Enlarged measuring 7 0 x 2 9 x 2 2 cm = 21 mL, previously 6 1 x 1 7 x 1 9 cm = 9 5 mL  ISTHMUS: Enlarged measuring 1 0 cm, previously 0 5 cm      Nodule #1  Image 8  RIGHT lower pole nodule measuring 0 9 x 0 8 x 0 9 cm, previously 1 1 x 0 9 x 1 0 cm  COMPOSITION:  2 points, solid or almost completely solid   ECHOGENICITY:  2 points, hypoechoic  SHAPE:  0 points, wider-than-tall  MARGIN: 0 points, smooth  ECHOGENIC FOCI:  0 points, none or large comet-tail artifacts  TI-RADS Classification: TR 4 (4-6 points), Moderately suspicious  +        IMPRESSION:  Interval enlargement and increasing heterogeneity and hypervascularity within the entire thyroid gland    Findings likely representing thyroiditis      Interval decrease in previous solitary right lower pole nodule which does not meet current ACR criteria for requiring biopsy or follow-up  Impression & Plan:    Problem List Items Addressed This Visit        Endocrine    PCOS (polycystic ovarian syndrome) - Primary    Thyroid nodule     Due for repeat US in January for continued surveillance          Relevant Medications    Synthroid 75 MCG tablet    Other Relevant Orders    US thyroid    Hypothyroidism     Discussed switching Levothyroxine to Synthroid as patient feels the Levothyroxine is giving her side effects and not controlling her hypothyroidism  Will switch to Synthroid 75 mcg and have patient try taking in the evening at least four hours after last meal to help with her taking consistently  Check thyroid function test in 6 weeks          Relevant Medications    Synthroid 75 MCG tablet    Other Relevant Orders    T4, free    TSH, 3rd generation       Other    Vitamin D deficiency     Continue vitamin d supplementation, check vitamin d level          Relevant Orders    Vitamin D 25 hydroxy          Orders Placed This Encounter   Procedures    US thyroid     Standing Status:   Future     Standing Expiration Date:   9/29/2025     Scheduling Instructions:      No prep required  Please bring your insurance cards, a form of photo ID and a list of your medications with you  Arrive 15 minutes prior to your appointment time in order to register  To schedule this appointment, please contact Central Scheduling at [phone]   T4, free     Standing Status:   Future     Standing Expiration Date:   9/29/2022    TSH, 3rd generation     This is a patient instruction: This test is non-fasting  Please drink two glasses of water morning of bloodwork  Standing Status:   Future     Standing Expiration Date:   9/29/2022    Vitamin D 25 hydroxy     Standing Status:   Future     Standing Expiration Date:   9/29/2022         Discussed with the patient and all questioned fully answered  She will call me if any problems arise        Counseled patient on diagnostic results, prognosis, risk and benefit of treatment options, instruction for management, importance of treatment compliance, Risk  factor reduction and impressions      Stephanie Tolentino [address]

## 2021-09-29 NOTE — ASSESSMENT & PLAN NOTE
Discussed switching Levothyroxine to Synthroid as patient feels the Levothyroxine is giving her side effects and not controlling her hypothyroidism  Will switch to Synthroid 75 mcg and have patient try taking in the evening at least four hours after last meal to help with her taking consistently   Check thyroid function test in 6 weeks

## 2021-10-06 ENCOUNTER — TELEPHONE (OUTPATIENT)
Dept: ENDOCRINOLOGY | Facility: CLINIC | Age: 31
End: 2021-10-06

## 2021-10-12 DIAGNOSIS — E55.9 VITAMIN D DEFICIENCY: ICD-10-CM

## 2021-10-12 RX ORDER — ERGOCALCIFEROL 1.25 MG/1
CAPSULE ORAL
Qty: 4 CAPSULE | Refills: 6 | Status: SHIPPED | OUTPATIENT
Start: 2021-10-12 | End: 2022-04-04 | Stop reason: SDUPTHER

## 2021-11-16 NOTE — LETTER
01/09/20  Alexandra Alexandra  1990    Thank you for completing Part 1 of your Sequential Screen  To obtain a complete test result, please complete blood work for Part 2 Sequential Screen between the weeks of 1/13/20 to 1/27/20  Based on your insurance coverage, please use one of the following locations  Call our office for any questions at [phone]      [address] [place] [address] Avenue  [address], [facility], [address]    [address] [address] [address]       Phone: [phone]      Phone: [phone]    [address] [facility], [address] [place] [address]  Phone: [phone]      Phone: [phone] (*ask for lab)    [address], [facility], [facility]   [address] [facility], [address]  Phone: [phone]      Phone:  [phone]  Hours: Monday-Friday 6a-6p, Saturday 7a-12    [address]  [address] 6   [address]  Phone: [phone]      Phone:  [facility],[address]  207 [facility], [facility], [address]   [address] [facility] [address]  Phone: [phone]      Phone: [phone]      Sincerely,    Mary Urbina RN alverto mcgovern. Okay to refill.  ?

## 2021-11-22 ENCOUNTER — TELEPHONE (OUTPATIENT)
Dept: ENDOCRINOLOGY | Facility: CLINIC | Age: 31
End: 2021-11-22

## 2021-11-22 DIAGNOSIS — E03.9 HYPOTHYROIDISM, UNSPECIFIED TYPE: ICD-10-CM

## 2021-11-23 RX ORDER — LEVOTHYROXINE SODIUM 75 MCG
75 TABLET ORAL DAILY
Qty: 30 TABLET | Refills: 3 | Status: SHIPPED | OUTPATIENT
Start: 2021-11-23 | End: 2022-04-04 | Stop reason: SDUPTHER

## 2021-12-14 ENCOUNTER — TELEPHONE (OUTPATIENT)
Dept: ENDOCRINOLOGY | Facility: CLINIC | Age: 31
End: 2021-12-14

## 2022-01-09 ENCOUNTER — IMMUNIZATIONS (OUTPATIENT)
Dept: FAMILY MEDICINE CLINIC | Facility: HOSPITAL | Age: 32
End: 2022-01-09

## 2022-01-09 DIAGNOSIS — Z23 ENCOUNTER FOR IMMUNIZATION: Primary | ICD-10-CM

## 2022-01-09 PROCEDURE — 91300 COVID-19 PFIZER VACC 0.3 ML: CPT

## 2022-01-09 PROCEDURE — 0001A COVID-19 PFIZER VACC 0.3 ML: CPT

## 2022-02-15 ENCOUNTER — APPOINTMENT (OUTPATIENT)
Dept: LAB | Facility: HOSPITAL | Age: 32
End: 2022-02-15
Payer: COMMERCIAL

## 2022-02-15 ENCOUNTER — TELEPHONE (OUTPATIENT)
Dept: ENDOCRINOLOGY | Facility: CLINIC | Age: 32
End: 2022-02-15

## 2022-02-15 DIAGNOSIS — E03.9 HYPOTHYROIDISM, UNSPECIFIED TYPE: ICD-10-CM

## 2022-02-15 DIAGNOSIS — E55.9 VITAMIN D DEFICIENCY: ICD-10-CM

## 2022-02-15 LAB
25(OH)D3 SERPL-MCNC: 19 NG/ML (ref 30–100)
T4 FREE SERPL-MCNC: 1.44 NG/DL (ref 0.76–1.46)
TSH SERPL DL<=0.05 MIU/L-ACNC: 3.58 UIU/ML (ref 0.36–3.74)

## 2022-02-15 PROCEDURE — 36415 COLL VENOUS BLD VENIPUNCTURE: CPT

## 2022-02-15 PROCEDURE — 82306 VITAMIN D 25 HYDROXY: CPT

## 2022-02-15 PROCEDURE — 84443 ASSAY THYROID STIM HORMONE: CPT

## 2022-02-15 PROCEDURE — 84439 ASSAY OF FREE THYROXINE: CPT

## 2022-02-15 NOTE — TELEPHONE ENCOUNTER
----- Message from [address] [place] [address] sent at 2/15/2022  2:50 PM EST -----  Please call about lab results  Thyroid labs look good  Continue current dose of Synthroid

## 2022-03-04 ENCOUNTER — HOSPITAL ENCOUNTER (OUTPATIENT)
Dept: RADIOLOGY | Facility: HOSPITAL | Age: 32
Discharge: HOME | End: 2022-03-04
Payer: COMMERCIAL

## 2022-03-04 DIAGNOSIS — E04.1 THYROID NODULE: ICD-10-CM

## 2022-03-04 PROCEDURE — 76536 US EXAM OF HEAD AND NECK: CPT

## 2022-03-10 ENCOUNTER — TELEPHONE (OUTPATIENT)
Dept: ENDOCRINOLOGY | Facility: CLINIC | Age: 32
End: 2022-03-10

## 2022-03-10 NOTE — TELEPHONE ENCOUNTER
[facility] radiology calling with abn ultrasound in [facility]   Louise Ra   p [phone]  Option 3

## 2022-03-10 NOTE — TELEPHONE ENCOUNTER
Thyroid ultrasound shows a possible new nodule - will require f/u , will discuss further on upcoming visit

## 2022-04-04 ENCOUNTER — OFFICE VISIT (OUTPATIENT)
Dept: ENDOCRINOLOGY | Facility: CLINIC | Age: 32
End: 2022-04-04
Payer: COMMERCIAL

## 2022-04-04 VITALS
SYSTOLIC BLOOD PRESSURE: 130 MMHG | DIASTOLIC BLOOD PRESSURE: 90 MMHG | HEART RATE: 91 BPM | BODY MASS INDEX: 43.4 KG/M2 | HEIGHT: 69 IN | WEIGHT: 293 LBS

## 2022-04-04 DIAGNOSIS — E66.01 CLASS 3 SEVERE OBESITY DUE TO EXCESS CALORIES WITH SERIOUS COMORBIDITY AND BODY MASS INDEX (BMI) OF 40.0 TO 44.9 IN ADULT (HCC): ICD-10-CM

## 2022-04-04 DIAGNOSIS — L68.0 HIRSUTISM: ICD-10-CM

## 2022-04-04 DIAGNOSIS — R63.5 ABNORMAL WEIGHT GAIN: ICD-10-CM

## 2022-04-04 DIAGNOSIS — G47.9 SLEEP DISTURBANCES: ICD-10-CM

## 2022-04-04 DIAGNOSIS — E06.3 HYPOTHYROIDISM DUE TO HASHIMOTO'S THYROIDITIS: ICD-10-CM

## 2022-04-04 DIAGNOSIS — E28.2 PCOS (POLYCYSTIC OVARIAN SYNDROME): Primary | ICD-10-CM

## 2022-04-04 DIAGNOSIS — E03.9 HYPOTHYROIDISM, UNSPECIFIED TYPE: ICD-10-CM

## 2022-04-04 DIAGNOSIS — E03.8 HYPOTHYROIDISM DUE TO HASHIMOTO'S THYROIDITIS: ICD-10-CM

## 2022-04-04 DIAGNOSIS — E55.9 VITAMIN D DEFICIENCY: ICD-10-CM

## 2022-04-04 PROCEDURE — 99214 OFFICE O/P EST MOD 30 MIN: CPT | Performed by: INTERNAL MEDICINE

## 2022-04-04 RX ORDER — ERGOCALCIFEROL 1.25 MG/1
50000 CAPSULE ORAL WEEKLY
Qty: 4 CAPSULE | Refills: 6 | Status: SHIPPED | OUTPATIENT
Start: 2022-04-04

## 2022-04-04 RX ORDER — LEVOTHYROXINE SODIUM 75 MCG
75 TABLET ORAL DAILY
Qty: 30 TABLET | Refills: 3 | Status: SHIPPED | OUTPATIENT
Start: 2022-04-04

## 2022-04-04 NOTE — PROGRESS NOTES
Ronnie Ibanez 27 y o  female MRN: [id number]    Encounter: [id number]      Assessment/Plan     Problem List Items Addressed This Visit        Endocrine    Hypothyroidism     Continue Synthroid at current dose         Relevant Medications    Synthroid 75 MCG tablet    Other Relevant Orders    Celiac Disease Antibody Profile    PCOS (polycystic ovarian syndrome) - Primary     Counseled on dietary and lifestyle modifications and weight loss  Repeat fasting lipids and glucose  Referred for medical nutrition therapy  Consider GLP 1 agonist in the future         Relevant Orders    Ambulatory referral to Nutrition Services    Comprehensive metabolic panel Lab Collect    HEMOGLOBIN A1C W/ EAG ESTIMATION Lab Collect    Insulin, fasting- Lab Collect    Lipid Panel with Direct LDL reflex Lab Collect       Musculoskeletal and Integument    Hirsutism    Relevant Orders    DHEA-sulfate Lab Collect    17-Hydroxyprogesterone- Lab Collect       Other    Sleep disturbances    Relevant Orders    Ambulatory referral to Sleep Medicine    Vitamin D deficiency     Continue supplementations         Relevant Medications    ergocalciferol (VITAMIN D2) 50,000 units      Other Visit Diagnoses     Class 3 severe obesity due to excess calories with serious comorbidity and body mass index (BMI) of 40 0 to 44 9 in [facility])        Relevant Orders    Ambulatory referral to Sleep Medicine    Ambulatory referral to Nutrition Services    Abnormal weight gain        Relevant Orders    SALIVARY CORTISOL,FOUR SPECIMENS        CC:   hypothyroidism     History of Present Illness     HPI:  26-year-old female with hypothyroidism PCOS and obesity seen in f/u      Occasional palpitations for the past few weeks - lasts mins   Checked BP at home -    For hypothyroidism she is currently on Synthroid 75 mcg daily and has been taking it properly - may miss less than once a week  Weight gain - 20 lbs since last visit   Off OCP for 2 months , planning on going back   C/o sleep disturbances , loose BM - usually upto 5 a day   C/o leg pain kicking overnight - never had a sleep study   No morning headaches     C/o hirsutism and hair loss -worsening in the past year  Face and back  3rd pregnancy - baby 21 months old   Stopped breastfeeding 3 months back      For vitamin-D deficiency she is currently taking Drisdol once a week    Review of Systems    Historical Information   Past Medical History:   Diagnosis Date    Abnormal Pap smear of cervix     LGSIL - colpo - LGSIL, HPV effect    Chronic diarrhea of unknown origin     last assessed 6/26/15    Disease of thyroid gland     nodule    Dyspepsia     last assessed  10/29/14    Early satiety     last assessed  10/29/14    Hidradenitis suppurativa     last assessed  14    Hypertension     Obesity     Polycystic ovary syndrome     Varicella     childhood     Past Surgical History:   Procedure Laterality Date     SECTION       and  [address]    [facility]    Procedure:  SECTION () REPEAT;  Surgeon: Shanae Sarabia MD;  Location: [facility] ;  Service: Obstetrics    WISDOM TOOTH EXTRACTION      all 4     Social History   Social History     Substance and Sexual Activity   Alcohol Use No     Social History     Substance and Sexual Activity   Drug Use No     Social History     Tobacco Use   Smoking Status Former Smoker    Packs/day: 0 00    Years: 0 00    Pack years: 0 00    Quit date: 2014    Years since quittin 1   Smokeless Tobacco Never Used   Tobacco Comment     sometimes 1 cig once weekly - none since preg     Family History:   Family History   Problem Relation Age of Onset    No Known Problems Father     Thyroid disease Maternal Grandmother     Diabetes Paternal Grandmother     Other Paternal Grandmother         pulmonary embolism    No Known Problems Mother     No Known Problems Maternal Grandfather     No Known Problems Paternal Grandfather     No Known Problems Son     No Known Problems Daughter     No Known Problems Sister        Meds/Allergies   Current Outpatient Medications   Medication Sig Dispense Refill    ergocalciferol (VITAMIN D2) 50,000 units Take 1 capsule (50,000 Units total) by mouth once a week 4 capsule 6    Synthroid 75 MCG tablet Take 1 tablet (75 mcg total) by mouth daily Synthroid, brand necessary 30 tablet 3    ibuprofen (MOTRIN) 400 mg tablet Take 1 tablet (400 mg total) by mouth every 6 (six) hours as needed for mild pain for up to 3 days (Patient not taking: Reported on 7/12/2021) 12 tablet 0    norethindrone (MICRONOR) 0 35 MG tablet TAKE 1 TABLET (0 35 MG TOTAL) BY MOUTH DAILY FOR 28 DAYS (Patient not taking: Reported on 4/4/2022 ) 28 tablet 2     No current facility-administered medications for this visit  Allergies   Allergen Reactions    A + D Personal Care Lotion  [Dimethicone] Rash    Calamine-Zinc Oxide Rash    Keflex [Cephalexin] GI Intolerance    Pramoxine-Calamine     Zinc Rash     No med alert bracelet no epi pen       Objective   Vitals: Blood pressure 130/90, pulse 91, height 5' 9" (1 753 m), weight 134 kg (294 lb 12 8 oz), currently breastfeeding  Physical Exam  Vitals reviewed  Constitutional:       Appearance: Normal appearance  She is obese  She is not ill-appearing or diaphoretic  HENT:      Head: Normocephalic and atraumatic  Eyes:      General: No scleral icterus  Extraocular Movements: Extraocular movements intact  Cardiovascular:      Rate and Rhythm: Normal rate and regular rhythm  Heart sounds: Normal heart sounds  No murmur heard  Pulmonary:      Effort: Pulmonary effort is normal  No respiratory distress  Breath sounds: Normal breath sounds  No wheezing  Abdominal:      General: There is no distension  Palpations: Abdomen is soft  Tenderness: There is no abdominal tenderness  There is no guarding  Musculoskeletal:      Cervical back: Neck supple  Right lower leg: No edema  Left lower leg: No edema  Lymphadenopathy:      Cervical: No cervical adenopathy  Skin:     General: Skin is warm and dry  Comments: Facial hirsutism   Neurological:      General: No focal deficit present  Mental Status: She is alert and oriented to person, place, and time  Psychiatric:         Mood and Affect: Mood normal          Behavior: Behavior normal          Thought Content: Thought content normal          Judgment: Judgment normal          The history was obtained from the review of the chart, patient  Lab Results:   Lab Results   Component Value Date/Time    TSH 3RD Syliva Ruths 3 580 02/15/2022 09:27 AM    TSH 3RD GENERATON 3 812 (H) 07/10/2021 05:50 AM    TSH 3RD GENERATON 2 870 04/23/2021 09:01 AM    Free T4 1 44 02/15/2022 09:27 AM    Free T4 1 53 (H) 07/10/2021 05:50 AM    Free T4 1 38 04/23/2021 09:01 AM       Imaging Studies:   Results for orders placed during the hospital encounter of 03/04/22    US thyroid    Impression  1  There is now a question of a pseudo nodule versus true thyroid nodule in the left lower pole region  See above comments  This can be reassessed on follow up  No nodule meets current ACR criteria for requiring biopsy but followup ultrasound is  recommended in 1 year  2   Heterogeneous thyroid gland suggesting background of thyroiditis  Reference: ACR Thyroid Imaging, Reporting and Data System (TI-RADS): White Paper of the [employer]  J AM Emma Radiol 7711;18:927-812  (additional recommendations based on American Thyroid Association 2015 guidelines )      The study was marked in EPIC for significant notification  Workstation performed: [id number]      I have personally reviewed pertinent reports  Portions of the record may have been created with voice recognition software   Occasional wrong word or "sound a like" substitutions may have occurred due to the inherent limitations of voice recognition software  Read the chart carefully and recognize, using context, where substitutions have occurred

## 2022-04-06 NOTE — ASSESSMENT & PLAN NOTE
Counseled on dietary and lifestyle modifications and weight loss  Repeat fasting lipids and glucose  Referred for medical nutrition therapy    Consider GLP 1 agonist in the future

## 2022-04-15 ENCOUNTER — LAB (OUTPATIENT)
Dept: LAB | Facility: HOSPITAL | Age: 32
End: 2022-04-15
Attending: INTERNAL MEDICINE
Payer: COMMERCIAL

## 2022-04-15 DIAGNOSIS — E03.8 HYPOTHYROIDISM DUE TO HASHIMOTO'S THYROIDITIS: ICD-10-CM

## 2022-04-15 DIAGNOSIS — E28.2 PCOS (POLYCYSTIC OVARIAN SYNDROME): ICD-10-CM

## 2022-04-15 DIAGNOSIS — L68.0 HIRSUTISM: ICD-10-CM

## 2022-04-15 DIAGNOSIS — E06.3 HYPOTHYROIDISM DUE TO HASHIMOTO'S THYROIDITIS: ICD-10-CM

## 2022-04-15 LAB
ALBUMIN SERPL BCP-MCNC: 3.7 G/DL (ref 3.5–5)
ALP SERPL-CCNC: 51 U/L (ref 46–116)
ALT SERPL W P-5'-P-CCNC: 37 U/L (ref 12–78)
ANION GAP SERPL CALCULATED.3IONS-SCNC: 1 MMOL/L (ref 4–13)
AST SERPL W P-5'-P-CCNC: 24 U/L (ref 5–45)
BILIRUB SERPL-MCNC: 1.53 MG/DL (ref 0.2–1)
BUN SERPL-MCNC: 11 MG/DL (ref 5–25)
CALCIUM SERPL-MCNC: 9.6 MG/DL (ref 8.3–10.1)
CHLORIDE SERPL-SCNC: 111 MMOL/L (ref 100–108)
CHOLEST SERPL-MCNC: 131 MG/DL
CO2 SERPL-SCNC: 28 MMOL/L (ref 21–32)
CREAT SERPL-MCNC: 0.84 MG/DL (ref 0.6–1.3)
EST. AVERAGE GLUCOSE BLD GHB EST-MCNC: 114 MG/DL
GFR SERPL CREATININE-BSD FRML MDRD: 90 ML/MIN/1.73SQ M
GLUCOSE P FAST SERPL-MCNC: 103 MG/DL (ref 65–99)
HBA1C MFR BLD: 5.6 %
HDLC SERPL-MCNC: 32 MG/DL
INSULIN SERPL-ACNC: 34 MU/L (ref 3–25)
LDLC SERPL CALC-MCNC: 66 MG/DL (ref 0–100)
POTASSIUM SERPL-SCNC: 4 MMOL/L (ref 3.5–5.3)
PROT SERPL-MCNC: 7.8 G/DL (ref 6.4–8.2)
SODIUM SERPL-SCNC: 140 MMOL/L (ref 136–145)
TRIGL SERPL-MCNC: 166 MG/DL

## 2022-04-15 PROCEDURE — 83498 ASY HYDROXYPROGESTERONE 17-D: CPT

## 2022-04-15 PROCEDURE — 86258 DGP ANTIBODY EACH IG CLASS: CPT

## 2022-04-15 PROCEDURE — 36415 COLL VENOUS BLD VENIPUNCTURE: CPT

## 2022-04-15 PROCEDURE — 80053 COMPREHEN METABOLIC PANEL: CPT

## 2022-04-15 PROCEDURE — 83525 ASSAY OF INSULIN: CPT

## 2022-04-15 PROCEDURE — 82627 DEHYDROEPIANDROSTERONE: CPT

## 2022-04-15 PROCEDURE — 83036 HEMOGLOBIN GLYCOSYLATED A1C: CPT

## 2022-04-15 PROCEDURE — 82784 ASSAY IGA/IGD/IGG/IGM EACH: CPT

## 2022-04-15 PROCEDURE — 86364 TISS TRNSGLTMNASE EA IG CLAS: CPT

## 2022-04-15 PROCEDURE — 86231 EMA EACH IG CLASS: CPT

## 2022-04-15 PROCEDURE — 80061 LIPID PANEL: CPT

## 2022-04-16 LAB — DHEA-S SERPL-MCNC: 154 UG/DL (ref 84.8–378)

## 2022-04-20 ENCOUNTER — VBI (OUTPATIENT)
Dept: ADMINISTRATIVE | Facility: OTHER | Age: 32
End: 2022-04-20

## 2022-04-20 ENCOUNTER — TELEPHONE (OUTPATIENT)
Dept: ENDOCRINOLOGY | Facility: CLINIC | Age: 32
End: 2022-04-20

## 2022-04-20 DIAGNOSIS — E28.2 PCOS (POLYCYSTIC OVARIAN SYNDROME): Primary | ICD-10-CM

## 2022-04-20 NOTE — RESULT ENCOUNTER NOTE
Please call the patient regarding labs - sugar in prediabetes range , high insulin level due to insulin resistance , focus on dietary and lifestyle modifications and weight loss   Has she seen the nutritionist ?

## 2022-04-20 NOTE — TELEPHONE ENCOUNTER
----- Message from Sangita Dejesus MD sent at 4/19/2022  9:33 PM EDT -----  Please call the patient regarding labs - sugar in prediabetes range , high insulin level due to insulin resistance , focus on dietary and lifestyle modifications and weight loss   Has she seen the nutritionist ?

## 2022-04-20 NOTE — TELEPHONE ENCOUNTER
Yes, merformin 500 mg bid with meals and if tolerating after 1 week increase to 1000 mg twice daily with meals

## 2022-04-25 LAB — 17OHP SERPL-MCNC: 59 NG/DL

## 2022-06-13 ENCOUNTER — OFFICE VISIT (OUTPATIENT)
Dept: OBGYN CLINIC | Facility: CLINIC | Age: 32
End: 2022-06-13
Payer: COMMERCIAL

## 2022-06-13 VITALS
SYSTOLIC BLOOD PRESSURE: 124 MMHG | HEIGHT: 69 IN | DIASTOLIC BLOOD PRESSURE: 82 MMHG | BODY MASS INDEX: 40.73 KG/M2 | WEIGHT: 275 LBS

## 2022-06-13 DIAGNOSIS — Z01.419 WOMEN'S ANNUAL ROUTINE GYNECOLOGICAL EXAMINATION: Primary | ICD-10-CM

## 2022-06-13 DIAGNOSIS — Z11.3 SCREENING EXAMINATION FOR STD (SEXUALLY TRANSMITTED DISEASE): ICD-10-CM

## 2022-06-13 PROBLEM — B37.9 YEAST INFECTION: Status: RESOLVED | Noted: 2019-04-23 | Resolved: 2022-06-13

## 2022-06-13 PROCEDURE — G0145 SCR C/V CYTO,THINLAYER,RESCR: HCPCS | Performed by: NURSE PRACTITIONER

## 2022-06-13 PROCEDURE — 87491 CHLMYD TRACH DNA AMP PROBE: CPT | Performed by: NURSE PRACTITIONER

## 2022-06-13 PROCEDURE — G0476 HPV COMBO ASSAY CA SCREEN: HCPCS | Performed by: NURSE PRACTITIONER

## 2022-06-13 PROCEDURE — 99395 PREV VISIT EST AGE 18-39: CPT | Performed by: NURSE PRACTITIONER

## 2022-06-13 PROCEDURE — 87591 N.GONORRHOEAE DNA AMP PROB: CPT | Performed by: NURSE PRACTITIONER

## 2022-06-13 PROCEDURE — 0503F POSTPARTUM CARE VISIT: CPT | Performed by: NURSE PRACTITIONER

## 2022-06-13 NOTE — PROGRESS NOTES
Subjective    HPI:     Thu Sims is a 27 y o  female  She is a  3 Para 3, with C/S x 3  Her menstrual cycles are regular monthly  Her current method of contraception includes none  She denies issues with intimacy  She denies /GI complaints  She feels she may have a yeast infection  Was on abx for dental work  She used Monistat 3 day which she does not feel it helped  She feels safe at home  She suffers from  depression/anxiety - she uses self coping mechanism  Denies feel SI/HI  Medical, surgical and family history reviewed  She was dx with Hashimoto  Her dental care is up-to-date  She is mostly eats a healthy diet and exercises regularly  She is working on her weight  She has loss 19 lbs  Gynecologic History    Patient's last menstrual period was 2022  Last Pap: 19  Results were: normal    Obstetric History    OB History    Para Term  AB Living   3 3 3 0 0 3   SAB IAB Ectopic Multiple Live Births   0 0 0 0 3      # Outcome Date GA Lbr Yuriy/2nd Weight Sex Delivery Anes PTL Lv   3 Term 20 37w4d  2930 g (6 lb 7 4 oz) F CS-LTranv Spinal  ALMAS   2 Term 13 39w0d  3487 g (7 lb 11 oz) M CS-LTranv Spinal  ALMAS   1 Term 11 39w0d  3374 g (7 lb 7 oz) F CS-LTranv EPI N ALMAS      Complications: Fetal Intolerance, Failed Induction       The following portions of the patient's history were reviewed and updated as appropriate: allergies, current medications, past family history, past medical history, past social history, past surgical history and problem list     Review of Systems    Pertinent items are noted in HPI  Objective    Physical Exam  Constitutional:       Appearance: Normal appearance  She is well-developed  Genitourinary:      Vulva, bladder and urethral meatus normal       No lesions in the vagina        Right Labia: No rash, tenderness, lesions, skin changes or Bartholin's cyst      Left Labia: No tenderness, lesions, skin changes, Bartholin's cyst or rash      No labial fusion noted  Vulva exam comments: There is mild swelling noted of the labia majora  No rashes         No inguinal adenopathy present in the right or left side  No vaginal discharge, erythema, tenderness, bleeding or granulation tissue  No vaginal prolapse present  No vaginal atrophy present  Right Adnexa: not tender, not full and no mass present  Left Adnexa: not tender, not full and no mass present  Cervix is not parous  No cervical motion tenderness, discharge, friability, lesion, polyp or nabothian cyst       Uterus is not enlarged, tender, irregular or prolapsed  No uterine mass detected  Uterus is anteverted  Pelvic exam was performed with patient in the lithotomy position  Breasts: Breasts are symmetrical       Right: No inverted nipple, mass, nipple discharge, skin change, tenderness, axillary adenopathy or supraclavicular adenopathy  Left: No inverted nipple, mass, nipple discharge, skin change, tenderness, axillary adenopathy or supraclavicular adenopathy  HENT:      Head: Normocephalic and atraumatic  Neck:      Thyroid: No thyromegaly  Cardiovascular:      Rate and Rhythm: Normal rate and regular rhythm  Heart sounds: Normal heart sounds, S1 normal and S2 normal    Pulmonary:      Effort: Pulmonary effort is normal       Breath sounds: Normal breath sounds  Abdominal:      General: Bowel sounds are normal  There is no distension  Palpations: Abdomen is soft  There is no mass  Tenderness: There is no abdominal tenderness  There is no guarding  Hernia: There is no hernia in the left inguinal area or right inguinal area  Musculoskeletal:      Cervical back: Neck supple  Lymphadenopathy:      Cervical: No cervical adenopathy  Upper Body:      Right upper body: No supraclavicular or axillary adenopathy  Left upper body: No supraclavicular or axillary adenopathy        Lower Body: No right inguinal adenopathy  No left inguinal adenopathy  Neurological:      General: No focal deficit present  Mental Status: She is alert and oriented to person, place, and time  Skin:     General: Skin is warm and dry  Findings: No rash  Psychiatric:         Attention and Perception: Attention and perception normal          Mood and Affect: Mood and affect normal          Speech: Speech normal          Behavior: Behavior is cooperative  Thought Content: Thought content normal          Cognition and Memory: Cognition and memory normal          Judgment: Judgment normal    Vitals and nursing note reviewed  Assessment and Plan    Rayo Puckett was seen today for gynecologic exam     Diagnoses and all orders for this visit:    Women's annual routine gynecological examination  -     Chlamydia/GC amplified DNA by PCR    Screening examination for STD (sexually transmitted disease)  -     Liquid-based pap, screening      Patient informed of a Stable GYN exam  A pap smear was performed  I have discussed the importance of exercise and healthy diet as well as adequate intake of calcium and vitamin D  The current ASCCP guidelines were reviewed  The low risk patient will receive pap smear screening every 3 years until the age of 34 and then every 3 to 5 years with HPV co-testing from the ages of 33-67  I emphasized the importance of an annual pelvic and breast exam  A yearly mammogram is recommended for breast cancer screening starting at age 36  Recommended she take a antihistamine for the swelling  We will wait for results to initiate treatment if needed  Results will be released to [facility], if abnormal will call to review and discuss treatment plan  All questions have been answered to her satisfaction  Contraception: none    Follow up in: year or sooner if needed

## 2022-06-15 LAB
C TRACH DNA SPEC QL NAA+PROBE: NEGATIVE
HPV HR 12 DNA CVX QL NAA+PROBE: NEGATIVE
HPV16 DNA CVX QL NAA+PROBE: NEGATIVE
HPV18 DNA CVX QL NAA+PROBE: NEGATIVE
N GONORRHOEA DNA SPEC QL NAA+PROBE: NEGATIVE

## 2022-06-16 LAB
LAB AP GYN PRIMARY INTERPRETATION: NORMAL
LAB AP LMP: NORMAL
Lab: NORMAL

## 2022-07-11 NOTE — PROGRESS NOTES
Established Patient Progress Note      Chief Complaint   Patient presents with    Polycystic Ovary Syndrome    Hypothyroidism    Vitamin D Deficiency        History of Present Illness:   Gwen Mcintosh is a 27 y o  female with hypothyroidism PCOS, vitamin d deficiency, and thyroid nodule  For the hypothyroidism, she is taking 75 mcg of brand Synthroid daily  She is taking this regularly and properly  She is experiencing some heat intolerance, other than that denies any symptoms of hypo/hyperthyroidism  For the vitamin d deficiency she is currently taking 50,000 units weekly  For the thyroid nodule she denies neck pain, dysphonia, dysphagia, or dyspnea  Recent thyroid US completed, showed a questionable new nodule in left lower region and heterogeneous thyroid glans suggesting thyroiditis  No nodule met criteria for biopsy  For the PCOS she is taking Metformin 500 mg BID  She is experiencing diarrhea and nausea from the Metformin  She has increased weight gain and worsening hirsutism  She is not currently taking OCP and does not plan to restart  She is also complaining of restless legs       Patient Active Problem List   Diagnosis    PCOS (polycystic ovarian syndrome)    Nontoxic goiter, unspecified    Morbid obesity ([address] )    Vitamin D deficiency    Infertility, female    Hyperlipidemia, mixed    Elevated liver enzymes    Anxiety    Thyroid nodule    Bilateral carpal tunnel syndrome    Elevated prolactin level    History of  delivery, antepartum    High risk pregnancy with low PAPPA (pregnancy-associated plasma protein A)    Maternal morbid obesity, antepartum ([address] )    Gestational hypertension    Evaluate anatomy not seen on prior sonogram    Encounter for ultrasound to check fetal growth    S/P  section    Hypothyroidism    Hirsutism    Sleep disturbances      Past Medical History:   Diagnosis Date    Abnormal Pap smear of cervix     LGSIL - colpo - LGSIL, HPV effect    Chronic diarrhea of unknown origin     last assessed 6/26/15    Disease of thyroid gland     nodule    Dyspepsia     last assessed  10/29/14    Early satiety     last assessed  10/29/14    Hidradenitis suppurativa     last assessed  14    Hypertension     Obesity     Polycystic ovary syndrome     Varicella     childhood      Past Surgical History:   Procedure Laterality Date     SECTION       and  [facility]  DELIVERY ONLY N/A 2020    Procedure:  SECTION () REPEAT;  Surgeon: Rosa Maria Soler MD;  Location: [facility] ;  Service: Obstetrics    WISDOM TOOTH EXTRACTION      all 4      Family History   Problem Relation Age of Onset    No Known Problems Father     Thyroid disease Maternal Grandmother     Diabetes Paternal Grandmother     Other Paternal Grandmother         pulmonary embolism    No Known Problems Mother     No Known Problems Maternal Grandfather     No Known Problems Paternal Grandfather     No Known Problems Son     No Known Problems Daughter     No Known Problems Sister      Social History     Tobacco Use    Smoking status: Former Smoker     Packs/day: 0 00     Years: 0 00     Pack years: 0 00     Quit date: 2014     Years since quittin 3    Smokeless tobacco: Never Used    Tobacco comment:  sometimes 1 cig once weekly - none since preg   Substance Use Topics    Alcohol use: No     Allergies   Allergen Reactions    A + D Personal Care Lotion  [Dimethicone] Rash    Calamine-Zinc Oxide Rash    Keflex [Cephalexin] GI Intolerance    Pramoxine-Calamine     Zinc Rash     No med alert bracelet no epi pen         Current Outpatient Medications:     Dulaglutide (Trulicity) 6 48 FQ/9 8CD SOPN, Inject 0 5 mL (0 75 mg total) under the skin once a week, Disp: 6 mL, Rfl: 0    Eflornithine HCl 13 9 % cream, Apply topically 2 (two) times a day with meals, Disp: 45 g, Rfl: 1    ergocalciferol (VITAMIN D2) 50,000 units, Take 1 capsule (50,000 Units total) by mouth once a week, Disp: 4 capsule, Rfl: 6    metFORMIN (GLUCOPHAGE) 500 mg tablet, Take 2 tablets (1,000 mg total) by mouth 2 (two) times a day with meals, Disp: 120 tablet, Rfl: 5    Synthroid 75 MCG tablet, Take 1 tablet (75 mcg total) by mouth daily Synthroid, brand necessary, Disp: 30 tablet, Rfl: 3    Review of Systems   Constitutional: Negative for activity change, appetite change, chills, diaphoresis, fatigue, fever and unexpected weight change  HENT: Negative for sore throat, trouble swallowing and voice change  Respiratory: Negative for chest tightness and shortness of breath  Cardiovascular: Negative for chest pain, palpitations and leg swelling  Gastrointestinal: Negative for abdominal pain, constipation, diarrhea, nausea and vomiting  Endocrine: Positive for heat intolerance  Negative for cold intolerance  Neurological: Positive for headaches  Negative for dizziness, tremors, weakness, light-headedness and numbness  All other systems reviewed and are negative  Physical Exam:  Body mass index is 40 76 kg/m²  /76   Pulse (!) 110   Ht 5' 9" (1 753 m)   Wt 125 kg (276 lb)   BMI 40 76 kg/m²    Wt Readings from Last 3 Encounters:   07/12/22 125 kg (276 lb)   06/13/22 125 kg (275 lb)   04/04/22 134 kg (294 lb 12 8 oz)       Physical Exam  Vitals reviewed  Constitutional:       Appearance: Normal appearance  She is normal weight  HENT:      Head: Normocephalic  Cardiovascular:      Rate and Rhythm: Normal rate and regular rhythm  Pulses: Normal pulses  Heart sounds: Normal heart sounds  No murmur heard  Pulmonary:      Effort: Pulmonary effort is normal  No respiratory distress  Breath sounds: Normal breath sounds  No wheezing, rhonchi or rales  Skin:     General: Skin is warm and dry  Neurological:      Mental Status: She is alert and oriented to person, place, and time     Psychiatric:         Mood and Affect: Mood normal          Behavior: Behavior normal          Thought Content: Thought content normal          Judgment: Judgment normal          Labs:   Lab Results   Component Value Date    HGBA1C 5 6 04/15/2022    HGBA1C 5 5 04/23/2019    HGBA1C 5 5 08/30/2016     Lab Results   Component Value Date    CREATININE 0 84 04/15/2022    CREATININE 0 78 07/10/2021    CREATININE 0 76 01/25/2021    BUN 11 04/15/2022     06/21/2016    K 4 0 04/15/2022     (H) 04/15/2022    CO2 28 04/15/2022     eGFR   Date Value Ref Range Status   04/15/2022 90 ml/min/1 73sq m Final     Lab Results   Component Value Date    CHOL 151 06/21/2016    HDL 32 (L) 04/15/2022    TRIG 166 (H) 04/15/2022     Lab Results   Component Value Date    ALT 37 04/15/2022    AST 24 04/15/2022    ALKPHOS 51 04/15/2022    BILITOT 1 3 (H) 06/21/2016     Lab Results   Component Value Date    GRT6OXHZHAUO 3 580 02/15/2022    PGI9UZWQVGZO 3 812 (H) 07/10/2021    LRU8XCMIDLIR 2 870 04/23/2021     Lab Results   Component Value Date    FREET4 1 44 02/15/2022       Impression & Plan:    Problem List Items Addressed This Visit        Endocrine    PCOS (polycystic ovarian syndrome)     She is having issues with Metformin  Will order Trulicity 1 79 mg weekly  Reviewed proper administration as well as side effects of medications  She denies hx of pancreatitis or fam hx of medullary thyroid cancer  She is complaining of increased hirsutism and does plan to restart OCP  She is no longer breast feeding  Will order Eflornithine cream  Recommend she work on lifestyle modifications and meet with [facility], this referral was ordered last visit  Relevant Medications    Dulaglutide (Trulicity) 6 72 ZL/4 2ZW SOPN    Other Relevant Orders    SALIVARY CORTISOL,FOUR SPECIMENS    Insulin, fasting- Lab Collect    Thyroid nodule     US done 3/22 - No nodule met criteria for biopsy  Recommend repeat in 1 year - will be due 3/23             Hypothyroidism - Primary     Continue current dose of Synthroid  Check thyroid labs  Relevant Orders    TSH, 3rd generation    T4, free       Musculoskeletal and Integument    Hirsutism     This has worsened  She has tried spironolactone in the past and did not improver symptoms  She is interested in trying cream and is no longer breast feeding  Relevant Medications    Eflornithine HCl 13 9 % cream    Other Relevant Orders    SALIVARY CORTISOL,FOUR SPECIMENS      Other Visit Diagnoses     Abnormal fasting glucose        Relevant Medications    Dulaglutide (Trulicity) 8 38 CV/3 5IV SOPN    Other Relevant Orders    Comprehensive metabolic panel    Insulin, fasting- Lab Collect    Restless legs        Relevant Orders    Iron Panel (Includes Ferritin, Iron Sat%, Iron, and TIBC)          Orders Placed This Encounter   Procedures    SALIVARY CORTISOL,FOUR SPECIMENS     Standing Status:   Future     Standing Expiration Date:   7/12/2023    TSH, 3rd generation     This is a patient instruction: This test is non-fasting  Please drink two glasses of water morning of bloodwork  Standing Status:   Future     Standing Expiration Date:   7/12/2023    T4, free     Standing Status:   Future     Standing Expiration Date:   7/12/2023    Comprehensive metabolic panel     This is a patient instruction: Patient fasting for 8 hours or longer recommended  Standing Status:   Future     Standing Expiration Date:   7/12/2023    Insulin, fasting- Lab Collect     Standing Status:   Future     Standing Expiration Date:   7/12/2023       Patient Instructions   Start Trulicity 1 69 mg weekly     Dulaglutide (By injection)   Dulaglutide (doo-la-GLOO-tide)  Treats type 2 diabetes  Also lowers risk of death, heart attack, or stroke in patients with diabetes and heart or blood vessel problems  Brand Name(s): Trulicity   There may be other brand names for this medicine  When This Medicine Should Not Be Used:    This medicine is not right for everyone  Do not use it if you had an allergic reaction to dulaglutide, you have multiple endocrine neoplasia syndrome type 2 (MEN 2), or you or anyone in your family has had medullary thyroid cancer  How to Use This Medicine:   Injectable  · Your doctor will prescribe your exact dose  This medicine is usually given once a week, on the same day of the week  It is given as a shot under the skin of your stomach, thighs, or upper arms  · You may be taught how to give your medicine at home  Make sure you understand all instructions before giving yourself an injection  Do not use more medicine or use it more often than your doctor tells you to  · If you use insulin in addition to this medicine, do not mix them in the same syringe  You may give the shots in the same area (including the stomach), but do not give the shots right next to each other  · If the medicine in the pen or prefilled syringe has changed color, looks cloudy, or has particles in it, do not use it  · You will be shown the body areas where this shot can be given  Use a different body area each time you give yourself a shot  Keep track of where you give each shot to make sure you rotate body areas  · Use a new needle and syringe each time you inject your medicine  · This medicine should come with a Medication Guide  Ask your pharmacist for a copy if you do not have one  · Missed dose: If you miss a dose, use it as soon as possible within 3 days after your missed dose  If you miss a dose by more than 3 days, wait until your next regular weekly dose  · Store your medicine pens or prefilled syringes in the refrigerator and keep them in the original carton  Protect the pens from light  You may also store the pens at room temperature for up to 14 days  Do not freeze the medicine, and do not use the medicine if it has been frozen    Drugs and Foods to Avoid:   Ask your doctor or pharmacist before using any other medicine, including over-the-counter medicines, vitamins, and herbal products  · Some medicines can affect how dulaglutide works  Tell your doctor if you are using warfarin, insulin, or other diabetes medicine (including metformin, sulfonylurea)  Warnings While Using This Medicine:   1  Tell your doctor if you are pregnant or breastfeeding, or if you have kidney disease or a history of diabetic retinopathy or pancreas problems  Tell your doctor if you have severe digestion problems (including gastroparesis) or stomach or bowel problems  2  This medicine may cause the following problems:  ? Increased risk of thyroid tumor  ? Pancreatitis (swelling of the pancreas)  ? Low blood sugar (more likely if you also take insulin or other diabetes medicine)  3  Your doctor will do lab tests at regular visits to check on the effects of this medicine  Keep all appointments  4  Keep all medicine out of the reach of children  Never share your medicine with anyone  Do not share needles or pens because you can spread an infection  Possible Side Effects While Using This Medicine:   Call your doctor right away if you notice any of these side effects:  · Allergic reaction: Itching or hives, swelling in your face or hands, swelling or tingling in your mouth or throat, chest tightness, trouble breathing  · Blurred vision, changes in vision  · Change in how much or how often you urinate  · Lump or swelling in your neck, trouble breathing or swallowing  · Shaking, trembling, sweating, fast or pounding heartbeat, lightheadedness, hunger, confusion  · Sudden and severe stomach pain, nausea, vomiting, fever, and lightheadedness  If you notice these less serious side effects, talk with your doctor:   · Diarrhea  · Redness, itching, swelling, or any changes in your skin where the shot was given  If you notice other side effects that you think are caused by this medicine, tell your doctor  Call your doctor for medical advice about side effects   You may report side effects to FDA at 1-800-FDA-1088     © Copyright [employer] 2022 Information is for End User's use only and may not be sold, redistributed or otherwise used for commercial purposes  The above information is an  only  It is not intended as medical advice for individual conditions or treatments  Talk to your doctor, nurse or pharmacist before following any medical regimen to see if it is safe and effective for you  Eflornithine (On the skin)   Eflornithine ([id number])  Reduces unwanted facial hair in women  Brand Name(s): Karstenrosatommy   There may be other brand names for this medicine  When This Medicine Should Not Be Used: You should not use this medicine if you have had an allergic reaction to eflornithine  This medicine is not to be used by girls under the age of 15  How to Use This Medicine:   Cream  · Your doctor will tell you how much of this medicine to apply and how often  Do not use more medicine or apply it more often than your doctor tells you to  · This medicine is for use on the skin only  Do not get it in your eyes, nose, or mouth  Do not use on skin areas that have cuts or scrapes  · Wash your hands with soap and water before and after using this medicine  · Apply a thin layer to the affected area twice daily  Rub it in gently  · Wait at least 5 minutes before applying sunscreen, makeup, or other cosmetics to the face  · Do not wash the treated facial areas for at least 4 hours after applying this medicine  · Never share your medicine with anyone  If a dose is missed:   · If you miss a dose or forget to use your medicine, apply it as soon as you can  Then wait at least 8 hours before using the medicine again  · Do not apply extra medicine to make up for a missed dose  How to Store and Dispose of This Medicine:   · Store the medicine at room temperature, away from heat and direct light  Do not freeze  · Keep all medicine out of the reach of children    Drugs and Foods to Avoid:   Ask your doctor or pharmacist before using any other medicine, including over-the-counter medicines, vitamins, and herbal products  · Tell your doctor about any other skin care products you are using on your face  Warnings While Using This Medicine:   · Make sure your doctor knows if you are pregnant or breastfeeding  · This medicine will slow down the growth of new hair, but will not remove hair from the face  Keep using your regular method of hair removal, such as plucking or shaving  · You may need to use this medicine for several weeks before you see a decrease in hair growth  Do not use the medicine longer than 6 months if it does not seem to be working  · The effects of this medicine are not permanent  After you stop using the cream, your hair growth will likely return to the same condition as before treatment  Possible Side Effects While Using This Medicine:   Call your doctor right away if you notice any of these side effects:  · Bleeding skin  · Swollen lips  If you notice these less serious side effects, talk with your doctor:   · Mild burning, redness, stinging, or tingling where the cream is applied  · Rash  If you notice other side effects that you think are caused by this medicine, tell your doctor  Call your doctor for medical advice about side effects  You may report side effects to FDA at 0-779-FDA-5864     © Copyright [employer] 2022 Information is for End User's use only and may not be sold, redistributed or otherwise used for commercial purposes  The above information is an  only  It is not intended as medical advice for individual conditions or treatments  Talk to your doctor, nurse or pharmacist before following any medical regimen to see if it is safe and effective for you  Discussed with the patient and all questioned fully answered  She will call me if any problems arise      ABI Hoyt

## 2022-07-12 ENCOUNTER — OFFICE VISIT (OUTPATIENT)
Dept: ENDOCRINOLOGY | Facility: CLINIC | Age: 32
End: 2022-07-12
Payer: COMMERCIAL

## 2022-07-12 VITALS
HEIGHT: 69 IN | WEIGHT: 276 LBS | BODY MASS INDEX: 40.88 KG/M2 | DIASTOLIC BLOOD PRESSURE: 76 MMHG | HEART RATE: 110 BPM | SYSTOLIC BLOOD PRESSURE: 130 MMHG

## 2022-07-12 DIAGNOSIS — E03.8 HYPOTHYROIDISM DUE TO HASHIMOTO'S THYROIDITIS: Primary | ICD-10-CM

## 2022-07-12 DIAGNOSIS — E04.1 THYROID NODULE: ICD-10-CM

## 2022-07-12 DIAGNOSIS — L68.0 HIRSUTISM: ICD-10-CM

## 2022-07-12 DIAGNOSIS — G25.81 RESTLESS LEGS: ICD-10-CM

## 2022-07-12 DIAGNOSIS — E28.2 PCOS (POLYCYSTIC OVARIAN SYNDROME): ICD-10-CM

## 2022-07-12 DIAGNOSIS — R73.01 ABNORMAL FASTING GLUCOSE: ICD-10-CM

## 2022-07-12 DIAGNOSIS — E06.3 HYPOTHYROIDISM DUE TO HASHIMOTO'S THYROIDITIS: Primary | ICD-10-CM

## 2022-07-12 PROCEDURE — 99214 OFFICE O/P EST MOD 30 MIN: CPT

## 2022-07-12 RX ORDER — DULAGLUTIDE 0.75 MG/.5ML
0.75 INJECTION, SOLUTION SUBCUTANEOUS WEEKLY
Qty: 6 ML | Refills: 0 | Status: SHIPPED | OUTPATIENT
Start: 2022-07-12

## 2022-07-12 RX ORDER — EFLORNITHINE HYDROCHLORIDE 139 MG/G
CREAM TOPICAL
Qty: 45 G | Refills: 1 | Status: SHIPPED | OUTPATIENT
Start: 2022-07-12

## 2022-07-12 NOTE — ASSESSMENT & PLAN NOTE
She is having issues with Metformin  Will order Trulicity 6 82 mg weekly  Reviewed proper administration as well as side effects of medications  She denies hx of pancreatitis or fam hx of medullary thyroid cancer  She is complaining of increased hirsutism and does plan to restart OCP  She is no longer breast feeding  Will order Eflornithine cream  Recommend she work on lifestyle modifications and meet with MNT, this referral was ordered last visit

## 2022-07-12 NOTE — PATIENT INSTRUCTIONS
Start Trulicity 3 84 mg weekly     Dulaglutide (By injection)   Dulaglutide (doo-la-GLOO-tide)  Treats type 2 diabetes  Also lowers risk of death, heart attack, or stroke in patients with diabetes and heart or blood vessel problems  Brand Name(s): Trulicity   There may be other brand names for this medicine  When This Medicine Should Not Be Used: This medicine is not right for everyone  Do not use it if you had an allergic reaction to dulaglutide, you have multiple endocrine neoplasia syndrome type 2 (MEN 2), or you or anyone in your family has had medullary thyroid cancer  How to Use This Medicine:   Injectable  Your doctor will prescribe your exact dose  This medicine is usually given once a week, on the same day of the week  It is given as a shot under the skin of your stomach, thighs, or upper arms  You may be taught how to give your medicine at home  Make sure you understand all instructions before giving yourself an injection  Do not use more medicine or use it more often than your doctor tells you to  If you use insulin in addition to this medicine, do not mix them in the same syringe  You may give the shots in the same area (including the stomach), but do not give the shots right next to each other  If the medicine in the pen or prefilled syringe has changed color, looks cloudy, or has particles in it, do not use it  You will be shown the body areas where this shot can be given  Use a different body area each time you give yourself a shot  Keep track of where you give each shot to make sure you rotate body areas  Use a new needle and syringe each time you inject your medicine  This medicine should come with a Medication Guide  Ask your pharmacist for a copy if you do not have one  Missed dose: If you miss a dose, use it as soon as possible within 3 days after your missed dose  If you miss a dose by more than 3 days, wait until your next regular weekly dose    Store your medicine pens or prefilled syringes in the refrigerator and keep them in the original carton  Protect the pens from light  You may also store the pens at room temperature for up to 14 days  Do not freeze the medicine, and do not use the medicine if it has been frozen  Drugs and Foods to Avoid:   Ask your doctor or pharmacist before using any other medicine, including over-the-counter medicines, vitamins, and herbal products  Some medicines can affect how dulaglutide works  Tell your doctor if you are using warfarin, insulin, or other diabetes medicine (including metformin, sulfonylurea)  Warnings While Using This Medicine:   Tell your doctor if you are pregnant or breastfeeding, or if you have kidney disease or a history of diabetic retinopathy or pancreas problems  Tell your doctor if you have severe digestion problems (including gastroparesis) or stomach or bowel problems  This medicine may cause the following problems:  Increased risk of thyroid tumor  Pancreatitis (swelling of the pancreas)  Low blood sugar (more likely if you also take insulin or other diabetes medicine)  Your doctor will do lab tests at regular visits to check on the effects of this medicine  Keep all appointments  Keep all medicine out of the reach of children  Never share your medicine with anyone  Do not share needles or pens because you can spread an infection  Possible Side Effects While Using This Medicine:   Call your doctor right away if you notice any of these side effects:   Allergic reaction: Itching or hives, swelling in your face or hands, swelling or tingling in your mouth or throat, chest tightness, trouble breathing  Blurred vision, changes in vision  Change in how much or how often you urinate  Lump or swelling in your neck, trouble breathing or swallowing  Shaking, trembling, sweating, fast or pounding heartbeat, lightheadedness, hunger, confusion  Sudden and severe stomach pain, nausea, vomiting, fever, and lightheadedness  If you notice these less serious side effects, talk with your doctor:   Diarrhea  Redness, itching, swelling, or any changes in your skin where the shot was given  If you notice other side effects that you think are caused by this medicine, tell your doctor  Call your doctor for medical advice about side effects  You may report side effects to FDA at 2-114-FDA-8726     © Copyright [employer]) 2022 Information is for End User's use only and may not be sold, redistributed or otherwise used for commercial purposes  The above information is an  only  It is not intended as medical advice for individual conditions or treatments  Talk to your doctor, nurse or pharmacist before following any medical regimen to see if it is safe and effective for you  Eflornithine (On the skin)   Eflornithine (op-HEK-rp-theen)  Reduces unwanted facial hair in women  Brand Name(s): Saqib   There may be other brand names for this medicine  When This Medicine Should Not Be Used: You should not use this medicine if you have had an allergic reaction to eflornithine  This medicine is not to be used by girls under the age of 15  How to Use This Medicine:   Cream  Your doctor will tell you how much of this medicine to apply and how often  Do not use more medicine or apply it more often than your doctor tells you to  This medicine is for use on the skin only  Do not get it in your eyes, nose, or mouth  Do not use on skin areas that have cuts or scrapes  Wash your hands with soap and water before and after using this medicine  Apply a thin layer to the affected area twice daily  Rub it in gently  Wait at least 5 minutes before applying sunscreen, makeup, or other cosmetics to the face  Do not wash the treated facial areas for at least 4 hours after applying this medicine  Never share your medicine with anyone  If a dose is missed:   If you miss a dose or forget to use your medicine, apply it as soon as you can   Then wait at least 8 hours before using the medicine again  Do not apply extra medicine to make up for a missed dose  How to Store and Dispose of This Medicine:   Store the medicine at room temperature, away from heat and direct light  Do not freeze  Keep all medicine out of the reach of children  Drugs and Foods to Avoid:   Ask your doctor or pharmacist before using any other medicine, including over-the-counter medicines, vitamins, and herbal products  Tell your doctor about any other skin care products you are using on your face  Warnings While Using This Medicine:   Make sure your doctor knows if you are pregnant or breastfeeding  This medicine will slow down the growth of new hair, but will not remove hair from the face  Keep using your regular method of hair removal, such as plucking or shaving  You may need to use this medicine for several weeks before you see a decrease in hair growth  Do not use the medicine longer than 6 months if it does not seem to be working  The effects of this medicine are not permanent  After you stop using the cream, your hair growth will likely return to the same condition as before treatment  Possible Side Effects While Using This Medicine:   Call your doctor right away if you notice any of these side effects:  Bleeding skin  Swollen lips  If you notice these less serious side effects, talk with your doctor:   Mild burning, redness, stinging, or tingling where the cream is applied  Rash  If you notice other side effects that you think are caused by this medicine, tell your doctor  Call your doctor for medical advice about side effects  You may report side effects to FDA at 0-549-FDA-7897     © Copyright [employer] 2022 Information is for End User's use only and may not be sold, redistributed or otherwise used for commercial purposes  The above information is an  only  It is not intended as medical advice for individual conditions or treatments   Talk to your doctor, nurse or pharmacist before following any medical regimen to see if it is safe and effective for you

## 2022-07-12 NOTE — ASSESSMENT & PLAN NOTE
This has worsened  She has tried spironolactone in the past and did not improver symptoms  She is interested in trying cream and is no longer breast feeding

## 2022-08-10 ENCOUNTER — TELEPHONE (OUTPATIENT)
Dept: OBGYN CLINIC | Facility: CLINIC | Age: 32
End: 2022-08-10

## 2022-08-10 NOTE — TELEPHONE ENCOUNTER
----- Message from Lili Cortes sent at 8/10/2022  1:37 PM EDT -----  Regarding: Appointment Question  Hello! I forgot to ask when I called but is it normal to wait till 12 weeks for your first prenatal check up? Just wondering bc every other time I have been high risk and I now have hashimotos and am not sure how that at the medications Im on will play into this?

## 2022-08-16 ENCOUNTER — INITIAL PRENATAL (OUTPATIENT)
Dept: OBGYN CLINIC | Facility: CLINIC | Age: 32
End: 2022-08-16
Payer: COMMERCIAL

## 2022-08-16 VITALS
DIASTOLIC BLOOD PRESSURE: 80 MMHG | WEIGHT: 277.6 LBS | HEIGHT: 69 IN | SYSTOLIC BLOOD PRESSURE: 120 MMHG | BODY MASS INDEX: 41.12 KG/M2

## 2022-08-16 DIAGNOSIS — E03.9 HYPOTHYROIDISM, UNSPECIFIED TYPE: ICD-10-CM

## 2022-08-16 DIAGNOSIS — O09.299 HX OF PRE-ECLAMPSIA IN PRIOR PREGNANCY, CURRENTLY PREGNANT: ICD-10-CM

## 2022-08-16 DIAGNOSIS — Z36.89 ENCOUNTER FOR OTHER SPECIFIED ANTENATAL SCREENING: ICD-10-CM

## 2022-08-16 DIAGNOSIS — Z34.81 PRENATAL CARE, SUBSEQUENT PREGNANCY, FIRST TRIMESTER: Primary | ICD-10-CM

## 2022-08-16 DIAGNOSIS — O21.9 NAUSEA/VOMITING IN PREGNANCY: Primary | ICD-10-CM

## 2022-08-16 DIAGNOSIS — E55.9 VITAMIN D DEFICIENCY: ICD-10-CM

## 2022-08-16 DIAGNOSIS — Z3A.08 8 WEEKS GESTATION OF PREGNANCY: ICD-10-CM

## 2022-08-16 PROCEDURE — T1001 NURSING ASSESSMENT/EVALUATN: HCPCS | Performed by: OBSTETRICS & GYNECOLOGY

## 2022-08-16 RX ORDER — ONDANSETRON 4 MG/1
4 TABLET, ORALLY DISINTEGRATING ORAL EVERY 6 HOURS PRN
Qty: 20 TABLET | Refills: 0 | Status: SHIPPED | OUTPATIENT
Start: 2022-08-16

## 2022-08-16 NOTE — PROGRESS NOTES
INITIAL PRENATAL NURSE APPT:   (prev C/S x 3) - last preg C/S 37 1/2 wk (elevated BP)    LMP 2022 - 8 wk by dates - [facility] 3/27/2022  (+) HPT 2022  Plans repeat C/S, poss TL  Not using any birth control except initially after last pregnancy (ocps for short time)  Last pregnancy 5 yrs to conceive (took Clomid but did not conceive on Clomid), 1st preg sev yrs to conceive (no meds)  Hx increased BMI  Hx hypothyroid - on Synthroid 75 mcg (last labs 2022)  Hx vit D def (taking 50,000 U daily)  Hx PCOS  Borderline HgbA1C - was on Metformin (severe GI side effects) - now on Trulicity since 7987  Last yearly 2022 - pap wnl, (-) HPV - hx abn pap  Taking prenatal vits w/ DHA (gummies) - will add ferrro sequels one daily  recom q 6 month dental cleanings - usually goes yearly - last cleaning 3/2022  Pt usually gets flu shot but not   Pt had COVID vaccine x 2 & booster 2022  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

## 2022-08-16 NOTE — TELEPHONE ENCOUNTER
Pt here for OB intake today - 8 wks by dates - req presc for Zofran (took prev preg) - if agree, please sign off on presc for [facility]  Allergy interaction with Zofran on order but pt states she has taken Zofran previously without reaction - pt not certain if calamine is allergy as she states itching just intensified when used, no respiratory sx  Also can pt continue on Trulicity? - was changed from Metformin in 6/2022 due to severe GI side effects  I ordered HgbA1C with initial labs (hx borderline = 5 6) instead of 1 hr glucose - is this ok?

## 2022-08-27 ENCOUNTER — APPOINTMENT (OUTPATIENT)
Dept: LAB | Facility: CLINIC | Age: 32
End: 2022-08-27
Payer: COMMERCIAL

## 2022-08-27 DIAGNOSIS — E28.2 PCOS (POLYCYSTIC OVARIAN SYNDROME): ICD-10-CM

## 2022-08-27 DIAGNOSIS — R73.01 ABNORMAL FASTING GLUCOSE: ICD-10-CM

## 2022-08-27 DIAGNOSIS — Z36.89 ENCOUNTER FOR OTHER SPECIFIED ANTENATAL SCREENING: ICD-10-CM

## 2022-08-27 DIAGNOSIS — G25.81 RESTLESS LEGS: ICD-10-CM

## 2022-08-27 DIAGNOSIS — O09.299 HX OF PRE-ECLAMPSIA IN PRIOR PREGNANCY, CURRENTLY PREGNANT: ICD-10-CM

## 2022-08-27 DIAGNOSIS — E55.9 VITAMIN D DEFICIENCY: ICD-10-CM

## 2022-08-27 DIAGNOSIS — L68.0 HIRSUTISM: ICD-10-CM

## 2022-08-27 DIAGNOSIS — E06.3 HYPOTHYROIDISM DUE TO HASHIMOTO'S THYROIDITIS: ICD-10-CM

## 2022-08-27 DIAGNOSIS — E03.8 HYPOTHYROIDISM DUE TO HASHIMOTO'S THYROIDITIS: ICD-10-CM

## 2022-08-27 LAB
25(OH)D3 SERPL-MCNC: 27.7 NG/ML (ref 30–100)
ABO GROUP BLD: NORMAL
ALBUMIN SERPL BCP-MCNC: 3.3 G/DL (ref 3.5–5)
ALP SERPL-CCNC: 42 U/L (ref 46–116)
ALT SERPL W P-5'-P-CCNC: 17 U/L (ref 12–78)
ANION GAP SERPL CALCULATED.3IONS-SCNC: 6 MMOL/L (ref 4–13)
AST SERPL W P-5'-P-CCNC: 12 U/L (ref 5–45)
BACTERIA UR QL AUTO: ABNORMAL /HPF
BASOPHILS # BLD AUTO: 0.1 THOUSANDS/ΜL (ref 0–0.1)
BASOPHILS NFR BLD AUTO: 1 % (ref 0–1)
BILIRUB SERPL-MCNC: 1.16 MG/DL (ref 0.2–1)
BILIRUB UR QL STRIP: NEGATIVE
BLD GP AB SCN SERPL QL: NEGATIVE
BUN SERPL-MCNC: 7 MG/DL (ref 5–25)
CALCIUM ALBUM COR SERPL-MCNC: 9.6 MG/DL (ref 8.3–10.1)
CALCIUM SERPL-MCNC: 9 MG/DL (ref 8.3–10.1)
CHLORIDE SERPL-SCNC: 104 MMOL/L (ref 96–108)
CLARITY UR: CLEAR
CO2 SERPL-SCNC: 25 MMOL/L (ref 21–32)
COLOR UR: YELLOW
CREAT SERPL-MCNC: 0.72 MG/DL (ref 0.6–1.3)
EOSINOPHIL # BLD AUTO: 0.19 THOUSAND/ΜL (ref 0–0.61)
EOSINOPHIL NFR BLD AUTO: 2 % (ref 0–6)
ERYTHROCYTE [DISTWIDTH] IN BLOOD BY AUTOMATED COUNT: 12.3 % (ref 11.6–15.1)
EST. AVERAGE GLUCOSE BLD GHB EST-MCNC: 105 MG/DL
FERRITIN SERPL-MCNC: 108 NG/ML (ref 8–388)
GFR SERPL CREATININE-BSD FRML MDRD: 109 ML/MIN/1.73SQ M
GLUCOSE P FAST SERPL-MCNC: 86 MG/DL (ref 65–99)
GLUCOSE UR STRIP-MCNC: NEGATIVE MG/DL
HBA1C MFR BLD: 5.3 %
HBV SURFACE AG SER QL: NORMAL
HCT VFR BLD AUTO: 41.5 % (ref 34.8–46.1)
HGB BLD-MCNC: 13.7 G/DL (ref 11.5–15.4)
HGB UR QL STRIP.AUTO: NEGATIVE
IMM GRANULOCYTES # BLD AUTO: 0.09 THOUSAND/UL (ref 0–0.2)
IMM GRANULOCYTES NFR BLD AUTO: 1 % (ref 0–2)
INSULIN SERPL-ACNC: 25.2 MU/L (ref 3–25)
IRON SATN MFR SERPL: 25 % (ref 15–50)
IRON SERPL-MCNC: 95 UG/DL (ref 50–170)
KETONES UR STRIP-MCNC: NEGATIVE MG/DL
LEUKOCYTE ESTERASE UR QL STRIP: NEGATIVE
LYMPHOCYTES # BLD AUTO: 1.95 THOUSANDS/ΜL (ref 0.6–4.47)
LYMPHOCYTES NFR BLD AUTO: 17 % (ref 14–44)
MCH RBC QN AUTO: 28.4 PG (ref 26.8–34.3)
MCHC RBC AUTO-ENTMCNC: 33 G/DL (ref 31.4–37.4)
MCV RBC AUTO: 86 FL (ref 82–98)
MONOCYTES # BLD AUTO: 0.57 THOUSAND/ΜL (ref 0.17–1.22)
MONOCYTES NFR BLD AUTO: 5 % (ref 4–12)
MUCOUS THREADS UR QL AUTO: ABNORMAL
NEUTROPHILS # BLD AUTO: 8.33 THOUSANDS/ΜL (ref 1.85–7.62)
NEUTS SEG NFR BLD AUTO: 74 % (ref 43–75)
NITRITE UR QL STRIP: NEGATIVE
NON-SQ EPI CELLS URNS QL MICRO: ABNORMAL /HPF
NRBC BLD AUTO-RTO: 0 /100 WBCS
PH UR STRIP.AUTO: 7 [PH]
PLATELET # BLD AUTO: 338 THOUSANDS/UL (ref 149–390)
PMV BLD AUTO: 9.2 FL (ref 8.9–12.7)
POTASSIUM SERPL-SCNC: 3.9 MMOL/L (ref 3.5–5.3)
PROT SERPL-MCNC: 7.8 G/DL (ref 6.4–8.4)
PROT UR STRIP-MCNC: ABNORMAL MG/DL
RBC # BLD AUTO: 4.82 MILLION/UL (ref 3.81–5.12)
RBC #/AREA URNS AUTO: ABNORMAL /HPF
RH BLD: POSITIVE
RUBV IGG SERPL IA-ACNC: >175 IU/ML
SODIUM SERPL-SCNC: 135 MMOL/L (ref 135–147)
SP GR UR STRIP.AUTO: 1.02 (ref 1–1.03)
SPECIMEN EXPIRATION DATE: NORMAL
T4 FREE SERPL-MCNC: 1.57 NG/DL (ref 0.76–1.46)
TIBC SERPL-MCNC: 379 UG/DL (ref 250–450)
TSH SERPL DL<=0.05 MIU/L-ACNC: 0.58 UIU/ML (ref 0.45–4.5)
URATE SERPL-MCNC: 5.1 MG/DL (ref 2–7.5)
UROBILINOGEN UR STRIP-ACNC: <2 MG/DL
WBC # BLD AUTO: 11.23 THOUSAND/UL (ref 4.31–10.16)
WBC #/AREA URNS AUTO: ABNORMAL /HPF

## 2022-08-27 PROCEDURE — 83036 HEMOGLOBIN GLYCOSYLATED A1C: CPT

## 2022-08-27 PROCEDURE — 87086 URINE CULTURE/COLONY COUNT: CPT

## 2022-08-27 PROCEDURE — 84443 ASSAY THYROID STIM HORMONE: CPT

## 2022-08-27 PROCEDURE — 83540 ASSAY OF IRON: CPT

## 2022-08-27 PROCEDURE — 83550 IRON BINDING TEST: CPT

## 2022-08-27 PROCEDURE — 84439 ASSAY OF FREE THYROXINE: CPT

## 2022-08-27 PROCEDURE — 80053 COMPREHEN METABOLIC PANEL: CPT

## 2022-08-27 PROCEDURE — 84550 ASSAY OF BLOOD/URIC ACID: CPT

## 2022-08-27 PROCEDURE — 36415 COLL VENOUS BLD VENIPUNCTURE: CPT

## 2022-08-27 PROCEDURE — 81001 URINALYSIS AUTO W/SCOPE: CPT

## 2022-08-27 PROCEDURE — 82306 VITAMIN D 25 HYDROXY: CPT

## 2022-08-27 PROCEDURE — 80081 OBSTETRIC PANEL INC HIV TSTG: CPT

## 2022-08-27 PROCEDURE — 83525 ASSAY OF INSULIN: CPT

## 2022-08-27 PROCEDURE — 82728 ASSAY OF FERRITIN: CPT

## 2022-08-28 LAB — BACTERIA UR CULT: NORMAL

## 2022-08-29 LAB
HIV 1+2 AB+HIV1 P24 AG SERPL QL IA: NORMAL
RPR SER QL: NORMAL

## 2022-09-06 ENCOUNTER — ROUTINE PRENATAL (OUTPATIENT)
Dept: OBGYN CLINIC | Facility: CLINIC | Age: 32
End: 2022-09-06
Payer: COMMERCIAL

## 2022-09-06 VITALS — BODY MASS INDEX: 40.76 KG/M2 | DIASTOLIC BLOOD PRESSURE: 80 MMHG | SYSTOLIC BLOOD PRESSURE: 114 MMHG | WEIGHT: 276 LBS

## 2022-09-06 DIAGNOSIS — Z36.89 ENCOUNTER FOR OTHER SPECIFIED ANTENATAL SCREENING: ICD-10-CM

## 2022-09-06 DIAGNOSIS — Z34.01 ENCOUNTER FOR SUPERVISION OF NORMAL FIRST PREGNANCY IN FIRST TRIMESTER: Primary | ICD-10-CM

## 2022-09-06 PROCEDURE — 87591 N.GONORRHOEAE DNA AMP PROB: CPT | Performed by: OBSTETRICS & GYNECOLOGY

## 2022-09-06 PROCEDURE — 87070 CULTURE OTHR SPECIMN AEROBIC: CPT | Performed by: OBSTETRICS & GYNECOLOGY

## 2022-09-06 PROCEDURE — 87491 CHLMYD TRACH DNA AMP PROBE: CPT | Performed by: OBSTETRICS & GYNECOLOGY

## 2022-09-06 PROCEDURE — 99214 OFFICE O/P EST MOD 30 MIN: CPT | Performed by: OBSTETRICS & GYNECOLOGY

## 2022-09-06 PROCEDURE — 87106 FUNGI IDENTIFICATION YEAST: CPT | Performed by: OBSTETRICS & GYNECOLOGY

## 2022-09-06 NOTE — PROGRESS NOTES
Viable intrauterine pregnancy noted the ultrasound, heart rate 174  Size equals dates  History of 3 prior  sections  Requesting repeat  section probable tubal ligation  Increased BMI  She has a normal hemoglobin A1c  Consultation Maternal-Fetal Medicine scheduled for

## 2022-09-06 NOTE — PATIENT INSTRUCTIONS
The patient was informed of a viable intrauterine pregnancy, EDC should be 2023  Make arrangements for consultation Maternal-Fetal Medicine  To take 162 mg baby aspirin starting today  Does have a history of hypothyroidism continue to monitor her thyroid levels continue thyroid replacement  Increased BMI greater than 40  Talked about diet will weight control   section x3  She is aware increased wrist   Requesting tubal ligation  She should return to our office in 4 weeks  She is aware of the coverage at the end of pregnancy

## 2022-09-07 LAB
C TRACH DNA SPEC QL NAA+PROBE: NEGATIVE
N GONORRHOEA DNA SPEC QL NAA+PROBE: NEGATIVE

## 2022-09-10 LAB
BACTERIA GENITAL AEROBE CULT: ABNORMAL
BACTERIA GENITAL AEROBE CULT: ABNORMAL

## 2022-09-14 ENCOUNTER — TELEPHONE (OUTPATIENT)
Dept: OBGYN CLINIC | Facility: CLINIC | Age: 32
End: 2022-09-14

## 2022-09-14 NOTE — TELEPHONE ENCOUNTER
----- Message from Fraser Lundborg, MD sent at 9/14/2022  5:28 AM EDT -----  Please inform this OB  positive yeast   tx monistat x 7 days

## 2022-09-15 NOTE — PROGRESS NOTES
[address]: Ms Laury Kraft was seen today for nuchal translucency ultrasound  See ultrasound report under "OB Procedures" tab  Review of Systems   Constitutional: Negative for chills, fever and unexpected weight change  HENT: Negative for congestion, dental problem, facial swelling and sore throat  Eyes: Negative for visual disturbance  Respiratory: Negative for cough and shortness of breath  Cardiovascular: Negative for chest pain and palpitations  Gastrointestinal: Negative for diarrhea and vomiting  Endocrine: Negative for polydipsia  Genitourinary: Negative for dysuria and vaginal bleeding  Musculoskeletal: Negative for back pain and joint swelling  Skin: Negative for rash and wound  Allergic/Immunologic: Negative for immunocompromised state  Neurological: Negative for seizures and headaches  Hematological: Does not bruise/bleed easily  Psychiatric/Behavioral: Negative for dysphoric mood, hallucinations and suicidal ideas  Physical Exam  Constitutional:       General: She is not in acute distress  Appearance: Normal appearance  She is obese  HENT:      Head: Normocephalic and atraumatic  Eyes:      Extraocular Movements: Extraocular movements intact  Cardiovascular:      Rate and Rhythm: Normal rate  Pulmonary:      Effort: Pulmonary effort is normal  No respiratory distress  Musculoskeletal:      Cervical back: Normal range of motion  Skin:     Findings: No erythema or rash  Neurological:      Mental Status: She is alert and oriented to person, place, and time  Psychiatric:         Mood and Affect: Mood normal          Behavior: Behavior normal          Please don't hesitate to contact our office with any concerns or questions    -Keri Falcon MD

## 2022-09-16 ENCOUNTER — ROUTINE PRENATAL (OUTPATIENT)
Dept: PERINATAL CARE | Facility: CLINIC | Age: 32
End: 2022-09-16
Payer: COMMERCIAL

## 2022-09-16 VITALS
HEIGHT: 69 IN | DIASTOLIC BLOOD PRESSURE: 88 MMHG | SYSTOLIC BLOOD PRESSURE: 112 MMHG | WEIGHT: 274.8 LBS | BODY MASS INDEX: 40.7 KG/M2 | HEART RATE: 116 BPM

## 2022-09-16 DIAGNOSIS — E66.01 MATERNAL MORBID OBESITY IN FIRST TRIMESTER, ANTEPARTUM (HCC): ICD-10-CM

## 2022-09-16 DIAGNOSIS — Z3A.12 12 WEEKS GESTATION OF PREGNANCY: Primary | ICD-10-CM

## 2022-09-16 DIAGNOSIS — O09.521 ELDERLY MULTIGRAVIDA, FIRST TRIMESTER: ICD-10-CM

## 2022-09-16 DIAGNOSIS — O34.219 HISTORY OF CESAREAN DELIVERY, ANTEPARTUM: ICD-10-CM

## 2022-09-16 DIAGNOSIS — Z87.59 HISTORY OF GESTATIONAL HYPERTENSION: ICD-10-CM

## 2022-09-16 DIAGNOSIS — E28.2 PCOS (POLYCYSTIC OVARIAN SYNDROME): ICD-10-CM

## 2022-09-16 DIAGNOSIS — O99.211 MATERNAL MORBID OBESITY IN FIRST TRIMESTER, ANTEPARTUM (HCC): ICD-10-CM

## 2022-09-16 DIAGNOSIS — Z36.82 ENCOUNTER FOR (NT) NUCHAL TRANSLUCENCY SCAN: ICD-10-CM

## 2022-09-16 PROBLEM — IMO0002 EVALUATE ANATOMY NOT SEEN ON PRIOR SONOGRAM: Status: RESOLVED | Noted: 2020-04-07 | Resolved: 2022-09-16

## 2022-09-16 PROBLEM — O09.899 HIGH RISK PREGNANCY WITH LOW PAPPA (PREGNANCY-ASSOCIATED PLASMA PROTEIN A): Status: RESOLVED | Noted: 2020-02-12 | Resolved: 2022-09-16

## 2022-09-16 PROBLEM — O28.0 HIGH RISK PREGNANCY WITH LOW PAPPA (PREGNANCY-ASSOCIATED PLASMA PROTEIN A): Status: RESOLVED | Noted: 2020-02-12 | Resolved: 2022-09-16

## 2022-09-16 PROBLEM — Z36.89 ENCOUNTER FOR ULTRASOUND TO CHECK FETAL GROWTH: Status: RESOLVED | Noted: 2020-04-07 | Resolved: 2022-09-16

## 2022-09-16 PROCEDURE — 76813 OB US NUCHAL MEAS 1 GEST: CPT | Performed by: STUDENT IN AN ORGANIZED HEALTH CARE EDUCATION/TRAINING PROGRAM

## 2022-09-16 PROCEDURE — 76801 OB US < 14 WKS SINGLE FETUS: CPT | Performed by: STUDENT IN AN ORGANIZED HEALTH CARE EDUCATION/TRAINING PROGRAM

## 2022-09-16 PROCEDURE — 99243 OFF/OP CNSLTJ NEW/EST LOW 30: CPT | Performed by: STUDENT IN AN ORGANIZED HEALTH CARE EDUCATION/TRAINING PROGRAM

## 2022-09-16 PROCEDURE — 36415 COLL VENOUS BLD VENIPUNCTURE: CPT | Performed by: STUDENT IN AN ORGANIZED HEALTH CARE EDUCATION/TRAINING PROGRAM

## 2022-09-16 NOTE — PROGRESS NOTES
Patient chose to have [facility] Non-invasive Prenatal Screen  Patient given brochure and is aware [facility] will contact patients insurance and coordinate coverage  Patient made aware she will need to respond to text message or e-mail from [facility] within 2 business days or testing will be run through insurance  Patient informed text message will come from area code  "415"  Provided [facility] Client Services # [phone] and web site : [email or web address]     2 vials of blood drawn from left arm, patient tolerated blood draw without difficulty  Specimens labeled with patient identifiers (name, date of birth, specimen collection date), order and specimen was verified with patient, packed and sent via [address]  Copy of lab order scanned to Epic media  Maternal Fetal Medicine will have results in approximately 7-10 business days and will call patient or notify via [address]  Patient aware viewing lab result online will reveal fetal sex If ordered  Patient verbalized understanding of all instructions and no questions at this time         ,

## 2022-09-16 NOTE — LETTER
September 16, 2022     Cass Hanley MD  [address]  [address]  [address] [place] [address]    Patient: Ariana Rico   YOB: 1990   Date of Visit: 9/16/2022       Dear Dr Tomás Courtney: Thank you for referring Ariana Rico to me for evaluation  Below are my notes for this consultation  If you have questions, please do not hesitate to call me  I look forward to following your patient along with you  Sincerely,        Simi Doherty MD        CC: No Recipients  Simi Doherty MD  9/16/2022  1:56 PM  Sign when Signing Visit  [address]: Ms Hussein Mercado was seen today for nuchal translucency ultrasound  See ultrasound report under "OB Procedures" tab  Review of Systems   Constitutional: Negative for chills, fever and unexpected weight change  HENT: Negative for congestion, dental problem, facial swelling and sore throat  Eyes: Negative for visual disturbance  Respiratory: Negative for cough and shortness of breath  Cardiovascular: Negative for chest pain and palpitations  Gastrointestinal: Negative for diarrhea and vomiting  Endocrine: Negative for polydipsia  Genitourinary: Negative for dysuria and vaginal bleeding  Musculoskeletal: Negative for back pain and joint swelling  Skin: Negative for rash and wound  Allergic/Immunologic: Negative for immunocompromised state  Neurological: Negative for seizures and headaches  Hematological: Does not bruise/bleed easily  Psychiatric/Behavioral: Negative for dysphoric mood, hallucinations and suicidal ideas  Physical Exam  Constitutional:       General: She is not in acute distress  Appearance: Normal appearance  She is obese  HENT:      Head: Normocephalic and atraumatic  Eyes:      Extraocular Movements: Extraocular movements intact  Cardiovascular:      Rate and Rhythm: Normal rate  Pulmonary:      Effort: Pulmonary effort is normal  No respiratory distress  Musculoskeletal:      Cervical back: Normal range of motion  Skin:     Findings: No erythema or rash  Neurological:      Mental Status: She is alert and oriented to person, place, and time  Psychiatric:         Mood and Affect: Mood normal          Behavior: Behavior normal          Please don't hesitate to contact our office with any concerns or questions    -Manjula Thompson MD

## 2022-10-05 ENCOUNTER — ROUTINE PRENATAL (OUTPATIENT)
Dept: OBGYN CLINIC | Facility: CLINIC | Age: 32
End: 2022-10-05
Payer: COMMERCIAL

## 2022-10-05 VITALS — WEIGHT: 278 LBS | DIASTOLIC BLOOD PRESSURE: 80 MMHG | BODY MASS INDEX: 41.05 KG/M2 | SYSTOLIC BLOOD PRESSURE: 114 MMHG

## 2022-10-05 DIAGNOSIS — Z13.79 GENETIC SCREENING: Primary | ICD-10-CM

## 2022-10-05 PROCEDURE — 99213 OFFICE O/P EST LOW 20 MIN: CPT | Performed by: OBSTETRICS & GYNECOLOGY

## 2022-10-05 NOTE — PATIENT INSTRUCTIONS
No complaints or issues, to make arrangements for maternal serum alpha fetoprotein on the 14th of October  Return my office in 4 weeks  Also check thyroid functions

## 2022-10-05 NOTE — PROGRESS NOTES
Complaints or issues, the patient is taking low-dose aspirin  She had a normal NIPS will need to get alpha fetoprotein at 16 weeks she is scheduled for 14th October 2022  Will also screen for her thyroid level

## 2022-11-02 ENCOUNTER — ROUTINE PRENATAL (OUTPATIENT)
Dept: OBGYN CLINIC | Facility: CLINIC | Age: 32
End: 2022-11-02

## 2022-11-02 VITALS — DIASTOLIC BLOOD PRESSURE: 80 MMHG | WEIGHT: 282.2 LBS | SYSTOLIC BLOOD PRESSURE: 118 MMHG | BODY MASS INDEX: 41.67 KG/M2

## 2022-11-02 DIAGNOSIS — O21.9 NAUSEA/VOMITING IN PREGNANCY: ICD-10-CM

## 2022-11-02 DIAGNOSIS — Z13.1 SCREENING FOR DIABETES MELLITUS: ICD-10-CM

## 2022-11-02 DIAGNOSIS — E03.9 HYPOTHYROIDISM, UNSPECIFIED TYPE: Primary | ICD-10-CM

## 2022-11-02 DIAGNOSIS — N89.8 VAGINAL DISCHARGE: ICD-10-CM

## 2022-11-02 RX ORDER — ONDANSETRON 4 MG/1
4 TABLET, ORALLY DISINTEGRATING ORAL EVERY 6 HOURS PRN
Qty: 20 TABLET | Refills: 2 | Status: SHIPPED | OUTPATIENT
Start: 2022-11-02

## 2022-11-02 RX ORDER — ASPIRIN 81 MG/1
162 TABLET, CHEWABLE ORAL DAILY
COMMUNITY

## 2022-11-02 NOTE — PROGRESS NOTES
The patient is complaining of increasing vaginal discharge and itching  A vaginal culture was done  There was no obvious evidence of yeast infection at this time  She was reminded to get her Glucola test, her thyroid screening test, and her maternal serum alpha fetoprotein test   She will try get the done this week  Level 2 ultrasound scheduled for next week  She is still leading to repeat  section  She should return my office in 4 weeks

## 2022-11-02 NOTE — PATIENT INSTRUCTIONS
The patient was reminded to get her lab work as soon as possible including maternal serum alpha fetoprotein, Glucola test, and TSH  She was complaining of discharge itching culture was done  I doubt this yeast infection at this time  Will await the results of the culture  Level 2 ultrasound next week  Return my office in 4 weeks she will be informed results of the lab work when they return

## 2022-11-03 ENCOUNTER — TELEPHONE (OUTPATIENT)
Dept: OBGYN CLINIC | Facility: CLINIC | Age: 32
End: 2022-11-03

## 2022-11-03 DIAGNOSIS — B37.31 VAGINAL YEAST INFECTION: Primary | ICD-10-CM

## 2022-11-03 LAB
C GLABRATA DNA VAG QL NAA+PROBE: NEGATIVE
C KRUSEI DNA VAG QL NAA+PROBE: NEGATIVE
CANDIDA SP 6 PNL VAG NAA+PROBE: POSITIVE
T VAGINALIS DNA VAG QL NAA+PROBE: NEGATIVE
VAGINOSIS/ITIS DNA PNL VAG PROBE+SIG AMP: NEGATIVE

## 2022-11-03 RX ORDER — FLUCONAZOLE 200 MG/1
TABLET ORAL
Qty: 2 TABLET | Refills: 1 | Status: SHIPPED | OUTPATIENT
Start: 2022-11-03 | End: 2022-11-04

## 2022-11-03 NOTE — TELEPHONE ENCOUNTER
Patient has questions regarding using diflucan in pregnancy and also the quantity that was dispensed by the pharmacy for her zofran refill

## 2022-11-04 NOTE — TELEPHONE ENCOUNTER
[facility] - pharmacist unsure why initial presc for Zofran was processed as quantity #5 for 2 days supply but now processed for #20 as prev escribed  Pt informed & also ok to take Diflucan (2nd trimester)

## 2022-11-05 ENCOUNTER — APPOINTMENT (OUTPATIENT)
Dept: LAB | Facility: CLINIC | Age: 32
End: 2022-11-05

## 2022-11-05 DIAGNOSIS — E03.9 HYPOTHYROIDISM, UNSPECIFIED TYPE: ICD-10-CM

## 2022-11-05 DIAGNOSIS — Z13.79 GENETIC SCREENING: ICD-10-CM

## 2022-11-05 LAB
GLUCOSE 1H P 50 G GLC PO SERPL-MCNC: 163 MG/DL (ref 40–134)
TSH SERPL DL<=0.05 MIU/L-ACNC: 0.83 UIU/ML (ref 0.45–4.5)

## 2022-11-07 LAB
2ND TRIMESTER 4 SCREEN SERPL-IMP: NORMAL
AFP ADJ MOM SERPL: 0.46
AFP INTERP AMN-IMP: NORMAL
AFP INTERP SERPL-IMP: NORMAL
AFP INTERP SERPL-IMP: NORMAL
AFP SERPL-MCNC: 29.1 NG/ML
AGE AT DELIVERY: 35.2 YR
GA METHOD: NORMAL
GA: 19.6 WEEKS
IDDM PATIENT QL: NO
MULTIPLE PREGNANCY: NO
NEURAL TUBE DEFECT RISK FETUS: NORMAL %

## 2022-11-08 ENCOUNTER — TELEPHONE (OUTPATIENT)
Dept: OBGYN CLINIC | Facility: CLINIC | Age: 32
End: 2022-11-08

## 2022-11-08 DIAGNOSIS — O99.810 ABNORMAL GLUCOSE IN PREGNANCY, ANTEPARTUM: Primary | ICD-10-CM

## 2022-11-10 ENCOUNTER — ROUTINE PRENATAL (OUTPATIENT)
Dept: PERINATAL CARE | Facility: CLINIC | Age: 32
End: 2022-11-10

## 2022-11-10 VITALS
WEIGHT: 285.6 LBS | HEIGHT: 69 IN | SYSTOLIC BLOOD PRESSURE: 120 MMHG | DIASTOLIC BLOOD PRESSURE: 76 MMHG | HEART RATE: 116 BPM | BODY MASS INDEX: 42.3 KG/M2

## 2022-11-10 DIAGNOSIS — Z36.3 ENCOUNTER FOR ANTENATAL SCREENING FOR MALFORMATIONS: ICD-10-CM

## 2022-11-10 DIAGNOSIS — E66.01 MATERNAL MORBID OBESITY, ANTEPARTUM (HCC): Primary | ICD-10-CM

## 2022-11-10 DIAGNOSIS — E07.9 THYROID DISEASE AFFECTING PREGNANCY: ICD-10-CM

## 2022-11-10 DIAGNOSIS — Z87.59 HISTORY OF GESTATIONAL HYPERTENSION: ICD-10-CM

## 2022-11-10 DIAGNOSIS — O34.219 HISTORY OF CESAREAN DELIVERY, ANTEPARTUM: ICD-10-CM

## 2022-11-10 DIAGNOSIS — Z36.86 ENCOUNTER FOR ANTENATAL SCREENING FOR CERVICAL LENGTH: ICD-10-CM

## 2022-11-10 DIAGNOSIS — O99.280 THYROID DISEASE AFFECTING PREGNANCY: ICD-10-CM

## 2022-11-10 DIAGNOSIS — O99.210 MATERNAL MORBID OBESITY, ANTEPARTUM (HCC): Primary | ICD-10-CM

## 2022-11-10 DIAGNOSIS — O09.522 MULTIGRAVIDA OF ADVANCED MATERNAL AGE IN SECOND TRIMESTER: ICD-10-CM

## 2022-11-10 DIAGNOSIS — Z3A.20 20 WEEKS GESTATION OF PREGNANCY: ICD-10-CM

## 2022-11-10 PROBLEM — O99.810 ABNORMAL GLUCOSE TOLERANCE IN PREGNANCY: Status: ACTIVE | Noted: 2022-11-10

## 2022-11-10 PROBLEM — Z98.891 S/P CESAREAN SECTION: Status: RESOLVED | Noted: 2020-04-30 | Resolved: 2022-11-10

## 2022-11-10 NOTE — LETTER
November 10, 2022     Araceli Bloom MD  [address]  [address] [place] [address]    Patient: Wilfrido Celis   YOB: 1990   Date of Visit: 11/10/2022       Dear Dr Abhay Chew: Thank you for referring Wilfrido Celis to me for evaluation  Below are my notes for this consultation  If you have questions, please do not hesitate to call me  I look forward to following your patient along with you           Sincerely,        Jenniffer Humphrey MD        CC: No Recipients

## 2022-11-10 NOTE — PROGRESS NOTES
[address]: Ms Danna Mancuso was seen today at 20w2d for anatomic survey and cervical length screening ultrasound  See ultrasound report under "OB Procedures" tab    Please don't hesitate to contact our office with any concerns or questions   -Ruddy Luna MD

## 2022-11-10 NOTE — PROGRESS NOTES
Ultrasound Probe Disinfection    A transvaginal ultrasound was performed  Prior to use, disinfection was performed with High Level Disinfection Process (Trophon)  Probe serial number [id number]: [id number] was used        Aydin Pretty  11/10/22  4:39 PM

## 2022-11-15 ENCOUNTER — TELEPHONE (OUTPATIENT)
Dept: OBGYN CLINIC | Facility: CLINIC | Age: 32
End: 2022-11-15

## 2022-11-15 NOTE — TELEPHONE ENCOUNTER
----- Message from Sean Valero MD sent at 11/15/2022  7:16 AM EST -----  Please inform this patient of normal alpha fetoprotein test

## 2022-11-16 ENCOUNTER — TELEPHONE (OUTPATIENT)
Dept: OBGYN CLINIC | Facility: CLINIC | Age: 32
End: 2022-11-16

## 2022-11-28 ENCOUNTER — ROUTINE PRENATAL (OUTPATIENT)
Dept: OBGYN CLINIC | Facility: CLINIC | Age: 32
End: 2022-11-28

## 2022-11-28 VITALS — SYSTOLIC BLOOD PRESSURE: 110 MMHG | DIASTOLIC BLOOD PRESSURE: 76 MMHG | WEIGHT: 286 LBS | BODY MASS INDEX: 42.23 KG/M2

## 2022-11-28 DIAGNOSIS — Z34.82 PRENATAL CARE, SUBSEQUENT PREGNANCY, SECOND TRIMESTER: Primary | ICD-10-CM

## 2022-11-28 NOTE — PROGRESS NOTES
Positive fetal movement no vaginal bleeding or cramping  Size equals dates, will need another growth scan in 2 weeks  Continue to monitor her blood pressure  Return my office in 4 weeks

## 2022-11-28 NOTE — PATIENT INSTRUCTIONS
No complaints or issues has another growth scan next week  Continue to monitor blood pressure return my office in 4 weeks

## 2022-12-08 ENCOUNTER — ROUTINE PRENATAL (OUTPATIENT)
Dept: PERINATAL CARE | Facility: CLINIC | Age: 32
End: 2022-12-08

## 2022-12-08 VITALS
HEART RATE: 124 BPM | WEIGHT: 288.4 LBS | DIASTOLIC BLOOD PRESSURE: 80 MMHG | HEIGHT: 69 IN | BODY MASS INDEX: 42.72 KG/M2 | SYSTOLIC BLOOD PRESSURE: 124 MMHG

## 2022-12-08 DIAGNOSIS — Z3A.24 24 WEEKS GESTATION OF PREGNANCY: ICD-10-CM

## 2022-12-08 DIAGNOSIS — O09.522 MULTIGRAVIDA OF ADVANCED MATERNAL AGE IN SECOND TRIMESTER: Primary | ICD-10-CM

## 2022-12-08 NOTE — PROGRESS NOTES
The patient was seen today for an ultrasound  Please see ultrasound report (located under Ob Procedures) for additional details  Thank you very much for allowing us to participate in the care of this very nice patient  Should you have any questions, please do not hesitate to contact me  Chano Wallis MD [address] [facility]  Attending Physician, [place]

## 2022-12-29 ENCOUNTER — ROUTINE PRENATAL (OUTPATIENT)
Dept: OBGYN CLINIC | Facility: CLINIC | Age: 32
End: 2022-12-29

## 2022-12-29 VITALS — DIASTOLIC BLOOD PRESSURE: 80 MMHG | SYSTOLIC BLOOD PRESSURE: 116 MMHG | WEIGHT: 290.6 LBS | BODY MASS INDEX: 42.91 KG/M2

## 2022-12-29 DIAGNOSIS — Z36.89 ENCOUNTER FOR OTHER SPECIFIED ANTENATAL SCREENING: Primary | ICD-10-CM

## 2022-12-29 LAB
DME PARACHUTE DELIVERY DATE REQUESTED: NORMAL
DME PARACHUTE ITEM DESCRIPTION: NORMAL
DME PARACHUTE ORDER STATUS: NORMAL
DME PARACHUTE SUPPLIER NAME: NORMAL
DME PARACHUTE SUPPLIER PHONE: NORMAL

## 2022-12-29 NOTE — PATIENT INSTRUCTIONS
Patient admits to positive fetal movement, denies any vaginal bleeding  Denies any uterine cramping  She has another growth scan scheduled for next week  She will make arrange to get her 20-week lab work done within the week  She will make a consultation visit with [facility] "she desires repeat  section  She may consider tubal ligation  All questions were answered

## 2022-12-29 NOTE — PROGRESS NOTES
Positive fetal movement, breech presentation, will be transferring her care to [facility]"  We will make arrangement for 28-week lab work  She is Rh+  She was repeat  section  She is contemplating tubal ligation she will need to have her paperwork signed release before hand  She has another growth scan at maternal-fetal medicine coming up soon

## 2023-01-04 ENCOUNTER — TELEPHONE (OUTPATIENT)
Dept: ENDOCRINOLOGY | Facility: CLINIC | Age: 33
End: 2023-01-04

## 2023-01-04 NOTE — TELEPHONE ENCOUNTER
Laura Barraza ([place]: [id number]) - [phone]  Need help? Call us at [phone]    Status   Sent to [employer]   The plan will fax you a determination, typically within 1 to 5 business days  How do I follow up?   Drug    Synthroid 75MCG tablets   Form    [address] [place] [address] Oral Form    Prior Authorization for Oral and Other Pharmacy Requests for [address] [facility]      [phone]    [phone]

## 2023-01-05 ENCOUNTER — ULTRASOUND (OUTPATIENT)
Facility: HOSPITAL | Age: 33
End: 2023-01-05

## 2023-01-05 ENCOUNTER — TELEPHONE (OUTPATIENT)
Dept: ENDOCRINOLOGY | Facility: CLINIC | Age: 33
End: 2023-01-05

## 2023-01-05 VITALS
WEIGHT: 291 LBS | DIASTOLIC BLOOD PRESSURE: 82 MMHG | SYSTOLIC BLOOD PRESSURE: 114 MMHG | HEART RATE: 98 BPM | HEIGHT: 69 IN | BODY MASS INDEX: 43.1 KG/M2

## 2023-01-05 DIAGNOSIS — O34.219 HISTORY OF CESAREAN DELIVERY, ANTEPARTUM: ICD-10-CM

## 2023-01-05 DIAGNOSIS — O99.280 THYROID DISEASE AFFECTING PREGNANCY: Primary | ICD-10-CM

## 2023-01-05 DIAGNOSIS — O99.212 OBESITY AFFECTING PREGNANCY IN SECOND TRIMESTER: ICD-10-CM

## 2023-01-05 DIAGNOSIS — Z3A.28 28 WEEKS GESTATION OF PREGNANCY: ICD-10-CM

## 2023-01-05 DIAGNOSIS — E07.9 THYROID DISEASE AFFECTING PREGNANCY: Primary | ICD-10-CM

## 2023-01-05 DIAGNOSIS — O99.810 ABNORMAL GLUCOSE TOLERANCE IN PREGNANCY: ICD-10-CM

## 2023-01-05 DIAGNOSIS — Z71.85 VACCINE COUNSELING: ICD-10-CM

## 2023-01-05 DIAGNOSIS — O09.522 MULTIGRAVIDA OF ADVANCED MATERNAL AGE IN SECOND TRIMESTER: ICD-10-CM

## 2023-01-05 NOTE — TELEPHONE ENCOUNTER
Patient called in to follow up on medication request for Syntrhroid  Medication was denied  How should patient proceed ?

## 2023-01-05 NOTE — LETTER
January 9, 2023     Yvette Sidhu MD  [address]  [address]  [address] [place] [address]    Patient: Vivien Esteves   YOB: 1990   Date of Visit: 1/5/2023       Dear Dr Lara Khanna: Thank you for referring Vivien Esteves to me for evaluation  Below are my notes for this consultation  If you have questions, please do not hesitate to call me  I look forward to following your patient along with you           Sincerely,        Josefina Ziegler MD        CC: No Recipients

## 2023-01-07 ENCOUNTER — APPOINTMENT (OUTPATIENT)
Dept: LAB | Facility: CLINIC | Age: 33
End: 2023-01-07

## 2023-01-07 DIAGNOSIS — Z36.89 ENCOUNTER FOR OTHER SPECIFIED ANTENATAL SCREENING: ICD-10-CM

## 2023-01-07 DIAGNOSIS — O99.810 ABNORMAL GLUCOSE IN PREGNANCY, ANTEPARTUM: ICD-10-CM

## 2023-01-07 LAB
BASOPHILS # BLD AUTO: 0.07 THOUSANDS/ÂΜL (ref 0–0.1)
BASOPHILS NFR BLD AUTO: 1 % (ref 0–1)
EOSINOPHIL # BLD AUTO: 0.14 THOUSAND/ÂΜL (ref 0–0.61)
EOSINOPHIL NFR BLD AUTO: 1 % (ref 0–6)
ERYTHROCYTE [DISTWIDTH] IN BLOOD BY AUTOMATED COUNT: 12.8 % (ref 11.6–15.1)
GLUCOSE P FAST SERPL-MCNC: 96 MG/DL (ref 65–99)
HCT VFR BLD AUTO: 36 % (ref 34.8–46.1)
HGB BLD-MCNC: 12 G/DL (ref 11.5–15.4)
IMM GRANULOCYTES # BLD AUTO: 0.08 THOUSAND/UL (ref 0–0.2)
IMM GRANULOCYTES NFR BLD AUTO: 1 % (ref 0–2)
LYMPHOCYTES # BLD AUTO: 1.49 THOUSANDS/ÂΜL (ref 0.6–4.47)
LYMPHOCYTES NFR BLD AUTO: 14 % (ref 14–44)
MCH RBC QN AUTO: 29.3 PG (ref 26.8–34.3)
MCHC RBC AUTO-ENTMCNC: 33.3 G/DL (ref 31.4–37.4)
MCV RBC AUTO: 88 FL (ref 82–98)
MONOCYTES # BLD AUTO: 0.59 THOUSAND/ÂΜL (ref 0.17–1.22)
MONOCYTES NFR BLD AUTO: 6 % (ref 4–12)
NEUTROPHILS # BLD AUTO: 8.32 THOUSANDS/ÂΜL (ref 1.85–7.62)
NEUTS SEG NFR BLD AUTO: 77 % (ref 43–75)
NRBC BLD AUTO-RTO: 0 /100 WBCS
PLATELET # BLD AUTO: 258 THOUSANDS/UL (ref 149–390)
PMV BLD AUTO: 9.8 FL (ref 8.9–12.7)
RBC # BLD AUTO: 4.1 MILLION/UL (ref 3.81–5.12)
WBC # BLD AUTO: 10.69 THOUSAND/UL (ref 4.31–10.16)

## 2023-01-08 LAB — RPR SER QL: NORMAL

## 2023-01-09 PROBLEM — Z3A.28 28 WEEKS GESTATION OF PREGNANCY: Status: ACTIVE | Noted: 2023-01-09

## 2023-01-09 PROBLEM — Z71.85 VACCINE COUNSELING: Status: ACTIVE | Noted: 2023-01-09

## 2023-01-09 PROBLEM — O99.212 OBESITY AFFECTING PREGNANCY IN SECOND TRIMESTER: Status: ACTIVE | Noted: 2023-01-09

## 2023-01-10 NOTE — TELEPHONE ENCOUNTER
Called and notified pt of Rose's message  Pt stated she still has [employer] and no longer has primary insurance  She will call [employer] to straighten things out  Asked her to call back once this is done so PA request can be submitted  Pt confirmed she has been on brand Synthroid

## 2023-01-17 ENCOUNTER — TELEPHONE (OUTPATIENT)
Dept: ENDOCRINOLOGY | Facility: CLINIC | Age: 33
End: 2023-01-17

## 2023-01-17 NOTE — TELEPHONE ENCOUNTER
Jayandrei Gupta ([facility] Maker) - [phone]  Need help? Call us at [phone]    Status   Sent to [employer]  Next [employer]   The plan will fax you a determination, typically within 1 to 5 business days  How do I follow up?   Drug    Synthroid 75MCG tablets   Form    [address] [place] [address] Oral Form    Prior Authorization for Oral and Other Pharmacy Requests for [address] [facility] Members      [phone]    [phone]

## 2023-01-23 ENCOUNTER — ROUTINE PRENATAL (OUTPATIENT)
Dept: OBGYN CLINIC | Facility: CLINIC | Age: 33
End: 2023-01-23

## 2023-01-23 VITALS — WEIGHT: 293 LBS | SYSTOLIC BLOOD PRESSURE: 100 MMHG | BODY MASS INDEX: 43.74 KG/M2 | DIASTOLIC BLOOD PRESSURE: 74 MMHG

## 2023-01-23 DIAGNOSIS — Z34.83 PRENATAL CARE, SUBSEQUENT PREGNANCY, THIRD TRIMESTER: Primary | ICD-10-CM

## 2023-01-23 DIAGNOSIS — Z23 NEED FOR INFLUENZA VACCINATION: ICD-10-CM

## 2023-01-23 DIAGNOSIS — Z23 NEED FOR TDAP VACCINATION: ICD-10-CM

## 2023-01-23 NOTE — PROGRESS NOTES
The patient was seen today at 30 weeks and 6 days gestation  She has not appointment next week with [facility]  Her Glucola test was negative  She is a repeat  section to be scheduled  She has a history of gestational hypertension  Her blood pressure today is normal   She has a history of hypothyroidism there is been a confusion with her medication she will let us know she gets it refilled by her endocrinologist   She was given a fetal kick count sheet  Her infant is still breech  A fetal echo was scheduled for next week

## 2023-01-23 NOTE — PATIENT INSTRUCTIONS
The patient admits to positive fetal movement, she was given fetal kick count sheet today  Her Glucola test was normal   She is scheduled for fetal echo next week  Her next visit [facility] with [facility]  She needs to schedule for repeat  section  Her infant is still breech  Her BMI is over 40  She was given a fetal kick count sheet today  She will get her flu vaccine and Tdap vaccination today in our office    Her next appointment was scheduled with [facility]"

## 2023-01-24 ENCOUNTER — TELEPHONE (OUTPATIENT)
Dept: PERINATAL CARE | Facility: OTHER | Age: 33
End: 2023-01-24

## 2023-01-24 NOTE — PROGRESS NOTES
OB/GYN  PN Visit  Chelsea Zuñiga  [id number]  2/1/2023  9:08 AM  ABI Hodge    S: 26 y o  [id number] 32w1d here for PN visit  Pregnancy complicated by AMA, BMI of 44, LTCS x3, hypothyroidism, hx of gestational HTN  Follows with endocrine for hypothyroidism  Breech infant at last [address]  She was started on LDASA  Fetal arrhthymia noted on MFM US on 1/26/23  [facility] recommends weekly FHT  She reports nausea and vomiting that is tolerable with Zofran along with edema of the feet and hands  She reports daily headaches that linger throughout the day and tolerable with tylenol  She reports visual disturbances that she states are "floaters/stars" with the headaches  Vitals:    02/01/23 0800   BP: 128/86        Pt presents for prenatal check up  Adequate FM noted  Reviewed FKCs  Denies c/o no smoking, no DV  No vb/lof  No cramping/ctxns or signs of PTL  Tdap: 1/23/23  Flu: 1/23/23   BC: is unsure if she desires tubal ligation  Breast feeding: pump ordered through Dr Ariella Smith office  Initiated scheduling for C/S  Pre-E baseline labs ordered and advised patient to get them done asap    RTC in 2 weeks

## 2023-01-26 ENCOUNTER — TELEPHONE (OUTPATIENT)
Dept: FAMILY MEDICINE CLINIC | Facility: CLINIC | Age: 33
End: 2023-01-26

## 2023-01-26 ENCOUNTER — ROUTINE PRENATAL (OUTPATIENT)
Dept: PERINATAL CARE | Facility: OTHER | Age: 33
End: 2023-01-26

## 2023-01-26 VITALS
DIASTOLIC BLOOD PRESSURE: 78 MMHG | HEART RATE: 94 BPM | WEIGHT: 293 LBS | SYSTOLIC BLOOD PRESSURE: 118 MMHG | BODY MASS INDEX: 43.4 KG/M2 | HEIGHT: 69 IN

## 2023-01-26 DIAGNOSIS — E66.01 MATERNAL MORBID OBESITY, ANTEPARTUM (HCC): Primary | ICD-10-CM

## 2023-01-26 DIAGNOSIS — Z3A.31 31 WEEKS GESTATION OF PREGNANCY: ICD-10-CM

## 2023-01-26 DIAGNOSIS — O99.210 MATERNAL MORBID OBESITY, ANTEPARTUM (HCC): Primary | ICD-10-CM

## 2023-01-26 DIAGNOSIS — O36.8390 FETAL ARRHYTHMIA AFFECTING PREGNANCY, ANTEPARTUM: ICD-10-CM

## 2023-01-26 NOTE — PROGRESS NOTES
The patient was seen today for an ultrasound  Please see ultrasound report (located under Ob Procedures) for additional details  Thank you very much for allowing us to participate in the care of this very nice patient  Should you have any questions, please do not hesitate to contact me  Chano Nugent MD [address] [facility]  Attending Physician, [place]

## 2023-01-31 DIAGNOSIS — E03.9 HYPOTHYROIDISM, UNSPECIFIED TYPE: ICD-10-CM

## 2023-02-01 ENCOUNTER — APPOINTMENT (OUTPATIENT)
Dept: LAB | Facility: CLINIC | Age: 33
End: 2023-02-01

## 2023-02-01 ENCOUNTER — ROUTINE PRENATAL (OUTPATIENT)
Dept: OBGYN CLINIC | Facility: CLINIC | Age: 33
End: 2023-02-01

## 2023-02-01 VITALS
HEIGHT: 69 IN | DIASTOLIC BLOOD PRESSURE: 86 MMHG | SYSTOLIC BLOOD PRESSURE: 128 MMHG | WEIGHT: 293 LBS | BODY MASS INDEX: 43.4 KG/M2

## 2023-02-01 DIAGNOSIS — E03.9 HYPOTHYROIDISM, UNSPECIFIED TYPE: ICD-10-CM

## 2023-02-01 DIAGNOSIS — H53.9 VISUAL DISTURBANCES: ICD-10-CM

## 2023-02-01 DIAGNOSIS — Z87.59 HISTORY OF GESTATIONAL HYPERTENSION: ICD-10-CM

## 2023-02-01 DIAGNOSIS — E04.9 NONTOXIC GOITER, UNSPECIFIED: ICD-10-CM

## 2023-02-01 DIAGNOSIS — E66.01 MATERNAL MORBID OBESITY, ANTEPARTUM (HCC): ICD-10-CM

## 2023-02-01 DIAGNOSIS — Z3A.32 32 WEEKS GESTATION OF PREGNANCY: Primary | ICD-10-CM

## 2023-02-01 DIAGNOSIS — E04.1 THYROID NODULE: ICD-10-CM

## 2023-02-01 DIAGNOSIS — O99.210 MATERNAL MORBID OBESITY, ANTEPARTUM (HCC): ICD-10-CM

## 2023-02-01 DIAGNOSIS — O34.219 HISTORY OF CESAREAN DELIVERY, ANTEPARTUM: ICD-10-CM

## 2023-02-01 LAB
ALBUMIN SERPL BCP-MCNC: 2.7 G/DL (ref 3.5–5)
ALP SERPL-CCNC: 61 U/L (ref 46–116)
ALT SERPL W P-5'-P-CCNC: 13 U/L (ref 12–78)
ANION GAP SERPL CALCULATED.3IONS-SCNC: 8 MMOL/L (ref 4–13)
AST SERPL W P-5'-P-CCNC: 18 U/L (ref 5–45)
BASOPHILS # BLD AUTO: 0.06 THOUSANDS/ÂΜL (ref 0–0.1)
BASOPHILS NFR BLD AUTO: 1 % (ref 0–1)
BILIRUB SERPL-MCNC: 0.8 MG/DL (ref 0.2–1)
BUN SERPL-MCNC: 6 MG/DL (ref 5–25)
CALCIUM ALBUM COR SERPL-MCNC: 10 MG/DL (ref 8.3–10.1)
CALCIUM SERPL-MCNC: 9 MG/DL (ref 8.3–10.1)
CHLORIDE SERPL-SCNC: 107 MMOL/L (ref 96–108)
CO2 SERPL-SCNC: 23 MMOL/L (ref 21–32)
CREAT SERPL-MCNC: 0.58 MG/DL (ref 0.6–1.3)
CREAT UR-MCNC: 149 MG/DL
EOSINOPHIL # BLD AUTO: 0.1 THOUSAND/ÂΜL (ref 0–0.61)
EOSINOPHIL NFR BLD AUTO: 1 % (ref 0–6)
ERYTHROCYTE [DISTWIDTH] IN BLOOD BY AUTOMATED COUNT: 13.3 % (ref 11.6–15.1)
GFR SERPL CREATININE-BSD FRML MDRD: 119 ML/MIN/1.73SQ M
GLUCOSE P FAST SERPL-MCNC: 83 MG/DL (ref 65–99)
HCT VFR BLD AUTO: 38.7 % (ref 34.8–46.1)
HGB BLD-MCNC: 12.3 G/DL (ref 11.5–15.4)
IMM GRANULOCYTES # BLD AUTO: 0.09 THOUSAND/UL (ref 0–0.2)
IMM GRANULOCYTES NFR BLD AUTO: 1 % (ref 0–2)
LYMPHOCYTES # BLD AUTO: 1.37 THOUSANDS/ÂΜL (ref 0.6–4.47)
LYMPHOCYTES NFR BLD AUTO: 12 % (ref 14–44)
MCH RBC QN AUTO: 28.8 PG (ref 26.8–34.3)
MCHC RBC AUTO-ENTMCNC: 31.8 G/DL (ref 31.4–37.4)
MCV RBC AUTO: 91 FL (ref 82–98)
MONOCYTES # BLD AUTO: 0.67 THOUSAND/ÂΜL (ref 0.17–1.22)
MONOCYTES NFR BLD AUTO: 6 % (ref 4–12)
NEUTROPHILS # BLD AUTO: 9.05 THOUSANDS/ÂΜL (ref 1.85–7.62)
NEUTS SEG NFR BLD AUTO: 79 % (ref 43–75)
NRBC BLD AUTO-RTO: 0 /100 WBCS
PLATELET # BLD AUTO: 290 THOUSANDS/UL (ref 149–390)
PMV BLD AUTO: 10.6 FL (ref 8.9–12.7)
POTASSIUM SERPL-SCNC: 4.3 MMOL/L (ref 3.5–5.3)
PROT SERPL-MCNC: 6.9 G/DL (ref 6.4–8.4)
PROT UR-MCNC: 12 MG/DL
PROT/CREAT UR: 0.08 MG/G{CREAT} (ref 0–0.1)
RBC # BLD AUTO: 4.27 MILLION/UL (ref 3.81–5.12)
SODIUM SERPL-SCNC: 138 MMOL/L (ref 135–147)
WBC # BLD AUTO: 11.34 THOUSAND/UL (ref 4.31–10.16)

## 2023-02-02 ENCOUNTER — TELEPHONE (OUTPATIENT)
Dept: OBGYN CLINIC | Facility: CLINIC | Age: 33
End: 2023-02-02

## 2023-02-02 ENCOUNTER — ULTRASOUND (OUTPATIENT)
Dept: PERINATAL CARE | Facility: CLINIC | Age: 33
End: 2023-02-02

## 2023-02-02 VITALS
DIASTOLIC BLOOD PRESSURE: 72 MMHG | BODY MASS INDEX: 41.86 KG/M2 | HEIGHT: 70 IN | SYSTOLIC BLOOD PRESSURE: 126 MMHG | HEART RATE: 124 BPM | WEIGHT: 292.4 LBS

## 2023-02-02 DIAGNOSIS — O36.8390 FETAL ARRHYTHMIA AFFECTING PREGNANCY, ANTEPARTUM: ICD-10-CM

## 2023-02-02 DIAGNOSIS — Z3A.32 32 WEEKS GESTATION OF PREGNANCY: Primary | ICD-10-CM

## 2023-02-02 NOTE — LETTER
February 2, 2023     [facility], [address] [facility]  [address] [address]    Patient: Christie Neves   YOB: 1990   Date of Visit: 2/2/2023       Dear Dr Bulmaro Lowery: Thank you for referring Christie Neves to me for evaluation  Below are my notes for this consultation  If you have questions, please do not hesitate to call me  I look forward to following your patient along with you           Sincerely,        Angela Manzo MD        CC: No Recipients  Angela Manzo MD  1/31/2023  1:37 PM  Sign when Signing Visit  Please refer to the [facility] ultrasound report in Ob Procedures for additional information regarding today's visit

## 2023-02-02 NOTE — TELEPHONE ENCOUNTER
----- Message from Josaih Khoury, [address] sent at 2/1/2023  9:16 AM EST -----  Regarding: Scheduling C/S  Hi Team,    Could we look into scheduling Crissy's repeat C/S? She is 32w1d       Thank you,  Pancho Martin

## 2023-02-02 NOTE — PROGRESS NOTES
Non-Stress Testing:    Non-Stress test, equipment, procedure, and expected outcomes reviewed  Reviewed fetal kick counts and when to call OB  Laquita Clark Verified patient understanding of fetal kick counts with teach back method  Patient reports feeling daily fetal movements  Patient has no questions or concerns

## 2023-02-03 RX ORDER — LEVOTHYROXINE SODIUM 75 MCG
TABLET ORAL
Qty: 30 TABLET | Refills: 3 | Status: SHIPPED | OUTPATIENT
Start: 2023-02-03

## 2023-02-08 NOTE — TELEPHONE ENCOUNTER
Patient scheduled for 3/23 at 1pm for c section  Calender and pink sticky updated, patient and provider aware  Scheduled with AF at labor and delivery

## 2023-02-09 PROBLEM — O09.523 MULTIGRAVIDA OF ADVANCED MATERNAL AGE IN THIRD TRIMESTER: Status: ACTIVE | Noted: 2022-11-10

## 2023-02-09 PROBLEM — Z3A.33 33 WEEKS GESTATION OF PREGNANCY: Status: ACTIVE | Noted: 2023-01-09

## 2023-02-09 NOTE — PROGRESS NOTES
OB/GYN  PN Visit  Sailaja Mendes  [id number]  2/16/2023  9:01 AM  ABI Gross    S: 26 y o  [id number] 34w2d here for PN visit  Pregnancy complicated by AMA, BMI of 44, LTCS x3, hypothyroidism, hx of gestational HTN  Follows with endocrine for hypothyroidism  Breech infant at last [address]  She was started on LDASA  Fetal arrhthymia noted on MFM US on 1/26/23  [facility] recommends weekly FHT  Repeat CS scheduled for 3/23/23  She reports nausea and cramping that is tolerable  She reports edema of the hands and feet  Denies Pre-E sxs  Patient is not taking Synthroid for 4 weeks due to insurance coverage  She states that levothyroxine causes horrible side effects such as hot flashes, diaphoresis, and burning sensation  Pt presents for prenatal check up  Adequate FM noted  Reviewed FKCs  Denies c/o n/v/ha, no edema, no smoking, no DV  No vb/lof  No cramping/ctxns or signs of PTL  Tdap: 1/23/23  Flu: 1/23/23   BC: TBD  Breast feeding: pump ordered through Dr Manuel Byrd office  Vitals:    02/16/23 0800   BP: 124/82      Urine: -/-  TSH ordered for evaluation of thyroid due to lack of treatment    RTC in 2 weeks

## 2023-02-10 ENCOUNTER — TELEPHONE (OUTPATIENT)
Facility: HOSPITAL | Age: 33
End: 2023-02-10

## 2023-02-10 ENCOUNTER — ULTRASOUND (OUTPATIENT)
Facility: HOSPITAL | Age: 33
End: 2023-02-10

## 2023-02-10 VITALS
DIASTOLIC BLOOD PRESSURE: 72 MMHG | HEART RATE: 100 BPM | HEIGHT: 69 IN | BODY MASS INDEX: 43.4 KG/M2 | WEIGHT: 293 LBS | SYSTOLIC BLOOD PRESSURE: 124 MMHG

## 2023-02-10 DIAGNOSIS — E66.01 MATERNAL MORBID OBESITY, ANTEPARTUM (HCC): ICD-10-CM

## 2023-02-10 DIAGNOSIS — O99.210 MATERNAL MORBID OBESITY, ANTEPARTUM (HCC): ICD-10-CM

## 2023-02-10 DIAGNOSIS — O36.8390 FETAL ARRHYTHMIA AFFECTING PREGNANCY, ANTEPARTUM: Primary | ICD-10-CM

## 2023-02-10 DIAGNOSIS — Z3A.33 33 WEEKS GESTATION OF PREGNANCY: ICD-10-CM

## 2023-02-10 NOTE — LETTER
NST sleeve cover sheet    Patient name: Rayna Mccabe  : 1990  MRN: [id number]    ROMAN: Estimated Date of Delivery: 3/28/23    Obstetrician: _______________________________    Reason(s) for testing:  __>BMI __Premature Atrial Contractions_________________      Testing frequency:    ___ 2x/wk  x___ 1x/wk  ___ Dopplers  ___ BPP?       Last growth scan: __________________________________________

## 2023-02-10 NOTE — PROGRESS NOTES
Non-Stress Testing:    Non-Stress test, equipment, procedure, and expected outcomes reviewed  Reviewed fetal kick counts and when to call OB  Feliz Many Verified patient understanding of fetal kick counts with teach back method  Patient reports feeling daily fetal movements  Patient has no questions or concerns  DR Alberto Farrar viewed NST prior to completion of appointment

## 2023-02-10 NOTE — PROGRESS NOTES
[facility] was seen today for NST (found under the pregnancy episode) which I reviewed the RN assessment and agree, and COURTNEY (see ultrasound report under OB procedures tab)  Please don't hesitate to contact our office with any concerns or questions    Vick Koyanagi, MD

## 2023-02-10 NOTE — TELEPHONE ENCOUNTER
Left patient a message that her MFM appointment had to be rescheduled  The new time, date and location were provided - 2/10/23  8:45  CARL  The patient has been instructed to please call us back at [phone] with any questions or concerns

## 2023-02-16 ENCOUNTER — APPOINTMENT (OUTPATIENT)
Dept: LAB | Facility: CLINIC | Age: 33
End: 2023-02-16

## 2023-02-16 ENCOUNTER — ROUTINE PRENATAL (OUTPATIENT)
Dept: OBGYN CLINIC | Facility: CLINIC | Age: 33
End: 2023-02-16

## 2023-02-16 ENCOUNTER — ULTRASOUND (OUTPATIENT)
Facility: HOSPITAL | Age: 33
End: 2023-02-16

## 2023-02-16 VITALS
WEIGHT: 293 LBS | HEIGHT: 69 IN | DIASTOLIC BLOOD PRESSURE: 82 MMHG | SYSTOLIC BLOOD PRESSURE: 124 MMHG | BODY MASS INDEX: 43.4 KG/M2

## 2023-02-16 VITALS
WEIGHT: 293 LBS | HEIGHT: 69 IN | DIASTOLIC BLOOD PRESSURE: 82 MMHG | HEART RATE: 112 BPM | BODY MASS INDEX: 43.4 KG/M2 | SYSTOLIC BLOOD PRESSURE: 126 MMHG

## 2023-02-16 DIAGNOSIS — Z3A.34 34 WEEKS GESTATION OF PREGNANCY: ICD-10-CM

## 2023-02-16 DIAGNOSIS — E66.01 MORBID OBESITY (HCC): ICD-10-CM

## 2023-02-16 DIAGNOSIS — E66.01 MATERNAL MORBID OBESITY, ANTEPARTUM (HCC): ICD-10-CM

## 2023-02-16 DIAGNOSIS — O36.8390 FETAL ARRHYTHMIA AFFECTING PREGNANCY, ANTEPARTUM: ICD-10-CM

## 2023-02-16 DIAGNOSIS — E04.1 THYROID NODULE: Primary | ICD-10-CM

## 2023-02-16 DIAGNOSIS — Z3A.34 34 WEEKS GESTATION OF PREGNANCY: Primary | ICD-10-CM

## 2023-02-16 DIAGNOSIS — Z36.89 ENCOUNTER FOR ULTRASOUND TO CHECK FETAL GROWTH: ICD-10-CM

## 2023-02-16 DIAGNOSIS — E03.9 HYPOTHYROIDISM, UNSPECIFIED TYPE: ICD-10-CM

## 2023-02-16 DIAGNOSIS — O99.210 MATERNAL MORBID OBESITY, ANTEPARTUM (HCC): ICD-10-CM

## 2023-02-16 DIAGNOSIS — Z87.59 HISTORY OF GESTATIONAL HYPERTENSION: ICD-10-CM

## 2023-02-16 DIAGNOSIS — O34.219 HISTORY OF CESAREAN DELIVERY, ANTEPARTUM: ICD-10-CM

## 2023-02-16 DIAGNOSIS — O09.523 MULTIGRAVIDA OF ADVANCED MATERNAL AGE IN THIRD TRIMESTER: ICD-10-CM

## 2023-02-16 LAB — TSH SERPL DL<=0.05 MIU/L-ACNC: 1.65 UIU/ML (ref 0.45–4.5)

## 2023-02-16 NOTE — PROGRESS NOTES
[facility] Ms Thora Halsted was seen today for NST (found under the pregnancy episode) which I reviewed the RN assessment and agree, and fetal growth ultrasound (see ultrasound report under OB procedures tab)  Please don't hesitate to contact our office with any concerns or questions    -Mynor Domingo MD

## 2023-02-16 NOTE — PROGRESS NOTES
Repeat Non-Stress Testing:    Patient verbalizes +FM  Pt denies ALL:               Leaking of fluid   Contractions   Vaginal bleeding   Decreased fetal movement    Patient is performing daily kick counts  Patient has no questions or concerns  NST strip reviewed by Dr Sharlene Scruggs

## 2023-02-23 ENCOUNTER — ULTRASOUND (OUTPATIENT)
Facility: HOSPITAL | Age: 33
End: 2023-02-23

## 2023-02-23 VITALS
SYSTOLIC BLOOD PRESSURE: 126 MMHG | BODY MASS INDEX: 43.4 KG/M2 | HEIGHT: 69 IN | DIASTOLIC BLOOD PRESSURE: 76 MMHG | WEIGHT: 293 LBS | HEART RATE: 127 BPM

## 2023-02-23 DIAGNOSIS — E66.01 MATERNAL MORBID OBESITY, ANTEPARTUM (HCC): ICD-10-CM

## 2023-02-23 DIAGNOSIS — O99.210 MATERNAL MORBID OBESITY, ANTEPARTUM (HCC): ICD-10-CM

## 2023-02-23 DIAGNOSIS — Z3A.35 35 WEEKS GESTATION OF PREGNANCY: ICD-10-CM

## 2023-02-23 DIAGNOSIS — O40.3XX0 POLYHYDRAMNIOS IN THIRD TRIMESTER COMPLICATION, SINGLE OR UNSPECIFIED FETUS: ICD-10-CM

## 2023-02-23 NOTE — PROGRESS NOTES
The patient was seen today for an ultrasound and NST  Please see ultrasound report (located under OB Procedures tab) for additional details  Polyhydramnios is identified today  We discussed the various etiologies of polyhydramnios including but not limited to diabetes, idiopathic, gastrointestinal abnormalities that prevent normal  fetal swallowing such as an upper GI atresia, and neurogenic etiologies that prevent fetal swallowing  There is no sonographic evidence to suggust a fetal structural abnormality on today's ultrasound  Preeclampsia precautions were reviewed with the patient  The patient verbalized her understanding of these instructions

## 2023-02-23 NOTE — PROGRESS NOTES
Repeat Non-Stress Testing:    Patient verbalizes +FM  Pt denies ALL:               Leaking of fluid   Contractions   Vaginal bleeding   Decreased fetal movement    Patient is performing daily kick counts  Patient has no questions or concerns  NST strip reviewed by Dr Beti Wallis also in to discuss plan of care with pt prior to completion of appointment  Pt receptive and declined further questions

## 2023-03-01 ENCOUNTER — TELEPHONE (OUTPATIENT)
Dept: OBGYN CLINIC | Facility: CLINIC | Age: 33
End: 2023-03-01

## 2023-03-01 ENCOUNTER — HOSPITAL ENCOUNTER (OUTPATIENT)
Facility: HOSPITAL | Age: 33
Discharge: HOME | End: 2023-03-01
Attending: OBSTETRICS & GYNECOLOGY | Admitting: OBSTETRICS & GYNECOLOGY

## 2023-03-01 VITALS
RESPIRATION RATE: 18 BRPM | DIASTOLIC BLOOD PRESSURE: 81 MMHG | HEART RATE: 103 BPM | SYSTOLIC BLOOD PRESSURE: 129 MMHG | TEMPERATURE: 98.6 F

## 2023-03-01 PROBLEM — Z3A.36 PREGNANCY WITH 36 COMPLETED WEEKS GESTATION: Status: ACTIVE | Noted: 2023-03-01

## 2023-03-01 PROBLEM — Z3A.36 36 WEEKS GESTATION OF PREGNANCY: Status: ACTIVE | Noted: 2023-01-09

## 2023-03-01 LAB
ALBUMIN SERPL BCP-MCNC: 3.1 G/DL (ref 3.5–5)
ALP SERPL-CCNC: 73 U/L (ref 34–104)
ALT SERPL W P-5'-P-CCNC: 7 U/L (ref 7–52)
ANION GAP SERPL CALCULATED.3IONS-SCNC: 9 MMOL/L (ref 4–13)
AST SERPL W P-5'-P-CCNC: 17 U/L (ref 13–39)
BACTERIA UR QL AUTO: ABNORMAL /HPF
BASOPHILS # BLD AUTO: 0.05 THOUSANDS/ÂΜL (ref 0–0.1)
BASOPHILS NFR BLD AUTO: 0 % (ref 0–1)
BILIRUB SERPL-MCNC: 1.12 MG/DL (ref 0.2–1)
BILIRUB UR QL STRIP: NEGATIVE
BUN SERPL-MCNC: 8 MG/DL (ref 5–25)
CALCIUM ALBUM COR SERPL-MCNC: 9.5 MG/DL (ref 8.3–10.1)
CALCIUM SERPL-MCNC: 8.8 MG/DL (ref 8.4–10.2)
CARDIAC TROPONIN I PNL SERPL HS: 3 NG/L
CHLORIDE SERPL-SCNC: 105 MMOL/L (ref 96–108)
CLARITY UR: ABNORMAL
CO2 SERPL-SCNC: 21 MMOL/L (ref 21–32)
COLOR UR: YELLOW
CREAT SERPL-MCNC: 0.54 MG/DL (ref 0.6–1.3)
CREAT UR-MCNC: 125.6 MG/DL
EOSINOPHIL # BLD AUTO: 0.08 THOUSAND/ÂΜL (ref 0–0.61)
EOSINOPHIL NFR BLD AUTO: 1 % (ref 0–6)
ERYTHROCYTE [DISTWIDTH] IN BLOOD BY AUTOMATED COUNT: 13.2 % (ref 11.6–15.1)
GFR SERPL CREATININE-BSD FRML MDRD: 122 ML/MIN/1.73SQ M
GLUCOSE SERPL-MCNC: 89 MG/DL (ref 65–140)
GLUCOSE UR STRIP-MCNC: NEGATIVE MG/DL
HCT VFR BLD AUTO: 36.7 % (ref 34.8–46.1)
HGB BLD-MCNC: 12 G/DL (ref 11.5–15.4)
HGB UR QL STRIP.AUTO: NEGATIVE
IMM GRANULOCYTES # BLD AUTO: 0.12 THOUSAND/UL (ref 0–0.2)
IMM GRANULOCYTES NFR BLD AUTO: 1 % (ref 0–2)
KETONES UR STRIP-MCNC: NEGATIVE MG/DL
LEUKOCYTE ESTERASE UR QL STRIP: ABNORMAL
LYMPHOCYTES # BLD AUTO: 1.57 THOUSANDS/ÂΜL (ref 0.6–4.47)
LYMPHOCYTES NFR BLD AUTO: 13 % (ref 14–44)
MCH RBC QN AUTO: 28.6 PG (ref 26.8–34.3)
MCHC RBC AUTO-ENTMCNC: 32.7 G/DL (ref 31.4–37.4)
MCV RBC AUTO: 88 FL (ref 82–98)
MONOCYTES # BLD AUTO: 0.87 THOUSAND/ÂΜL (ref 0.17–1.22)
MONOCYTES NFR BLD AUTO: 7 % (ref 4–12)
MUCOUS THREADS UR QL AUTO: ABNORMAL
NEUTROPHILS # BLD AUTO: 9.19 THOUSANDS/ÂΜL (ref 1.85–7.62)
NEUTS SEG NFR BLD AUTO: 78 % (ref 43–75)
NITRITE UR QL STRIP: NEGATIVE
NON-SQ EPI CELLS URNS QL MICRO: ABNORMAL /HPF
NRBC BLD AUTO-RTO: 0 /100 WBCS
PH UR STRIP.AUTO: 6 [PH]
PLATELET # BLD AUTO: 242 THOUSANDS/UL (ref 149–390)
PMV BLD AUTO: 10.3 FL (ref 8.9–12.7)
POTASSIUM SERPL-SCNC: 4.2 MMOL/L (ref 3.5–5.3)
PROT SERPL-MCNC: 6.3 G/DL (ref 6.4–8.4)
PROT UR STRIP-MCNC: ABNORMAL MG/DL
PROT UR-MCNC: 13 MG/DL
PROT/CREAT UR: 0.1 MG/G{CREAT} (ref 0–0.1)
RBC # BLD AUTO: 4.19 MILLION/UL (ref 3.81–5.12)
RBC #/AREA URNS AUTO: ABNORMAL /HPF
SODIUM SERPL-SCNC: 135 MMOL/L (ref 135–147)
SP GR UR STRIP.AUTO: 1.02 (ref 1–1.03)
UROBILINOGEN UR STRIP-ACNC: <2 MG/DL
WBC # BLD AUTO: 11.88 THOUSAND/UL (ref 4.31–10.16)
WBC #/AREA URNS AUTO: ABNORMAL /HPF

## 2023-03-01 RX ORDER — ACETAMINOPHEN 325 MG/1
975 TABLET ORAL EVERY 6 HOURS PRN
Status: DISCONTINUED | OUTPATIENT
Start: 2023-03-01 | End: 2023-03-01 | Stop reason: HOSPADM

## 2023-03-01 RX ADMIN — ACETAMINOPHEN 975 MG: 325 TABLET, FILM COATED ORAL at 18:33

## 2023-03-01 NOTE — TELEPHONE ENCOUNTER
Pt called, she is having a blood pressure reading of 146/123, she took it in both arms and each time was around the same. She is also having a headache and swelling in her extremities. Reports good fetal movement no loss of fluid. Patient instructed to be seen at [facility] for evaluation.

## 2023-03-01 NOTE — PROGRESS NOTES
L&D Triage Note - OB/GYN  Maynor Crane 26 y o  female MRN: [id number]  Unit/Bed#: LD TRIAGE 2 Encounter: [id number]      ASSESSMENT:    Maynor Crane is a 26 y o  [id number] at 36w1d presenting for r/o Pre-Eclampsia due to elevated BP reading at home and  complaint of Chest pain and headache  PLAN:    1) R/O Pre Eclampsia : CBC, CMP and P:C wnl   Systolic (85DJM), AIF:458 , Min:127 , HWB:021     Diastolic (11OBT), WZS:03, Min:77, Max:87      2)Headache: Tylenol 975mg-->1 hour reassessment-> improved to 2/10     3) Chest pain: EKG and troponins wnl     4) Cramping( R/O labor ) : SVE Closed/thick/High , FHT: Reactive and reassuring                       5) Continue routine prenatal care    6) Discharge from [facility] triage with  labor precautions    - Reviewed rupture of membranes, false vs true labor, decreased fetal movement, and vaginal bleeding   - Pt to call provider with any concerns and follow up at her next scheduled prenatal appointment    - Case discussed with Dr Lyons Pen:    Maynor Crane 26 y o  [id number] at 36w1d with an Estimated Date of Delivery: 3/28/23 presented to triage with a complaint of a headache, chest pain and an Elevated BP at home ( 146/123)  She reports that she has a history of gestational hypertension in a prior pregnancy and has been checking her blood pressures at home  She reports that today she had a headache that she did not take any medication for  She reports that at the time of the elevated BP reading at home she also had chest pain that resolved almost immediatly  She denies shortness of breath, blurry vision and RUQ/epigastric pain  She reports swelling in her hands and feet  She reports that she has been feeling cramping but is unsure if they are contractions or not  She reports good fetal movement and denies vaginal bleeding and leakage of fluid       Her current obstetrical history is significant for 36w2d gestational age pregnancy complicated by obesity and hypothyroidism         Contractions: cramping  Leakage of fluid: no  Vaginal Bleeding: no  Fetal movement: present    OBJECTIVE:    Vitals:    03/01/23 1741   BP: 128/87   Pulse: (!) 115   Resp: 18   Temp: 98 6 °F (37 °C)       ROS:  Constitutional: Negative  Respiratory: Negative  Cardiovascular: Negative    Gastrointestinal: Negative    General Physical Exam:  General: in no apparent distress  Cardiovascular: Cor RRR  Lungs: non-labored breathing  Abdomen: abdomen is soft without significant tenderness, masses, organomegaly or guarding  Lower extremeties: nontender    Cervical Exam  SVE: Closed/thick/high     Fetal monitoring:  FHT:  Reactive and reassuring [place]: irregular contractions     Lubna Mcgrath MD  OBGYN PGY-1  3/1/2023 5:47 PM

## 2023-03-02 ENCOUNTER — ULTRASOUND (OUTPATIENT)
Dept: PERINATAL CARE | Facility: CLINIC | Age: 33
End: 2023-03-02

## 2023-03-02 VITALS
SYSTOLIC BLOOD PRESSURE: 132 MMHG | BODY MASS INDEX: 43.4 KG/M2 | WEIGHT: 293 LBS | HEIGHT: 69 IN | DIASTOLIC BLOOD PRESSURE: 88 MMHG | HEART RATE: 95 BPM

## 2023-03-02 DIAGNOSIS — O36.8390 FETAL ARRHYTHMIA AFFECTING PREGNANCY, ANTEPARTUM: ICD-10-CM

## 2023-03-02 DIAGNOSIS — O99.210 MATERNAL MORBID OBESITY, ANTEPARTUM (HCC): Primary | ICD-10-CM

## 2023-03-02 DIAGNOSIS — Z3A.36 PREGNANCY WITH 36 COMPLETED WEEKS GESTATION: ICD-10-CM

## 2023-03-02 DIAGNOSIS — E66.01 MATERNAL MORBID OBESITY, ANTEPARTUM (HCC): Primary | ICD-10-CM

## 2023-03-02 LAB
ATRIAL RATE: 96 BPM
ATRIAL RATE: 97 BPM
P AXIS: 36 DEGREES
P AXIS: 49 DEGREES
PR INTERVAL: 148 MS
PR INTERVAL: 152 MS
QRS AXIS: 25 DEGREES
QRS AXIS: 27 DEGREES
QRSD INTERVAL: 84 MS
QRSD INTERVAL: 86 MS
QT INTERVAL: 344 MS
QT INTERVAL: 346 MS
QTC INTERVAL: 436 MS
QTC INTERVAL: 437 MS
T WAVE AXIS: 14 DEGREES
T WAVE AXIS: 15 DEGREES
VENTRICULAR RATE: 96 BPM
VENTRICULAR RATE: 97 BPM

## 2023-03-02 NOTE — PROGRESS NOTES
Repeat Non-Stress Testing:    Patient verbalizes +FM  Pt denies ALL:               Leaking of fluid   Contractions   Vaginal bleeding   Decreased fetal movement    Patient is performing daily kick counts  Jimi Sung states that she was in triage yesterday for elevated BP on her home monitor of 140's/120's  She states that she checked it on both arms and also checked her 's BP to verify if the cuff was working appropriately and got a normal reading for him  States that she has been having a headache that gets better with tylenol but doesn't last long  She states that the headache comes back in about an hour  She also c/o some blurry vision  She states that in triage yesterday she relayed these symptoms to the staff and had bloodwork done  She has an appointment at her [facility] office tomorrow  I reviewed with her to call her OB if her BP's continue to be up tonight  She also agrees to call for worsening or persistent symptoms

## 2023-03-03 ENCOUNTER — ROUTINE PRENATAL (OUTPATIENT)
Dept: OBGYN CLINIC | Facility: CLINIC | Age: 33
End: 2023-03-03

## 2023-03-03 VITALS — DIASTOLIC BLOOD PRESSURE: 88 MMHG | WEIGHT: 293 LBS | BODY MASS INDEX: 45.19 KG/M2 | SYSTOLIC BLOOD PRESSURE: 134 MMHG

## 2023-03-03 DIAGNOSIS — R51.9 FRONTAL HEADACHE: ICD-10-CM

## 2023-03-03 DIAGNOSIS — Z71.85 VACCINE COUNSELING: ICD-10-CM

## 2023-03-03 DIAGNOSIS — O99.212 OBESITY AFFECTING PREGNANCY IN SECOND TRIMESTER: ICD-10-CM

## 2023-03-03 DIAGNOSIS — O36.8390 FETAL ARRHYTHMIA AFFECTING PREGNANCY, ANTEPARTUM: Primary | ICD-10-CM

## 2023-03-03 DIAGNOSIS — Z3A.36 36 WEEKS GESTATION OF PREGNANCY: ICD-10-CM

## 2023-03-03 DIAGNOSIS — O09.523 MULTIGRAVIDA OF ADVANCED MATERNAL AGE IN THIRD TRIMESTER: ICD-10-CM

## 2023-03-03 RX ORDER — METOCLOPRAMIDE 10 MG/1
10 TABLET ORAL 4 TIMES DAILY
Qty: 20 TABLET | Refills: 0 | Status: SHIPPED | OUTPATIENT
Start: 2023-03-03

## 2023-03-03 NOTE — PROGRESS NOTES
Leigha Anderson is a 26 y o  [id number] 36w3d  Reports ++FM, no LOF, VB, or regular contractions       Bps at home had been 120s-140s/80s-100s  Went to triage, normotensive, headache improved  Again worse today, somewhat improved after tylenol 5/10    Vitals:    03/03/23 1500   BP: 134/88   S=D  +FHTs    A/P:  Will try reglan, if no improvement will call and present for eval   Will monitor blood pressures and symptoms, call with   Rh status POS  Discussed pre-term labor precautions  Return to office in 1 week

## 2023-03-05 LAB — GP B STREP DNA SPEC QL NAA+PROBE: NEGATIVE

## 2023-03-07 ENCOUNTER — HOSPITAL ENCOUNTER (OUTPATIENT)
Facility: HOSPITAL | Age: 33
Discharge: HOME | End: 2023-03-07
Attending: STUDENT IN AN ORGANIZED HEALTH CARE EDUCATION/TRAINING PROGRAM | Admitting: STUDENT IN AN ORGANIZED HEALTH CARE EDUCATION/TRAINING PROGRAM

## 2023-03-07 ENCOUNTER — TELEPHONE (OUTPATIENT)
Dept: OBGYN CLINIC | Facility: CLINIC | Age: 33
End: 2023-03-07

## 2023-03-07 VITALS
HEART RATE: 118 BPM | TEMPERATURE: 97.3 F | OXYGEN SATURATION: 96 % | HEIGHT: 69 IN | SYSTOLIC BLOOD PRESSURE: 132 MMHG | DIASTOLIC BLOOD PRESSURE: 84 MMHG | BODY MASS INDEX: 43.4 KG/M2 | RESPIRATION RATE: 18 BRPM | WEIGHT: 293 LBS

## 2023-03-07 DIAGNOSIS — O23.43: ICD-10-CM

## 2023-03-07 DIAGNOSIS — Z3A.36 PREGNANCY WITH 36 COMPLETED WEEKS GESTATION: Primary | ICD-10-CM

## 2023-03-07 PROBLEM — O13.9 GESTATIONAL HYPERTENSION: Status: ACTIVE | Noted: 2023-03-07

## 2023-03-07 LAB
ABO GROUP BLD: NORMAL
ALBUMIN SERPL BCP-MCNC: 3 G/DL (ref 3.5–5)
ALP SERPL-CCNC: 76 U/L (ref 34–104)
ALT SERPL W P-5'-P-CCNC: 7 U/L (ref 7–52)
ANION GAP SERPL CALCULATED.3IONS-SCNC: 9 MMOL/L (ref 4–13)
AST SERPL W P-5'-P-CCNC: 18 U/L (ref 13–39)
BACTERIA UR QL AUTO: ABNORMAL /HPF
BASOPHILS # BLD AUTO: 0.04 THOUSANDS/ÂΜL (ref 0–0.1)
BASOPHILS NFR BLD AUTO: 0 % (ref 0–1)
BILIRUB SERPL-MCNC: 0.73 MG/DL (ref 0.2–1)
BILIRUB UR QL STRIP: NEGATIVE
BLD GP AB SCN SERPL QL: NEGATIVE
BUN SERPL-MCNC: 9 MG/DL (ref 5–25)
CALCIUM ALBUM COR SERPL-MCNC: 9.5 MG/DL (ref 8.3–10.1)
CALCIUM SERPL-MCNC: 8.7 MG/DL (ref 8.4–10.2)
CHLORIDE SERPL-SCNC: 106 MMOL/L (ref 96–108)
CLARITY UR: CLEAR
CO2 SERPL-SCNC: 21 MMOL/L (ref 21–32)
COLOR UR: ABNORMAL
CREAT SERPL-MCNC: 0.54 MG/DL (ref 0.6–1.3)
CREAT UR-MCNC: 125.3 MG/DL
EOSINOPHIL # BLD AUTO: 0.16 THOUSAND/ÂΜL (ref 0–0.61)
EOSINOPHIL NFR BLD AUTO: 2 % (ref 0–6)
ERYTHROCYTE [DISTWIDTH] IN BLOOD BY AUTOMATED COUNT: 13.4 % (ref 11.6–15.1)
GFR SERPL CREATININE-BSD FRML MDRD: 122 ML/MIN/1.73SQ M
GLUCOSE SERPL-MCNC: 118 MG/DL (ref 65–140)
GLUCOSE UR STRIP-MCNC: NEGATIVE MG/DL
HCT VFR BLD AUTO: 34.7 % (ref 34.8–46.1)
HGB BLD-MCNC: 11.6 G/DL (ref 11.5–15.4)
HGB UR QL STRIP.AUTO: NEGATIVE
IMM GRANULOCYTES # BLD AUTO: 0.08 THOUSAND/UL (ref 0–0.2)
IMM GRANULOCYTES NFR BLD AUTO: 1 % (ref 0–2)
KETONES UR STRIP-MCNC: NEGATIVE MG/DL
LEUKOCYTE ESTERASE UR QL STRIP: NEGATIVE
LYMPHOCYTES # BLD AUTO: 1.69 THOUSANDS/ÂΜL (ref 0.6–4.47)
LYMPHOCYTES NFR BLD AUTO: 16 % (ref 14–44)
MCH RBC QN AUTO: 28.9 PG (ref 26.8–34.3)
MCHC RBC AUTO-ENTMCNC: 33.4 G/DL (ref 31.4–37.4)
MCV RBC AUTO: 87 FL (ref 82–98)
MONOCYTES # BLD AUTO: 0.7 THOUSAND/ÂΜL (ref 0.17–1.22)
MONOCYTES NFR BLD AUTO: 7 % (ref 4–12)
MUCOUS THREADS UR QL AUTO: ABNORMAL
NEUTROPHILS # BLD AUTO: 7.84 THOUSANDS/ÂΜL (ref 1.85–7.62)
NEUTS SEG NFR BLD AUTO: 74 % (ref 43–75)
NITRITE UR QL STRIP: POSITIVE
NON-SQ EPI CELLS URNS QL MICRO: ABNORMAL /HPF
NRBC BLD AUTO-RTO: 0 /100 WBCS
PH UR STRIP.AUTO: 6 [PH]
PLATELET # BLD AUTO: 257 THOUSANDS/UL (ref 149–390)
PMV BLD AUTO: 10.4 FL (ref 8.9–12.7)
POTASSIUM SERPL-SCNC: 3.9 MMOL/L (ref 3.5–5.3)
PROT SERPL-MCNC: 6.1 G/DL (ref 6.4–8.4)
PROT UR STRIP-MCNC: NEGATIVE MG/DL
PROT UR-MCNC: 13 MG/DL
PROT/CREAT UR: 0.1 MG/G{CREAT} (ref 0–0.1)
RBC # BLD AUTO: 4.01 MILLION/UL (ref 3.81–5.12)
RBC #/AREA URNS AUTO: ABNORMAL /HPF
RH BLD: POSITIVE
SODIUM SERPL-SCNC: 136 MMOL/L (ref 135–147)
SP GR UR STRIP.AUTO: 1.02 (ref 1–1.03)
SPECIMEN EXPIRATION DATE: NORMAL
UROBILINOGEN UR STRIP-ACNC: <2 MG/DL
WBC # BLD AUTO: 10.51 THOUSAND/UL (ref 4.31–10.16)
WBC #/AREA URNS AUTO: ABNORMAL /HPF

## 2023-03-07 RX ORDER — NITROFURANTOIN 25; 75 MG/1; MG/1
100 CAPSULE ORAL 2 TIMES DAILY WITH MEALS
Status: DISCONTINUED | OUTPATIENT
Start: 2023-03-07 | End: 2023-03-07 | Stop reason: HOSPADM

## 2023-03-07 RX ORDER — NITROFURANTOIN 25; 75 MG/1; MG/1
100 CAPSULE ORAL 2 TIMES DAILY WITH MEALS
Status: DISCONTINUED | OUTPATIENT
Start: 2023-03-08 | End: 2023-03-07

## 2023-03-07 RX ORDER — METOCLOPRAMIDE HYDROCHLORIDE 5 MG/ML
10 INJECTION INTRAMUSCULAR; INTRAVENOUS EVERY 6 HOURS PRN
Status: DISCONTINUED | OUTPATIENT
Start: 2023-03-07 | End: 2023-03-07 | Stop reason: HOSPADM

## 2023-03-07 RX ORDER — NITROFURANTOIN 25; 75 MG/1; MG/1
100 CAPSULE ORAL 2 TIMES DAILY
Qty: 14 CAPSULE | Refills: 0 | Status: SHIPPED | OUTPATIENT
Start: 2023-03-07 | End: 2023-03-14

## 2023-03-07 RX ORDER — DIPHENHYDRAMINE HYDROCHLORIDE 50 MG/ML
25 INJECTION INTRAMUSCULAR; INTRAVENOUS EVERY 6 HOURS PRN
Status: DISCONTINUED | OUTPATIENT
Start: 2023-03-07 | End: 2023-03-07 | Stop reason: HOSPADM

## 2023-03-07 RX ORDER — MAGNESIUM SULFATE HEPTAHYDRATE 40 MG/ML
2 INJECTION, SOLUTION INTRAVENOUS ONCE
Status: COMPLETED | OUTPATIENT
Start: 2023-03-07 | End: 2023-03-07

## 2023-03-07 RX ADMIN — SODIUM CHLORIDE, SODIUM LACTATE, POTASSIUM CHLORIDE, AND CALCIUM CHLORIDE 1000 ML: .6; .31; .03; .02 INJECTION, SOLUTION INTRAVENOUS at 18:58

## 2023-03-07 RX ADMIN — MAGNESIUM SULFATE HEPTAHYDRATE 2 G: 40 INJECTION, SOLUTION INTRAVENOUS at 19:03

## 2023-03-07 RX ADMIN — SODIUM CHLORIDE 500 MG: 0.9 INJECTION, SOLUTION INTRAVENOUS at 19:30

## 2023-03-07 RX ADMIN — METOCLOPRAMIDE 10 MG: 5 INJECTION, SOLUTION INTRAMUSCULAR; INTRAVENOUS at 19:06

## 2023-03-07 RX ADMIN — NITROFURANTOIN (MONOHYDRATE/MACROCRYSTALS) 100 MG: 75; 25 CAPSULE ORAL at 21:10

## 2023-03-07 NOTE — PROGRESS NOTES
"L&D Triage Note - OB/GYN  Shante Disla 26 y o  female MRN: [id number]  Unit/Bed#: LD TRIAGE 2 Encounter: [id number]      ASSESSMENT:    Shante Disla is a 26 y o  [id number] at 37w0d presenting for r/o Pre-Eclampsia due to a non-resolving headache at home  PLAN:    1) R/O Pre-Eclampsia: CBC, CMP within normal limits, P:C 0 10    Meets criteria for Gestational hypertension due to  BP in triage today of 141/95     and BP on 2023  Of 128/92    Recommendation : Patient was informed of the recommendation to admit her for delivery via   Patient states that she would like to leave an return in the morning after making arrangements for the care of her other children  Patient counseled on Pre-Eclampsia and associated severe features/ Patient opted to sign out against medical advice with the plan to return in the morning  2)Headache:HA cocktail of Reglan, magnesium, Solumedrol, benadryl and IV fluid bolus  1 hr follow up--> improved    3) Decreased fetal movement: improved upon 1 hr recheck  FHT: reactive and reassuring   DVP: 3 94cm    4)UTI: positive nitrites on UA  1 dose of Macrobid given to patient in triage , C/S tomorrow morning 23  Macrobid remaining doses to be ordered for treatment in postpartum    3) Discharge from [facility] triage with Pre-Eclampsia and labor precautions    - Reviewed rupture of membranes, false vs true labor, decreased fetal movement, and vaginal bleeding   - Pt to call provider with any concerns and follow up at her next scheduled prenatal appointment    - Case discussed with Dr Elana Anderson:    Shante Disla 26 y o  [id number] at 37w0d with an Estimated Date of Delivery: 3/28/23 presented to triage complaining of a headache that did not resolve after treatment at home  She endorses \"spots\" in her vision and denies blurry vision  She reports dizziness but denies falls  She  Denies Shortness of breath and RUQ/Epigastric pain   She reports decreased fetal movement " and denies vaginal bleeding and leakage of fluid  She denies fever, chills , nausea, vomiting and diarrhea       Her current obstetrical history is significant for 37w gestational age pregnancy complicated by gestational hypertension and obesity    Her past obstetrical history is significant for Three fullterm CS deliveries    Contractions: no  Leakage of fluid: no  Vaginal Bleeding: no  Fetal movement: present    OBJECTIVE:    Vitals:    03/07/23 1747   Pulse: (!) 112   Resp: 18   Temp: (!) 97 3 °F (36 3 °C)   SpO2: 96%       ROS:  Constitutional: Negative  Respiratory: Negative  Cardiovascular: Negative    Gastrointestinal: Negative    General Physical Exam:  General: in no apparent distress  Cardiovascular: Cor RRR  Lungs: non-labored breathing  Abdomen: abdomen is soft without significant tenderness, masses, organomegaly or guarding  Lower extremeties: nontender    Cervical Exam  Deferred    Fetal monitoring:  FHT: reactive and reassuring [place]: contractions absent           Abd  US   DVP: 3 95      Lubna Grayson MD,   OBGYN PGY-1  3/7/2023 5:50 PM

## 2023-03-07 NOTE — TELEPHONE ENCOUNTER
Pt called and said she is 37 weeks and her headache is worse than before  she took her prescription for the headache and it's not going away  Pt advised per Dr Oseas Mariscal to go to [facility]  L&D has been advised  Pt said she will go

## 2023-03-08 ENCOUNTER — HOSPITAL ENCOUNTER (INPATIENT)
Facility: HOSPITAL | Age: 33
LOS: 2 days | Discharge: HOME | End: 2023-03-10
Attending: OBSTETRICS & GYNECOLOGY | Admitting: OBSTETRICS & GYNECOLOGY

## 2023-03-08 ENCOUNTER — ANESTHESIA EVENT (INPATIENT)
Dept: LABOR AND DELIVERY | Facility: HOSPITAL | Age: 33
End: 2023-03-08

## 2023-03-08 ENCOUNTER — ANESTHESIA (INPATIENT)
Dept: LABOR AND DELIVERY | Facility: HOSPITAL | Age: 33
End: 2023-03-08

## 2023-03-08 DIAGNOSIS — Z98.891 PREVIOUS CESAREAN SECTION: ICD-10-CM

## 2023-03-08 DIAGNOSIS — Z98.891 S/P REPEAT LOW TRANSVERSE C-SECTION: ICD-10-CM

## 2023-03-08 PROBLEM — Z3A.36 PREGNANCY WITH 36 COMPLETED WEEKS GESTATION: Status: RESOLVED | Noted: 2023-03-01 | Resolved: 2023-03-08

## 2023-03-08 PROBLEM — Z3A.37 37 WEEKS GESTATION OF PREGNANCY: Status: ACTIVE | Noted: 2023-01-09

## 2023-03-08 PROBLEM — O99.213 OBESITY AFFECTING PREGNANCY IN THIRD TRIMESTER: Status: ACTIVE | Noted: 2023-01-09

## 2023-03-08 LAB
ALBUMIN SERPL BCP-MCNC: 3 G/DL (ref 3.5–5)
ALP SERPL-CCNC: 73 U/L (ref 34–104)
ALT SERPL W P-5'-P-CCNC: 6 U/L (ref 7–52)
ANION GAP SERPL CALCULATED.3IONS-SCNC: 10 MMOL/L (ref 4–13)
AST SERPL W P-5'-P-CCNC: 15 U/L (ref 13–39)
BASE EXCESS BLDCOA CALC-SCNC: -5 MMOL/L (ref 3–11)
BASE EXCESS BLDCOV CALC-SCNC: -2.7 MMOL/L (ref 1–9)
BILIRUB SERPL-MCNC: 0.9 MG/DL (ref 0.2–1)
BUN SERPL-MCNC: 10 MG/DL (ref 5–25)
CALCIUM ALBUM COR SERPL-MCNC: 9.6 MG/DL (ref 8.3–10.1)
CALCIUM SERPL-MCNC: 8.8 MG/DL (ref 8.4–10.2)
CHLORIDE SERPL-SCNC: 106 MMOL/L (ref 96–108)
CO2 SERPL-SCNC: 20 MMOL/L (ref 21–32)
CREAT SERPL-MCNC: 0.55 MG/DL (ref 0.6–1.3)
CREAT UR-MCNC: 153.2 MG/DL
ERYTHROCYTE [DISTWIDTH] IN BLOOD BY AUTOMATED COUNT: 13.2 % (ref 11.6–15.1)
GFR SERPL CREATININE-BSD FRML MDRD: 121 ML/MIN/1.73SQ M
GLUCOSE SERPL-MCNC: 125 MG/DL (ref 65–140)
HCO3 BLDCOA-SCNC: 23.2 MMOL/L (ref 17.3–27.3)
HCO3 BLDCOV-SCNC: 24.3 MMOL/L (ref 12.2–28.6)
HCT VFR BLD AUTO: 34.2 % (ref 34.8–46.1)
HGB BLD-MCNC: 11.4 G/DL (ref 11.5–15.4)
MCH RBC QN AUTO: 29.2 PG (ref 26.8–34.3)
MCHC RBC AUTO-ENTMCNC: 33.3 G/DL (ref 31.4–37.4)
MCV RBC AUTO: 88 FL (ref 82–98)
O2 CT VFR BLDCOA CALC: 9.7 ML/DL
OXYHGB MFR BLDCOA: 44.1 %
OXYHGB MFR BLDCOV: 40 %
PCO2 BLDCOA: 55.3 MM[HG] (ref 30–60)
PCO2 BLDCOV: 50.6 MM HG (ref 27–43)
PH BLDCOA: 7.24 [PH] (ref 7.23–7.43)
PH BLDCOV: 7.3 [PH] (ref 7.19–7.49)
PLATELET # BLD AUTO: 252 THOUSANDS/UL (ref 149–390)
PMV BLD AUTO: 10.3 FL (ref 8.9–12.7)
PO2 BLDCOA: 21.9 MM HG (ref 5–25)
PO2 BLDCOV: 19 MM HG (ref 15–45)
POTASSIUM SERPL-SCNC: 4.4 MMOL/L (ref 3.5–5.3)
PROT SERPL-MCNC: 6.1 G/DL (ref 6.4–8.4)
PROT UR-MCNC: 43 MG/DL
PROT/CREAT UR: 0.28 MG/G{CREAT} (ref 0–0.1)
RBC # BLD AUTO: 3.9 MILLION/UL (ref 3.81–5.12)
SAO2 % BLDCOV: 8.2 ML/DL
SODIUM SERPL-SCNC: 136 MMOL/L (ref 135–147)
TREPONEMA PALLIDUM IGG+IGM AB [PRESENCE] IN SERUM OR PLASMA BY IMMUNOASSAY: NORMAL
WBC # BLD AUTO: 15.27 THOUSAND/UL (ref 4.31–10.16)

## 2023-03-08 PROCEDURE — 4A1HXCZ MONITORING OF PRODUCTS OF CONCEPTION, CARDIAC RATE, EXTERNAL APPROACH: ICD-10-PCS | Performed by: OBSTETRICS & GYNECOLOGY

## 2023-03-08 RX ORDER — HYDROMORPHONE HCL/PF 1 MG/ML
0.5 SYRINGE (ML) INJECTION
Status: DISCONTINUED | OUTPATIENT
Start: 2023-03-08 | End: 2023-03-10 | Stop reason: HOSPADM

## 2023-03-08 RX ORDER — CALCIUM CARBONATE 200(500)MG
1000 TABLET,CHEWABLE ORAL DAILY PRN
Status: DISCONTINUED | OUTPATIENT
Start: 2023-03-08 | End: 2023-03-10 | Stop reason: HOSPADM

## 2023-03-08 RX ORDER — METOCLOPRAMIDE HYDROCHLORIDE 5 MG/ML
5 INJECTION INTRAMUSCULAR; INTRAVENOUS EVERY 6 HOURS PRN
Status: ACTIVE | OUTPATIENT
Start: 2023-03-08 | End: 2023-03-09

## 2023-03-08 RX ORDER — CEFAZOLIN SODIUM 2 G/50ML
2000 SOLUTION INTRAVENOUS ONCE
Status: COMPLETED | OUTPATIENT
Start: 2023-03-08 | End: 2023-03-08

## 2023-03-08 RX ORDER — ONDANSETRON 2 MG/ML
4 INJECTION INTRAMUSCULAR; INTRAVENOUS ONCE AS NEEDED
Status: DISCONTINUED | OUTPATIENT
Start: 2023-03-08 | End: 2023-03-10 | Stop reason: HOSPADM

## 2023-03-08 RX ORDER — ACETAMINOPHEN 325 MG/1
650 TABLET ORAL EVERY 6 HOURS PRN
Status: DISPENSED | OUTPATIENT
Start: 2023-03-08 | End: 2023-03-09

## 2023-03-08 RX ORDER — SODIUM CHLORIDE, SODIUM LACTATE, POTASSIUM CHLORIDE, CALCIUM CHLORIDE 600; 310; 30; 20 MG/100ML; MG/100ML; MG/100ML; MG/100ML
INJECTION, SOLUTION INTRAVENOUS CONTINUOUS PRN
Status: DISCONTINUED | OUTPATIENT
Start: 2023-03-08 | End: 2023-03-08

## 2023-03-08 RX ORDER — ONDANSETRON 2 MG/ML
INJECTION INTRAMUSCULAR; INTRAVENOUS AS NEEDED
Status: DISCONTINUED | OUTPATIENT
Start: 2023-03-08 | End: 2023-03-08

## 2023-03-08 RX ORDER — ENOXAPARIN SODIUM 100 MG/ML
40 INJECTION SUBCUTANEOUS DAILY
Status: DISCONTINUED | OUTPATIENT
Start: 2023-03-09 | End: 2023-03-09

## 2023-03-08 RX ORDER — METOCLOPRAMIDE HYDROCHLORIDE 5 MG/ML
10 INJECTION INTRAMUSCULAR; INTRAVENOUS ONCE AS NEEDED
Status: DISCONTINUED | OUTPATIENT
Start: 2023-03-08 | End: 2023-03-10 | Stop reason: HOSPADM

## 2023-03-08 RX ORDER — ACETAMINOPHEN 325 MG/1
975 TABLET ORAL EVERY 6 HOURS
Status: DISCONTINUED | OUTPATIENT
Start: 2023-03-09 | End: 2023-03-10 | Stop reason: HOSPADM

## 2023-03-08 RX ORDER — CEFAZOLIN SODIUM 1 G/50ML
1000 SOLUTION INTRAVENOUS ONCE
Status: DISCONTINUED | OUTPATIENT
Start: 2023-03-08 | End: 2023-03-10 | Stop reason: HOSPADM

## 2023-03-08 RX ORDER — DIPHENHYDRAMINE HYDROCHLORIDE 50 MG/ML
25 INJECTION INTRAMUSCULAR; INTRAVENOUS EVERY 6 HOURS PRN
Status: DISCONTINUED | OUTPATIENT
Start: 2023-03-09 | End: 2023-03-10 | Stop reason: HOSPADM

## 2023-03-08 RX ORDER — NITROFURANTOIN 25; 75 MG/1; MG/1
100 CAPSULE ORAL 2 TIMES DAILY
Status: DISCONTINUED | OUTPATIENT
Start: 2023-03-08 | End: 2023-03-10 | Stop reason: HOSPADM

## 2023-03-08 RX ORDER — FENTANYL CITRATE 50 UG/ML
INJECTION, SOLUTION INTRAMUSCULAR; INTRAVENOUS AS NEEDED
Status: DISCONTINUED | OUTPATIENT
Start: 2023-03-08 | End: 2023-03-08

## 2023-03-08 RX ORDER — HYDROMORPHONE HCL/PF 1 MG/ML
0.5 SYRINGE (ML) INJECTION EVERY 2 HOUR PRN
Status: DISCONTINUED | OUTPATIENT
Start: 2023-03-08 | End: 2023-03-10 | Stop reason: HOSPADM

## 2023-03-08 RX ORDER — SODIUM CHLORIDE, SODIUM LACTATE, POTASSIUM CHLORIDE, CALCIUM CHLORIDE 600; 310; 30; 20 MG/100ML; MG/100ML; MG/100ML; MG/100ML
125 INJECTION, SOLUTION INTRAVENOUS CONTINUOUS
Status: DISCONTINUED | OUTPATIENT
Start: 2023-03-08 | End: 2023-03-08

## 2023-03-08 RX ORDER — CLONIDINE 100 UG/ML
INJECTION, SOLUTION EPIDURAL AS NEEDED
Status: DISCONTINUED | OUTPATIENT
Start: 2023-03-08 | End: 2023-03-08

## 2023-03-08 RX ORDER — NALBUPHINE HCL 10 MG/ML
5 AMPUL (ML) INJECTION EVERY 4 HOURS PRN
Status: DISCONTINUED | OUTPATIENT
Start: 2023-03-08 | End: 2023-03-10 | Stop reason: HOSPADM

## 2023-03-08 RX ORDER — OXYTOCIN/RINGER'S LACTATE 30/500 ML
62.5 PLASTIC BAG, INJECTION (ML) INTRAVENOUS CONTINUOUS
Status: ACTIVE | OUTPATIENT
Start: 2023-03-08 | End: 2023-03-08

## 2023-03-08 RX ORDER — HYDROMORPHONE HCL/PF 1 MG/ML
0.5 SYRINGE (ML) INJECTION
Status: DISCONTINUED | OUTPATIENT
Start: 2023-03-09 | End: 2023-03-10 | Stop reason: HOSPADM

## 2023-03-08 RX ORDER — ONDANSETRON 2 MG/ML
4 INJECTION INTRAMUSCULAR; INTRAVENOUS EVERY 6 HOURS PRN
Status: ACTIVE | OUTPATIENT
Start: 2023-03-08 | End: 2023-03-09

## 2023-03-08 RX ORDER — SODIUM CHLORIDE, SODIUM LACTATE, POTASSIUM CHLORIDE, CALCIUM CHLORIDE 600; 310; 30; 20 MG/100ML; MG/100ML; MG/100ML; MG/100ML
125 INJECTION, SOLUTION INTRAVENOUS CONTINUOUS
Status: DISPENSED | OUTPATIENT
Start: 2023-03-08 | End: 2023-03-08

## 2023-03-08 RX ORDER — MORPHINE SULFATE 1 MG/ML
INJECTION, SOLUTION EPIDURAL; INTRATHECAL; INTRAVENOUS AS NEEDED
Status: DISCONTINUED | OUTPATIENT
Start: 2023-03-08 | End: 2023-03-08

## 2023-03-08 RX ORDER — ONDANSETRON 2 MG/ML
4 INJECTION INTRAMUSCULAR; INTRAVENOUS EVERY 8 HOURS PRN
Status: DISCONTINUED | OUTPATIENT
Start: 2023-03-08 | End: 2023-03-08

## 2023-03-08 RX ORDER — KETOROLAC TROMETHAMINE 30 MG/ML
15 INJECTION, SOLUTION INTRAMUSCULAR; INTRAVENOUS EVERY 6 HOURS PRN
Status: DISPENSED | OUTPATIENT
Start: 2023-03-08 | End: 2023-03-09

## 2023-03-08 RX ORDER — DOCUSATE SODIUM 100 MG/1
100 CAPSULE, LIQUID FILLED ORAL 2 TIMES DAILY
Status: DISCONTINUED | OUTPATIENT
Start: 2023-03-08 | End: 2023-03-10 | Stop reason: HOSPADM

## 2023-03-08 RX ORDER — OXYTOCIN/RINGER'S LACTATE 30/500 ML
PLASTIC BAG, INJECTION (ML) INTRAVENOUS CONTINUOUS PRN
Status: DISCONTINUED | OUTPATIENT
Start: 2023-03-08 | End: 2023-03-08

## 2023-03-08 RX ORDER — ONDANSETRON 2 MG/ML
4 INJECTION INTRAMUSCULAR; INTRAVENOUS EVERY 8 HOURS PRN
Status: DISCONTINUED | OUTPATIENT
Start: 2023-03-09 | End: 2023-03-10 | Stop reason: HOSPADM

## 2023-03-08 RX ORDER — NALOXONE HYDROCHLORIDE 0.4 MG/ML
0.1 INJECTION, SOLUTION INTRAMUSCULAR; INTRAVENOUS; SUBCUTANEOUS
Status: ACTIVE | OUTPATIENT
Start: 2023-03-08 | End: 2023-03-09

## 2023-03-08 RX ORDER — DEXAMETHASONE SODIUM PHOSPHATE 10 MG/ML
INJECTION, SOLUTION INTRAMUSCULAR; INTRAVENOUS AS NEEDED
Status: DISCONTINUED | OUTPATIENT
Start: 2023-03-08 | End: 2023-03-08

## 2023-03-08 RX ORDER — BUPIVACAINE HYDROCHLORIDE 7.5 MG/ML
INJECTION, SOLUTION INTRASPINAL AS NEEDED
Status: DISCONTINUED | OUTPATIENT
Start: 2023-03-08 | End: 2023-03-08

## 2023-03-08 RX ADMIN — BUPIVACAINE HYDROCHLORIDE IN DEXTROSE 1.8 ML: 7.5 INJECTION, SOLUTION SUBARACHNOID at 08:53

## 2023-03-08 RX ADMIN — NITROFURANTOIN (MONOHYDRATE/MACROCRYSTALS) 100 MG: 75; 25 CAPSULE ORAL at 13:07

## 2023-03-08 RX ADMIN — DOCUSATE SODIUM 100 MG: 100 CAPSULE, LIQUID FILLED ORAL at 13:07

## 2023-03-08 RX ADMIN — Medication 62.5 MILLI-UNITS/MIN: at 10:22

## 2023-03-08 RX ADMIN — SODIUM CHLORIDE, SODIUM LACTATE, POTASSIUM CHLORIDE, AND CALCIUM CHLORIDE: .6; .31; .03; .02 INJECTION, SOLUTION INTRAVENOUS at 08:49

## 2023-03-08 RX ADMIN — NITROFURANTOIN (MONOHYDRATE/MACROCRYSTALS) 100 MG: 75; 25 CAPSULE ORAL at 18:03

## 2023-03-08 RX ADMIN — ACETAMINOPHEN 650 MG: 325 TABLET ORAL at 19:37

## 2023-03-08 RX ADMIN — CEFAZOLIN SODIUM 3000 MG: 2 SOLUTION INTRAVENOUS at 08:50

## 2023-03-08 RX ADMIN — KETOROLAC TROMETHAMINE 15 MG: 30 INJECTION, SOLUTION INTRAMUSCULAR; INTRAVENOUS at 16:49

## 2023-03-08 RX ADMIN — MORPHINE SULFATE 0.15 MG: 1 INJECTION, SOLUTION EPIDURAL; INTRATHECAL; INTRAVENOUS at 08:53

## 2023-03-08 RX ADMIN — SODIUM CHLORIDE, SODIUM LACTATE, POTASSIUM CHLORIDE, AND CALCIUM CHLORIDE 1000 ML: .6; .31; .03; .02 INJECTION, SOLUTION INTRAVENOUS at 07:35

## 2023-03-08 RX ADMIN — CLONIDINE 20 MCG: 100 INJECTION, SOLUTION EPIDURAL at 08:53

## 2023-03-08 RX ADMIN — FENTANYL CITRATE 15 MCG: 50 INJECTION, SOLUTION INTRAMUSCULAR; INTRAVENOUS at 08:53

## 2023-03-08 RX ADMIN — ONDANSETRON 4 MG: 2 INJECTION INTRAMUSCULAR; INTRAVENOUS at 09:01

## 2023-03-08 RX ADMIN — NALBUPHINE HYDROCHLORIDE 5 MG: 10 INJECTION, SOLUTION INTRAMUSCULAR; INTRAVENOUS; SUBCUTANEOUS at 10:36

## 2023-03-08 RX ADMIN — DOCUSATE SODIUM 100 MG: 100 CAPSULE, LIQUID FILLED ORAL at 18:03

## 2023-03-08 RX ADMIN — NALBUPHINE HYDROCHLORIDE 5 MG: 10 INJECTION, SOLUTION INTRAMUSCULAR; INTRAVENOUS; SUBCUTANEOUS at 13:07

## 2023-03-08 RX ADMIN — SODIUM CHLORIDE, SODIUM LACTATE, POTASSIUM CHLORIDE, AND CALCIUM CHLORIDE 125 ML/HR: .6; .31; .03; .02 INJECTION, SOLUTION INTRAVENOUS at 07:58

## 2023-03-08 RX ADMIN — Medication 550 MILLI-UNITS/MIN: at 09:20

## 2023-03-08 RX ADMIN — KETOROLAC TROMETHAMINE 15 MG: 30 INJECTION, SOLUTION INTRAMUSCULAR; INTRAVENOUS at 09:51

## 2023-03-08 RX ADMIN — SODIUM CHLORIDE, SODIUM LACTATE, POTASSIUM CHLORIDE, AND CALCIUM CHLORIDE 125 ML/HR: .6; .31; .03; .02 INJECTION, SOLUTION INTRAVENOUS at 10:22

## 2023-03-08 RX ADMIN — SODIUM CHLORIDE, SODIUM LACTATE, POTASSIUM CHLORIDE, AND CALCIUM CHLORIDE 125 ML/HR: .6; .31; .03; .02 INJECTION, SOLUTION INTRAVENOUS at 13:08

## 2023-03-08 RX ADMIN — PHENYLEPHRINE HYDROCHLORIDE 50 MCG/MIN: 10 INJECTION, SOLUTION INTRAVENOUS at 08:53

## 2023-03-08 RX ADMIN — DEXAMETHASONE SODIUM PHOSPHATE 10 MG: 10 INJECTION, SOLUTION INTRAMUSCULAR; INTRAVENOUS at 09:01

## 2023-03-08 RX ADMIN — NALBUPHINE HYDROCHLORIDE 5 MG: 10 INJECTION, SOLUTION INTRAMUSCULAR; INTRAVENOUS; SUBCUTANEOUS at 18:03

## 2023-03-08 NOTE — ANESTHESIA PROCEDURE NOTES
Spinal Block    Patient location during procedure: OR  Start time: 3/8/2023 8:53 AM  Reason for block: primary anesthetic  Staffing  Performed: CRNA   Anesthesiologist: Shelly Robles MD  Resident/CRNA: Reggie Polanco CRNA  Preanesthetic Checklist  Completed: patient identified, IV checked, risks and benefits discussed, surgical consent, monitors and equipment checked, pre-op evaluation and timeout performed  Spinal Block  Patient position: sitting  Prep: ChloraPrep  Patient monitoring: heart rate, continuous pulse ox and frequent blood pressure checks  Approach: midline  Location: L3-4  Injection technique: single-shot  Needle  Needle type: pencil-tip   Needle gauge: 24 G (used 4inch 24g)  Assessment  Injection Assessment:  negative aspiration for heme, no paresthesia on injection and positive aspiration for clear CSF    Post-procedure:  site cleaned

## 2023-03-08 NOTE — ASSESSMENT & PLAN NOTE
- Ultrasounds: Most recent growth scan on 2/16/23 wnl; s/p AFIs for BMI >40  - Labs: UTD  - Tdap: Received  - Flu Shot: Received  - COVID: Received  - Delivery: RLTCS  - Contraception: Patient declined tubal ligation

## 2023-03-08 NOTE — H&P
H&P Exam - Obstetrics   Augusta Po 26 y o  female MRN: [id number]  Unit/Bed#: LD TRIAGE 3-01 Encounter: [id number]      History of Present Illness     Chief Complaint: Elective  Section, Repeat    HPI:  Augusta Fonseca is a 26 y o   female with an ROMAN of 3/28/2023, by Last Menstrual Period at 37w1d weeks gestation who is being admitted for RLTCS in the setting of newly diagnosed gestational hypertension  Contractions: None  Loss of fluid: None  Vaginal bleeding: None  Fetal movement: Yes    She is CFW patient       PREGNANCY COMPLICATIONS:   1) gHTN with hx gHTN  2) BMI >40  3) Hypothyroidism  4) AMA  5) Hx C/S x3  6) PCOS  7) Fetal arrhythmia  8) UTI in pregnancy currently on antibiotics    OB History    Para Term  AB Living   4 3 3 0 0 3   SAB IAB Ectopic Multiple Live Births   0 0 0 0 3      # Outcome Date GA Lbr Yuriy/2nd Weight Sex Delivery Anes PTL Lv   4 Current            3 Term 20 37w4d  2930 g (6 lb 7 4 oz) F CS-LTranv Spinal  ALMAS   2 Term 13 39w0d  3487 g (7 lb 11 oz) M CS-LTranv Spinal  ALMAS   1 Term 11 39w0d  3374 g (7 lb 7 oz) F CS-LTranv EPI N ALMAS      Complications: Fetal Intolerance, Failed Induction       Historical Information   Past Medical History:   Diagnosis Date   • Abnormal Pap smear of cervix     LGSIL - colpo - LGSIL, HPV effect   • Chronic diarrhea of unknown origin     last assessed 6/26/15   • Disease of thyroid gland     nodule   • Dyspepsia     last assessed  10/29/14   • Early satiety     last assessed  10/29/14   • Hidradenitis suppurativa     last assessed  14   • Hypertension     last pregnancy   • Hypothyroidism        • Obesity    • Polycystic ovary syndrome    • Varicella     childhood     Past Surgical History:   Procedure Laterality Date   •  SECTION       and  [address] [place],    • [place]  DELIVERY ONLY N/A 2020    Procedure:  SECTION () REPEAT;  Surgeon: Ling Dominguez Rancho Hogan MD;  Location: [facility] ;  Service: Obstetrics   • WISDOM TOOTH EXTRACTION      all 4     Social History   Social History     Substance and Sexual Activity   Alcohol Use No     Social History     Substance and Sexual Activity   Drug Use No     Social History     Tobacco Use   Smoking Status Former   • Packs/day: 0 00   • Years: 0 00   • Pack years: 0 00   • Types: Cigarettes   • Quit date: 2014   • Years since quittin 0   Smokeless Tobacco Never   Tobacco Comments     sometimes 1 cig once weekly - none since preg     Family History: non-contributory    Meds/Allergies      Medications Prior to Admission   Medication   • aspirin 81 mg chewable tablet   • ergocalciferol (VITAMIN D2) 50,000 units   • Ferrous Fumarate-Vitamin C (KVNG-SEQUELS PO)   • metoclopramide (Reglan) 10 mg tablet   • nitrofurantoin (MACROBID) 100 mg capsule   • ondansetron (Zofran ODT) 4 mg disintegrating tablet   • Prenatal MV & Min w/FA-DHA (PRENATAL ADULT GUMMY/DHA/FA PO)        Allergies   Allergen Reactions   • A + D Personal Care Lotion  [Dimethicone] Rash   • Calamine-Zinc Oxide Rash   • Keflex [Cephalexin] GI Intolerance   • Pramoxine-Calamine    • Zinc Rash     No med alert bracelet no epi pen       OBJECTIVE:    Vitals: Blood pressure 131/93, pulse (!) 125, resp  rate 18, height 5' 9" (1 753 m), weight (!) 139 kg (306 lb), last menstrual period 2022, SpO2 97 %, currently breastfeeding  Body mass index is 45 19 kg/m²  Physical Exam  Vitals reviewed  Constitutional:       General: She is not in acute distress  Appearance: Normal appearance  She is well-developed  She is obese  She is not ill-appearing, toxic-appearing or diaphoretic  Cardiovascular:      Rate and Rhythm: Normal rate  Pulmonary:      Effort: Pulmonary effort is normal  No respiratory distress  Abdominal:      General: There is no distension  Palpations: Abdomen is soft  There is no mass  Tenderness: There is no abdominal tenderness  There is no guarding or rebound  Genitourinary:     Comments: Gravid, nontender  Skin:     General: Skin is warm and dry  Neurological:      Mental Status: She is alert and oriented to person, place, and time  Psychiatric:         Mood and Affect: Mood is anxious (patient tearful during discussion)           Behavior: Behavior normal        Fetal heart rate:   Baseline Rate: 135 bpm  Variability: Moderate 6-25 bpm  Accelerations: 15 x 15 or greater, At variable times  Decelerations: None  FHR Category: Category I    Wing:   Contraction Frequency (minutes): 0  Contraction Duration (seconds): 0  Contraction Quality: Not applicable    EFW: 35EX% on 2/16/23    GBS: negative    Prenatal Labs:   Blood Type:   Lab Results   Component Value Date/Time    ABO Grouping O 03/07/2023 06:55 PM     , D (Rh type):   Lab Results   Component Value Date/Time    Rh Factor Positive 03/07/2023 06:55 PM     , Antibody Screen: No results found for: ANTIBODYSCR , HCT/HGB:   Lab Results   Component Value Date/Time    Hematocrit 34 7 (L) 03/07/2023 06:55 PM    Hematocrit 39 0 06/21/2016 11:01 AM    Hemoglobin 11 6 03/07/2023 06:55 PM    Hemoglobin 12 9 06/21/2016 11:01 AM      , Platelets:   Lab Results   Component Value Date/Time    Platelets 082 10/58/3845 06:55 PM    Platelets 535 79/90/7021 11:01 AM      , 1 hour Glucola:   Lab Results   Component Value Date/Time    Glucose 163 (H) 11/05/2022 11:18 AM   ,Rubella:   Lab Results   Component Value Date/Time    Rubella IgG Quant >175 0 08/27/2022 10:31 AM        , VDRL/RPR:   Lab Results   Component Value Date/Time    RPR Non-Reactive 01/07/2023 10:11 AM      , Hep B:   Lab Results   Component Value Date/Time    Hepatitis B Surface Ag Non-reactive 08/27/2022 10:31 AM     , HIV:   Lab Results   Component Value Date/Time    HIV-1/HIV-2 Ab Non-Reactive 08/27/2022 10:31 AM     , Group B Strep:    Lab Results   Component Value Date/Time    Strep Grp B PCR Negative 03/03/2023 04:51 PM    Strep Grp B AMARILIS Negative 2020 01:41 PM          Invasive Devices     Peripheral Intravenous Line  Duration           Peripheral IV 23 Dorsal (posterior); Left Hand <1 day                  Assessment/Plan     ASSESSMENT:  32yo  at 37w1d weeks gestation who is being admitted for RLTCS in the setting of gHTN  PLAN:  Urinary tract infection in pregnancy in third trimester, antepartum  Assessment & Plan  Continue Macrobid    Gestational hypertension  Assessment & Plan  Systolic (76QII), KLK:351 , Min:131 , SNP:141   Diastolic (71MMH), OBU:77, Min:84, Max:97  PreE labs normal yesterday; Today's PreE labs pending        Fetal arrhythmia affecting pregnancy, antepartum  Assessment & Plan  No recent PACs  S/p fetal echo    Obesity affecting pregnancy in third trimester  Assessment & Plan  Patient s/p APFS    Abnormal glucose tolerance in pregnancy  Assessment & Plan  Patient had abnormal glucola early in pregnancy  Per documentation from patient's physician: "Glucola negative" in 3rd trimester but lab results not available for review  Patient does not endorse having GDM  Fasting BG to be collected with CMP this morning    Thyroid disease affecting pregnancy  Assessment & Plan  Patient not currently on thyroid medication  TSH wnl on 23     * 37 weeks gestation of pregnancy  Assessment & Plan  - Ultrasounds: Most recent growth scan on 23 wnl; s/p AFIs for BMI >40  - Labs: UTD  - Tdap: Received  - Flu Shot: Received  - COVID: Received  - Delivery: RLTCS  - Contraception: Patient declined tubal ligation    This patient will be an INPATIENT  and I certify the anticipated length of stay is >2 Midnights  Marcelene Pilon Collette Agreste, MD  3/8/2023  7:44 AM

## 2023-03-08 NOTE — ASSESSMENT & PLAN NOTE
Patient had abnormal glucola early in pregnancy  Per documentation from patient's physician: "Glucola negative" in 3rd trimester but lab results not available for review  Patient does not endorse having GDM  Fasting BG to be collected with CMP this morning

## 2023-03-08 NOTE — ANESTHESIA POSTPROCEDURE EVALUATION
Post-Op Assessment Note    CV Status:  Stable    Pain management: adequate     Mental Status:  Alert and awake   PONV Controlled:  None   Airway Patency:  Patent      Post Op Vitals Reviewed: Yes      Staff: Anesthesiologist, CRNA         No notable events documented      BP   110/55   Temp   97 8   Pulse 100   Resp   14   SpO2   98

## 2023-03-08 NOTE — LACTATION NOTE
This note was copied from a baby's chart  CONSULT - LACTATION  Baby Girl [facility]) [place] 0 days female MRN: [id number]    [address] Room / Bed: (N)/(N) Encounter: [id number]    Maternal Information     MOTHER:  Reagan Phillips  Maternal Age: 26 y o    OB History: # 1 - Date: 11, Sex: Female, Weight: 3374 g (7 lb 7 oz), GA: 39w0d, Delivery: , Low Transverse, Apgar1: None, Apgar5: None, Living: Living, Birth Comments: None    # 2 - Date: 13, Sex: Male, Weight: 3487 g (7 lb 11 oz), GA: 39w0d, Delivery: , Low Transverse, Apgar1: None, Apgar5: None, Living: Living, Birth Comments: None    # 3 - Date: 20, Sex: Female, Weight: 2930 g (6 lb 7 4 oz), GA: 37w4d, Delivery: , Low Transverse, Apgar1: 7, Apgar5: 9, Living: Living, Birth Comments: None    # 4 - Date: 23, Sex: Female, Weight: 3610 g (7 lb 15 3 oz), GA: 37w1d, Delivery: , Low Transverse, Apgar1: 8, Apgar5: 9, Living: Living, Birth Comments: None   Previouse breast reduction surgery?  No    Lactation history:   Has patient previously breast fed: Yes   How long had patient previously breast fed: 18 mo   Previous breast feeding complications: None     Past Surgical History:   Procedure Laterality Date   •  SECTION       and  [address] [facility]   • [place]  DELIVERY ONLY N/A 2020    Procedure: Marcelo Ham () REPEAT;  Surgeon: Hernan Chapa MD;  Location: [facility] ;  Service: Obstetrics   • WISDOM TOOTH EXTRACTION      all 4        Birth information:  YOB: 2025   Time of birth: 9:19 AM   Sex: female   Delivery type: , Low Transverse   Birth Weight: 3610 g (7 lb 15 3 oz)   Percent of Weight Change: 0%     Gestational Age: 42w4d   [unfilled]    Assessment     Breast and nipple assessment: normal assessment    [place] Assessment: normal assessment    Feeding assessment: feeding well  LATCH:  Latch: Grasps breast, tongue down, lips flanged, rhythmic sucking   Audible Swallowing: Spontaneous and intermittent (24 hours old)   Type of Nipple: Everted (After stimulation)   Comfort (Breast/Nipple): Soft/non-tender   Hold (Positioning): No assist from staff, mother able to position/hold infant   LATCH Score: 10          Feeding recommendations:  breast feed on demand     Met with mother  Provided mother with Ready, Set, Baby booklet  Discussed Skin to Skin contact an benefits to mom and baby  Talked about the delay of the first bath until baby has adjusted  Spoke about the benefits of rooming in  Feeding on cue and what that means for recognizing infant's hunger  Avoidance of pacifiers for the first month discussed  Talked about exclusive breastfeeding for the first 6 months  Positioning and latch reviewed as well as showing images of other feeding positions  Discussed the properties of a good latch in any position  Reviewed hand/manual expression  Discussed s/s that baby is getting enough milk and some s/s that breastfeeding dyad may need further help  Gave information on common concerns, what to expect the first few weeks after delivery, preparing for other caregivers, and how partners can help  Resources for support also provided  Information on hand expression given  Discussed benefits of knowing how to manually express breast including stimulating milk supply, softening nipple for latch and evacuating breast in the event of engorgement  Discussed 2nd night syndrome and ways to calm infant  Hand out given  Provided DC booklet at this time, enc family to review and prepare questions for day of DC  Mom pumping, collecting 10+mL of colostrum, easily hand expresses colostrum as well  Baby is on blood sugar protocol  Assisted parents to place baby skin to skin in football hold   Discussed importance of alignment of baby's ear, shoulder, and hip in any preferred position  Worked on supporting baby at breast level and beginning the feed with baby's nose arriving at the nipple  Then, using areolar compression while guiding baby chin-forward to the breast to achieve a deep, comfortable latch  Mom latches baby independently, wide latch and rocker suck observed  Reviewed signs of effective breastfeeding: audible swallows, strong but comfortable tugging while latched, breasts softening (after milk comes in), baby falling asleep and releasing the breast, and meeting daily diaper goals  Consult placed for Medela pump           Gema Lock RN 3/8/2023 5:35 PM

## 2023-03-08 NOTE — PLAN OF CARE
Problem: POSTPARTUM  Goal: Experiences normal postpartum course  Description: INTERVENTIONS:  - Monitor maternal vital signs  - Assess uterine involution and lochia  Outcome: Progressing  Goal: Appropriate maternal -  bonding  Description: INTERVENTIONS:  - Identify family support  - Assess for appropriate maternal/infant bonding   -Encourage maternal/infant bonding opportunities  - Referral to  or  as needed  Outcome: Progressing  Goal: Establishment of infant feeding pattern  Description: INTERVENTIONS:  - Assess breast/bottle feeding  - Refer to lactation as needed  Outcome: Progressing  Goal: Incision(s), wounds(s) or drain site(s) healing without S/S of infection  Description: INTERVENTIONS  - Assess and document dressing, incision, wound bed, drain sites and surrounding tissue  - Provide patient and family education  - Perform skin care/dressing changes every   Outcome: Progressing     Problem: PAIN - ADULT  Goal: Verbalizes/displays adequate comfort level or baseline comfort level  Description: Interventions:  - Encourage patient to monitor pain and request assistance  - Assess pain using appropriate pain scale  - Administer analgesics based on type and severity of pain and evaluate response  - Implement non-pharmacological measures as appropriate and evaluate response  - Consider cultural and social influences on pain and pain management  - Notify physician/advanced practitioner if interventions unsuccessful or patient reports new pain  Outcome: Progressing     Problem: INFECTION - ADULT  Goal: Absence or prevention of progression during hospitalization  Description: INTERVENTIONS:  - Assess and monitor for signs and symptoms of infection  - Monitor lab/diagnostic results  - Monitor all insertion sites, i e  indwelling lines, tubes, and drains  - Monitor endotracheal if appropriate and nasal secretions for changes in amount and color  - [place] appropriate cooling/warming therapies per order  - Administer medications as ordered  - Instruct and encourage patient and family to use good hand hygiene technique  - Identify and instruct in appropriate isolation precautions for identified infection/condition  Outcome: Progressing  Goal: Absence of fever/infection during neutropenic period  Description: INTERVENTIONS:  - Monitor WBC    Outcome: Progressing     Problem: SAFETY ADULT  Goal: Patient will remain free of falls  Description: INTERVENTIONS:  - Educate patient/family on patient safety including physical limitations  - Instruct patient to call for assistance with activity   - Consult OT/PT to assist with strengthening/mobility   - Keep Call bell within reach  - Keep bed low and locked with side rails adjusted as appropriate  - Keep care items and personal belongings within reach  - Initiate and maintain comfort rounds  - Make Fall Risk Sign visible to staff  - Offer Toileting every  Hours, in advance of need  - Initiate/Maintain alarm  - Obtain necessary fall risk management equipment:   - Apply yellow socks and bracelet for high fall risk patients  - Consider moving patient to room near nurses station  Outcome: Progressing  Goal: Maintain or return to baseline ADL function  Description: INTERVENTIONS:  -  Assess patient's ability to carry out ADLs; assess patient's baseline for ADL function and identify physical deficits which impact ability to perform ADLs (bathing, care of mouth/teeth, toileting, grooming, dressing, etc )  - Assess/evaluate cause of self-care deficits   - Assess range of motion  - Assess patient's mobility; develop plan if impaired  - Assess patient's need for assistive devices and provide as appropriate  - Encourage maximum independence but intervene and supervise when necessary  - Involve family in performance of ADLs  - Assess for home care needs following discharge   - Consider OT consult to assist with ADL evaluation and planning for discharge  - Provide patient education as appropriate  Outcome: Progressing  Goal: Maintains/Returns to pre admission functional level  Description: INTERVENTIONS:  - Perform BMAT or MOVE assessment daily    - Set and communicate daily mobility goal to care team and patient/family/caregiver  - Collaborate with rehabilitation services on mobility goals if consulted  - Perform Range of Motion  times a day  - Reposition patient every  hours  - Dangle patient  times a day  - Stand patient  times a day  - Ambulate patient  times a day  - Out of bed to chair  times a day   - Out of bed for meals  times a day  - Out of bed for toileting  - Record patient progress and toleration of activity level   Outcome: Progressing     Problem: Knowledge Deficit  Goal: Patient/family/caregiver demonstrates understanding of disease process, treatment plan, medications, and discharge instructions  Description: Complete learning assessment and assess knowledge base    Interventions:  - Provide teaching at level of understanding  - Provide teaching via preferred learning methods  Outcome: Progressing     Problem: DISCHARGE PLANNING  Goal: Discharge to home or other facility with appropriate resources  Description: INTERVENTIONS:  - Identify barriers to discharge w/patient and caregiver  - Arrange for needed discharge resources and transportation as appropriate  - Identify discharge learning needs (meds, wound care, etc )  - Arrange for interpretive services to assist at discharge as needed  - Refer to Case Management Department for coordinating discharge planning if the patient needs post-hospital services based on physician/advanced practitioner order or complex needs related to functional status, cognitive ability, or social support system  Outcome: Progressing

## 2023-03-08 NOTE — ASSESSMENT & PLAN NOTE
1 dose of Macrobid given to patient in triage , C/S tomorrow morning 03/08/23   Macrobid remaining doses to be ordered for treatment in postpartum

## 2023-03-08 NOTE — ANESTHESIA PREPROCEDURE EVALUATION
Procedure:   SECTION () REPEAT (Uterus)    Relevant Problems   CARDIO   (+) Gestational hypertension   (+) Hyperlipidemia, mixed      ENDO   (+) Hypothyroidism      GYN   (+) 37 weeks gestation of pregnancy   (+) Multigravida of advanced maternal age in third trimester      NEURO/PSYCH   (+) Anxiety   (+) History of gestational hypertension        Physical Exam    Airway    Mallampati score: II  TM Distance: >3 FB  Neck ROM: full     Dental       Cardiovascular      Pulmonary      Other Findings        Anesthesia Plan  ASA Score- 2     Anesthesia Type- spinal with ASA Monitors  Additional Monitors:   Airway Plan:           Plan Factors-    Chart reviewed  Induction- intravenous  Postoperative Plan-     Informed Consent- Anesthetic plan and risks discussed with patient  I personally reviewed this patient with the CRNA  Discussed and agreed on the Anesthesia Plan with the CRNA  Morgan Francois

## 2023-03-08 NOTE — DISCHARGE SUMMARY
CS Discharge Summary - Noé Williamson 26 y o  female MRN: [id number]    Unit/Bed#: -01 Encounter: [id number]    Admission Date: 3/8/2023     Discharge Date: 3/10/2023    Admitting Diagnosis:   40 weeks gestation of pregnancy  gHTN  History of C/S x3  Obesity in pregnancy  Hypothyroidism  History of gHTN  AMA  Fetal arrhythmia  UTI    Discharge Diagnosis:   Same, delivered  S/p RLTCS    Procedures:   repeat  section, low transverse incision    Admitting Attending: Dr Wing Castillo  Delivery Attending: Dr Wing Castillo  Discharge Attending: Dr Elijah Vargasum:     Noé Williamson is a 26 y o  [id number] who was admitted for RLTCS in the setting of gestational hypertension at 42 weeks  She then underwent an uncomplicated  section delivery and delivered a viable female  on 3/8/23 at 0919  APGARS were 8, 9 at 1 and 5 minutes, respectively   weighed 7lb 15 3oz   was then transferred to  nursery  Patient tolerated the procedure well and was transferred to recovery in stable condition  The patient's post partum course was unremarkable    Preoperative hemaglobin was 11 4, postoperative was 10 3  Her postoperative pain was well controlled with oral analgesics  On day of discharge, she was ambulating and able to reasonably perform all ADLs  She was voiding and had appropriate bowel function  Pain was well controlled  She was discharged home on post-operative day #2  without complications  Patient was instructed to follow up with her OB as an outpatient and was given appropriate warnings to call provider if she develops signs of infection or uncontrolled pain  Complications:   None    Condition at discharge:   good     Provisions for Follow-Up Care:  See after visit summary for information related to follow-up care and any pertinent home health orders        Disposition:   Home    Planned Readmission:   No    Discharge Medications:   Prenatal vitamin daily for 6 months or the duration of nursing whichever is longer  Motrin 600 mg orally every 6 hours as needed for pain  Tylenol (over the counter) per bottle directions as needed for pain  Hydrocortisone cream 1% (over the counter) applied 1-2x daily to hemorrhoids as needed  Witch hazel pads for hemorrhoidal discomfort as needed      Discharge instructions :   -Do not place anything (no partner, tampons or douche) in your vagina for 6 weeks  -You may walk for exercise for the first 6 weeks then gradually return to your usual activities    -Please do not drive for 1 week if you have no stitches and for 2 weeks if you have stitches or underwent a  delivery     -You may take baths or shower per your preference    -Please look at your bust (breasts) in the mirror daily and call provider for redness or tenderness or increased warmth  - If you have had a  please look at your incision daily as well and call provider for increasing redness or steady drainage from the incision    -Please call your provider if temperature > 100 4*F or 38* C, worsening pain or a foul discharge  Case and note reviewed agree

## 2023-03-08 NOTE — PROGRESS NOTES
Kelly Cheung is a 25 yo  at 37 weeks gestation seen at bedside separate from resident- We reviewed Crissy's diagnosis for gestational hypertension given prior elevated blood pressure at 36 weeks and elevated blood pressures (non severe) this evening  I explained recommendations for delivery today given she is 37 weeks gestation- her preeclamptic labs are within normal limits and headache was improved following HA cocktail  Kelly Cheung declined staying for delivery and in addition she ate on the way in to the hospital so she would not be able to have a  section until midnight- I reviewed that in that case I would recommend staying inpatient until AM for  section and she explains she cannot stay secondary to   We reviewed risks of evolving preeclampsia, eclampsia, seizure, stroke and increase in maternal and fetal morbidity and mortality  Patient is understanding and signed papers to leave against medical advice  Preeclamptic and return precautions were reviewed and she understands she is welcome to return to triage at any time  Chlorhexidine soap given and reviewed NPO after midnight  Kelly Cheung was put on books for AM section which she was agreeable with  She was started on macrobid therapy given her allergy to keflex for nitrite positive urinalysis

## 2023-03-08 NOTE — OP NOTE
Section Procedure Note    Procedure:   RLTCS    Indications:   Maternal request for  section  History of  section (x3)  Gestational Hypertension at 37 weeks gestation  IUP at 37 weeks    Pre-operative Diagnosis:   37 weeks gestation of pregnancy  gHTN  History of C/S x3  Obesity in pregnancy  Hypothyroidism  History of gHTN  AMA  Fetal arrhythmia  UTI    Post-operative Diagnosis:   Same  S/p RLTCS    Attending: Terry Valdez MD  Co-surgeon: Kiya Rodrigez MD  *Second surgeon was necessary as no qualified resident was available  Maternal Findings:  Normal uterus  Normal tubes and ovaries bilaterally  Adhesive disease noted from bilateral rectus muscles to fascia  No difficulty noted from skin to delivery     Findings:  Viable female weighing 7lbs 15 3oz;  Apgar scores of 8 at one minute and 9 at five minutes  Clear amniotic fluid  Normal placenta with 3-vessel cord    Arterial and Venous Gases:  Umbilical Cord Venous Blood Gas:  Results from last 7 days   Lab Units 23  0920   PH COV  7 299   PCO2 COV mm HG 50 6*   HCO3 COV mmol/L 24 3   BASE EXC COV mmol/L -2 7*   O2 CT CD VB mL/dL 8 2   O2 HGB, VENOUS CORD % 36 9     Umbilical Cord Arterial Blood Gas:  Results from last 7 days   Lab Units 23  0920   PH COA  7 240   PCO2 COA  55 3   PO2 COA mm HG 21 9   HCO3 COA mmol/L 23 2   BASE EXC COA mmol/L -5 0*   O2 CONTENT CORD ART ml/dl 9 7   O2 HGB, ARTERIAL CORD % 44 1       Specimens: Arterial and venous cord gases, cord blood, segment of umbilical cord, placenta to pathology    Quantitative Blood Loss: 525 mL    Drains: Padron catheter           Complications:  None; patient tolerated the procedure well  Disposition: PACU            Condition: stable    Brief Labor Course:   Patient was admitted for RLTCS in the setting of gestational hypertension    Procedure Details   The patient was seen prior to the procedure   Risks, benefits, possible complications, alternate treatment options, and expected outcomes were discussed with the patient  The patient agreed with the proposed plan and gave informed consent for a RLTCS  The patient was taken to the [facility] Operating Room where she received spinal anesthesia  For infection prophylaxis, she received Ancef 3g IV preoperatively  Fetal heart tones in the OR were assessed and noted to be within normal limits and a Padron catheter and SCDs were placed  The abdomen was prepped with Chloraprep, the vagina was prepped with chlorhexidine, and following appropriate drying time, the patient was draped in the usual sterile manner  A Time Out was held and the above information confirmed  The patient was identified as Joel Diaz and the procedure verified as a  Delivery  A Pfannenstiel incision was made and carried down through the underlying subcutaneous tissue to the fascia using a scalpel  The rectus fascia was then nicked in the midline and dissected laterally using Rivera scissors  The superior edge of the  fascial incision was grasped with Kocher clamps bilaterally, tented upward and the underlying rectus muscles were dissected off sharply with Bovie and scalpel  The rectus muscles were  and the peritoneum was identified, entered, and extended longitudinally with combination of sharp and blunt dissection  The [place] was inserted  A low transverse uterine incision was made with the scalpel and extended laterally with blunt dissection  The amnion was entered bluntly  The fetal head was palpated, elevated, and delivered through the uterine incision followed by the body without difficulty  There was noted to be spontaneous cry and good tone  There was no apparent injury to the   The umbilical cord was doubly clamped and cut after 30 seconds to allow for delayed cord clamping  The infant was handed off to the  providers    Arterial and venous cord gases, cord blood, and a segment of umbilical cord were obtained for evaluation  The placenta delivered spontaneously with uterine fundal massage and appeared normal  The uterus was cleaned out with a moist lap sponge  The uterine incision was closed with a running locked suture of 0 Vicryl  A second layer of the same suture was used to imbricate the first   Hemostasis was noted to be excellent  The paracolic gutters were inspected and cleared of all clots and debris  The [place] retractor was removed  The fascia was closed with a running suture of 0 Vicryl  Subcutaneous adipose tissue was closed with a running suture of 2-0 Plain gut  The skin was closed with a subcuticular running suture of 4-0 Monocryl  Mepilex dressing was applied  The patient appeared to tolerate the procedure very well  Lap sponge, needle, and instrument counts were correct x2  The patient was transferred to her postpartum recovery room in stable condition and her infant went to the  nursery  Kiya Ribeiro MD  OB/GYN  3/8/2023  2:31 PM

## 2023-03-08 NOTE — ASSESSMENT & PLAN NOTE
Systolic (78IOK), TIZ:361 , Min:122 , ZSH:907   Diastolic (51VVV), NCR:78, Min:76, Max:93  CBC/CMP wnl, PCr 0 28

## 2023-03-09 LAB
DME PARACHUTE DELIVERY DATE ACTUAL: NORMAL
DME PARACHUTE DELIVERY DATE REQUESTED: NORMAL
DME PARACHUTE ITEM DESCRIPTION: NORMAL
DME PARACHUTE ORDER STATUS: NORMAL
DME PARACHUTE SUPPLIER NAME: NORMAL
DME PARACHUTE SUPPLIER PHONE: NORMAL
ERYTHROCYTE [DISTWIDTH] IN BLOOD BY AUTOMATED COUNT: 13.3 % (ref 11.6–15.1)
HCT VFR BLD AUTO: 31.5 % (ref 34.8–46.1)
HGB BLD-MCNC: 10.3 G/DL (ref 11.5–15.4)
MCH RBC QN AUTO: 29.1 PG (ref 26.8–34.3)
MCHC RBC AUTO-ENTMCNC: 32.7 G/DL (ref 31.4–37.4)
MCV RBC AUTO: 89 FL (ref 82–98)
PLATELET # BLD AUTO: 265 THOUSANDS/UL (ref 149–390)
PMV BLD AUTO: 10.2 FL (ref 8.9–12.7)
RBC # BLD AUTO: 3.54 MILLION/UL (ref 3.81–5.12)
WBC # BLD AUTO: 18.82 THOUSAND/UL (ref 4.31–10.16)

## 2023-03-09 RX ORDER — ENOXAPARIN SODIUM 100 MG/ML
40 INJECTION SUBCUTANEOUS EVERY 12 HOURS SCHEDULED
Status: DISCONTINUED | OUTPATIENT
Start: 2023-03-09 | End: 2023-03-10 | Stop reason: HOSPADM

## 2023-03-09 RX ORDER — OXYCODONE HYDROCHLORIDE 10 MG/1
10 TABLET ORAL EVERY 4 HOURS PRN
Status: DISCONTINUED | OUTPATIENT
Start: 2023-03-09 | End: 2023-03-10 | Stop reason: HOSPADM

## 2023-03-09 RX ORDER — OXYCODONE HYDROCHLORIDE 5 MG/1
5 TABLET ORAL EVERY 4 HOURS PRN
Status: DISCONTINUED | OUTPATIENT
Start: 2023-03-09 | End: 2023-03-10 | Stop reason: HOSPADM

## 2023-03-09 RX ORDER — IBUPROFEN 600 MG/1
600 TABLET ORAL EVERY 6 HOURS
Status: DISCONTINUED | OUTPATIENT
Start: 2023-03-09 | End: 2023-03-10 | Stop reason: HOSPADM

## 2023-03-09 RX ADMIN — DOCUSATE SODIUM 100 MG: 100 CAPSULE, LIQUID FILLED ORAL at 09:44

## 2023-03-09 RX ADMIN — DOCUSATE SODIUM 100 MG: 100 CAPSULE, LIQUID FILLED ORAL at 18:08

## 2023-03-09 RX ADMIN — KETOROLAC TROMETHAMINE 15 MG: 30 INJECTION, SOLUTION INTRAMUSCULAR; INTRAVENOUS at 05:35

## 2023-03-09 RX ADMIN — ACETAMINOPHEN 975 MG: 325 TABLET ORAL at 09:44

## 2023-03-09 RX ADMIN — NALBUPHINE HYDROCHLORIDE 5 MG: 10 INJECTION, SOLUTION INTRAMUSCULAR; INTRAVENOUS; SUBCUTANEOUS at 00:08

## 2023-03-09 RX ADMIN — NITROFURANTOIN (MONOHYDRATE/MACROCRYSTALS) 100 MG: 75; 25 CAPSULE ORAL at 18:08

## 2023-03-09 RX ADMIN — ACETAMINOPHEN 650 MG: 325 TABLET ORAL at 02:21

## 2023-03-09 RX ADMIN — KETOROLAC TROMETHAMINE 15 MG: 30 INJECTION, SOLUTION INTRAMUSCULAR; INTRAVENOUS at 00:08

## 2023-03-09 RX ADMIN — ACETAMINOPHEN 975 MG: 325 TABLET ORAL at 16:40

## 2023-03-09 RX ADMIN — NITROFURANTOIN (MONOHYDRATE/MACROCRYSTALS) 100 MG: 75; 25 CAPSULE ORAL at 09:43

## 2023-03-09 RX ADMIN — ENOXAPARIN SODIUM 40 MG: 40 INJECTION SUBCUTANEOUS at 21:00

## 2023-03-09 RX ADMIN — IBUPROFEN 600 MG: 600 TABLET, FILM COATED ORAL at 21:00

## 2023-03-09 RX ADMIN — ENOXAPARIN SODIUM 40 MG: 40 INJECTION SUBCUTANEOUS at 09:46

## 2023-03-09 RX ADMIN — IBUPROFEN 600 MG: 600 TABLET, FILM COATED ORAL at 14:27

## 2023-03-09 NOTE — PROGRESS NOTES
Progress Note - OB/GYN  Howard Adam 26 y o  female MRN: [id number]  Unit/Bed#:  315-01 Encounter: [id number]    Assessment and Plan     Howard Adam is a patient of: [facility]   She is POD# 1 s/p  repeat  section, low transverse incision  Recovering well and is stable       S/P repeat low transverse   Assessment & Plan  , Hgb 11 4 --> post op Hgb 10 3   Lines: borrego removed, spontaneously voiding   Pain: Tylenol and toradol scheduled, rebeca 5/10 PRN    FEN: Tolerating regular diet  DVT ppx: SCDs and Lovenox 40mg BID  Passing flatus   Incision C/D/I     Urinary tract infection in pregnancy in third trimester, antepartum  Assessment & Plan  Continue Macrobid    Gestational hypertension  Assessment & Plan  Systolic (99GGY), DVC:615 , Min:102 , NVB:090   Diastolic (54LGU), TDH:55, Min:53, Max:96  CBC/CMP wnl, PCr 0 28        Fetal arrhythmia affecting pregnancy, antepartum  Assessment & Plan  No recent PACs  S/p fetal echo    Thyroid disease affecting pregnancy  Assessment & Plan  Patient not currently on thyroid medication  TSH wnl on 23         Disposition    - Anticipate discharge home on PPD# 2-3      Subjective/Objective     Chief Complaint: Postpartum State     Subjective:    Howard Adam is POD#1 s/p  repeat  section, low transverse incision  She has no current complaints  Pain is well controlled  Patient is currently voiding  She is ambulating  Patient is not currently passing flatus and has had no bowel movement  She is tolerating PO, and denies nausea or vomitting  Patient denies fever, chills, chest pain, shortness of breath, or calf tenderness  Lochia is normal  She is  Breastfeeding  She is recovering well and is stable         Vitals:   /89 (BP Location: Left arm)   Pulse 100   Temp 97 8 °F (36 6 °C) (Oral)   Resp 20   Ht 5' 9" (1 753 m)   Wt (!) 139 kg (306 lb)   LMP 2022   SpO2 96%   Breastfeeding Yes   BMI 45 19 kg/m² Intake/Output Summary (Last 24 hours) at 3/9/2023 0703  Last data filed at 3/8/2023 2130  Gross per 24 hour   Intake 900 ml   Output 1400 ml   Net -500 ml       Invasive Devices     Peripheral Intravenous Line  Duration           Peripheral IV 03/08/23 Dorsal (posterior); Left Hand <1 day                Physical Exam:   GEN: Rayna Mccabe appears well, alert and oriented x 3, pleasant and cooperative   CARDIO: RRR, no murmurs or rubs  RESP:  CTAB, no wheezes or rales  ABDOMEN: soft, no tenderness, no distention, fundus @ +1, Incision C/D/I  EXTREMITIES: SCDs on, non tender, no erythema, b/l Jose Manuel's sign negative      Labs:     Hemoglobin   Date Value Ref Range Status   03/09/2023 10 3 (L) 11 5 - 15 4 g/dL Final   03/08/2023 11 4 (L) 11 5 - 15 4 g/dL Final   06/21/2016 12 9 11 1 - 15 9 g/dL Final     WBC   Date Value Ref Range Status   03/09/2023 18 82 (H) 4 31 - 10 16 Thousand/uL Final   03/08/2023 15 27 (H) 4 31 - 10 16 Thousand/uL Final   06/21/2016 7 5 3 4 - 10 8 x10E3/uL Final     Platelets   Date Value Ref Range Status   03/09/2023 265 149 - 390 Thousands/uL Final   03/08/2023 252 149 - 390 Thousands/uL Final   06/21/2016 241 150 - 379 x10E3/uL Final     Creatinine   Date Value Ref Range Status   03/08/2023 0 55 (L) 0 60 - 1 30 mg/dL Final     Comment:     Standardized to IDMS reference method   03/07/2023 0 54 (L) 0 60 - 1 30 mg/dL Final     Comment:     Standardized to IDMS reference method   06/21/2016 0 69 0 57 - 1 00 mg/dL Final     AST   Date Value Ref Range Status   03/08/2023 15 13 - 39 U/L Final     Comment:     Specimen collection should occur prior to Sulfasalazine administration due to the potential for falsely depressed results  03/07/2023 18 13 - 39 U/L Final     Comment:     Specimen collection should occur prior to Sulfasalazine administration due to the potential for falsely depressed results      06/21/2016 21 0 - 40 IU/L Final     ALT   Date Value Ref Range Status   03/08/2023 6 (L) 7 - 52 U/L Final     Comment:     Specimen collection should occur prior to Sulfasalazine administration due to the potential for falsely depressed results  03/07/2023 7 7 - 52 U/L Final     Comment:     Specimen collection should occur prior to Sulfasalazine administration due to the potential for falsely depressed results      06/21/2016 19 0 - 32 IU/L Final          Alejandra MD Sukhdeep  3/9/2023  7:03 AM

## 2023-03-09 NOTE — CASE MANAGEMENT
Case Management Progress Note    Patient name Maria G Johnson  Location /-72 MRN [id number]  : 1990 Date 3/9/2023       LOS (days): 1  Geometric Mean LOS (GMLOS) (days):   Days to GMLOS:        OBJECTIVE:        Current admission status: Inpatient  Preferred Pharmacy:   [address] [place] [id number]  Phone: [phone] Fax: [phone]    Primary Care Provider: Rasta Wren MD    Primary Insurance: Celeste Pencil  Secondary Insurance:     PROGRESS NOTE:    Consult received re: mother requesting Medela breast pump  Order sent via parachute  CM confirmed with rep Tien Purpura) that pump was delivered bedside  Consult completed

## 2023-03-09 NOTE — PLAN OF CARE
Problem: POSTPARTUM  Goal: Experiences normal postpartum course  Description: INTERVENTIONS:  - Monitor maternal vital signs  - Assess uterine involution and lochia  Outcome: Progressing  Goal: Appropriate maternal -  bonding  Description: INTERVENTIONS:  - Identify family support  - Assess for appropriate maternal/infant bonding   -Encourage maternal/infant bonding opportunities  - Referral to  or  as needed  Outcome: Progressing  Goal: Establishment of infant feeding pattern  Description: INTERVENTIONS:  - Assess breast/bottle feeding  - Refer to lactation as needed  Outcome: Progressing  Goal: Incision(s), wounds(s) or drain site(s) healing without S/S of infection  Description: INTERVENTIONS  - Assess and document dressing, incision, wound bed, drain sites and surrounding tissue  - Provide patient and family education  - Perform skin care/dressing changes every   Outcome: Progressing     Problem: PAIN - ADULT  Goal: Verbalizes/displays adequate comfort level or baseline comfort level  Description: Interventions:  - Encourage patient to monitor pain and request assistance  - Assess pain using appropriate pain scale  - Administer analgesics based on type and severity of pain and evaluate response  - Implement non-pharmacological measures as appropriate and evaluate response  - Consider cultural and social influences on pain and pain management  - Notify physician/advanced practitioner if interventions unsuccessful or patient reports new pain  Outcome: Progressing     Problem: INFECTION - ADULT  Goal: Absence or prevention of progression during hospitalization  Description: INTERVENTIONS:  - Assess and monitor for signs and symptoms of infection  - Monitor lab/diagnostic results  - Monitor all insertion sites, i e  indwelling lines, tubes, and drains  - Monitor endotracheal if appropriate and nasal secretions for changes in amount and color  - [place] appropriate cooling/warming therapies per order  - Administer medications as ordered  - Instruct and encourage patient and family to use good hand hygiene technique  - Identify and instruct in appropriate isolation precautions for identified infection/condition  Outcome: Progressing  Goal: Absence of fever/infection during neutropenic period  Description: INTERVENTIONS:  - Monitor WBC    Outcome: Progressing     Problem: SAFETY ADULT  Goal: Patient will remain free of falls  Description: INTERVENTIONS:  - Educate patient/family on patient safety including physical limitations  - Instruct patient to call for assistance with activity   - Consult OT/PT to assist with strengthening/mobility   - Keep Call bell within reach  - Keep bed low and locked with side rails adjusted as appropriate  - Keep care items and personal belongings within reach  - Initiate and maintain comfort rounds  - Make Fall Risk Sign visible to staff  - Offer Toileting every  Hours, in advance of need  - Initiate/Maintain alarm  - Obtain necessary fall risk management equipment:   - Apply yellow socks and bracelet for high fall risk patients  - Consider moving patient to room near nurses station  Outcome: Progressing  Goal: Maintain or return to baseline ADL function  Description: INTERVENTIONS:  -  Assess patient's ability to carry out ADLs; assess patient's baseline for ADL function and identify physical deficits which impact ability to perform ADLs (bathing, care of mouth/teeth, toileting, grooming, dressing, etc )  - Assess/evaluate cause of self-care deficits   - Assess range of motion  - Assess patient's mobility; develop plan if impaired  - Assess patient's need for assistive devices and provide as appropriate  - Encourage maximum independence but intervene and supervise when necessary  - Involve family in performance of ADLs  - Assess for home care needs following discharge   - Consider OT consult to assist with ADL evaluation and planning for discharge  - Provide patient education as appropriate  Outcome: Progressing  Goal: Maintains/Returns to pre admission functional level  Description: INTERVENTIONS:  - Perform BMAT or MOVE assessment daily    - Set and communicate daily mobility goal to care team and patient/family/caregiver  - Collaborate with rehabilitation services on mobility goals if consulted  - Perform Range of Motion  times a day  - Reposition patient every  hours  - Dangle patient  times a day  - Stand patient  times a day  - Ambulate patient  times a day  - Out of bed to chair  times a day   - Out of bed for meals times a day  - Out of bed for toileting  - Record patient progress and toleration of activity level   Outcome: Progressing     Problem: Knowledge Deficit  Goal: Patient/family/caregiver demonstrates understanding of disease process, treatment plan, medications, and discharge instructions  Description: Complete learning assessment and assess knowledge base    Interventions:  - Provide teaching at level of understanding  - Provide teaching via preferred learning methods  Outcome: Progressing     Problem: DISCHARGE PLANNING  Goal: Discharge to home or other facility with appropriate resources  Description: INTERVENTIONS:  - Identify barriers to discharge w/patient and caregiver  - Arrange for needed discharge resources and transportation as appropriate  - Identify discharge learning needs (meds, wound care, etc )  - Arrange for interpretive services to assist at discharge as needed  - Refer to Case Management Department for coordinating discharge planning if the patient needs post-hospital services based on physician/advanced practitioner order or complex needs related to functional status, cognitive ability, or social support system  Outcome: Progressing

## 2023-03-09 NOTE — PLAN OF CARE
Problem: POSTPARTUM  Goal: Experiences normal postpartum course  Description: INTERVENTIONS:  - Monitor maternal vital signs  - Assess uterine involution and lochia  Outcome: Progressing  Goal: Appropriate maternal -  bonding  Description: INTERVENTIONS:  - Identify family support  - Assess for appropriate maternal/infant bonding   -Encourage maternal/infant bonding opportunities  - Referral to  or  as needed  Outcome: Progressing  Goal: Establishment of infant feeding pattern  Description: INTERVENTIONS:  - Assess breast/bottle feeding  - Refer to lactation as needed  Outcome: Progressing  Goal: Incision(s), wounds(s) or drain site(s) healing without S/S of infection  Description: INTERVENTIONS  - Assess and document dressing, incision, wound bed, drain sites and surrounding tissue  - Provide patient and family education  - Perform skin care/dressing changes every   Outcome: Progressing     Problem: PAIN - ADULT  Goal: Verbalizes/displays adequate comfort level or baseline comfort level  Description: Interventions:  - Encourage patient to monitor pain and request assistance  - Assess pain using appropriate pain scale  - Administer analgesics based on type and severity of pain and evaluate response  - Implement non-pharmacological measures as appropriate and evaluate response  - Consider cultural and social influences on pain and pain management  - Notify physician/advanced practitioner if interventions unsuccessful or patient reports new pain  Outcome: Progressing     Problem: INFECTION - ADULT  Goal: Absence or prevention of progression during hospitalization  Description: INTERVENTIONS:  - Assess and monitor for signs and symptoms of infection  - Monitor lab/diagnostic results  - Monitor all insertion sites, i e  indwelling lines, tubes, and drains  - Monitor endotracheal if appropriate and nasal secretions for changes in amount and color  - [place] appropriate cooling/warming therapies per order  - Administer medications as ordered  - Instruct and encourage patient and family to use good hand hygiene technique  - Identify and instruct in appropriate isolation precautions for identified infection/condition  Outcome: Progressing  Goal: Absence of fever/infection during neutropenic period  Description: INTERVENTIONS:  - Monitor WBC    Outcome: Progressing     Problem: SAFETY ADULT  Goal: Patient will remain free of falls  Description: INTERVENTIONS:  - Educate patient/family on patient safety including physical limitations  - Instruct patient to call for assistance with activity   - Consult OT/PT to assist with strengthening/mobility   - Keep Call bell within reach  - Keep bed low and locked with side rails adjusted as appropriate  - Keep care items and personal belongings within reach  - Initiate and maintain comfort rounds  - Make Fall Risk Sign visible to staff  - Offer Toileting every  Hours, in advance of need  - Initiate/Maintain alarm  - Obtain necessary fall risk management equipment:   - Apply yellow socks and bracelet for high fall risk patients  - Consider moving patient to room near nurses station  Outcome: Progressing  Goal: Maintain or return to baseline ADL function  Description: INTERVENTIONS:  -  Assess patient's ability to carry out ADLs; assess patient's baseline for ADL function and identify physical deficits which impact ability to perform ADLs (bathing, care of mouth/teeth, toileting, grooming, dressing, etc )  - Assess/evaluate cause of self-care deficits   - Assess range of motion  - Assess patient's mobility; develop plan if impaired  - Assess patient's need for assistive devices and provide as appropriate  - Encourage maximum independence but intervene and supervise when necessary  - Involve family in performance of ADLs  - Assess for home care needs following discharge   - Consider OT consult to assist with ADL evaluation and planning for discharge  - Provide patient education as appropriate  Outcome: Progressing  Goal: Maintains/Returns to pre admission functional level  Description: INTERVENTIONS:  - Perform BMAT or MOVE assessment daily    - Set and communicate daily mobility goal to care team and patient/family/caregiver  - Collaborate with rehabilitation services on mobility goals if consulted  - Perform Range of Motion  times a day  - Reposition patient every  hours  - Dangle patient  times a day  - Stand patient  times a day  - Ambulate patient  times a day  - Out of bed to chair  times a day   - Out of bed for meals  times a day  - Out of bed for toileting  - Record patient progress and toleration of activity level   Outcome: Progressing     Problem: Knowledge Deficit  Goal: Patient/family/caregiver demonstrates understanding of disease process, treatment plan, medications, and discharge instructions  Description: Complete learning assessment and assess knowledge base    Interventions:  - Provide teaching at level of understanding  - Provide teaching via preferred learning methods  Outcome: Progressing     Problem: DISCHARGE PLANNING  Goal: Discharge to home or other facility with appropriate resources  Description: INTERVENTIONS:  - Identify barriers to discharge w/patient and caregiver  - Arrange for needed discharge resources and transportation as appropriate  - Identify discharge learning needs (meds, wound care, etc )  - Arrange for interpretive services to assist at discharge as needed  - Refer to Case Management Department for coordinating discharge planning if the patient needs post-hospital services based on physician/advanced practitioner order or complex needs related to functional status, cognitive ability, or social support system  Outcome: Progressing

## 2023-03-09 NOTE — LACTATION NOTE
This note was copied from a baby's chart  Follow up lactation: mom is an exp  bfing mom  Mom states she is unhappy with glucose protocols and had decided to give non-human substitute during glucose protocol due to annoyance  Mom plans on excl  Breastfeeding  Ed  On how to est  Milk supply  Ed  On non-nutritive suck and cluster feeding  Enc  S2s  Enc baby in the room and within arm's reach    Enc  To call lactation to assist with latch  Milk Supply:   - Allow for non-nutritive suck at the breast to stimulate supply   - Allow for skin to skin during and after each breastfeeding session   - Use massage, heat, and hand expression prior to feedings to assist with deep latch   - Increase pumping sessions and pump after every feeding    Education and information provided about non-nutritive suck, role of colostrum, and benefits of skin to skin  Discussed 2nd night syndrome and ways to calm infant  Hand out given  Information on hand expression given  Discussed benefits of knowing how to manually express breast including stimulating milk supply, softening nipple for latch and evacuating breast in the event of engorgement  Mom is encouraged to place baby skin to skin for feedings  Skin to skin education provided for baby placement on mother's chest, baby only in diaper, blankets below shoulders on baby's back  Skin to skin is encouraged to continue at home for feedings and between feedings  Encouraged parents to call for assistance, questions, and concerns about breastfeeding  Extension provided

## 2023-03-09 NOTE — ASSESSMENT & PLAN NOTE
, Hgb 11 4 --> post op Hgb 10 3   Lines: borrego removed, spontaneously voiding   Pain: Tylenol and motrin scheduled, rebeca 5/10 PRN    FEN: Tolerating regular diet  DVT ppx: SCDs and Lovenox 40mg BID  Passing flatus   Incision C/D/I

## 2023-03-10 VITALS
RESPIRATION RATE: 18 BRPM | HEART RATE: 112 BPM | DIASTOLIC BLOOD PRESSURE: 84 MMHG | BODY MASS INDEX: 43.4 KG/M2 | WEIGHT: 293 LBS | TEMPERATURE: 99.3 F | OXYGEN SATURATION: 96 % | HEIGHT: 69 IN | SYSTOLIC BLOOD PRESSURE: 132 MMHG

## 2023-03-10 LAB — BACTERIA UR CULT: ABNORMAL

## 2023-03-10 RX ORDER — OXYCODONE HYDROCHLORIDE 5 MG/1
5 TABLET ORAL EVERY 6 HOURS PRN
Qty: 5 TABLET | Refills: 0 | Status: SHIPPED | OUTPATIENT
Start: 2023-03-10 | End: 2023-03-20

## 2023-03-10 RX ORDER — IBUPROFEN 600 MG/1
600 TABLET ORAL EVERY 6 HOURS PRN
Qty: 30 TABLET | Refills: 0 | Status: SHIPPED | OUTPATIENT
Start: 2023-03-10 | End: 2023-03-29

## 2023-03-10 RX ORDER — ACETAMINOPHEN 325 MG/1
975 TABLET ORAL EVERY 6 HOURS
Qty: 27 TABLET | Refills: 0 | Status: CANCELLED
Start: 2023-03-10 | End: 2023-03-13

## 2023-03-10 RX ORDER — DOCUSATE SODIUM 100 MG/1
100 CAPSULE, LIQUID FILLED ORAL 2 TIMES DAILY PRN
Refills: 0
Start: 2023-03-10 | End: 2023-03-21

## 2023-03-10 RX ADMIN — ENOXAPARIN SODIUM 40 MG: 40 INJECTION SUBCUTANEOUS at 08:52

## 2023-03-10 RX ADMIN — ACETAMINOPHEN 975 MG: 325 TABLET ORAL at 00:04

## 2023-03-10 RX ADMIN — IBUPROFEN 600 MG: 600 TABLET, FILM COATED ORAL at 03:59

## 2023-03-10 RX ADMIN — IBUPROFEN 600 MG: 600 TABLET, FILM COATED ORAL at 11:28

## 2023-03-10 RX ADMIN — DOCUSATE SODIUM 100 MG: 100 CAPSULE, LIQUID FILLED ORAL at 08:52

## 2023-03-10 RX ADMIN — ACETAMINOPHEN 975 MG: 325 TABLET ORAL at 08:51

## 2023-03-10 RX ADMIN — NITROFURANTOIN (MONOHYDRATE/MACROCRYSTALS) 100 MG: 75; 25 CAPSULE ORAL at 08:52

## 2023-03-10 NOTE — UTILIZATION REVIEW
NOTIFICATION OF INPATIENT ADMISSION   MATERNITY/DELIVERY AUTHORIZATION REQUEST   SERVICING FACILITY:   [facility] - L&D, , NICU  [address]  Tax ID: [id number]  NPI: [id number]   ATTENDING PROVIDER:  Attending Name and NPI#: Vicky Plunkett Md [[id number]]  Address: [address] [place] [address]  Phone: [phone]   ADMISSION INFORMATION:  Place of Service: Inpatient [address] [facility]  [address]  Place of Service Code: 21  Inpatient Admission Date/Time: 3/8/23  6:13 AM  Discharge Date/Time: 3/10/2023  2:18 PM  Admitting Diagnosis Code/Description:  Previous  section [Z98 891]  37 weeks gestation of pregnancy [Z3A 37]  Pregnancy [Z34 90]  Encounter for  delivery without indication [O82]     Mother: Maria G Johnson 1990 Estimated Date of Delivery: 3/28/23  Delivering clinician: Vicky Plunkett    OB History        4    Para   4    Term   4       0    AB   0    Living   4       SAB   0    IAB   0    Ectopic   0    Multiple   0    Live Births   4                Name & MRN:   Information for the patient's :  Karolyn Khalil Girl Jocelyn Phelan) [[id number]]      Delivery Information:  Sex: female  Delivered 3/8/2023 9:19 AM by , Low Transverse; Gestational Age: 42w4d    [place] Measurements:  Weight: 7 lb 15 3 oz (3610 g); Height: 19 5"    APGAR 1 minute 5 minutes 10 minutes   Totals: 8 9       Birth Information: 26 y o  female MRN: [id number] Unit/Bed#: -01   Birthweight: No birth weight on file  Gestational Age: <None> Delivery Type:    APGARS Totals:        UTILIZATION REVIEW CONTACT:  Lizz Martin, Utilization   Network Utilization Review Department  Phone: [phone]  Fax [phone]  Email: [email or web address]  Contact for approvals/pending authorizations, clinical reviews, and discharge       PHYSICIAN ADVISORY SERVICES:  Medical Necessity Denial & [facility] Review  Phone: [phone]  Fax: [phone]  Email: [email or web address]

## 2023-03-10 NOTE — PLAN OF CARE
Problem: POSTPARTUM  Goal: Experiences normal postpartum course  Description: INTERVENTIONS:  - Monitor maternal vital signs  - Assess uterine involution and lochia  [id number] by Quan Leslie RN  Outcome: Progressing  1/3/1453 7148 by Quan Leslie RN  Outcome: Progressing  Goal: Appropriate maternal -  bonding  Description: INTERVENTIONS:  - Identify family support  - Assess for appropriate maternal/infant bonding   -Encourage maternal/infant bonding opportunities  - Referral to  or  as needed   0135 by Quan Leslie RN  Outcome: Progressing  [phone] by Quan Leslie RN  Outcome: Progressing  Goal: Establishment of infant feeding pattern  Description: INTERVENTIONS:  - Assess breast/bottle feeding  - Refer to lactation as needed  [id number] by Quan Leslie RN  Outcome: Progressing  6667 8908 by Quan Leslie RN  Outcome: Progressing  Goal: Incision(s), wounds(s) or drain site(s) healing without S/S of infection  Description: INTERVENTIONS  - Assess and document dressing, incision, wound bed, drain sites and surrounding tissue  - Provide patient and family education  - Perform skin care/dressing changes every   3/0/1409 9127 by Quan Leslie RN  Outcome: Progressing  9/3/2121 2612 by Quan Leslie RN  Outcome: Progressing     Problem: PAIN - ADULT  Goal: Verbalizes/displays adequate comfort level or baseline comfort level  Description: Interventions:  - Encourage patient to monitor pain and request assistance  - Assess pain using appropriate pain scale  - Administer analgesics based on type and severity of pain and evaluate response  - Implement non-pharmacological measures as appropriate and evaluate response  - Consider cultural and social influences on pain and pain management  - Notify physician/advanced practitioner if interventions unsuccessful or patient reports new pain  3/9/2023 2024 by Mary Serafin, RN  Outcome: Progressing  8/1/2595 7607 by Mary Betancourt RN  Outcome: Progressing     Problem: INFECTION - ADULT  Goal: Absence or prevention of progression during hospitalization  Description: INTERVENTIONS:  - Assess and monitor for signs and symptoms of infection  - Monitor lab/diagnostic results  - Monitor all insertion sites, i e  indwelling lines, tubes, and drains  - Monitor endotracheal if appropriate and nasal secretions for changes in amount and color  - [place] appropriate cooling/warming therapies per order  - Administer medications as ordered  - Instruct and encourage patient and family to use good hand hygiene technique  - Identify and instruct in appropriate isolation precautions for identified infection/condition  7/6/0468 3973 by Mary Betancourt RN  Outcome: Progressing  7/1/2308 0968 by Mary Betancourt RN  Outcome: Progressing  Goal: Absence of fever/infection during neutropenic period  Description: INTERVENTIONS:  - Monitor WBC    4/9/7063 7169 by Mayr Betancourt RN  Outcome: Progressing  8/0/4355 2115 by Mary Betancourt RN  Outcome: Progressing     Problem: SAFETY ADULT  Goal: Patient will remain free of falls  Description: INTERVENTIONS:  - Educate patient/family on patient safety including physical limitations  - Instruct patient to call for assistance with activity   - Consult OT/PT to assist with strengthening/mobility   - Keep Call bell within reach  - Keep bed low and locked with side rails adjusted as appropriate  - Keep care items and personal belongings within reach  - Initiate and maintain comfort rounds  - Make Fall Risk Sign visible to staff  - Offer Toileting every  Hours, in advance of need  - Initiate/Maintain alarm  - Obtain necessary fall risk management equipment:   - Apply yellow socks and bracelet for high fall risk patients  - Consider moving patient to room near nurses station  9/9/25515321 2323 by Mary Betancourt RN  Outcome: Progressing  2/9/5324 7420 by Sebas Hong RN  Outcome: Progressing  Goal: Maintain or return to baseline ADL function  Description: INTERVENTIONS:  -  Assess patient's ability to carry out ADLs; assess patient's baseline for ADL function and identify physical deficits which impact ability to perform ADLs (bathing, care of mouth/teeth, toileting, grooming, dressing, etc )  - Assess/evaluate cause of self-care deficits   - Assess range of motion  - Assess patient's mobility; develop plan if impaired  - Assess patient's need for assistive devices and provide as appropriate  - Encourage maximum independence but intervene and supervise when necessary  - Involve family in performance of ADLs  - Assess for home care needs following discharge   - Consider OT consult to assist with ADL evaluation and planning for discharge  - Provide patient education as appropriate  3/0/6263 2696 by Sebas Hong RN  Outcome: Progressing  7/8/7044 1792 by Sebas Hong RN  Outcome: Progressing  Goal: Maintains/Returns to pre admission functional level  Description: INTERVENTIONS:  - Perform BMAT or MOVE assessment daily    - Set and communicate daily mobility goal to care team and patient/family/caregiver  - Collaborate with rehabilitation services on mobility goals if consulted  - Perform Range of Motion  times a day  - Reposition patient every  hours    - Dangle patient  times a day  - Stand patient  times a day  - Ambulate patient  times a day  - Out of bed to chair  times a day   - Out of bed for meals  times a day  - Out of bed for toileting  - Record patient progress and toleration of activity level   1/6/5410 8860 by Sebas Hong RN  Outcome: Progressing  5/0/0699 8437 by Sebas Hong RN  Outcome: Progressing     Problem: Knowledge Deficit  Goal: Patient/family/caregiver demonstrates understanding of disease process, treatment plan, medications, and discharge instructions  Description: Complete learning assessment and assess knowledge base    Interventions:  - Provide teaching at level of understanding  - Provide teaching via preferred learning methods  9/4/9031 2935 by Nora Zabala RN  Outcome: Progressing  0/7/6902 7150 by Nora Zabala RN  Outcome: Progressing     Problem: DISCHARGE PLANNING  Goal: Discharge to home or other facility with appropriate resources  Description: INTERVENTIONS:  - Identify barriers to discharge w/patient and caregiver  - Arrange for needed discharge resources and transportation as appropriate  - Identify discharge learning needs (meds, wound care, etc )  - Arrange for interpretive services to assist at discharge as needed  - Refer to Case Management Department for coordinating discharge planning if the patient needs post-hospital services based on physician/advanced practitioner order or complex needs related to functional status, cognitive ability, or social support system  4/2/4424 8576 by Nora Zabala RN  Outcome: Progressing  2/6/7080 9655 by Nora Zabala RN  Outcome: Progressing

## 2023-03-10 NOTE — QUICK NOTE
Patient evaluated at bedside  for a complaint of hard swelling on the lateral edge of thighs bilaterally  Patient reports that she first noticed the swelling today and believes it is expanding  She states that what she feels is the stretching of the skin and not really pain  She is concerned about the fact that it feels hard to the touch  She denies having difficult ambulating  She denies fever, chills, nausea vomiting  She denies SOB, Chest pain   ROS  Constitutional: Negative for fevers, chills, headaches, vision changes  Respiratory: Negative for shortness of breath, cough  Cardiovascular: Negative for chest pain, palpitations, lower extremity edema    Gastrointestinal: Negative for nausea, vomiting, diarrhea, constipation  :  Negative for dysuria, hematuria  EXTR:  Negative for rash, new myalgias/arthralgias, joint swelling      Physical exam:   Lateral  induration on upper thigh thigh : bilateral  No erythema on tenderness to palpation  Abdomen: Mepilex in place, Uterus firm @ U, minimal lochia   Extremities: Negative homens sign  Vitals:    03/09/23 2024   BP: 142/93   Pulse: (!) 107   Resp: 18   Temp: 97 7 °F (36 5 °C)   SpO2: 96%      Plan : Heating pads and followup tomorrow jeff abdullahi during rounds         Elly Meyer MD  OBGYN PGY-1

## 2023-03-10 NOTE — PROGRESS NOTES
Progress Note - OB/GYN  Gordon Zhao 26 y o  female MRN: [id number]  Unit/Bed#: -01 Encounter: [id number]    Assessment and Plan     Gordon Zhao is a patient of: [facility]   She is POD# 2 s/p RLTCS  Recovering well and is stable       S/P repeat low transverse   Assessment & Plan  , Hgb 11 4 --> post op Hgb 10 3   Lines: borrego removed, spontaneously voiding   Pain: Tylenol and motrin scheduled, rebeca 5/10 PRN    FEN: Tolerating regular diet  DVT ppx: SCDs and Lovenox 40mg BID  Passing flatus   Incision C/D/I     Urinary tract infection in pregnancy in third trimester, antepartum  Assessment & Plan  Continue Macrobid    Gestational hypertension  Assessment & Plan  Systolic (90MXL), DXQ:548 , Min:122 , TJJ:189   Diastolic (58LJJ), YWC:20, Min:76, Max:93  CBC/CMP wnl, PCr 0 28        Thyroid disease affecting pregnancy  Assessment & Plan  Patient not currently on thyroid medication  TSH wnl on 23       Disposition    - Anticipate discharge home today      Subjective/Objective     Chief Complaint: Postoperative State     Subjective:    Gordon Zhao is s/p RLTCS  She is POD# 2  She has no current complaints  Pain is well controlled  Patient is currently voiding  She is ambulating  Patient is currently passing flatus and has had no bowel movement  She is tolerating PO, and denies nausea or vomitting  Patient denies fever, chills, chest pain, shortness of breath, or calf tenderness  Lochia is normal  She is  Breastfeeding  Her incision is C/D/I  She is recovering well and is stable         Vitals:   /77 (BP Location: Left arm)   Pulse 80   Temp 97 6 °F (36 4 °C) (Oral)   Resp 18   Ht 5' 9" (1 753 m)   Wt (!) 139 kg (306 lb)   LMP 2022   SpO2 96%   Breastfeeding Yes   BMI 45 19 kg/m²     No intake or output data in the 24 hours ending 03/10/23 0610    Invasive Devices     Peripheral Intravenous Line  Duration           Peripheral IV 23 Dorsal (posterior); Left Hand 1 day                Physical Exam:   GEN: Gato Parekh appears well, alert, pleasant and cooperative   CARDIO: RRR  RESP:  Normal respiratory effort  ABDOMEN: soft, no tenderness, no distention, fundus firm, Incision C/D/I  EXTREMITIES: SCDs on, Negative Jose Manuel's sign bilaterally      Labs:     Hemoglobin   Date Value Ref Range Status   03/09/2023 10 3 (L) 11 5 - 15 4 g/dL Final   03/08/2023 11 4 (L) 11 5 - 15 4 g/dL Final   06/21/2016 12 9 11 1 - 15 9 g/dL Final     WBC   Date Value Ref Range Status   03/09/2023 18 82 (H) 4 31 - 10 16 Thousand/uL Final   03/08/2023 15 27 (H) 4 31 - 10 16 Thousand/uL Final   06/21/2016 7 5 3 4 - 10 8 x10E3/uL Final     Platelets   Date Value Ref Range Status   03/09/2023 265 149 - 390 Thousands/uL Final   03/08/2023 252 149 - 390 Thousands/uL Final   06/21/2016 241 150 - 379 x10E3/uL Final     Creatinine   Date Value Ref Range Status   03/08/2023 0 55 (L) 0 60 - 1 30 mg/dL Final     Comment:     Standardized to IDMS reference method   03/07/2023 0 54 (L) 0 60 - 1 30 mg/dL Final     Comment:     Standardized to IDMS reference method   06/21/2016 0 69 0 57 - 1 00 mg/dL Final     AST   Date Value Ref Range Status   03/08/2023 15 13 - 39 U/L Final     Comment:     Specimen collection should occur prior to Sulfasalazine administration due to the potential for falsely depressed results  03/07/2023 18 13 - 39 U/L Final     Comment:     Specimen collection should occur prior to Sulfasalazine administration due to the potential for falsely depressed results  06/21/2016 21 0 - 40 IU/L Final     ALT   Date Value Ref Range Status   03/08/2023 6 (L) 7 - 52 U/L Final     Comment:     Specimen collection should occur prior to Sulfasalazine administration due to the potential for falsely depressed results      03/07/2023 7 7 - 52 U/L Final     Comment:     Specimen collection should occur prior to Sulfasalazine administration due to the potential for falsely depressed results      06/21/2016 19 0 - 32 IU/L Final          Cherrie Crystal MD  OBGYN PGY1  3/10/2023  6:10 AM

## 2023-03-10 NOTE — PLAN OF CARE
Problem: POSTPARTUM  Goal: Experiences normal postpartum course  Description: INTERVENTIONS:  - Monitor maternal vital signs  - Assess uterine involution and lochia  Outcome: Progressing  Goal: Appropriate maternal -  bonding  Description: INTERVENTIONS:  - Identify family support  - Assess for appropriate maternal/infant bonding   -Encourage maternal/infant bonding opportunities  - Referral to  or  as needed  Outcome: Progressing  Goal: Establishment of infant feeding pattern  Description: INTERVENTIONS:  - Assess breast/bottle feeding  - Refer to lactation as needed  Outcome: Progressing  Goal: Incision(s), wounds(s) or drain site(s) healing without S/S of infection  Description: INTERVENTIONS  - Assess and document dressing, incision, wound bed, drain sites and surrounding tissue  - Provide patient and family education  - Perform skin care/dressing changes every   Outcome: Progressing     Problem: PAIN - ADULT  Goal: Verbalizes/displays adequate comfort level or baseline comfort level  Description: Interventions:  - Encourage patient to monitor pain and request assistance  - Assess pain using appropriate pain scale  - Administer analgesics based on type and severity of pain and evaluate response  - Implement non-pharmacological measures as appropriate and evaluate response  - Consider cultural and social influences on pain and pain management  - Notify physician/advanced practitioner if interventions unsuccessful or patient reports new pain  Outcome: Progressing     Problem: INFECTION - ADULT  Goal: Absence or prevention of progression during hospitalization  Description: INTERVENTIONS:  - Assess and monitor for signs and symptoms of infection  - Monitor lab/diagnostic results  - Monitor all insertion sites, i e  indwelling lines, tubes, and drains  - Monitor endotracheal if appropriate and nasal secretions for changes in amount and color  - [place] appropriate cooling/warming therapies per order  - Administer medications as ordered  - Instruct and encourage patient and family to use good hand hygiene technique  - Identify and instruct in appropriate isolation precautions for identified infection/condition  Outcome: Progressing  Goal: Absence of fever/infection during neutropenic period  Description: INTERVENTIONS:  - Monitor WBC    Outcome: Progressing     Problem: SAFETY ADULT  Goal: Patient will remain free of falls  Description: INTERVENTIONS:  - Educate patient/family on patient safety including physical limitations  - Instruct patient to call for assistance with activity   - Consult OT/PT to assist with strengthening/mobility   - Keep Call bell within reach  - Keep bed low and locked with side rails adjusted as appropriate  - Keep care items and personal belongings within reach  - Initiate and maintain comfort rounds  - Make Fall Risk Sign visible to staff  - Offer Toileting every  Hours, in advance of need  - Initiate/Maintain alarm  - Obtain necessary fall risk management equipment:   - Apply yellow socks and bracelet for high fall risk patients  - Consider moving patient to room near nurses station  Outcome: Progressing  Goal: Maintain or return to baseline ADL function  Description: INTERVENTIONS:  -  Assess patient's ability to carry out ADLs; assess patient's baseline for ADL function and identify physical deficits which impact ability to perform ADLs (bathing, care of mouth/teeth, toileting, grooming, dressing, etc )  - Assess/evaluate cause of self-care deficits   - Assess range of motion  - Assess patient's mobility; develop plan if impaired  - Assess patient's need for assistive devices and provide as appropriate  - Encourage maximum independence but intervene and supervise when necessary  - Involve family in performance of ADLs  - Assess for home care needs following discharge   - Consider OT consult to assist with ADL evaluation and planning for discharge  - Provide patient education as appropriate  Outcome: Progressing  Goal: Maintains/Returns to pre admission functional level  Description: INTERVENTIONS:  - Perform BMAT or MOVE assessment daily    - Set and communicate daily mobility goal to care team and patient/family/caregiver  - Collaborate with rehabilitation services on mobility goals if consulted  - Perform Range of Motion  times a day  - Reposition patient every  hours  - Dangle patient  times a day  - Stand patient  times a day  - Ambulate patient  times a day  - Out of bed to chair  times a day   - Out of bed for meals times a day  - Out of bed for toileting  - Record patient progress and toleration of activity level   Outcome: Progressing     Problem: Knowledge Deficit  Goal: Patient/family/caregiver demonstrates understanding of disease process, treatment plan, medications, and discharge instructions  Description: Complete learning assessment and assess knowledge base    Interventions:  - Provide teaching at level of understanding  - Provide teaching via preferred learning methods  Outcome: Progressing     Problem: DISCHARGE PLANNING  Goal: Discharge to home or other facility with appropriate resources  Description: INTERVENTIONS:  - Identify barriers to discharge w/patient and caregiver  - Arrange for needed discharge resources and transportation as appropriate  - Identify discharge learning needs (meds, wound care, etc )  - Arrange for interpretive services to assist at discharge as needed  - Refer to Case Management Department for coordinating discharge planning if the patient needs post-hospital services based on physician/advanced practitioner order or complex needs related to functional status, cognitive ability, or social support system  Outcome: Progressing

## 2023-03-13 ENCOUNTER — TRANSITIONAL CARE MANAGEMENT (OUTPATIENT)
Dept: FAMILY MEDICINE CLINIC | Facility: CLINIC | Age: 33
End: 2023-03-13

## 2023-03-15 ENCOUNTER — POSTPARTUM VISIT (OUTPATIENT)
Dept: OBGYN CLINIC | Facility: CLINIC | Age: 33
End: 2023-03-15

## 2023-03-15 VITALS
SYSTOLIC BLOOD PRESSURE: 140 MMHG | DIASTOLIC BLOOD PRESSURE: 90 MMHG | HEIGHT: 69 IN | BODY MASS INDEX: 43.4 KG/M2 | WEIGHT: 293 LBS

## 2023-03-15 DIAGNOSIS — O13.9 GESTATIONAL HYPERTENSION, ANTEPARTUM: Primary | ICD-10-CM

## 2023-03-15 DIAGNOSIS — Z98.891 S/P REPEAT LOW TRANSVERSE C-SECTION: ICD-10-CM

## 2023-03-15 RX ORDER — NIFEDIPINE 60 MG/1
60 TABLET, EXTENDED RELEASE ORAL DAILY
Qty: 30 TABLET | Refills: 1 | Status: SHIPPED | OUTPATIENT
Start: 2023-03-15

## 2023-03-15 NOTE — PROGRESS NOTES
[facility] OBGYN  Incision and Blood pressure check  Seth Griggs MD  03/15/23    Assessment/Plan:  Kayli Mahajan is a 33-year-old P4 postop day 7 from repeat low-transverse  section at 37 weeks for the indication of gestational hypertension-blood pressure remains elevated today at 140/90 and patient reports elevated blood pressures at home with mild headache at times  Plan for preeclamptic labs the patient will go complete soon as possible  Procardia XL 30 mg initiated-reviewed most common side effects of headache and patient to return call to office with any concerns  Preeclamptic precautions were reviewed and I encouraged her to call us with any blood pressures with systolic greater than 898 and diastolic greater than 050 or any concerns  Wound care reviewed  Patient return to office in 1 to 2 weeks for postpartum exam and blood pressure check  Subjective:      Patient ID: Audie Garcia is a 26 y o  female  HPI  Kayli Mahajan is a 33-year-old  who presents postop day 7 following repeat low-transverse  section at 37 weeks and 1 day gestation for the indication of gestational hypertension- ghas some incisional tenderness but overall pain well controlled  Denies any fever, chills, CP, SOB  She did have a mild HA yesterday and checked her BP at home an noticed it was 160/100- did not call bc she had this appointment today- HA has since resolved and she denies any visual disturbance, right upper quadrant pain/epigastric pain, increased edema  She does report she had to go on medications postpartum following her last delivery that was also complicated by gestational hypertension  She is breast-feeding without any concerns  Voiding and bowel movements are within normal limits  Ambulating without difficulty      The following portions of the patient's history were reviewed and updated as appropriate: allergies, current medications, past family history, past medical history, past social history, past surgical history and problem list     Review of Systems    Per HPI    Objective:    /90 (BP Location: Left arm, Patient Position: Sitting, Cuff Size: Large)   Ht 5' 9" (1 753 m)   Wt 135 kg (298 lb)   LMP 06/21/2022   Breastfeeding Yes   BMI 44 01 kg/m²      Physical Exam  Vitals reviewed  Constitutional:       General: She is not in acute distress  Appearance: Normal appearance  She is well-developed  She is not ill-appearing or diaphoretic  HENT:      Head: Normocephalic and atraumatic  Cardiovascular:      Rate and Rhythm: Normal rate and regular rhythm  Heart sounds: Normal heart sounds  No murmur heard  No friction rub  No gallop  Pulmonary:      Effort: Pulmonary effort is normal  No respiratory distress  Breath sounds: Normal breath sounds  No wheezing or rales  Abdominal:      Palpations: Abdomen is soft  Tenderness: There is no abdominal tenderness  There is no guarding or rebound  Comments: Mepilex dressing was removed- incision closed with running absorbable sutures is clean/dry/intact with no overlying erythema or discharge from the site  Genitourinary:     Vagina: Normal    Musculoskeletal:      Cervical back: Normal range of motion and neck supple  Right lower leg: Edema present  Left lower leg: Edema present  Skin:     General: Skin is warm and dry  Neurological:      Mental Status: She is alert and oriented to person, place, and time     Psychiatric:         Mood and Affect: Mood normal          Behavior: Behavior normal

## 2023-03-15 NOTE — PATIENT INSTRUCTIONS
Preeclampsia and Eclampsia After Delivery   AMBULATORY CARE:   What you need to know about preeclampsia and eclampsia after delivery:  Preeclampsia is high blood pressure (BP) that usually develops after week 20 of pregnancy  It can also develop days to weeks after delivery, even if you did not have high BP during pregnancy  When it develops after delivery, it may also be called postpartum preeclampsia  Preeclampsia causes your BP to be 140/90 or higher  You may also have protein in your urine or damage to organs such as your kidneys or liver  Preeclampsia can lead to life-threatening conditions such as a stroke or HELLP syndrome (blood cell destruction)  It can also lead to eclampsia, a condition that causes seizures from high BP  Warning signs to watch for:   BP of 130/80 or higher    Shortness of breath    Nausea and vomiting, or severe stomach pain    Severe headaches    Vision changes, or seeing spots in front of your eyes    Arm or leg swelling    Call your local emergency number (911 in the [address] [facility]) if:   You have a seizure  You have chest pain  You have severe nausea and vomiting  Call your doctor or obstetrician if:   You develop a severe headache that does not go away  You have new or increased vision changes, such as blurred or spotted vision  You have new or increased swelling in your face or hands  You have severe abdominal pain with or without nausea and vomiting  You have shortness of breath  Your blood pressure is higher than you were told it should be  You have questions or concerns about your condition or care  Manage or prevent preeclampsia or eclampsia after delivery:   Go to follow-up appointments as directed  Your obstetrician will check your blood pressure regularly until it is normal  He or she may also order other tests  Take medicines as directed  Medicines may be given to lower your blood pressure, protect your organs, or prevent seizures      Rest during the day  Your healthcare provider may tell you to rest more often if you have mild symptoms of preeclampsia  Do not drink alcohol or smoke  Alcohol, nicotine, and other chemicals in cigarettes and cigars, can increase your BP  They can also harm your baby  Ask your healthcare provider for information if you currently drink alcohol or smoke and need help to quit  E-cigarettes or smokeless tobacco still contain nicotine  Talk to your healthcare provider before you use these products  Follow up with your doctor or obstetrician as directed:  Write down your questions so you remember to ask them during your visits  © Copyright Noemi Harder 2022 Information is for End User's use only and may not be sold, redistributed or otherwise used for commercial purposes  The above information is an  only  It is not intended as medical advice for individual conditions or treatments  Talk to your doctor, nurse or pharmacist before following any medical regimen to see if it is safe and effective for you

## 2023-03-21 ENCOUNTER — OFFICE VISIT (OUTPATIENT)
Dept: ENDOCRINOLOGY | Facility: CLINIC | Age: 33
End: 2023-03-21

## 2023-03-21 VITALS
SYSTOLIC BLOOD PRESSURE: 130 MMHG | DIASTOLIC BLOOD PRESSURE: 82 MMHG | WEIGHT: 283.2 LBS | HEIGHT: 69 IN | BODY MASS INDEX: 41.95 KG/M2 | HEART RATE: 121 BPM

## 2023-03-21 DIAGNOSIS — E04.1 THYROID NODULE: ICD-10-CM

## 2023-03-21 DIAGNOSIS — E03.9 HYPOTHYROIDISM, UNSPECIFIED TYPE: ICD-10-CM

## 2023-03-21 DIAGNOSIS — E06.3 HASHIMOTO'S THYROIDITIS: ICD-10-CM

## 2023-03-21 DIAGNOSIS — E28.2 PCOS (POLYCYSTIC OVARIAN SYNDROME): ICD-10-CM

## 2023-03-21 DIAGNOSIS — E55.9 VITAMIN D DEFICIENCY: Primary | ICD-10-CM

## 2023-03-21 PROBLEM — Z3A.37 37 WEEKS GESTATION OF PREGNANCY: Status: RESOLVED | Noted: 2023-01-09 | Resolved: 2023-03-21

## 2023-03-21 RX ORDER — CHOLECALCIFEROL (VITAMIN D3) 10(400)/ML
DROPS ORAL
COMMUNITY
Start: 2023-03-14 | End: 2023-03-21

## 2023-03-21 NOTE — ASSESSMENT & PLAN NOTE
Repeat thyroid ultrasound to monitor for any changes in the size or characteristics of thyroid nodule

## 2023-03-21 NOTE — ASSESSMENT & PLAN NOTE
Thyroid function test normal after being off Synthroid for more than 4 weeks    For now we will monitor and repeat thyroid function test in the next 4 to 6 weeks, depending on the results she may need reinitiation of Synthroid

## 2023-03-21 NOTE — PROGRESS NOTES
Justen Mckenzie 26 y o  female MRN: [id number]    Encounter: [id number]      Assessment/Plan   Problem List Items Addressed This Visit        Endocrine    Hashimoto's thyroiditis     Thyroid function test normal after being off Synthroid for more than 4 weeks    For now we will monitor and repeat thyroid function test in the next 4 to 6 weeks, depending on the results she may need reinitiation of Synthroid         Relevant Orders    TSH, 3rd generation Lab Collect    T4, free Lab Collect    Hypothyroidism    PCOS (polycystic ovarian syndrome)    Relevant Orders    Comprehensive metabolic panel Lab Collect    HEMOGLOBIN A1C W/ EAG ESTIMATION Lab Collect    Thyroid nodule     Repeat thyroid ultrasound to monitor for any changes in the size or characteristics of thyroid nodule         Relevant Orders    US thyroid       Other    Vitamin D deficiency - Primary     Continue supplementations         Relevant Orders    Vitamin D 25 hydroxy Lab Collect         CC: Hypothyroidism    History of Present Illness     HPI:  26 y o  female with hypothyroidism PCOS, vitamin d deficiency, and thyroid nodule   Started trulicity in July and stopped aug/sept as she found out that she is pregnant-she is currently 2 weeks postpartum , according to the patient she did not have gestational diabetes during her pregnancy      She was on synthroid  75 mcg daily and was off for the past 2 months of her pregnancy due to some mixup with her prescription/coverage she was on levothyroxine in the past however Would get very hot when she would take LT4 and has not had any issues when she was on Synthroid    Delivered at  37 weeks due to HTN - 7 lbs 15oz     Gained 27 lbs during the pregnancy   Not breastfeeding         Review of Systems    Historical Information   Past Medical History:   Diagnosis Date   • Abnormal Pap smear of cervix     LGSIL - colpo - LGSIL, HPV effect   • Chronic diarrhea of unknown origin     last assessed 6/26/15   • Disease of thyroid gland     nodule   • Dyspepsia     last assessed  10/29/14   • Early satiety     last assessed  10/29/14   • Hidradenitis suppurativa     last assessed  14   • Hypertension     last pregnancy   • Hypothyroidism        • Obesity    • Polycystic ovary syndrome    • Varicella     childhood     Past Surgical History:   Procedure Laterality Date   •  SECTION       and  [address] [facility]   • [place]  DELIVERY ONLY N/A 2020    Procedure:  SECTION () REPEAT;  Surgeon: Patricia Rodgers MD;  Location: [facility] ;  Service: Obstetrics   • WISDOM TOOTH EXTRACTION      all 4     Social History   Social History     Substance and Sexual Activity   Alcohol Use No     Social History     Substance and Sexual Activity   Drug Use No     Social History     Tobacco Use   Smoking Status Former   • Packs/day: 0 00   • Years: 0 00   • Pack years: 0 00   • Types: Cigarettes   • Quit date: 3/1/2014   • Years since quittin 0   Smokeless Tobacco Never   Tobacco Comments     sometimes 1 cig once weekly - none since preg     Family History:   Family History   Problem Relation Age of Onset   • Hypertension Mother    • No Known Problems Father    • No Known Problems Sister    • No Known Problems Daughter    • No Known Problems Son    • Thyroid disease Maternal Grandmother    • No Known Problems Maternal Grandfather    • Other Paternal Grandmother         pulmonary embolism   • No Known Problems Paternal Grandfather        Meds/Allergies   Current Outpatient Medications   Medication Sig Dispense Refill   • ergocalciferol (VITAMIN D2) 50,000 units Take 1 capsule (50,000 Units total) by mouth once a week 4 capsule 6   • ibuprofen (MOTRIN) 600 mg tablet Take 1 tablet (600 mg total) by mouth every 6 (six) hours as needed for moderate pain (cramping) 30 tablet 0   • metoclopramide (Reglan) 10 mg tablet Take 1 tablet (10 mg total) by mouth 4 (four) times a day 20 tablet 0   • NIFEdipine (PROCARDIA XL) 60 mg 24 hr tablet Take 1 tablet (60 mg total) by mouth daily 30 tablet 1   • Prenatal MV & Min w/FA-DHA (PRENATAL ADULT GUMMY/DHA/FA PO) Take 1 tablet by mouth     • ondansetron (Zofran ODT) 4 mg disintegrating tablet Take 1 tablet (4 mg total) by mouth every 6 (six) hours as needed for nausea or vomiting (Patient not taking: Reported on 3/15/2023) 20 tablet 2     No current facility-administered medications for this visit  Allergies   Allergen Reactions   • A + D Personal Care Lotion  [Dimethicone] Rash   • Calamine-Zinc Oxide Rash   • Keflex [Cephalexin] GI Intolerance   • Pramoxine-Calamine    • Zinc Rash     No med alert bracelet no epi pen       Objective   Vitals: Blood pressure 130/82, pulse (!) 121, height 5' 9" (1 753 m), weight 128 kg (283 lb 3 2 oz), last menstrual period 06/21/2022, currently breastfeeding  Physical Exam  Vitals reviewed  Constitutional:       General: She is not in acute distress  Appearance: Normal appearance  She is obese  She is not ill-appearing, toxic-appearing or diaphoretic  HENT:      Head: Normocephalic and atraumatic  Eyes:      General: No scleral icterus  Extraocular Movements: Extraocular movements intact  Cardiovascular:      Rate and Rhythm: Normal rate and regular rhythm  Heart sounds: Normal heart sounds  No murmur heard  Pulmonary:      Effort: Pulmonary effort is normal  No respiratory distress  Breath sounds: Normal breath sounds  No wheezing  Abdominal:      General: There is no distension  Palpations: Abdomen is soft  Tenderness: There is no abdominal tenderness  There is no guarding  Musculoskeletal:      Cervical back: Neck supple  Right lower leg: No edema  Left lower leg: No edema  Lymphadenopathy:      Cervical: No cervical adenopathy  Skin:     General: Skin is warm and dry  Neurological:      General: No focal deficit present        Mental Status: She is alert and oriented to person, place, and time  Psychiatric:         Mood and Affect: Mood normal          Behavior: Behavior normal          Thought Content: Thought content normal          Judgment: Judgment normal          The history was obtained from the review of the chart, patient      Lab Results:   Lab Results   Component Value Date/Time    Hemoglobin A1C 5 3 08/27/2022 10:31 AM    Hemoglobin A1C 5 6 04/15/2022 09:07 AM    WBC 18 82 (H) 03/09/2023 05:33 AM    WBC 15 27 (H) 03/08/2023 07:31 AM    WBC 10 51 (H) 03/07/2023 06:55 PM    Hemoglobin 10 3 (L) 03/09/2023 05:33 AM    Hemoglobin 11 4 (L) 03/08/2023 07:31 AM    Hemoglobin 11 6 03/07/2023 06:55 PM    Hematocrit 31 5 (L) 03/09/2023 05:33 AM    Hematocrit 34 2 (L) 03/08/2023 07:31 AM    Hematocrit 34 7 (L) 03/07/2023 06:55 PM    MCV 89 03/09/2023 05:33 AM    MCV 88 03/08/2023 07:31 AM    MCV 87 03/07/2023 06:55 PM    Platelets 384 93/40/6112 05:33 AM    Platelets 327 85/90/6336 07:31 AM    Platelets 758 80/46/9175 06:55 PM    BUN 10 03/08/2023 07:31 AM    BUN 9 03/07/2023 06:55 PM    BUN 8 03/01/2023 05:45 PM    Potassium 4 4 03/08/2023 07:31 AM    Potassium 3 9 03/07/2023 06:55 PM    Potassium 4 2 03/01/2023 05:45 PM    Chloride 106 03/08/2023 07:31 AM    Chloride 106 03/07/2023 06:55 PM    Chloride 105 03/01/2023 05:45 PM    CO2 20 (L) 03/08/2023 07:31 AM    CO2 21 03/07/2023 06:55 PM    CO2 21 03/01/2023 05:45 PM    Creatinine 0 55 (L) 03/08/2023 07:31 AM    Creatinine 0 54 (L) 03/07/2023 06:55 PM    Creatinine 0 54 (L) 03/01/2023 05:45 PM    AST 15 03/08/2023 07:31 AM    AST 18 03/07/2023 06:55 PM    AST 17 03/01/2023 05:45 PM    ALT 6 (L) 03/08/2023 07:31 AM    ALT 7 03/07/2023 06:55 PM    ALT 7 03/01/2023 05:45 PM    Albumin 3 0 (L) 03/08/2023 07:31 AM    Albumin 3 0 (L) 03/07/2023 06:55 PM    Albumin 3 1 (L) 03/01/2023 05:45 PM    HDL, Direct 32 (L) 04/15/2022 09:07 AM    Triglycerides 166 (H) 04/15/2022 09:07 AM         Portions of the record may have been created with voice recognition software  Occasional wrong word or "sound a like" substitutions may have occurred due to the inherent limitations of voice recognition software  Read the chart carefully and recognize, using context, where substitutions have occurred

## 2023-03-29 ENCOUNTER — POSTPARTUM VISIT (OUTPATIENT)
Dept: OBGYN CLINIC | Facility: CLINIC | Age: 33
End: 2023-03-29

## 2023-03-29 VITALS — BODY MASS INDEX: 40.76 KG/M2 | DIASTOLIC BLOOD PRESSURE: 80 MMHG | SYSTOLIC BLOOD PRESSURE: 110 MMHG | WEIGHT: 276 LBS

## 2023-03-29 DIAGNOSIS — O13.9 GESTATIONAL HYPERTENSION, ANTEPARTUM: Primary | ICD-10-CM

## 2023-03-29 DIAGNOSIS — Z98.891 S/P REPEAT LOW TRANSVERSE C-SECTION: ICD-10-CM

## 2023-03-29 NOTE — PROGRESS NOTES
Indu Aranda 77-year-old female   She is 3 weeks postpartum following a low cervical transverse  section  I have fully reviewed the prenatal and intrapartum course  The delivery was at 40 gestational weeks for gestational hypertension  She was seen in the office on 3/15 and her BP remained elevated at the time of her visit at 140/90  Patient was sent for preeclampic labs and Procardia XL 30 mg was initiated daily  She did not have her labs drawn  BP initially today was elevated at 140/90  Repeat on left arm 110/80  She is taking her procardia, but has not taken it today  She normally takes it at 3 pm  She has been experiencing frequent HA's about 4 x per week  She describes it as a throbbing HA at the base of her head and in the front  Denies visual disturbances or upper abdominal pain and edema  She has tried Ibuprofen, Excedrin and Reglan with no relief  She states at home her BP readings have been in the 130's over 70 or 80  Overall she states she is doing well  Visit Vitals  /90   Wt 125 kg (276 lb)   LMP 2022   Breastfeeding Yes   BMI 40 76 kg/m²   OB Status Recent pregnancy   Smoking Status Former   BSA 2 37 m²         OB History        4    Para   4    Term   4       0    AB   0    Living   4       SAB   0    IAB   0    Ectopic   0    Multiple   0    Live Births   4               ROS:  As indicated in HPI  All other ROS negative  Physical Exam  Constitutional:       Appearance: Normal appearance  HENT:      Head: Normocephalic and atraumatic  Cardiovascular:      Rate and Rhythm: Normal rate and regular rhythm  Pulses: Normal pulses  Heart sounds: Normal heart sounds  Pulmonary:      Effort: Pulmonary effort is normal       Breath sounds: Normal breath sounds  Abdominal:      Palpations: Abdomen is soft  Tenderness: There is no abdominal tenderness        Comments: Surgical incision is well approximated and healing, with no sign of infection  Musculoskeletal:      Cervical back: Neck supple  Neurological:      Mental Status: She is alert  Skin:     General: Skin is warm  Vitals and nursing note reviewed  Sade Tavares was seen today for postpartum care  Diagnoses and all orders for this visit:    Gestational hypertension, antepartum    S/P repeat low transverse       Patient states she wasn't aware labs were ordered  She will go today and them drawn  Continue Procardia  Continue to monitor BP at home  RTO in one week for BP check or sooner if needed

## 2023-03-31 ENCOUNTER — APPOINTMENT (OUTPATIENT)
Dept: LAB | Facility: CLINIC | Age: 33
End: 2023-03-31

## 2023-03-31 DIAGNOSIS — O13.9 GESTATIONAL HYPERTENSION, ANTEPARTUM: ICD-10-CM

## 2023-03-31 LAB
ALBUMIN SERPL BCP-MCNC: 3.8 G/DL (ref 3.5–5)
ALP SERPL-CCNC: 58 U/L (ref 34–104)
ALT SERPL W P-5'-P-CCNC: 16 U/L (ref 7–52)
ANION GAP SERPL CALCULATED.3IONS-SCNC: 7 MMOL/L (ref 4–13)
AST SERPL W P-5'-P-CCNC: 18 U/L (ref 13–39)
BILIRUB SERPL-MCNC: 0.78 MG/DL (ref 0.2–1)
BUN SERPL-MCNC: 10 MG/DL (ref 5–25)
CALCIUM SERPL-MCNC: 9.6 MG/DL (ref 8.4–10.2)
CHLORIDE SERPL-SCNC: 105 MMOL/L (ref 96–108)
CO2 SERPL-SCNC: 28 MMOL/L (ref 21–32)
CREAT SERPL-MCNC: 0.85 MG/DL (ref 0.6–1.3)
ERYTHROCYTE [DISTWIDTH] IN BLOOD BY AUTOMATED COUNT: 11.9 % (ref 11.6–15.1)
GFR SERPL CREATININE-BSD FRML MDRD: 89 ML/MIN/1.73SQ M
GLUCOSE SERPL-MCNC: 99 MG/DL (ref 65–140)
HCT VFR BLD AUTO: 39.7 % (ref 34.8–46.1)
HGB BLD-MCNC: 12.8 G/DL (ref 11.5–15.4)
MCH RBC QN AUTO: 28.1 PG (ref 26.8–34.3)
MCHC RBC AUTO-ENTMCNC: 32.2 G/DL (ref 31.4–37.4)
MCV RBC AUTO: 87 FL (ref 82–98)
PLATELET # BLD AUTO: 386 THOUSANDS/UL (ref 149–390)
PMV BLD AUTO: 9.5 FL (ref 8.9–12.7)
POTASSIUM SERPL-SCNC: 4 MMOL/L (ref 3.5–5.3)
PROT SERPL-MCNC: 7.1 G/DL (ref 6.4–8.4)
RBC # BLD AUTO: 4.56 MILLION/UL (ref 3.81–5.12)
SODIUM SERPL-SCNC: 140 MMOL/L (ref 135–147)
WBC # BLD AUTO: 6.97 THOUSAND/UL (ref 4.31–10.16)

## 2023-04-02 ENCOUNTER — APPOINTMENT (EMERGENCY)
Dept: CT IMAGING | Facility: HOSPITAL | Age: 33
End: 2023-04-02

## 2023-04-02 ENCOUNTER — HOSPITAL ENCOUNTER (EMERGENCY)
Facility: HOSPITAL | Age: 33
Discharge: HOME | End: 2023-04-03
Attending: EMERGENCY MEDICINE

## 2023-04-02 DIAGNOSIS — N20.0 KIDNEY STONE ON LEFT SIDE: Primary | ICD-10-CM

## 2023-04-02 LAB
ALBUMIN SERPL BCP-MCNC: 4 G/DL (ref 3.5–5)
ALP SERPL-CCNC: 51 U/L (ref 34–104)
ALT SERPL W P-5'-P-CCNC: 17 U/L (ref 7–52)
ANION GAP SERPL CALCULATED.3IONS-SCNC: 11 MMOL/L (ref 4–13)
AST SERPL W P-5'-P-CCNC: 30 U/L (ref 13–39)
BACTERIA UR QL AUTO: ABNORMAL /HPF
BASOPHILS # BLD AUTO: 0.07 THOUSANDS/ÂΜL (ref 0–0.1)
BASOPHILS NFR BLD AUTO: 1 % (ref 0–1)
BILIRUB SERPL-MCNC: 1.94 MG/DL (ref 0.2–1)
BILIRUB UR QL STRIP: NEGATIVE
BUN SERPL-MCNC: 13 MG/DL (ref 5–25)
CALCIUM SERPL-MCNC: 8.9 MG/DL (ref 8.4–10.2)
CARDIAC TROPONIN I PNL SERPL HS: 2 NG/L (ref 8–18)
CHLORIDE SERPL-SCNC: 103 MMOL/L (ref 96–108)
CLARITY UR: CLEAR
CO2 SERPL-SCNC: 22 MMOL/L (ref 21–32)
COLOR UR: ABNORMAL
CREAT SERPL-MCNC: 1.39 MG/DL (ref 0.6–1.3)
EOSINOPHIL # BLD AUTO: 0.13 THOUSAND/ÂΜL (ref 0–0.61)
EOSINOPHIL NFR BLD AUTO: 1 % (ref 0–6)
ERYTHROCYTE [DISTWIDTH] IN BLOOD BY AUTOMATED COUNT: 12.2 % (ref 11.6–15.1)
GFR SERPL CREATININE-BSD FRML MDRD: 49 ML/MIN/1.73SQ M
GLUCOSE SERPL-MCNC: 97 MG/DL (ref 65–140)
GLUCOSE UR STRIP-MCNC: NEGATIVE MG/DL
HCG SERPL QL: NEGATIVE
HCT VFR BLD AUTO: 37.6 % (ref 34.8–46.1)
HGB BLD-MCNC: 12.3 G/DL (ref 11.5–15.4)
HGB UR QL STRIP.AUTO: ABNORMAL
IMM GRANULOCYTES # BLD AUTO: 0.04 THOUSAND/UL (ref 0–0.2)
IMM GRANULOCYTES NFR BLD AUTO: 0 % (ref 0–2)
KETONES UR STRIP-MCNC: NEGATIVE MG/DL
LACTATE SERPL-SCNC: 1.5 MMOL/L (ref 0.5–2)
LEUKOCYTE ESTERASE UR QL STRIP: ABNORMAL
LYMPHOCYTES # BLD AUTO: 1.34 THOUSANDS/ÂΜL (ref 0.6–4.47)
LYMPHOCYTES NFR BLD AUTO: 13 % (ref 14–44)
MCH RBC QN AUTO: 28.3 PG (ref 26.8–34.3)
MCHC RBC AUTO-ENTMCNC: 32.7 G/DL (ref 31.4–37.4)
MCV RBC AUTO: 87 FL (ref 82–98)
MONOCYTES # BLD AUTO: 0.71 THOUSAND/ÂΜL (ref 0.17–1.22)
MONOCYTES NFR BLD AUTO: 7 % (ref 4–12)
MUCOUS THREADS UR QL AUTO: ABNORMAL
NEUTROPHILS # BLD AUTO: 8.44 THOUSANDS/ÂΜL (ref 1.85–7.62)
NEUTS SEG NFR BLD AUTO: 78 % (ref 43–75)
NITRITE UR QL STRIP: NEGATIVE
NON-SQ EPI CELLS URNS QL MICRO: ABNORMAL /HPF
NRBC BLD AUTO-RTO: 0 /100 WBCS
PH UR STRIP.AUTO: 6.5 [PH]
PLATELET # BLD AUTO: 313 THOUSANDS/UL (ref 149–390)
PMV BLD AUTO: 10.4 FL (ref 8.9–12.7)
POTASSIUM SERPL-SCNC: 4.5 MMOL/L (ref 3.5–5.3)
PROT SERPL-MCNC: 7.5 G/DL (ref 6.4–8.4)
PROT UR STRIP-MCNC: NEGATIVE MG/DL
RBC # BLD AUTO: 4.34 MILLION/UL (ref 3.81–5.12)
RBC #/AREA URNS AUTO: ABNORMAL /HPF
SODIUM SERPL-SCNC: 136 MMOL/L (ref 135–147)
SP GR UR STRIP.AUTO: 1.03 (ref 1–1.03)
UROBILINOGEN UR STRIP-ACNC: <2 MG/DL
WBC # BLD AUTO: 10.73 THOUSAND/UL (ref 4.31–10.16)
WBC #/AREA URNS AUTO: ABNORMAL /HPF

## 2023-04-02 RX ORDER — ONDANSETRON 2 MG/ML
4 INJECTION INTRAMUSCULAR; INTRAVENOUS ONCE
Status: COMPLETED | OUTPATIENT
Start: 2023-04-02 | End: 2023-04-02

## 2023-04-02 RX ORDER — ACETAMINOPHEN 325 MG/1
975 TABLET ORAL ONCE
Status: COMPLETED | OUTPATIENT
Start: 2023-04-02 | End: 2023-04-02

## 2023-04-02 RX ADMIN — SODIUM CHLORIDE, SODIUM LACTATE, POTASSIUM CHLORIDE, AND CALCIUM CHLORIDE 1000 ML: .6; .31; .03; .02 INJECTION, SOLUTION INTRAVENOUS at 21:07

## 2023-04-02 RX ADMIN — ACETAMINOPHEN 975 MG: 325 TABLET ORAL at 21:19

## 2023-04-02 RX ADMIN — IOHEXOL 100 ML: 350 INJECTION, SOLUTION INTRAVENOUS at 22:59

## 2023-04-02 RX ADMIN — ONDANSETRON 4 MG: 2 INJECTION INTRAMUSCULAR; INTRAVENOUS at 21:05

## 2023-04-03 VITALS
HEART RATE: 88 BPM | OXYGEN SATURATION: 95 % | TEMPERATURE: 99.3 F | RESPIRATION RATE: 18 BRPM | DIASTOLIC BLOOD PRESSURE: 57 MMHG | SYSTOLIC BLOOD PRESSURE: 97 MMHG

## 2023-04-03 RX ORDER — MORPHINE SULFATE 4 MG/ML
4 INJECTION, SOLUTION INTRAMUSCULAR; INTRAVENOUS ONCE
Status: COMPLETED | OUTPATIENT
Start: 2023-04-03 | End: 2023-04-03

## 2023-04-03 RX ORDER — ONDANSETRON 2 MG/ML
4 INJECTION INTRAMUSCULAR; INTRAVENOUS ONCE
Status: COMPLETED | OUTPATIENT
Start: 2023-04-03 | End: 2023-04-03

## 2023-04-03 RX ORDER — OXYCODONE HYDROCHLORIDE 5 MG/1
5 TABLET ORAL EVERY 4 HOURS PRN
Qty: 5 TABLET | Refills: 0 | Status: SHIPPED | OUTPATIENT
Start: 2023-04-03 | End: 2023-04-13

## 2023-04-03 RX ORDER — MORPHINE SULFATE 15 MG/1
7.5 TABLET ORAL EVERY 4 HOURS PRN
Qty: 5 TABLET | Refills: 0 | Status: SHIPPED | OUTPATIENT
Start: 2023-04-03 | End: 2023-04-03 | Stop reason: CLARIF

## 2023-04-03 RX ADMIN — MORPHINE SULFATE 4 MG: 4 INJECTION INTRAVENOUS at 00:45

## 2023-04-03 RX ADMIN — SODIUM CHLORIDE, SODIUM LACTATE, POTASSIUM CHLORIDE, AND CALCIUM CHLORIDE 1000 ML: .6; .31; .03; .02 INJECTION, SOLUTION INTRAVENOUS at 00:45

## 2023-04-03 RX ADMIN — ONDANSETRON 4 MG: 2 INJECTION INTRAMUSCULAR; INTRAVENOUS at 00:45

## 2023-04-03 NOTE — ED CARE HANDOFF
Emergency Department Sign Out Note        Sign out and transfer of care from Dr Eleanor Green  See Separate Emergency Department note  The patient, Ramon Elizondo, was evaluated by the previous provider for flank pain  Workup Completed:  Labs, CT, Pain control    ED Course / Workup Pending (followup):  Pending CT scan read, pain reevaluation  Pain improved  CT shows 3mm UVJ stone on the L  Will DC with OP Urology follow up  Ibuprofen, tylenol  PRN oxycodone  Instructed to avoid opioid use if possible and consider dumping milk as she is breastfeeding (pumping)  I personally conducted a search of the [place] prescription drug monitoring program   No signs of suspicious behavior was found  The patient will be provided with a prescription for controlled substance for outpatient management  I personally counseled the patient about appropriate use of these medications including but not limited to operating motor vehicles within 6 hours of taking a pill  Patient acknowledged receipt of these medication precautions  I personally discussed return precautions with this patient and family  I provided the patient with written discharge instructions and particularly highlighted specific areas of interest to this patient, including but not limited to: medications for symptom managment, follow up recommendations, and return precautions  Patient and family are in agreement with this plan as outlined above  Procedures  MDM    Disposition  Final diagnoses:   Kidney stone on left side     Time reflects when diagnosis was documented in both MDM as applicable and the Disposition within this note     Time User Action Codes Description Comment    4/3/2023  1:27 AM Gail Gracia Add [N20 0] Kidney stone on left side       ED Disposition     ED Disposition   Discharge    Condition   Stable    Date/Time   Mon Apr 3, 2023  1:27 AM    Comment   Ramon Elizondo discharge to home/self care                 Follow-up Information     Follow up With Specialties Details Why Contact Info Additional Information    Clifford Segura MD Family Medicine Call  As needed Noelle [address] [place] [address] [phone]       [facility] Schedule an appointment as soon as possible for a visit  follow up of kidney stone [address] [phone]  [address]  [facility] [place], [address], [place], [place], [address] [facility]        Patient's Medications   Discharge Prescriptions    OXYCODONE (ROXICODONE) 5 IMMEDIATE RELEASE TABLET    Take 1 tablet (5 mg total) by mouth every 4 (four) hours as needed for moderate pain for up to 10 days Max Daily Amount: 30 mg       Start Date: 4/3/2023  End Date: 4/13/2023       Order Dose: 5 mg       Quantity: 5 tablet    Refills: 0        ED Provider  Electronically Signed by     Dagmar Parikh MD  04/03/23 0816

## 2023-04-03 NOTE — ED PROVIDER NOTES
History  Chief Complaint   Patient presents with   • Abdominal Pain     Pt 3 weeks post partum  States this AM began with L sided abdominal pain, N/V  States she has been unable to eat or drink appropriately  +Chills -Fever     25 y/o F w hx of  on 3/8/2023 presents w low grade fever, nausea and LLQ abd pain  Also report slight LUQ abd pain  No dysuria or hematuria  No flank pain  Abdominal Pain  Associated symptoms: no anorexia, no belching, no chest pain, no chills, no constipation, no cough, no diarrhea, no dysuria, no fatigue, no fever, no hematemesis, no hematochezia, no hematuria, no melena, no shortness of breath, no sore throat and no vomiting        Prior to Admission Medications   Prescriptions Last Dose Informant Patient Reported? Taking?    NIFEdipine (PROCARDIA XL) 60 mg 24 hr tablet Past Week  No Yes   Sig: Take 1 tablet (60 mg total) by mouth daily   Prenatal MV & Min w/FA-DHA (PRENATAL ADULT GUMMY/DHA/FA PO) 2023  Yes Yes   Sig: Take 1 tablet by mouth   ergocalciferol (VITAMIN D2) 50,000 units 2023  No Yes   Sig: Take 1 capsule (50,000 Units total) by mouth once a week      Facility-Administered Medications: None       Past Medical History:   Diagnosis Date   • Abnormal Pap smear of cervix     LGSIL - colpo - LGSIL, HPV effect   • Chronic diarrhea of unknown origin     last assessed 6/26/15   • Disease of thyroid gland     nodule   • Dyspepsia     last assessed  10/29/14   • Early satiety     last assessed  10/29/14   • Hidradenitis suppurativa     last assessed  14   • Hypertension     last pregnancy   • Hypothyroidism        • Obesity    • Polycystic ovary syndrome    • Varicella     childhood       Past Surgical History:   Procedure Laterality Date   •  SECTION       and  [address] [facility] [address],    • [place]  DELIVERY ONLY N/A 2020    Procedure:  SECTION () REPEAT;  Surgeon: Jermaine Aceves MD;  Location: [facility] ; Service: Obstetrics   • [place]  DELIVERY ONLY N/A 3/8/2023    Procedure:  SECTION () REPEAT;  Surgeon: Avel Lester MD;  Location: [facility] ;  Service: Obstetrics   • WISDOM TOOTH EXTRACTION      all 4       Family History   Problem Relation Age of Onset   • Hypertension Mother    • No Known Problems Father    • No Known Problems Sister    • No Known Problems Daughter    • No Known Problems Son    • Thyroid disease Maternal Grandmother    • No Known Problems Maternal Grandfather    • Other Paternal Grandmother         pulmonary embolism   • No Known Problems Paternal Grandfather      I have reviewed and agree with the history as documented  E-Cigarette/Vaping   • E-Cigarette Use Never User      E-Cigarette/Vaping Substances   • Nicotine No    • THC No    • CBD No    • Flavoring No    • Other No    • Unknown No      Social History     Tobacco Use   • Smoking status: Former     Packs/day: 0 00     Years: 0 00     Pack years: 0 00     Types: Cigarettes     Quit date: 3/1/2014     Years since quittin 0   • Smokeless tobacco: Never   • Tobacco comments:      sometimes 1 cig once weekly - none since preg   Vaping Use   • Vaping Use: Never used   Substance Use Topics   • Alcohol use: No   • Drug use: No       Review of Systems   Constitutional: Negative for chills, fatigue and fever  HENT: Negative for ear pain and sore throat  Eyes: Negative for pain and visual disturbance  Respiratory: Negative for cough and shortness of breath  Cardiovascular: Negative for chest pain and palpitations  Gastrointestinal: Positive for abdominal pain  Negative for abdominal distention, anorexia, constipation, diarrhea, hematemesis, hematochezia, melena and vomiting  Endocrine: Negative for cold intolerance  Genitourinary: Negative for dysuria and hematuria  Musculoskeletal: Negative for arthralgias, back pain and myalgias  Skin: Negative for color change and rash     Allergic/Immunologic: Negative for environmental allergies  Neurological: Negative for seizures and syncope  Hematological: Negative for adenopathy  Psychiatric/Behavioral: Negative for agitation  All other systems reviewed and are negative  Physical Exam  Physical Exam  Vitals and nursing note reviewed  Constitutional:       General: She is not in acute distress  Appearance: She is well-developed  HENT:      Head: Normocephalic and atraumatic  Mouth/Throat:      Mouth: Mucous membranes are moist       Pharynx: Oropharynx is clear  Eyes:      Extraocular Movements: Extraocular movements intact  Conjunctiva/sclera: Conjunctivae normal       Pupils: Pupils are equal, round, and reactive to light  Cardiovascular:      Rate and Rhythm: Normal rate and regular rhythm  Heart sounds: No murmur heard  Pulmonary:      Effort: Pulmonary effort is normal  No respiratory distress  Breath sounds: Normal breath sounds  Abdominal:      Palpations: Abdomen is soft  Tenderness: There is abdominal tenderness  Hernia: No hernia is present  Musculoskeletal:         General: No swelling  Cervical back: Neck supple  Skin:     General: Skin is warm and dry  Capillary Refill: Capillary refill takes less than 2 seconds  Neurological:      General: No focal deficit present  Mental Status: She is alert     Psychiatric:         Mood and Affect: Mood normal          Vital Signs  ED Triage Vitals   Temperature Pulse Respirations Blood Pressure SpO2   04/02/23 2027 04/02/23 2027 04/02/23 2027 04/02/23 2029 04/02/23 2027   99 3 °F (37 4 °C) (!) 121 20 142/88 96 %      Temp Source Heart Rate Source Patient Position - Orthostatic VS BP Location FiO2 (%)   04/02/23 2027 04/02/23 2027 04/02/23 2027 04/02/23 2027 --   Oral Monitor Lying Right arm       Pain Score       04/02/23 2027       10 - Worst Possible Pain           Vitals:    04/02/23 2027 04/02/23 2029 04/02/23 2222   BP:  142/88 116/76 Pulse: (!) 121  95   Patient Position - Orthostatic VS: Lying  Lying         Visual Acuity      ED Medications  Medications   acetaminophen (TYLENOL) tablet 975 mg (975 mg Oral Given 4/2/23 2119)   lactated ringers bolus 1,000 mL (1,000 mL Intravenous New Bag 4/2/23 2107)   ondansetron (ZOFRAN) injection 4 mg (4 mg Intravenous Given 4/2/23 2105)   iohexol (OMNIPAQUE) 350 MG/ML injection (SINGLE-DOSE) 100 mL (100 mL Intravenous Given 4/2/23 2259)       Diagnostic Studies  Results Reviewed     Procedure Component Value Units Date/Time    Urinalysis with microscopic [[id number]]  (Abnormal) Collected: 04/02/23 2312    Lab Status: Final result Specimen: Urine, Clean Catch Updated: 04/02/23 2339     Color, UA Light Yellow     Clarity, UA Clear     Specific [place], UA 1 030     pH, UA 6 5     Leukocytes, UA Moderate     Nitrite, UA Negative     Protein, UA Negative mg/dl      Glucose, UA Negative mg/dl      Ketones, UA Negative mg/dl      Urobilinogen, UA <2 0 mg/dl      Bilirubin, UA Negative     Occult Blood, UA Moderate     RBC, UA 1-2 /hpf      WBC, UA 4-10 /hpf      Epithelial Cells Occasional /hpf      Bacteria, UA None Seen /hpf      MUCUS THREADS Occasional    hCG, qualitative pregnancy [[id number]]  (Normal) Collected: 04/02/23 2108    Lab Status: Final result Specimen: Blood from Arm, Right Updated: 04/02/23 2226     Preg, Serum Negative    Comprehensive metabolic panel [[id number]]  (Abnormal) Collected: 04/02/23 2108    Lab Status: Final result Specimen: Blood from Arm, Right Updated: 04/02/23 2202     Sodium 136 mmol/L      Potassium 4 5 mmol/L      Chloride 103 mmol/L      CO2 22 mmol/L      ANION GAP 11 mmol/L      BUN 13 mg/dL      Creatinine 1 39 mg/dL      Glucose 97 mg/dL      Calcium 8 9 mg/dL      AST 30 U/L      ALT 17 U/L      Alkaline Phosphatase 51 U/L      Total Protein 7 5 g/dL      Albumin 4 0 g/dL      Total Bilirubin 1 94 mg/dL      eGFR 49 ml/min/1 73sq m     Narrative:      National Kidney Disease Foundation guidelines for Chronic Kidney Disease (CKD):   •  Stage 1 with normal or high GFR (GFR > 90 mL/min/1 73 square meters)  •  Stage 2 Mild CKD (GFR = 60-89 mL/min/1 73 square meters)  •  Stage 3A Moderate CKD (GFR = 45-59 mL/min/1 73 square meters)  •  Stage 3B Moderate CKD (GFR = 30-44 mL/min/1 73 square meters)  •  Stage 4 Severe CKD (GFR = 15-29 mL/min/1 73 square meters)  •  Stage 5 End Stage CKD (GFR <15 mL/min/1 73 square meters)  Note: GFR calculation is accurate only with a steady state creatinine    High Sensitivity Troponin I Random [[id number]]  (Abnormal) Collected: 04/02/23 2108    Lab Status: Final result Specimen: Blood from Arm, Right Updated: 04/02/23 2150     HS TnI random 2 ng/L     Lactic acid, plasma [[id number]]  (Normal) Collected: 04/02/23 2108    Lab Status: Final result Specimen: Blood from Arm, Right Updated: 04/02/23 2147     LACTIC ACID 1 5 mmol/L     Narrative:      Result may be elevated if tourniquet was used during collection      CBC and differential [[id number]]  (Abnormal) Collected: 04/02/23 2108    Lab Status: Final result Specimen: Blood from Arm, Right Updated: 04/02/23 2127     WBC 10 73 Thousand/uL      RBC 4 34 Million/uL      Hemoglobin 12 3 g/dL      Hematocrit 37 6 %      MCV 87 fL      MCH 28 3 pg      MCHC 32 7 g/dL      RDW 12 2 %      MPV 10 4 fL      Platelets 916 Thousands/uL      nRBC 0 /100 WBCs      Neutrophils Relative 78 %      Immat GRANS % 0 %      Lymphocytes Relative 13 %      Monocytes Relative 7 %      Eosinophils Relative 1 %      Basophils Relative 1 %      Neutrophils Absolute 8 44 Thousands/µL      Immature Grans Absolute 0 04 Thousand/uL      Lymphocytes Absolute 1 34 Thousands/µL      Monocytes Absolute 0 71 Thousand/µL      Eosinophils Absolute 0 13 Thousand/µL      Basophils Absolute 0 07 Thousands/µL                  CT pe study w abdomen pelvis w contrast    (Results Pending)              Procedures  Procedures         ED Course SBIRT 20yo+    Flowsheet Row Most Recent Value   SBIRT (25 yo +)    In order to provide better care to our patients, we are screening all of our patients for alcohol and drug use  Would it be okay to ask you these screening questions? Yes Filed at: 04/02/2023 2233   Initial Alcohol Screen: US AUDIT-C     1  How often do you have a drink containing alcohol? 0 Filed at: 04/02/2023 2233   2  How many drinks containing alcohol do you have on a typical day you are drinking? 0 Filed at: 04/02/2023 2233   3a  Male UNDER 65: How often do you have five or more drinks on one occasion? 0 Filed at: 04/02/2023 2233   3b  FEMALE Any Age, or MALE 65+: How often do you have 4 or more drinks on one occassion? 0 Filed at: 04/02/2023 2233   Audit-C Score 0 Filed at: 04/02/2023 2233   ENEDINA: How many times in the past year have you    Used an illegal drug or used a prescription medication for non-medical reasons? Never Filed at: 04/02/2023 2233                    Medical Decision Making  Amount and/or Complexity of Data Reviewed  External Data Reviewed: labs and radiology  Labs: ordered  Radiology: ordered  Risk  OTC drugs  Prescription drug management  Disposition  Final diagnoses:   None     ED Disposition     None      Follow-up Information    None         Patient's Medications   Discharge Prescriptions    No medications on file       No discharge procedures on file      PDMP Review       Value Time User    PDMP Reviewed  Yes 7/10/2021  5:30 AM Eric Sainz PA-C          ED Provider  Electronically Signed by

## 2023-04-06 ENCOUNTER — POSTPARTUM VISIT (OUTPATIENT)
Dept: OBGYN CLINIC | Facility: CLINIC | Age: 33
End: 2023-04-06

## 2023-04-06 VITALS — WEIGHT: 278 LBS | DIASTOLIC BLOOD PRESSURE: 80 MMHG | SYSTOLIC BLOOD PRESSURE: 110 MMHG | BODY MASS INDEX: 41.05 KG/M2

## 2023-04-06 DIAGNOSIS — Z98.891 S/P REPEAT LOW TRANSVERSE C-SECTION: Primary | ICD-10-CM

## 2023-04-06 DIAGNOSIS — O13.9 GESTATIONAL HYPERTENSION, ANTEPARTUM: ICD-10-CM

## 2023-04-06 LAB
ATRIAL RATE: 104 BPM
P AXIS: 46 DEGREES
PR INTERVAL: 142 MS
QRS AXIS: 36 DEGREES
QRSD INTERVAL: 86 MS
QT INTERVAL: 346 MS
QTC INTERVAL: 454 MS
T WAVE AXIS: 12 DEGREES
VENTRICULAR RATE: 104 BPM

## 2023-04-06 NOTE — PROGRESS NOTES
Ingrid Eye is here blood pressure check  She is 4 weeks post c/s delivery  She has gestational hypertension  She states she forgot to take her Procardia XL 60 mg - she has been off her medication for at least one week  She has been checking her BP at home occasional and they have been within normal range  BP is within normal range today at 110/80  Of note she was seen in the ED on 23 for left side abdominal pain and was found to have a kidney stone  Her pain has now improved  She is awaiting to pass the stone  Overall she is doing well  Bladder and bowel are normal  Her bleeding is light spotting  Baby is doing well  Patient's last menstrual period was 2022  Visit Vitals  /80   Wt 126 kg (278 lb)   LMP 2022   Breastfeeding Yes   BMI 41 05 kg/m²   OB Status Recent pregnancy   Smoking Status Former   BSA 2 38 m²     Exam: Incision is well approximated and healed  Right side retention suture removed  Debbie was seen today for postpartum care  Diagnoses and all orders for this visit:    S/P repeat low transverse     Gestational hypertension, antepartum      RTO in 2 weeks for 6 week post partum visit

## 2023-04-10 NOTE — ED ATTENDING ATTESTATION
4/2/2023  Tyrell Cruz DO, saw and evaluated the patient  I have discussed the patient with the resident/non-physician practitioner and agree with the resident's/non-physician practitioner's findings, Plan of Care, and MDM as documented in the resident's/non-physician practitioner's note, except where noted  All available labs and Radiology studies were reviewed  I was present for key portions of any procedure(s) performed by the resident/non-physician practitioner and I was immediately available to provide assistance  At this point I agree with the current assessment done in the Emergency Department    I have conducted an independent evaluation of this patient a history and physical is as follows:    ED Course         Critical Care Time  Procedures

## 2023-04-29 ENCOUNTER — LAB (OUTPATIENT)
Dept: LAB | Facility: CLINIC | Age: 33
End: 2023-04-29

## 2023-04-29 DIAGNOSIS — E06.3 HASHIMOTO'S THYROIDITIS: ICD-10-CM

## 2023-04-29 DIAGNOSIS — E55.9 VITAMIN D DEFICIENCY: ICD-10-CM

## 2023-04-29 DIAGNOSIS — E28.2 PCOS (POLYCYSTIC OVARIAN SYNDROME): ICD-10-CM

## 2023-04-29 LAB
25(OH)D3 SERPL-MCNC: 27.1 NG/ML (ref 30–100)
ALBUMIN SERPL BCP-MCNC: 4 G/DL (ref 3.5–5)
ALP SERPL-CCNC: 53 U/L (ref 34–104)
ALT SERPL W P-5'-P-CCNC: 27 U/L (ref 7–52)
ANION GAP SERPL CALCULATED.3IONS-SCNC: 4 MMOL/L (ref 4–13)
AST SERPL W P-5'-P-CCNC: 26 U/L (ref 13–39)
BILIRUB SERPL-MCNC: 1.58 MG/DL (ref 0.2–1)
BUN SERPL-MCNC: 12 MG/DL (ref 5–25)
CALCIUM SERPL-MCNC: 9.1 MG/DL (ref 8.4–10.2)
CHLORIDE SERPL-SCNC: 105 MMOL/L (ref 96–108)
CO2 SERPL-SCNC: 27 MMOL/L (ref 21–32)
CREAT SERPL-MCNC: 0.86 MG/DL (ref 0.6–1.3)
GFR SERPL CREATININE-BSD FRML MDRD: 87 ML/MIN/1.73SQ M
GLUCOSE SERPL-MCNC: 83 MG/DL (ref 65–140)
POTASSIUM SERPL-SCNC: 3.9 MMOL/L (ref 3.5–5.3)
PROT SERPL-MCNC: 7.6 G/DL (ref 6.4–8.4)
SODIUM SERPL-SCNC: 136 MMOL/L (ref 135–147)
T4 FREE SERPL-MCNC: 1.28 NG/DL (ref 0.76–1.46)
TSH SERPL DL<=0.05 MIU/L-ACNC: 1.08 UIU/ML (ref 0.45–4.5)

## 2023-05-01 NOTE — RESULT ENCOUNTER NOTE
Please call the patient regarding labs - A1C 7 1 ALMOST AT goal - cholesterol high - focus on dietary modifications

## 2023-05-02 LAB
EST. AVERAGE GLUCOSE BLD GHB EST-MCNC: 105 MG/DL
HBA1C MFR BLD: 5.3 %

## 2023-05-03 DIAGNOSIS — O99.810 ABNORMAL GLUCOSE TOLERANCE IN PREGNANCY: ICD-10-CM

## 2023-05-03 DIAGNOSIS — E28.2 PCOS (POLYCYSTIC OVARIAN SYNDROME): Primary | ICD-10-CM

## 2023-05-03 DIAGNOSIS — R73.01 ABNORMAL FASTING GLUCOSE: ICD-10-CM

## 2023-05-03 RX ORDER — DULAGLUTIDE 0.75 MG/.5ML
0.75 INJECTION, SOLUTION SUBCUTANEOUS
Qty: 2 ML | Refills: 3 | Status: SHIPPED | OUTPATIENT
Start: 2023-05-03

## 2023-05-03 NOTE — TELEPHONE ENCOUNTER
Bessy Moreno MD  to [address]      8:34 PM  Please send in script for trulicity 3 29 mg weekly

## 2023-05-03 NOTE — PROGRESS NOTES
Postpartum Visit  Toney Rice MD    23      Arminda Parra is a 26 y o  [id number] female who presents for a postpartum visit  She is s/p CS at 37w1d on 3/8/23  She delivered a female   Incision: no concers    Lochia is no bleeding  Bowel function is normal    Bladder function is normal    Desired contraception method is IUD    [place] score: 8, no si/hi, feeling well    Gestational Diabetes: no  Gestational HTN/Preeclampsia: yes      The following portions of the patient's history were reviewed and updated as appropriate: She  has a past medical history of Abnormal Pap smear of cervix, Chronic diarrhea of unknown origin, Disease of thyroid gland, Dyspepsia, Early satiety, Hidradenitis suppurativa, Hypertension, Hypothyroidism, Obesity, Polycystic ovary syndrome, and Varicella  She  has a past surgical history that includes [place] tooth extraction;  section; pr  delivery only (N/A, 2020); and pr  delivery only (N/A, 3/8/2023)    She is allergic to a + d personal care lotion  [dimethicone], calamine-zinc oxide, keflex [cephalexin], pramoxine-calamine, and zinc       Current Outpatient Medications:     ergocalciferol (VITAMIN D2) 50,000 units, Take 1 capsule (50,000 Units total) by mouth once a week, Disp: 4 capsule, Rfl: 6    Prenatal MV & Min w/FA-DHA (PRENATAL ADULT GUMMY/DHA/FA PO), Take 1 tablet by mouth, Disp: , Rfl:     dulaglutide (Trulicity) 5 78 LW/9 5IQ injection, Inject 0 5 mL (0 75 mg total) under the skin every 7 days (Patient not taking: Reported on 2023), Disp: 2 mL, Rfl: 3    Allergies   Allergen Reactions    A + D Personal Care Lotion  [Dimethicone] Rash    Calamine-Zinc Oxide Rash    Keflex [Cephalexin] GI Intolerance    Pramoxine-Calamine     Zinc Rash     No med alert bracelet no epi pen       Review of Systems  Constitutional: no complaints  Breasts: no complaints   Gastrointestinal: no complaints   Genitourinary: as noted "in HPI  Neurological: no complaints     Objective      /88 (BP Location: Left arm, Patient Position: Sitting, Cuff Size: Large)   Ht 5' 9 5\" (1 765 m)   Wt 127 kg (280 lb)   BMI 40 76 kg/m²   Physical Exam  Constitutional:       Appearance: Normal appearance  She is not ill-appearing  HENT:      Head: Normocephalic and atraumatic  Eyes:      Extraocular Movements: Extraocular movements intact  Conjunctiva/sclera: Conjunctivae normal    Pulmonary:      Effort: Pulmonary effort is normal    Abdominal:      General: There is no distension  Palpations: Abdomen is soft  Tenderness: There is no abdominal tenderness  There is no guarding  Comments: Incision clean, dry, intact without erythema, induration, bleeding or discharge     Genitourinary:     Comments: Vulva: normal, no lesions  Vagina: normal, no lesions or ttp  Urethra: normal, no lesions, masses or ttp  Bladder: normal, no masses or ttp  Cervix: normal, no lesions, masses or CMT  Uterus: normal-size, normal mobility, good descensus  Adnexa: no masses or ttp  Musculoskeletal:         General: Normal range of motion  Cervical back: Normal range of motion  Skin:     General: Skin is warm and dry  Neurological:      General: No focal deficit present  Psychiatric:         Mood and Affect: Mood normal          Behavior: Behavior normal          Thought Content: Thought content normal            Assessment/Plan:  Parker Schaumann is a 26 y o  who is postpartum from a CS with a normal postpartum examination  · GHTN, started on nifedipine 60, stopped before last visit  · Thyroid disease--no meds  · Contraception: using condoms would like Mirena IUD  Reviewed risk of pain, bleeding, infection, expulsion, malposition, uterine perforation requiring laparoscopic removal  All questions answered to the best of my ability      · Annual exam due in summer Last Pap : 06/2022--NILM, HPV neg    "

## 2023-05-03 NOTE — TELEPHONE ENCOUNTER
Dr Rina Mcintosh ,  Please review pending RX  I will sent a message to Pt that you will sent the prescription to her [facility] pharmacy , but we are not sure if this will be cover bu her insurance since is only cover for pt with Type 2 diabetes

## 2023-05-04 ENCOUNTER — TELEPHONE (OUTPATIENT)
Dept: OBGYN CLINIC | Facility: CLINIC | Age: 33
End: 2023-05-04

## 2023-05-04 ENCOUNTER — OFFICE VISIT (OUTPATIENT)
Dept: OBGYN CLINIC | Facility: CLINIC | Age: 33
End: 2023-05-04

## 2023-05-04 VITALS
HEIGHT: 70 IN | SYSTOLIC BLOOD PRESSURE: 130 MMHG | BODY MASS INDEX: 40.09 KG/M2 | WEIGHT: 280 LBS | DIASTOLIC BLOOD PRESSURE: 88 MMHG

## 2023-05-04 DIAGNOSIS — Z98.891 S/P CESAREAN SECTION: ICD-10-CM

## 2023-05-04 NOTE — TELEPHONE ENCOUNTER
Patient scheduled for Mirena in [place] on 5/30/23  Codes given to check coverage  Please john device for patient in [place] office

## 2023-05-07 PROBLEM — O23.43: Status: RESOLVED | Noted: 2023-03-07 | Resolved: 2023-05-07

## 2023-05-11 ENCOUNTER — HOSPITAL ENCOUNTER (OUTPATIENT)
Dept: ULTRASOUND IMAGING | Facility: HOSPITAL | Age: 33
Discharge: HOME | End: 2023-05-11

## 2023-05-11 DIAGNOSIS — E04.1 THYROID NODULE: ICD-10-CM

## 2023-05-18 ENCOUNTER — TELEPHONE (OUTPATIENT)
Dept: ENDOCRINOLOGY | Facility: CLINIC | Age: 33
End: 2023-05-18

## 2023-05-18 NOTE — RESULT ENCOUNTER NOTE
Please call the patient regarding thyroid ultrasound-1 new nodule on the left side however not sure if it is within the thyroid or not-we will discuss in detail and next  step on upcoming visit

## 2023-05-28 DIAGNOSIS — E55.9 VITAMIN D DEFICIENCY: ICD-10-CM

## 2023-05-30 ENCOUNTER — PROCEDURE VISIT (OUTPATIENT)
Dept: OBGYN CLINIC | Facility: CLINIC | Age: 33
End: 2023-05-30

## 2023-05-30 VITALS
SYSTOLIC BLOOD PRESSURE: 124 MMHG | DIASTOLIC BLOOD PRESSURE: 78 MMHG | BODY MASS INDEX: 41.03 KG/M2 | HEIGHT: 70 IN | WEIGHT: 286.6 LBS

## 2023-05-30 DIAGNOSIS — Z30.430 ENCOUNTER FOR INSERTION OF MIRENA IUD: Primary | ICD-10-CM

## 2023-05-30 LAB — SL AMB POCT URINE HCG: NORMAL

## 2023-05-30 RX ORDER — ERGOCALCIFEROL 1.25 MG/1
CAPSULE ORAL
Qty: 4 CAPSULE | Refills: 6 | Status: SHIPPED | OUTPATIENT
Start: 2023-05-30

## 2023-05-30 NOTE — TELEPHONE ENCOUNTER
Requested medication(s) are due for refill today: Yes  Patient has already received a courtesy refill: No  Other reason request has been forwarded to provider: Please advise if you would like to refill this Rx of if the patient should switch to an OTC supplement  Thank you!

## 2023-05-30 NOTE — PROGRESS NOTES
Iud insertions    Date/Time: 5/30/2023 3:00 PM    Performed by: Garrett Pang 10 Shira Morales  Authorized by: Barbara Martinez MD    Other Assisting Provider: No    Verbal consent obtained?: Yes    Risks and benefits: Risks, benefits and alternatives were discussed    Consent given by:  Patient  Time Out:     Time out: Immediately prior to the procedure a time out was called      Time out performed at:  5/30/2023 3:06 PM  Patient states understanding of procedure being performed: Yes    Patient's understanding of procedure matches consent: Yes    Procedure consent matches procedure scheduled: Yes    Relevant documents present and verified: Yes    Test results available and properly labeled: Yes    Site marked: No    Radiology Images displayed and confirmed  If images not available, report reviewed: Yes    Required items: Required blood products, implants, devices and special equipment available    Patient identity confirmed:  Verbally with patient  Procedure:     Pelvic exam performed: yes      Negative GC/chlamydia test: no (offered, pt declined)      Negative urine pregnancy test: yes      Negative serum pregnancy test: no      Cervix cleaned and prepped: yes      Speculum placed in vagina: yes      Tenaculum applied to cervix: no      Uterus sounded: yes      Strings trimmed: yes    Post-procedure:     Patient tolerated procedure well: yes      Patient will follow up after next period: no    Comments:      Pt for IUD insertion today  Pt has already been previously counseled on all risks/benefits of IUD usage/insertion  I again reviewed with pt risks of insertion including infection, perforation and expulsion  Reviewed with pt increased ectopic risk, migration, PID, infertility, high risk pregnancy if a pregnancy were to occur  I reviewed possible irregular menses and side effects of Mirena  We reviewed changes in menstrual cycles that may occur     Pt aware 8 year coverage for birth control but can have it removed at any point if she desires  Pt tolerated procedure well  Aware nothing PV x 48 hours  Will call with any cramping that does not resolve, fevers/chills  Etc

## 2023-06-22 ENCOUNTER — OFFICE VISIT (OUTPATIENT)
Dept: ENDOCRINOLOGY | Facility: CLINIC | Age: 33
End: 2023-06-22
Payer: COMMERCIAL

## 2023-06-22 VITALS
DIASTOLIC BLOOD PRESSURE: 64 MMHG | HEIGHT: 70 IN | SYSTOLIC BLOOD PRESSURE: 108 MMHG | BODY MASS INDEX: 41.72 KG/M2 | HEART RATE: 101 BPM

## 2023-06-22 DIAGNOSIS — E04.1 THYROID NODULE: Primary | ICD-10-CM

## 2023-06-22 DIAGNOSIS — E28.2 PCOS (POLYCYSTIC OVARIAN SYNDROME): ICD-10-CM

## 2023-06-22 DIAGNOSIS — E03.9 HYPOTHYROIDISM, UNSPECIFIED TYPE: ICD-10-CM

## 2023-06-22 DIAGNOSIS — R93.89 ABNORMAL IMAGING OF THYROID: ICD-10-CM

## 2023-06-22 DIAGNOSIS — E66.01 MORBID OBESITY (HCC): ICD-10-CM

## 2023-06-22 DIAGNOSIS — R59.9 ENLARGED LYMPH NODES: ICD-10-CM

## 2023-06-22 DIAGNOSIS — E55.9 VITAMIN D DEFICIENCY: ICD-10-CM

## 2023-06-22 PROCEDURE — 99214 OFFICE O/P EST MOD 30 MIN: CPT

## 2023-06-22 NOTE — ASSESSMENT & PLAN NOTE
Ozempic should help with this  Patient should also implement lifestyle modifications including diet and exercise

## 2023-06-22 NOTE — ASSESSMENT & PLAN NOTE
Most recent lab work has been stable however patient has not used her Synthroid since February and is feeling symptomatic  I will repeat thyroid labs now

## 2023-06-22 NOTE — PROGRESS NOTES
Established Patient Progress Note    CC: Follow-up for hypothyroidism, thyroid nodule and insulin resistance    Impression & Plan:    Problem List Items Addressed This Visit        Endocrine    PCOS (polycystic ovarian syndrome)     Patient would like to switch to Ozempic  We will start her on the equivalent dose of Trulicity which is 0 5 mg once per week for 4 weeks  If she is tolerating that she may increase to 1 mg  This will also help with weight loss  Her most recent hemoglobin A1c was normal          Relevant Medications    semaglutide, 0 25 or 0 5 mg/dose, (Ozempic, 0 25 or 0 5 MG/DOSE,) 2 mg/3 mL injection pen    Thyroid nodule - Primary     His recent ultrasound of the thyroid showed a thyroid nodule slightly larger from prior and tissue sampling is recommended  I have sent the order for ultrasound-guided biopsy and to send for Afirma testing  I have also sent the patient to see surgical oncology for further evaluation  Relevant Orders    Ambulatory referral to Surgical Oncology    TSH, 3rd generation Lab Collect    T4, free Lab Collect    US guided thyroid biopsy    Hypothyroidism     Most recent lab work has been stable however patient has not used her Synthroid since February and is feeling symptomatic  I will repeat thyroid labs now  Relevant Orders    TSH, 3rd generation Lab Collect    T4, free Lab Collect       Immune and Lymphatic    Enlarged lymph nodes     If patient's lymph nodes remain enlarged, I would recommend she see ENT  Relevant Orders    Ambulatory Referral to Otolaryngology       Other    Morbid obesity ([facility] [address] )     Joe Rangel should help with this  Patient should also implement lifestyle modifications including diet and exercise  Vitamin D deficiency     Repeat vitamin D level now  If low, patient may benefit from switching to 10,000 IU D3 daily           Relevant Orders    Vitamin D 25 hydroxy Lab Collect    Abnormal imaging of thyroid     There was an abnormal finding on the recent ultrasound of the thyroid which may correlate to a parathyroid adenoma  Historically her serum calcium levels have been normal thus suspicion for parathyroid adenoma is low  She does have a history of kidney stone  She is not taking any calcium supplements  Orders Placed This Encounter   Procedures   • US guided thyroid biopsy     Send for [facility]     Standing Status:   Future     Standing Expiration Date:   6/22/2027     Scheduling Instructions:      No prep required  Please bring your insurance cards, a form of photo ID and a list of your medications with you  Arrive 15 minutes prior to your appointment time in order to register  To schedule this appointment, please contact Central Scheduling at [phone]  Order Specific Question:   Is the patient pregnant? Answer:   No     Order Specific Question:   What is the patient's sedation requirement? If Medication for Claustrophobia is selected, order medication at this point  Answer:   No Sedation     Order Specific Question:   Please specify laterality     Answer:   Left     Order Specific Question:   Please specify the number of nodules being biopsied     Answer:   1     Order Specific Question:   Describe each nodule location     Answer:   left inferior pole   • TSH, 3rd generation Lab Collect     This is a patient instruction: This test is non-fasting  Please drink two glasses of water morning of bloodwork          Standing Status:   Future     Standing Expiration Date:   6/22/2024   • T4, free Lab Collect     Standing Status:   Future     Standing Expiration Date:   6/22/2024   • Vitamin D 25 hydroxy Lab Collect     Standing Status:   Future     Standing Expiration Date:   6/22/2024   • Ambulatory referral to Surgical Oncology     Standing Status:   Future     Standing Expiration Date:   6/22/2024     Referral Priority:   Routine     Referral Type:   Consult - AMB     Referral Reason:   Specialty "Services Required     Requested Specialty:   Surgical Oncology     Number of Visits Requested:   1     Expiration Date:   6/22/2024   • Ambulatory Referral to Otolaryngology     Standing Status:   Future     Standing Expiration Date:   6/23/2024     Referral Priority:   Routine     Referral Type:   Consult - AMB     Referral Reason:   Specialty Services Required     Requested Specialty:   Otolaryngology     Number of Visits Requested:   1     Expiration Date:   6/23/2024       History of Present Illness:   Brennon Lay is a 26 y o  female with a history of hypothyroidism due to Hashimoto's thyroiditis, thyroid nodule and insulin resistance  For the hypothyroidism, she stopped taking her Synthroid in February  Labs were repeated in April which were normal   She did deliver a baby girl in March  She has remained off of Synthroid and presents today feeling \"off\"  For the thyroid nodule, she most recently had a thyroid ultrasound which showed 1 nodule had enlarged and recommends tissue sampling  There was also an extrathyroidal area potentially concerning for parathyroid adenoma  Historically her calcium levels have been normal on serum blood work  She does report having a kidney stone in the past   She is currently taking 50,000 units of vitamin D2 per week plus prenatal vitamin  Her most recent vitamin D is low  Patient does report compressive symptoms including trouble breathing, swallowing and pain when compressed  She does have multiple inflamed lymph nodes  She denies any recent illnesses  She also denies any history of radiation exposure  She is unsure about a history of thyroid cancer in her family       Patient Active Problem List   Diagnosis   • PCOS (polycystic ovarian syndrome)   • Nontoxic goiter, unspecified   • Morbid obesity ([address] )   • Vitamin D deficiency   • Infertility, female   • Hyperlipidemia, mixed   • Elevated liver enzymes   • Anxiety   • Thyroid nodule   • Bilateral carpal tunnel " syndrome   • Elevated prolactin level   • History of  delivery, antepartum   • Maternal morbid obesity, antepartum ([address] )   • History of gestational hypertension   • Hypothyroidism   • Hirsutism   • Sleep disturbances   • Thyroid disease affecting pregnancy   • Multigravida of advanced maternal age in third trimester   • Abnormal glucose tolerance in pregnancy   • Obesity affecting pregnancy in third trimester   • Vaccine counseling   • Fetal arrhythmia affecting pregnancy, antepartum   • Gestational hypertension   • S/P repeat low transverse    • Hashimoto's thyroiditis   • Abnormal imaging of thyroid   • Enlarged lymph nodes      Past Medical History:   Diagnosis Date   • Abnormal Pap smear of cervix     LGSIL - colpo - LGSIL, HPV effect   • Chronic diarrhea of unknown origin     last assessed 6/26/15   • Disease of thyroid gland     nodule   • Dyspepsia     last assessed  10/29/14   • Early satiety     last assessed  10/29/14   • Hidradenitis suppurativa     last assessed  14   • Hypertension     last pregnancy   • Hypothyroidism        • Obesity    • Polycystic ovary syndrome    • Varicella     childhood      Past Surgical History:   Procedure Laterality Date   •  SECTION       and  [address] [facility],    • [place]  DELIVERY ONLY N/A 2020    Procedure:  SECTION () REPEAT;  Surgeon: Dorethea Meckel, MD;  Location: [facility] ;  Service: Obstetrics   • [place] ONLY N/A 3/8/2023    Procedure:  SECTION () REPEAT;  Surgeon: Weston White MD;  Location: [facility] LD;  Service: Obstetrics   • WISDOM TOOTH EXTRACTION      all 4      Family History   Problem Relation Age of Onset   • Hypertension Mother    • No Known Problems Father    • No Known Problems Sister    • No Known Problems Daughter    • No Known Problems Son    • Thyroid disease Maternal Grandmother    • No Known Problems Maternal Grandfather    • Other Paternal "Grandmother         pulmonary embolism   • No Known Problems Paternal Grandfather      Social History     Tobacco Use   • Smoking status: Former     Packs/day: 0 00     Years: 0 00     Total pack years: 0 00     Types: Cigarettes     Quit date: 3/1/2014     Years since quittin 3   • Smokeless tobacco: Never   • Tobacco comments:      sometimes 1 cig once weekly - none since preg   Substance Use Topics   • Alcohol use: Not Currently     Allergies   Allergen Reactions   • A + D Personal Care Lotion  [Dimethicone] Rash   • Calamine-Zinc Oxide Rash   • Keflex [Cephalexin] GI Intolerance   • Pramoxine-Calamine    • Zinc Rash     No med alert bracelet no epi pen         Current Outpatient Medications:   •  ergocalciferol (VITAMIN D2) 50,000 units, TAKE 1 CAPSULE BY MOUTH ONE TIME PER WEEK, Disp: 4 capsule, Rfl: 6  •  Prenatal MV & Min w/FA-DHA (PRENATAL ADULT GUMMY/DHA/FA PO), Take 1 tablet by mouth, Disp: , Rfl:   •  semaglutide, 0 25 or 0 5 mg/dose, (Ozempic, 0 25 or 0 5 MG/DOSE,) 2 mg/3 mL injection pen, Inject 0 75 mL (0 5 mg total) under the skin every 7 days, Disp: 1 1 mL, Rfl: 2    Review of Systems   Constitutional: Negative for chills and fever  HENT: Positive for trouble swallowing  Negative for ear pain and sore throat  Eyes: Negative for pain and visual disturbance  Respiratory: Negative for cough and shortness of breath  Cardiovascular: Negative for chest pain and palpitations  Gastrointestinal: Negative for abdominal pain and vomiting  Genitourinary: Negative for dysuria and hematuria  Musculoskeletal: Negative for arthralgias and back pain  Skin: Negative for color change and rash  Neurological: Negative for seizures and syncope  All other systems reviewed and are negative  Physical Exam:  Body mass index is 41 72 kg/m²    /64   Pulse 101   Ht 5' 9 5\" (1 765 m)   LMP 2023 (Exact Date)   BMI 41 72 kg/m²    Wt Readings from Last 3 Encounters:   23 130 kg " (286 lb 9 6 oz)   05/04/23 127 kg (280 lb)   04/06/23 126 kg (278 lb)       Physical Exam  Vitals reviewed  Constitutional:       Appearance: Normal appearance  HENT:      Head: Normocephalic and atraumatic  Cardiovascular:      Rate and Rhythm: Normal rate and regular rhythm  Heart sounds: Normal heart sounds  Pulmonary:      Effort: Pulmonary effort is normal       Breath sounds: Normal breath sounds  Musculoskeletal:      Cervical back: Neck supple  Tenderness present  Lymphadenopathy:      Cervical: No cervical adenopathy  Neurological:      Mental Status: She is alert and oriented to person, place, and time  Psychiatric:         Mood and Affect: Mood normal          Behavior: Behavior normal          Labs:   Lab Results   Component Value Date    HGBA1C 5 3 04/29/2023    HGBA1C 5 3 08/27/2022    HGBA1C 5 6 04/15/2022     Lab Results   Component Value Date    CREATININE 0 86 04/29/2023    CREATININE 1 39 (H) 04/02/2023    CREATININE 0 85 03/31/2023    BUN 12 04/29/2023     06/21/2016    K 3 9 04/29/2023     04/29/2023    CO2 27 04/29/2023     eGFR   Date Value Ref Range Status   04/29/2023 87 ml/min/1 73sq m Final     Lab Results   Component Value Date    CHOL 151 06/21/2016    HDL 32 (L) 04/15/2022    TRIG 166 (H) 04/15/2022     Lab Results   Component Value Date    ALT 27 04/29/2023    AST 26 04/29/2023    ALKPHOS 53 04/29/2023    BILITOT 1 3 (H) 06/21/2016     Lab Results   Component Value Date    PRT2QNEWNTAE 1 084 04/29/2023    YEP7HGNSSWUQ 1 646 02/16/2023    BUQ9KGQVRPXU 0 826 11/05/2022     Lab Results   Component Value Date    FREET4 1 28 04/29/2023         There are no Patient Instructions on file for this visit  Discussed with the patient and all questioned fully answered  She will call me if any problems arise

## 2023-06-22 NOTE — ASSESSMENT & PLAN NOTE
His recent ultrasound of the thyroid showed a thyroid nodule slightly larger from prior and tissue sampling is recommended  I have sent the order for ultrasound-guided biopsy and to send for Afirma testing  I have also sent the patient to see surgical oncology for further evaluation

## 2023-06-22 NOTE — ASSESSMENT & PLAN NOTE
Patient would like to switch to Ozempic  We will start her on the equivalent dose of Trulicity which is 0 5 mg once per week for 4 weeks  If she is tolerating that she may increase to 1 mg  This will also help with weight loss    Her most recent hemoglobin A1c was normal

## 2023-06-22 NOTE — ASSESSMENT & PLAN NOTE
There was an abnormal finding on the recent ultrasound of the thyroid which may correlate to a parathyroid adenoma  Historically her serum calcium levels have been normal thus suspicion for parathyroid adenoma is low  She does have a history of kidney stone  She is not taking any calcium supplements

## 2023-06-23 ENCOUNTER — TELEPHONE (OUTPATIENT)
Dept: HEMATOLOGY ONCOLOGY | Facility: CLINIC | Age: 33
End: 2023-06-23

## 2023-06-23 NOTE — TELEPHONE ENCOUNTER
I called Suelma Massey in response to a referral that was received for patient to establish care with Surgical Oncology  Outreach was made to schedule a consultation       I left a voicemail explaining the reason for my call and advised patient to call [facility] at [phone]  Another attempt will be made to contact patient

## 2023-06-26 DIAGNOSIS — E04.9 NONTOXIC GOITER, UNSPECIFIED: Primary | ICD-10-CM

## 2023-06-27 ENCOUNTER — CONSULT (OUTPATIENT)
Dept: SURGICAL ONCOLOGY | Facility: CLINIC | Age: 33
End: 2023-06-27
Payer: COMMERCIAL

## 2023-06-27 ENCOUNTER — APPOINTMENT (OUTPATIENT)
Dept: LAB | Facility: CLINIC | Age: 33
End: 2023-06-27
Payer: COMMERCIAL

## 2023-06-27 VITALS
HEIGHT: 70 IN | TEMPERATURE: 97.7 F | SYSTOLIC BLOOD PRESSURE: 126 MMHG | BODY MASS INDEX: 40.09 KG/M2 | DIASTOLIC BLOOD PRESSURE: 74 MMHG | WEIGHT: 280 LBS | HEART RATE: 117 BPM | RESPIRATION RATE: 18 BRPM | OXYGEN SATURATION: 98 %

## 2023-06-27 DIAGNOSIS — E55.9 VITAMIN D DEFICIENCY: ICD-10-CM

## 2023-06-27 DIAGNOSIS — E04.1 THYROID NODULE: ICD-10-CM

## 2023-06-27 DIAGNOSIS — E03.9 HYPOTHYROIDISM, UNSPECIFIED TYPE: ICD-10-CM

## 2023-06-27 DIAGNOSIS — E04.1 THYROID NODULE: Primary | ICD-10-CM

## 2023-06-27 DIAGNOSIS — R13.10 ODYNOPHAGIA: ICD-10-CM

## 2023-06-27 LAB
25(OH)D3 SERPL-MCNC: 20.7 NG/ML (ref 30–100)
T4 FREE SERPL-MCNC: 0.88 NG/DL (ref 0.61–1.12)
TSH SERPL DL<=0.05 MIU/L-ACNC: 1.68 UIU/ML (ref 0.45–4.5)

## 2023-06-27 PROCEDURE — 84439 ASSAY OF FREE THYROXINE: CPT

## 2023-06-27 PROCEDURE — 36415 COLL VENOUS BLD VENIPUNCTURE: CPT

## 2023-06-27 PROCEDURE — 84443 ASSAY THYROID STIM HORMONE: CPT

## 2023-06-27 PROCEDURE — 99244 OFF/OP CNSLTJ NEW/EST MOD 40: CPT | Performed by: STUDENT IN AN ORGANIZED HEALTH CARE EDUCATION/TRAINING PROGRAM

## 2023-06-27 PROCEDURE — 82306 VITAMIN D 25 HYDROXY: CPT

## 2023-06-27 NOTE — PROGRESS NOTES
Surgical Oncology Consultation    [address]  [facility] [address] [place] [address]    Patient:  Ofelia Almanza  1990  [id number]    Primary Care provider:  MD Gracie Camargo[address] [address]    Referring provider:  Saba [address] [address]    Diagnoses and all orders for this visit:    Thyroid nodule  -     Ambulatory referral to Surgical Oncology        Chief Complaint   Patient presents with   • Advice Only       No follow-ups on file  Oncology History    No history exists  History of Present Illness  :   27 yo female presents for eval of thyroid nodule  Has hx hypothyroidism for which she is followed by endo  Seemed to be related to pregnancy time period  Recently stopped taking synthroid due to normalization of hormone levels  Does feel fatigued but has a 1 mo old baby  No other sx of hyper or hypothyroidism  LFT lower pole vs extrathyroid nodule of 1 3 cm detected one year ago, now this nodule is 1 4 cm in size on US  Pt has several complaints of SOB when supine, trouble swallowing, several enlarged nodes throughout the head and neck which seem to wax and wane in size  She states these have been present at least 2-3 years  She had an [address] in July 2021 demonstrating normal-appearing LNs in each of the regions where she describes enlargement  Feels they have grown since that time  Has seen ENT for the above sx who stated after workup that her pharyngeal sx are likely due to allergies and post-nasal drip  Has not be evaluated for sleep apnea  TSH 4/2023 WNL  Ca 4/2023 WNL    Review of Systems  Complete ROS Surg Onc:   Constitutional: The patient ENDORSES new or recent history of general fatigue, no recent weight loss, no change in appetite  Eyes: No complaints of visual problems, no scleral icterus     ENT: C/o EAR FULLNESS ear pain, no hoarseness, MILD difficulty swallowing,  no tinnitus and no new masses in head, oral cavity, or neck  Cardiovascular: No complaints of chest pain, no palpitations, no ankle edema  Respiratory: C/O shortness of breath when supine, no cough  Gastrointestinal: No complaints of jaundice, no bloody stools, no pale stools  Genitourinary: No complaints of dysuria, no hematuria, no nocturia, no frequent urination, no urethral discharge  Musculoskeletal: No complaints of weakness, paralysis, joint stiffness or arthralgias  Integumentary: No complaints of rash, no new lesions  Neurological: No complaints of convulsions, no seizures, no dizziness  Hematologic/Lymphatic: No complaints of easy bruising  Endocrine:  No hot or cold intolerance  No polydipsia, polyphagia, or polyuria  Allergy/immunology:  No environmental allergies  No food allergies  Not immunocompromised        Patient Active Problem List   Diagnosis   • PCOS (polycystic ovarian syndrome)   • Nontoxic goiter, unspecified   • Morbid obesity ([facility] )   • Vitamin D deficiency   • Infertility, female   • Hyperlipidemia, mixed   • Elevated liver enzymes   • Anxiety   • Thyroid nodule   • Bilateral carpal tunnel syndrome   • Elevated prolactin level   • History of  delivery, antepartum   • Maternal morbid obesity, antepartum ([facility] )   • History of gestational hypertension   • Hypothyroidism   • Hirsutism   • Sleep disturbances   • Thyroid disease affecting pregnancy   • Multigravida of advanced maternal age in third trimester   • Abnormal glucose tolerance in pregnancy   • Obesity affecting pregnancy in third trimester   • Vaccine counseling   • Fetal arrhythmia affecting pregnancy, antepartum   • Gestational hypertension   • S/P repeat low transverse    • Hashimoto's thyroiditis   • Abnormal imaging of thyroid   • Enlarged lymph nodes     Past Medical History:   Diagnosis Date   • Abnormal Pap smear of cervix     LGSIL - colpo - LGSIL, HPV effect   • Chronic diarrhea of unknown origin     last assessed 6/26/15   • Disease of thyroid gland     nodule   • Dyspepsia     last assessed  10/29/14   • Early satiety     last assessed  10/29/14   • Hidradenitis suppurativa     last assessed  14   • Hypertension     last pregnancy   • Hypothyroidism        • Obesity    • Polycystic ovary syndrome    • Varicella     childhood     Past Surgical History:   Procedure Laterality Date   •  SECTION       and  [address] [facility]   • [place]IV[place] ONLY N/A 2020    Procedure:  SECTION () REPEAT;  Surgeon: Blossom Marquez MD;  Location: [facility] LD;  Service: Obstetrics   • [place] ONLY N/A 3/8/2023    Procedure:  SECTION () REPEAT;  Surgeon: Leonardo Caceres MD;  Location: [facility] LD;  Service: Obstetrics   • WISDOM TOOTH EXTRACTION      all 4     Family History   Problem Relation Age of Onset   • Hypertension Mother    • No Known Problems Father    • No Known Problems Sister    • No Known Problems Daughter    • No Known Problems Son    • Thyroid disease Maternal Grandmother    • No Known Problems Maternal Grandfather    • Other Paternal Grandmother         pulmonary embolism   • No Known Problems Paternal Grandfather      Social History     Socioeconomic History   • Marital status: /Civil Union     Spouse name: Not on file   • Number of children: Not on file   • Years of education: Not on file   • Highest education level: Not on file   Occupational History   • Occupation:    Tobacco Use   • Smoking status: Former     Packs/day: 0 00     Years: 0 00     Total pack years: 0 00     Types: Cigarettes     Quit date: 3/1/2014     Years since quittin 3   • Smokeless tobacco: Never   • Tobacco comments:      sometimes 1 cig once weekly - none since preg   Vaping Use   • Vaping Use: Never used   Substance and Sexual Activity   • Alcohol use: Not Currently   • Drug use: No   • Sexual activity: Yes     Partners: Male     Birth control/protection: None   Other Topics Concern   • Not on file   Social History Narrative    Caffeine use: 1 cup coffee or ice tea/ day  Stress at work     Social Determinants of Health     Financial Resource Strain: Not on file   Food Insecurity: Not on file   Transportation Needs: Not on file   Physical Activity: Not on file   Stress: Not on file   Social Connections: Not on file   Intimate Partner Violence: Not on file   Housing Stability: Not on file       Current Outpatient Medications:   •  ergocalciferol (VITAMIN D2) 50,000 units, TAKE 1 CAPSULE BY MOUTH ONE TIME PER WEEK, Disp: 4 capsule, Rfl: 6  •  Prenatal MV & Min w/FA-DHA (PRENATAL ADULT GUMMY/DHA/FA PO), Take 1 tablet by mouth, Disp: , Rfl:   •  semaglutide, 0 25 or 0 5 mg/dose, (Ozempic, 0 25 or 0 5 MG/DOSE,) 2 mg/3 mL injection pen, Inject 0 75 mL (0 5 mg total) under the skin every 7 days, Disp: 1 1 mL, Rfl: 2  Allergies   Allergen Reactions   • A + D Personal Care Lotion  [Dimethicone] Rash   • Calamine-Zinc Oxide Rash   • Keflex [Cephalexin] GI Intolerance   • Pramoxine-Calamine    • Zinc Rash     No med alert bracelet no epi pen       Vitals:    06/27/23 1411   BP: 126/74   Pulse: (!) 117   Resp: 18   Temp: 97 7 °F (36 5 °C)   SpO2: 98%       Physical Exam   General: Appears well, appears stated age  Skin: Warm, anicteric  HEENT: Normocephalic, atraumatic; sclera aniceteric, mucous membranes moist; cervical nodes without adenopathy  Several fat pads vs lipomas, more pronounced in RT subclv and RT submandibular region   No thyroid nodules appreciated  Cardiopulmonary: RRR, Easy WOB, no BLE edema  Abd: Flat and soft, nontender, no masses appreciated, no hepatosplenomegaly  MSK: Symmetric, no cyanosis, no overt weakness  Lymphatic: No cervical, axillary or inguinal lymphadenopathy  Neuro: Affect appropriate, no gross motor abnormalities    Labs: Reviewed in EPIC    Imaging    Thyroid US: IMPRESSION:     1 4 cm left lower pole nodule demonstrates subthreshold enlargement but does appear new since older examinations      Some level 3 nodes are seen on the left although not significantly enlarged      Given borderline change, histologic sampling of this nodule on image 56 on the left is suggested      Correlation with serum parathyroid hormone is also suggested given a possible extrathyroidal location  I independently reviewed and interpreted the above laboratory and imaging data incl previous and past thyroid US, H&N LN US, labs      Discussion/Summary:   25 yo female with small LFT thyroid nodule 1 4 cm that has marginally grown in size since last year  Reassured the patient that this nodule is highly unlikely to be causing compressive-type symptoms given its very small size  Will obtain US-guided bx for histologic eval and surgical planning as needed  Will obtain CT neck for better anatomic eval of neck and her compressive sx  Pt will f/u after these studies

## 2023-07-03 ENCOUNTER — TELEPHONE (OUTPATIENT)
Dept: ENDOCRINOLOGY | Facility: CLINIC | Age: 33
End: 2023-07-03

## 2023-07-03 NOTE — TELEPHONE ENCOUNTER
Pt is requesting to initiated a P. A for Ozempic , I did call [facility] pharmacy but was close for lunch, I did call pt back , she will ask her  for a copy of her new insurance and she  will sent a copy of her new prescription plan via my chart ,so we can submit the Prior authorization .

## 2023-07-03 NOTE — TELEPHONE ENCOUNTER
[employer] ID # [id number]  BIN# [id number]  n# [id number]  Group# [id number]    Per covermymeds . To initiate an authorization request for this medication, please contact [employer] F at [phone]. I did called [facility], per representative she will fax the P. A form but the doesn't see any claim from the pharmacy. Called pt back , advise pt to call her pharamcy and to provide the new RX plan  To her pharmacy, I will wait for the P. A form .

## 2023-07-07 ENCOUNTER — TELEPHONE (OUTPATIENT)
Dept: ENDOCRINOLOGY | Facility: CLINIC | Age: 33
End: 2023-07-07

## 2023-07-07 ENCOUNTER — HOSPITAL ENCOUNTER (OUTPATIENT)
Dept: CT IMAGING | Facility: HOSPITAL | Age: 33
Discharge: HOME | End: 2023-07-07
Attending: STUDENT IN AN ORGANIZED HEALTH CARE EDUCATION/TRAINING PROGRAM
Payer: COMMERCIAL

## 2023-07-07 DIAGNOSIS — E04.1 THYROID NODULE: ICD-10-CM

## 2023-07-07 DIAGNOSIS — R13.10 ODYNOPHAGIA: ICD-10-CM

## 2023-07-07 PROCEDURE — G1004 CDSM NDSC: HCPCS

## 2023-07-07 PROCEDURE — 70491 CT SOFT TISSUE NECK W/DYE: CPT

## 2023-07-07 RX ADMIN — IOHEXOL 85 ML: 350 INJECTION, SOLUTION INTRAVENOUS at 18:11

## 2023-07-07 NOTE — TELEPHONE ENCOUNTER
Received the P. A form , Ozbranden will be only cover if pt has type 2 diabetes ,  They will cover Saxshelbi or Davin gates is on back order.     I did call pt,left message requesting a call back

## 2023-07-10 DIAGNOSIS — R73.01 ABNORMAL FASTING GLUCOSE: Primary | ICD-10-CM

## 2023-07-10 NOTE — TELEPHONE ENCOUNTER
Spoke with patient. Will try sending under different diagnosis code. If that doesn't help, then she is agreeable to trying [place].

## 2023-07-11 NOTE — TELEPHONE ENCOUNTER
Please review Prior Authorization form .   Insurance will only cover ozempic if pt has Type 2 diabetes

## 2023-07-12 ENCOUNTER — TELEPHONE (OUTPATIENT)
Dept: ENDOCRINOLOGY | Facility: CLINIC | Age: 33
End: 2023-07-12

## 2023-07-12 NOTE — TELEPHONE ENCOUNTER
Sailaja Renteria (Samantha Section)  Rx #: [id number]  Ozempic (0.25 or 0.5 MG/DOSE) 2MG/3ML pen-injectors       Form  [facility] Oral Form     Plan Contact  [phone] phone  [phone] fax

## 2023-07-13 ENCOUNTER — HOSPITAL ENCOUNTER (OUTPATIENT)
Dept: ULTRASOUND IMAGING | Facility: HOSPITAL | Age: 33
Discharge: HOME | End: 2023-07-13
Attending: INTERNAL MEDICINE
Payer: COMMERCIAL

## 2023-07-13 ENCOUNTER — TELEPHONE (OUTPATIENT)
Dept: ENDOCRINOLOGY | Facility: CLINIC | Age: 33
End: 2023-07-13

## 2023-07-13 DIAGNOSIS — E04.9 NONTOXIC GOITER, UNSPECIFIED: ICD-10-CM

## 2023-07-13 PROCEDURE — 88173 CYTOPATH EVAL FNA REPORT: CPT | Performed by: PATHOLOGY

## 2023-07-13 RX ORDER — LIDOCAINE HYDROCHLORIDE 10 MG/ML
5 INJECTION, SOLUTION EPIDURAL; INFILTRATION; INTRACAUDAL; PERINEURAL ONCE
Status: COMPLETED | OUTPATIENT
Start: 2023-07-13 | End: 2023-07-13

## 2023-07-13 RX ADMIN — LIDOCAINE HYDROCHLORIDE 5 ML: 10 INJECTION, SOLUTION EPIDURAL; INFILTRATION; INTRACAUDAL; PERINEURAL at 14:40

## 2023-07-17 NOTE — TELEPHONE ENCOUNTER
[employer] [place] form received, completed and faxed to [phone], along with recent ov note. Fax confirmation received.

## 2023-07-18 ENCOUNTER — TELEPHONE (OUTPATIENT)
Dept: HEMATOLOGY ONCOLOGY | Facility: CLINIC | Age: 33
End: 2023-07-18

## 2023-07-18 NOTE — TELEPHONE ENCOUNTER
Appointment Schedule   Who are you speaking with? Patient   If it is not the patient, are they listed on an active communication consent form? N/A   Which provider is the appointment scheduled with? Dr. Shannan Do   At which location is the appointment scheduled for? [facility]   When is the appointment scheduled? Please list date and time 7/25/23 962   What is the reason for this appointment? f/u   Did patient voice understanding of the details of this appointment? Yes   Was the no show policy reviewed with patient?  Yes

## 2023-07-19 NOTE — RESULT ENCOUNTER NOTE
Please call the patient regarding thyroid biopsy-biopsy is suspicious however specimen is sent out for further testing which can take up to 14 days to come back

## 2023-07-21 NOTE — TELEPHONE ENCOUNTER
Received fax request for more information from [facility]. Completed form and faxed back, along with ov note again. Fax confirmation received.

## 2023-07-25 ENCOUNTER — OFFICE VISIT (OUTPATIENT)
Dept: SURGICAL ONCOLOGY | Facility: CLINIC | Age: 33
End: 2023-07-25
Payer: COMMERCIAL

## 2023-07-25 VITALS
RESPIRATION RATE: 15 BRPM | DIASTOLIC BLOOD PRESSURE: 84 MMHG | HEART RATE: 98 BPM | OXYGEN SATURATION: 97 % | WEIGHT: 285.5 LBS | HEIGHT: 70 IN | TEMPERATURE: 97 F | BODY MASS INDEX: 40.87 KG/M2 | SYSTOLIC BLOOD PRESSURE: 130 MMHG

## 2023-07-25 DIAGNOSIS — E04.1 THYROID NODULE: Primary | ICD-10-CM

## 2023-07-25 PROCEDURE — 99215 OFFICE O/P EST HI 40 MIN: CPT | Performed by: STUDENT IN AN ORGANIZED HEALTH CARE EDUCATION/TRAINING PROGRAM

## 2023-07-25 RX ORDER — LEVOTHYROXINE SODIUM 0.2 MG/1
200 TABLET ORAL DAILY
Qty: 30 TABLET | Refills: 0 | Status: SHIPPED | OUTPATIENT
Start: 2023-07-25

## 2023-07-25 RX ORDER — TRAMADOL HYDROCHLORIDE 50 MG/1
50 TABLET ORAL EVERY 6 HOURS PRN
Qty: 10 TABLET | Refills: 0 | Status: CANCELLED | OUTPATIENT
Start: 2023-07-25

## 2023-07-25 NOTE — PROGRESS NOTES
Surgical Oncology Consultation F/U    [address] [facility]  [facility] [address] [place]  [facility] [address]    Patient:  Letty Ramirez  1990  [id number]    Primary Care provider:  Barbara Norton MD  [address]    Referring provider:  No referring provider defined for this encounter. Diagnoses and all orders for this visit:    Thyroid nodule        Chief Complaint   Patient presents with   • Follow-up       No follow-ups on file. Oncology History    No history exists. History of Present Illness  :   27 yo female presents for eval of thyroid nodule. Has hx hypothyroidism for which she is followed by endo. Seemed to be related to pregnancy time period. Recently stopped taking synthroid due to normalization of hormone levels. Does feel fatigued but has a 1 mo old baby. No other sx of hyper or hypothyroidism. LFT lower pole vs extrathyroid nodule of 1.3 cm detected one year ago, now this nodule is 1.4 cm in size on US. Pt has several complaints of SOB when supine, trouble swallowing, several enlarged nodes throughout the head and neck which seem to wax and wane in size. She states these have been present at least 2-3 years. She had an [address] in July 2021 demonstrating normal-appearing LNs in each of the regions where she describes enlargement. Feels they have grown since that time. Has seen ENT for the above sx who stated after workup that her pharyngeal sx are likely due to allergies and post-nasal drip. Has not be evaluated for sleep apnea. TSH 4/2023 WNL  Ca 4/2023 WNL    LFT thyroid nodule 1.4 cm that has marginally grown in size since last year. Reassured the patient that this nodule is highly unlikely to be causing compressive-type symptoms given its very small size. Will obtain US-guided bx for histologic eval and surgical planning as needed. Will obtain CT neck for better anatomic eval of neck and her compressive sx.  Pt will f/u after these studies. Interval:  Bx results:   Final Diagnosis   A-B. Thyroid, Left, lower (ThinPrep and smear preparations):  Suspicious for Malignancy ([place] Category V) - See note. Atypical cells with nuclear grooves, enlargement, and pseudoinclusions seen.     Satisfactory for evaluation. CT with very small ~ 1 cm nodule within left thyroid. No evidence of compression, deviation. Patient doing well with no change in symptoms. Review of Systems  Complete ROS Surg Onc:   Constitutional: The patient ENDORSES new or recent history of general fatigue, no recent weight loss, no change in appetite. Eyes: No complaints of visual problems, no scleral icterus. ENT: C/o EAR FULLNESS ear pain, no hoarseness, MILD difficulty swallowing,  no tinnitus and no new masses in head, oral cavity, or neck. Cardiovascular: No complaints of chest pain, no palpitations, no ankle edema. Respiratory: C/O shortness of breath when supine, no cough. Gastrointestinal: No complaints of jaundice, no bloody stools, no pale stools. Genitourinary: No complaints of dysuria, no hematuria, no nocturia, no frequent urination, no urethral discharge. Musculoskeletal: No complaints of weakness, paralysis, joint stiffness or arthralgias. Integumentary: No complaints of rash, no new lesions. Neurological: No complaints of convulsions, no seizures, no dizziness. Hematologic/Lymphatic: No complaints of easy bruising. Endocrine:  No hot or cold intolerance. No polydipsia, polyphagia, or polyuria. Allergy/immunology:  No environmental allergies. No food allergies. Not immunocompromised.       Patient Active Problem List   Diagnosis   • PCOS (polycystic ovarian syndrome)   • Nontoxic goiter, unspecified   • Morbid obesity ([address])   • Vitamin D deficiency   • Infertility, female   • Hyperlipidemia, mixed   • Elevated liver enzymes   • Anxiety   • Thyroid nodule   • Bilateral carpal tunnel syndrome   • Elevated prolactin level   • History of  delivery, antepartum   • Maternal morbid obesity, antepartum ([address])   • History of gestational hypertension   • Hypothyroidism   • Hirsutism   • Sleep disturbances   • Thyroid disease affecting pregnancy   • Multigravida of advanced maternal age in third trimester   • Abnormal glucose tolerance in pregnancy   • Obesity affecting pregnancy in third trimester   • Vaccine counseling   • Fetal arrhythmia affecting pregnancy, antepartum   • Gestational hypertension   • S/P repeat low transverse    • Hashimoto's thyroiditis   • Abnormal imaging of thyroid   • Enlarged lymph nodes     Past Medical History:   Diagnosis Date   • Abnormal Pap smear of cervix     LGSIL - colpo - LGSIL, HPV effect   • Chronic diarrhea of unknown origin     last assessed 6/26/15   • Disease of thyroid gland     nodule   • Dyspepsia     last assessed  10/29/14   • Early satiety     last assessed  10/29/14   • Hidradenitis suppurativa     last assessed  14   • Hypertension     last pregnancy   • Hypothyroidism        • Obesity    • Polycystic ovary syndrome    • Varicella     childhood     Past Surgical History:   Procedure Laterality Date   •  SECTION       and  [facility]   • [place]  DELIVERY ONLY N/A 2020    Procedure:  SECTION () REPEAT;  Surgeon: Apollo Walls MD;  [facility];  Service: Obstetrics   • MT  DELIVERY ONLY N/A 3/8/2023    Procedure: Ressie Brandie () REPEAT;  Surgeon: Marco Flores MD;  [facility];  Service: Obstetrics   • US GUIDED THYROID BIOPSY  2023   • WISDOM TOOTH EXTRACTION      all 4     Family History   Problem Relation Age of Onset   • Hypertension Mother    • No Known Problems Father    • No Known Problems Sister    • No Known Problems Daughter    • No Known Problems Son    • Thyroid disease Maternal Grandmother    • No Known Problems Maternal Grandfather    • Other Paternal Grandmother         pulmonary embolism   • No Known Problems Paternal Grandfather      Social History     Socioeconomic History   • Marital status: /Civil Union     Spouse name: Not on file   • Number of children: Not on file   • Years of education: Not on file   • Highest education level: Not on file   Occupational History   • Occupation:    Tobacco Use   • Smoking status: Former     Packs/day: 0.00     Years: 0.00     Total pack years: 0.00     Types: Cigarettes     Quit date: 3/1/2014     Years since quittin.4   • Smokeless tobacco: Never   • Tobacco comments:      sometimes 1 cig once weekly - none since preg   Vaping Use   • Vaping Use: Never used   Substance and Sexual Activity   • Alcohol use: Not Currently   • Drug use: No   • Sexual activity: Yes     Partners: Male     Birth control/protection: None   Other Topics Concern   • Not on file   Social History Narrative    Caffeine use: 1 cup coffee or ice tea/ day.     Stress at work     Social Determinants of Health     Financial Resource Strain: Not on file   Food Insecurity: Not on file   Transportation Needs: Not on file   Physical Activity: Not on file   Stress: Not on file   Social Connections: Not on file   Intimate Partner Violence: Not on file   Housing Stability: Not on file       Current Outpatient Medications:   •  ergocalciferol (VITAMIN D2) 50,000 units, TAKE 1 CAPSULE BY MOUTH ONE TIME PER WEEK, Disp: 4 capsule, Rfl: 6  •  Prenatal MV & Min w/FA-DHA (PRENATAL ADULT GUMMY/DHA/FA PO), Take 1 tablet by mouth, Disp: , Rfl:   •  semaglutide, 0.25 or 0.5 mg/dose, (Ozempic, 0.25 or 0.5 MG/DOSE,) 2 mg/3 mL injection pen, Inject 0.75 mL (0.5 mg total) under the skin every 7 days (Patient not taking: Reported on 2023), Disp: 3.2 mL, Rfl: 0  Allergies   Allergen Reactions   • A + D Personal Care Lotion  [Dimethicone] Rash   • Calamine-Zinc Oxide Rash   • Keflex [Cephalexin] GI Intolerance   • Pramoxine-Calamine    • Zinc Rash     No med alert bracelet no epi pen       Vitals:    07/25/23 0935   BP: 130/84   Pulse: 98   Resp: 15   Temp: (!) 97 °F (36.1 °C)   SpO2: 97%       Physical Exam   General: Appears well, appears stated age  Skin: Warm, anicteric  HEENT: Normocephalic, atraumatic; sclera aniceteric, mucous membranes moist; cervical nodes without adenopathy. Several fat pads vs lipomas, more pronounced in RT subclv and RT submandibular region. No thyroid nodules appreciated  Cardiopulmonary: RRR, Easy WOB, no BLE edema  Abd: Flat and soft, nontender, no masses appreciated, no hepatosplenomegaly  MSK: Symmetric, no cyanosis, no overt weakness  Lymphatic: No cervical, axillary or inguinal lymphadenopathy  Neuro: Affect appropriate, no gross motor abnormalities    Labs: Reviewed in EPIC    Imaging    Thyroid US: IMPRESSION:     1.4 cm left lower pole nodule demonstrates subthreshold enlargement but does appear new since older examinations.     Some level 3 nodes are seen on the left although not significantly enlarged.     Given borderline change, histologic sampling of this nodule on image 56 on the left is suggested.     Correlation with serum parathyroid hormone is also suggested given a possible extrathyroidal location. I independently reviewed and interpreted the above laboratory and imaging data incl previous and past thyroid US, H&N LN US, labs      Discussion/Summary:   27 yo female with 1.5 cm [place] V nodule of left thyroid. We discussed today that this nodule has a relatively high risk of representing the most common type of thyroid cancer, a well-differentiated papillary or follicular cancer. I discussed that the typical treatment for this type of cancer would be a partial vs total thyroidectomy.   We discussed that based on her ultrasound and biopsy results, I think that a left thyroidectomy would be a safe choice for the patient; however, the patient strongly prefers to avoid surveillance of the right thyroid nodule or undergo an additional surgery. She strongly prefers a total thyroidectomy. We also discussed that the final pathology will determine if additional treatment, with surgery or radioactive iodine, might be necessary. We discussed the natural history of well differentiated thyroid cancer, as well as the proposed procedure and expected postop course. We discussed the risks of total thyroidectomy to include wound healing complications, infection, bleeding, damage to nearby structures, voice hoarseness, voice pitch change, temporary or permanent calcium regulation disruption, surgical airway emergency. We also discussed the risks of surgery with general anesthesia to include MI, PE, stroke, DVT, respiratory failure, PE, death, other medical complication. The patient is at above average risk for complications related to surgery due to history of obesity. The patient expresses understanding and would like to proceed.

## 2023-08-01 DIAGNOSIS — E66.01 CLASS 3 SEVERE OBESITY DUE TO EXCESS CALORIES WITH BODY MASS INDEX (BMI) OF 40.0 TO 44.9 IN ADULT, UNSPECIFIED WHETHER SERIOUS COMORBIDITY PRESENT (HCC): ICD-10-CM

## 2023-08-01 DIAGNOSIS — E28.2 PCOS (POLYCYSTIC OVARIAN SYNDROME): Primary | ICD-10-CM

## 2023-08-01 RX ORDER — LIRAGLUTIDE 6 MG/ML
INJECTION, SOLUTION SUBCUTANEOUS
Qty: 3 ML | Refills: 0 | Status: SHIPPED | OUTPATIENT
Start: 2023-08-01

## 2023-08-01 RX ORDER — LIRAGLUTIDE 6 MG/ML
0.6 INJECTION, SOLUTION SUBCUTANEOUS DAILY
Qty: 3 ML | Refills: 0 | Status: SHIPPED | OUTPATIENT
Start: 2023-08-01 | End: 2023-08-01

## 2023-08-04 DIAGNOSIS — E66.01 CLASS 3 SEVERE OBESITY DUE TO EXCESS CALORIES WITH BODY MASS INDEX (BMI) OF 40.0 TO 44.9 IN ADULT, UNSPECIFIED WHETHER SERIOUS COMORBIDITY PRESENT (HCC): ICD-10-CM

## 2023-08-04 DIAGNOSIS — E28.2 PCOS (POLYCYSTIC OVARIAN SYNDROME): Primary | ICD-10-CM

## 2023-08-04 RX ORDER — PHENTERMINE HYDROCHLORIDE 15 MG/1
15 CAPSULE ORAL EVERY MORNING
Qty: 30 CAPSULE | Refills: 2 | Status: SHIPPED | OUTPATIENT
Start: 2023-08-04

## 2023-08-10 ENCOUNTER — TELEPHONE (OUTPATIENT)
Dept: HEMATOLOGY ONCOLOGY | Facility: CLINIC | Age: 33
End: 2023-08-10

## 2023-08-10 NOTE — TELEPHONE ENCOUNTER
I called Niya Candelario regarding an appointment that they have scheduled with Dr. Nicolle Peres scheduled on 9/11/23     I left a voicemail explaining to patient that this appointment will need to be rescheduled due to a change in the providers schedule. Patient was advised to call [facility] to reschedule. A Iconicfuturet message (if applicable) has been sent to patient relaying the above information and advising patient to call [facility] and reschedule their appointment.

## 2023-08-11 ENCOUNTER — APPOINTMENT (OUTPATIENT)
Dept: LAB | Facility: CLINIC | Age: 33
End: 2023-08-11
Payer: COMMERCIAL

## 2023-08-11 DIAGNOSIS — E04.1 THYROID NODULE: ICD-10-CM

## 2023-08-11 LAB
ALBUMIN SERPL BCP-MCNC: 4.4 G/DL (ref 3.5–5)
ALP SERPL-CCNC: 58 U/L (ref 34–104)
ALT SERPL W P-5'-P-CCNC: 20 U/L (ref 7–52)
ANION GAP SERPL CALCULATED.3IONS-SCNC: 7 MMOL/L
AST SERPL W P-5'-P-CCNC: 18 U/L (ref 13–39)
BASOPHILS # BLD AUTO: 0.13 THOUSANDS/ÂΜL (ref 0–0.1)
BASOPHILS NFR BLD AUTO: 1 % (ref 0–1)
BILIRUB SERPL-MCNC: 1.02 MG/DL (ref 0.2–1)
BUN SERPL-MCNC: 14 MG/DL (ref 5–25)
CALCIUM SERPL-MCNC: 9.5 MG/DL (ref 8.4–10.2)
CHLORIDE SERPL-SCNC: 103 MMOL/L (ref 96–108)
CO2 SERPL-SCNC: 26 MMOL/L (ref 21–32)
CREAT SERPL-MCNC: 0.84 MG/DL (ref 0.6–1.3)
EOSINOPHIL # BLD AUTO: 0.17 THOUSAND/ÂΜL (ref 0–0.61)
EOSINOPHIL NFR BLD AUTO: 2 % (ref 0–6)
ERYTHROCYTE [DISTWIDTH] IN BLOOD BY AUTOMATED COUNT: 12.2 % (ref 11.6–15.1)
GFR SERPL CREATININE-BSD FRML MDRD: 90 ML/MIN/1.73SQ M
GLUCOSE P FAST SERPL-MCNC: 99 MG/DL (ref 65–99)
HCT VFR BLD AUTO: 42.2 % (ref 34.8–46.1)
HGB BLD-MCNC: 13.7 G/DL (ref 11.5–15.4)
IMM GRANULOCYTES # BLD AUTO: 0.03 THOUSAND/UL (ref 0–0.2)
IMM GRANULOCYTES NFR BLD AUTO: 0 % (ref 0–2)
LYMPHOCYTES # BLD AUTO: 2.7 THOUSANDS/ÂΜL (ref 0.6–4.47)
LYMPHOCYTES NFR BLD AUTO: 28 % (ref 14–44)
MCH RBC QN AUTO: 27.6 PG (ref 26.8–34.3)
MCHC RBC AUTO-ENTMCNC: 32.5 G/DL (ref 31.4–37.4)
MCV RBC AUTO: 85 FL (ref 82–98)
MONOCYTES # BLD AUTO: 0.76 THOUSAND/ÂΜL (ref 0.17–1.22)
MONOCYTES NFR BLD AUTO: 8 % (ref 4–12)
NEUTROPHILS # BLD AUTO: 5.94 THOUSANDS/ÂΜL (ref 1.85–7.62)
NEUTS SEG NFR BLD AUTO: 61 % (ref 43–75)
NRBC BLD AUTO-RTO: 0 /100 WBCS
PLATELET # BLD AUTO: 360 THOUSANDS/UL (ref 149–390)
PMV BLD AUTO: 9.2 FL (ref 8.9–12.7)
POTASSIUM SERPL-SCNC: 4.2 MMOL/L (ref 3.5–5.3)
PROT SERPL-MCNC: 8.1 G/DL (ref 6.4–8.4)
RBC # BLD AUTO: 4.96 MILLION/UL (ref 3.81–5.12)
SODIUM SERPL-SCNC: 136 MMOL/L (ref 135–147)
WBC # BLD AUTO: 9.73 THOUSAND/UL (ref 4.31–10.16)

## 2023-08-11 PROCEDURE — 80053 COMPREHEN METABOLIC PANEL: CPT

## 2023-08-11 PROCEDURE — 85025 COMPLETE CBC W/AUTO DIFF WBC: CPT

## 2023-08-11 PROCEDURE — 36415 COLL VENOUS BLD VENIPUNCTURE: CPT

## 2023-08-22 ENCOUNTER — TELEPHONE (OUTPATIENT)
Dept: HEMATOLOGY ONCOLOGY | Facility: CLINIC | Age: 33
End: 2023-08-22

## 2023-08-22 NOTE — TELEPHONE ENCOUNTER
I called Ijeoma Schmitt regarding an appointment that they have scheduled with Dr. Bishop Dinero scheduled on 9/11/23     Patient's voicemail is full and not accepting messages at this time    A Clearwiret message (if applicable) has been sent to patient relaying the above information and advising patient to call [facility] and reschedule their appointment.

## 2023-08-23 ENCOUNTER — TELEPHONE (OUTPATIENT)
Dept: HEMATOLOGY ONCOLOGY | Facility: CLINIC | Age: 33
End: 2023-08-23

## 2023-08-23 NOTE — TELEPHONE ENCOUNTER
Appointment Schedule   Who are you speaking with? Patient   If it is not the patient, are they listed on an active communication consent form? N/A   Which provider is the appointment scheduled with? Dr. Kurtis Mendoza   At which location is the appointment scheduled for? [place]   When is the appointment scheduled? Please list date and time 9/14/23 3pm   What is the reason for this appointment? Post op   Did patient voice understanding of the details of this appointment? Yes   Was the no show policy reviewed with patient?  Yes

## 2023-08-23 NOTE — TELEPHONE ENCOUNTER
I called Ronen Kenny regarding an appointment that they have scheduled with Dr. Kelsea Dickey scheduled on 09/11/23     Patient does not have a voicemail set up. A Stemnion message (if applicable) has been sent to patient relaying the above information and advising patient to call [facility] and reschedule their appointment.

## 2023-08-23 NOTE — LETTER
[address] [place] [address]  KENNY, [address] [facility]    Ph: [phone]  Fx: [phone]        Aide Castro,     We have made multiple attempts to contact you regarding your upcoming appointment with Dr. Sameer Rothman scheduled  09/11/23 . We have been unable to reach you by phone. Due to a change in the providers schedule, your appointment has been cancelled. At your earliest convenience, please contact the [address] [place] [address] at [phone] and we would be happy to assist you in rescheduling your appointment. Thank you,  Corinne C.    [facility]

## 2023-08-23 NOTE — TELEPHONE ENCOUNTER
Appointment Change  Cancel, Reschedule, Change to Virtual      Who are you speaking with? LVM for patient   If it is not the patient, are they listed on an active communication consent form? N/A   Which provider is the appointment scheduled with? Dr. Klaus Scanlon   When is the appointment scheduled? Please list date and time  09/11/23 3PM   At which location is the appointment scheduled to take place? [facility]   Was the appointment rescheduled or changed from an in person visit to a virtual visit? If so, please list the details of the change. No, 3 attempts made to reschedule patient. LVM, sent ViaCyte message, and sent letter to patients address on file. What is the reason for the appointment change? Provider   Was [facility] transport scheduled for this visit? No   Does [facility] transport need to be scheduled for the new visit (if applicable) N/A   Does the patient need an infusion appointment rescheduled? No   Does the patient have an infusion appointment scheduled? If so, when? No   Is the patient undergoing chemotherapy? No   Was the no-show policy reviewed for appointments being changed with less then 24 hours of notice?  N/A

## 2023-08-24 ENCOUNTER — ANESTHESIA (OUTPATIENT)
Dept: PERIOP | Facility: HOSPITAL | Age: 33
End: 2023-08-24
Payer: COMMERCIAL

## 2023-08-24 ENCOUNTER — ANESTHESIA EVENT (OUTPATIENT)
Dept: PERIOP | Facility: HOSPITAL | Age: 33
End: 2023-08-24
Payer: COMMERCIAL

## 2023-08-24 ENCOUNTER — HOSPITAL ENCOUNTER (OUTPATIENT)
Facility: HOSPITAL | Age: 33
Setting detail: OUTPATIENT SURGERY
Discharge: HOME | End: 2023-08-25
Attending: STUDENT IN AN ORGANIZED HEALTH CARE EDUCATION/TRAINING PROGRAM | Admitting: STUDENT IN AN ORGANIZED HEALTH CARE EDUCATION/TRAINING PROGRAM
Payer: COMMERCIAL

## 2023-08-24 DIAGNOSIS — E06.3 HASHIMOTO'S THYROIDITIS: Primary | ICD-10-CM

## 2023-08-24 DIAGNOSIS — E04.1 THYROID NODULE: ICD-10-CM

## 2023-08-24 LAB
CALCIUM SERPL-MCNC: 8.3 MG/DL (ref 8.4–10.2)
CALCIUM SERPL-MCNC: 8.7 MG/DL (ref 8.4–10.2)
EXT PREGNANCY TEST URINE: NEGATIVE
EXT. CONTROL: NORMAL
PTH-INTACT SERPL-MCNC: 1.3 PG/ML (ref 12–88)
PTH-INTACT SERPL-MCNC: <1 PG/ML (ref 12–88)

## 2023-08-24 PROCEDURE — 82310 ASSAY OF CALCIUM: CPT | Performed by: STUDENT IN AN ORGANIZED HEALTH CARE EDUCATION/TRAINING PROGRAM

## 2023-08-24 PROCEDURE — 83970 ASSAY OF PARATHORMONE: CPT | Performed by: STUDENT IN AN ORGANIZED HEALTH CARE EDUCATION/TRAINING PROGRAM

## 2023-08-24 PROCEDURE — 82310 ASSAY OF CALCIUM: CPT

## 2023-08-24 PROCEDURE — NC001 PR NO CHARGE: Performed by: STUDENT IN AN ORGANIZED HEALTH CARE EDUCATION/TRAINING PROGRAM

## 2023-08-24 PROCEDURE — 81025 URINE PREGNANCY TEST: CPT | Performed by: STUDENT IN AN ORGANIZED HEALTH CARE EDUCATION/TRAINING PROGRAM

## 2023-08-24 RX ORDER — CALCIUM GLUCONATE 20 MG/ML
2 INJECTION, SOLUTION INTRAVENOUS ONCE
Status: COMPLETED | OUTPATIENT
Start: 2023-08-24 | End: 2023-08-25

## 2023-08-24 RX ORDER — MEPERIDINE HYDROCHLORIDE 25 MG/ML
12.5 INJECTION INTRAMUSCULAR; INTRAVENOUS; SUBCUTANEOUS
Status: DISCONTINUED | OUTPATIENT
Start: 2023-08-24 | End: 2023-08-24 | Stop reason: HOSPADM

## 2023-08-24 RX ORDER — HYDROMORPHONE HCL/PF 1 MG/ML
0.5 SYRINGE (ML) INJECTION
Status: DISCONTINUED | OUTPATIENT
Start: 2023-08-24 | End: 2023-08-24 | Stop reason: HOSPADM

## 2023-08-24 RX ORDER — PHENTERMINE HYDROCHLORIDE 15 MG/1
15 CAPSULE ORAL EVERY MORNING
Status: DISCONTINUED | OUTPATIENT
Start: 2023-08-25 | End: 2023-08-25 | Stop reason: HOSPADM

## 2023-08-24 RX ORDER — LEVOTHYROXINE SODIUM 0.1 MG/1
200 TABLET ORAL
Status: DISCONTINUED | OUTPATIENT
Start: 2023-08-25 | End: 2023-08-25 | Stop reason: HOSPADM

## 2023-08-24 RX ORDER — HEPARIN SODIUM 5000 [USP'U]/ML
5000 INJECTION, SOLUTION INTRAVENOUS; SUBCUTANEOUS EVERY 8 HOURS SCHEDULED
Status: DISCONTINUED | OUTPATIENT
Start: 2023-08-24 | End: 2023-08-25 | Stop reason: HOSPADM

## 2023-08-24 RX ORDER — SODIUM CHLORIDE, SODIUM LACTATE, POTASSIUM CHLORIDE, CALCIUM CHLORIDE 600; 310; 30; 20 MG/100ML; MG/100ML; MG/100ML; MG/100ML
125 INJECTION, SOLUTION INTRAVENOUS CONTINUOUS
Status: DISCONTINUED | OUTPATIENT
Start: 2023-08-24 | End: 2023-08-25

## 2023-08-24 RX ORDER — ACETAMINOPHEN 325 MG/1
650 TABLET ORAL EVERY 6 HOURS SCHEDULED
Status: DISCONTINUED | OUTPATIENT
Start: 2023-08-24 | End: 2023-08-25 | Stop reason: HOSPADM

## 2023-08-24 RX ORDER — ONDANSETRON 2 MG/ML
INJECTION INTRAMUSCULAR; INTRAVENOUS AS NEEDED
Status: DISCONTINUED | OUTPATIENT
Start: 2023-08-24 | End: 2023-08-24

## 2023-08-24 RX ORDER — PROPOFOL 10 MG/ML
INJECTION, EMULSION INTRAVENOUS AS NEEDED
Status: DISCONTINUED | OUTPATIENT
Start: 2023-08-24 | End: 2023-08-24

## 2023-08-24 RX ORDER — ONDANSETRON 2 MG/ML
4 INJECTION INTRAMUSCULAR; INTRAVENOUS EVERY 6 HOURS PRN
Status: DISCONTINUED | OUTPATIENT
Start: 2023-08-24 | End: 2023-08-25 | Stop reason: HOSPADM

## 2023-08-24 RX ORDER — FENTANYL CITRATE 50 UG/ML
INJECTION, SOLUTION INTRAMUSCULAR; INTRAVENOUS AS NEEDED
Status: DISCONTINUED | OUTPATIENT
Start: 2023-08-24 | End: 2023-08-24

## 2023-08-24 RX ORDER — CALCITRIOL 0.25 UG/1
0.25 CAPSULE, LIQUID FILLED ORAL DAILY
Status: DISCONTINUED | OUTPATIENT
Start: 2023-08-24 | End: 2023-08-25 | Stop reason: HOSPADM

## 2023-08-24 RX ORDER — LIDOCAINE HYDROCHLORIDE 10 MG/ML
INJECTION, SOLUTION EPIDURAL; INFILTRATION; INTRACAUDAL; PERINEURAL AS NEEDED
Status: DISCONTINUED | OUTPATIENT
Start: 2023-08-24 | End: 2023-08-24

## 2023-08-24 RX ORDER — ALBUTEROL SULFATE 90 UG/1
AEROSOL, METERED RESPIRATORY (INHALATION) AS NEEDED
Status: DISCONTINUED | OUTPATIENT
Start: 2023-08-24 | End: 2023-08-24

## 2023-08-24 RX ORDER — ROCURONIUM BROMIDE 10 MG/ML
INJECTION, SOLUTION INTRAVENOUS AS NEEDED
Status: DISCONTINUED | OUTPATIENT
Start: 2023-08-24 | End: 2023-08-24

## 2023-08-24 RX ORDER — DEXAMETHASONE SODIUM PHOSPHATE 10 MG/ML
INJECTION, SOLUTION INTRAMUSCULAR; INTRAVENOUS AS NEEDED
Status: DISCONTINUED | OUTPATIENT
Start: 2023-08-24 | End: 2023-08-24

## 2023-08-24 RX ORDER — CALCIUM CARBONATE 500 MG/1
500 TABLET, CHEWABLE ORAL 3 TIMES DAILY
Status: DISCONTINUED | OUTPATIENT
Start: 2023-08-24 | End: 2023-08-24

## 2023-08-24 RX ORDER — MIDAZOLAM HYDROCHLORIDE 2 MG/2ML
INJECTION, SOLUTION INTRAMUSCULAR; INTRAVENOUS AS NEEDED
Status: DISCONTINUED | OUTPATIENT
Start: 2023-08-24 | End: 2023-08-24

## 2023-08-24 RX ORDER — HYDROMORPHONE HCL/PF 1 MG/ML
SYRINGE (ML) INJECTION AS NEEDED
Status: DISCONTINUED | OUTPATIENT
Start: 2023-08-24 | End: 2023-08-24

## 2023-08-24 RX ORDER — PHENYLEPHRINE HYDROCHLORIDE 10 MG/ML
INJECTION INTRAVENOUS AS NEEDED
Status: DISCONTINUED | OUTPATIENT
Start: 2023-08-24 | End: 2023-08-24

## 2023-08-24 RX ORDER — LABETALOL HYDROCHLORIDE 5 MG/ML
10 INJECTION, SOLUTION INTRAVENOUS EVERY 4 HOURS PRN
Status: DISCONTINUED | OUTPATIENT
Start: 2023-08-24 | End: 2023-08-25 | Stop reason: HOSPADM

## 2023-08-24 RX ORDER — PROMETHAZINE HYDROCHLORIDE 25 MG/ML
25 INJECTION, SOLUTION INTRAMUSCULAR; INTRAVENOUS ONCE AS NEEDED
Status: DISCONTINUED | OUTPATIENT
Start: 2023-08-24 | End: 2023-08-24 | Stop reason: HOSPADM

## 2023-08-24 RX ORDER — CALCIUM CARBONATE 500 MG/1
500 TABLET, CHEWABLE ORAL ONCE
Status: COMPLETED | OUTPATIENT
Start: 2023-08-24 | End: 2023-08-24

## 2023-08-24 RX ORDER — DIPHENHYDRAMINE HYDROCHLORIDE 50 MG/ML
INJECTION INTRAMUSCULAR; INTRAVENOUS AS NEEDED
Status: DISCONTINUED | OUTPATIENT
Start: 2023-08-24 | End: 2023-08-24

## 2023-08-24 RX ORDER — FENTANYL CITRATE/PF 50 MCG/ML
50 SYRINGE (ML) INJECTION
Status: COMPLETED | OUTPATIENT
Start: 2023-08-24 | End: 2023-08-24

## 2023-08-24 RX ORDER — LABETALOL HYDROCHLORIDE 5 MG/ML
10 INJECTION, SOLUTION INTRAVENOUS EVERY 4 HOURS PRN
Status: DISCONTINUED | OUTPATIENT
Start: 2023-08-24 | End: 2023-08-24

## 2023-08-24 RX ORDER — OXYCODONE HYDROCHLORIDE 5 MG/1
5 TABLET ORAL EVERY 4 HOURS PRN
Status: DISCONTINUED | OUTPATIENT
Start: 2023-08-24 | End: 2023-08-25 | Stop reason: HOSPADM

## 2023-08-24 RX ORDER — CALCIUM CARBONATE 500 MG/1
1000 TABLET, CHEWABLE ORAL 4 TIMES DAILY
Status: DISCONTINUED | OUTPATIENT
Start: 2023-08-24 | End: 2023-08-25 | Stop reason: HOSPADM

## 2023-08-24 RX ORDER — CALCIUM CARBONATE 500 MG/1
500 TABLET, CHEWABLE ORAL 4 TIMES DAILY
Status: DISCONTINUED | OUTPATIENT
Start: 2023-08-24 | End: 2023-08-24

## 2023-08-24 RX ORDER — ONDANSETRON 2 MG/ML
4 INJECTION INTRAMUSCULAR; INTRAVENOUS ONCE AS NEEDED
Status: COMPLETED | OUTPATIENT
Start: 2023-08-24 | End: 2023-08-24

## 2023-08-24 RX ORDER — MAGNESIUM HYDROXIDE 1200 MG/15ML
LIQUID ORAL AS NEEDED
Status: DISCONTINUED | OUTPATIENT
Start: 2023-08-24 | End: 2023-08-24 | Stop reason: HOSPADM

## 2023-08-24 RX ORDER — CEFAZOLIN SODIUM 1 G/3ML
INJECTION, POWDER, FOR SOLUTION INTRAMUSCULAR; INTRAVENOUS AS NEEDED
Status: DISCONTINUED | OUTPATIENT
Start: 2023-08-24 | End: 2023-08-24

## 2023-08-24 RX ADMIN — HYDROMORPHONE HYDROCHLORIDE 0.5 MG: 1 INJECTION, SOLUTION INTRAMUSCULAR; INTRAVENOUS; SUBCUTANEOUS at 13:00

## 2023-08-24 RX ADMIN — MIDAZOLAM 2 MG: 1 INJECTION INTRAMUSCULAR; INTRAVENOUS at 10:20

## 2023-08-24 RX ADMIN — ALBUTEROL SULFATE 2 PUFF: 90 AEROSOL, METERED RESPIRATORY (INHALATION) at 11:41

## 2023-08-24 RX ADMIN — PHENYLEPHRINE HYDROCHLORIDE 200 MCG: 10 INJECTION INTRAVENOUS at 11:23

## 2023-08-24 RX ADMIN — SUGAMMADEX 200 MG: 100 INJECTION, SOLUTION INTRAVENOUS at 10:54

## 2023-08-24 RX ADMIN — ROCURONIUM BROMIDE 50 MG: 10 INJECTION, SOLUTION INTRAVENOUS at 10:27

## 2023-08-24 RX ADMIN — FENTANYL CITRATE 100 MCG: 50 INJECTION INTRAMUSCULAR; INTRAVENOUS at 10:27

## 2023-08-24 RX ADMIN — HEPARIN SODIUM 5000 UNITS: 5000 INJECTION INTRAVENOUS; SUBCUTANEOUS at 23:21

## 2023-08-24 RX ADMIN — SODIUM CHLORIDE, SODIUM LACTATE, POTASSIUM CHLORIDE, AND CALCIUM CHLORIDE 125 ML/HR: .6; .31; .03; .02 INJECTION, SOLUTION INTRAVENOUS at 09:09

## 2023-08-24 RX ADMIN — SODIUM CHLORIDE 12 MCG: 9 INJECTION, SOLUTION INTRAVENOUS at 12:36

## 2023-08-24 RX ADMIN — DEXAMETHASONE SODIUM PHOSPHATE 10 MG: 10 INJECTION, SOLUTION INTRAMUSCULAR; INTRAVENOUS at 11:01

## 2023-08-24 RX ADMIN — REMIFENTANIL HYDROCHLORIDE 0.17 MCG/KG/MIN: 1 INJECTION, POWDER, LYOPHILIZED, FOR SOLUTION INTRAVENOUS at 10:53

## 2023-08-24 RX ADMIN — CALCIUM CARBONATE (ANTACID) CHEW TAB 500 MG 500 MG: 500 CHEW TAB at 19:19

## 2023-08-24 RX ADMIN — ONDANSETRON 4 MG: 2 INJECTION INTRAMUSCULAR; INTRAVENOUS at 11:11

## 2023-08-24 RX ADMIN — HYDROMORPHONE HYDROCHLORIDE 0.5 MG: 1 INJECTION, SOLUTION INTRAMUSCULAR; INTRAVENOUS; SUBCUTANEOUS at 14:30

## 2023-08-24 RX ADMIN — LABETALOL HYDROCHLORIDE 10 MG: 5 INJECTION, SOLUTION INTRAVENOUS at 17:29

## 2023-08-24 RX ADMIN — FENTANYL CITRATE 50 MCG: 50 INJECTION INTRAMUSCULAR; INTRAVENOUS at 14:00

## 2023-08-24 RX ADMIN — SODIUM CHLORIDE 16 MCG: 9 INJECTION, SOLUTION INTRAVENOUS at 13:35

## 2023-08-24 RX ADMIN — CALCIUM CARBONATE (ANTACID) CHEW TAB 500 MG 500 MG: 500 CHEW TAB at 17:29

## 2023-08-24 RX ADMIN — SODIUM CHLORIDE, SODIUM LACTATE, POTASSIUM CHLORIDE, AND CALCIUM CHLORIDE 125 ML/HR: .6; .31; .03; .02 INJECTION, SOLUTION INTRAVENOUS at 16:16

## 2023-08-24 RX ADMIN — DIPHENHYDRAMINE HYDROCHLORIDE 25 MG: 50 INJECTION, SOLUTION INTRAMUSCULAR; INTRAVENOUS at 10:52

## 2023-08-24 RX ADMIN — HYDROMORPHONE HYDROCHLORIDE 0.5 MG: 1 INJECTION, SOLUTION INTRAMUSCULAR; INTRAVENOUS; SUBCUTANEOUS at 10:45

## 2023-08-24 RX ADMIN — CALCIUM GLUCONATE 2 G: 20 INJECTION, SOLUTION INTRAVENOUS at 20:54

## 2023-08-24 RX ADMIN — CALCIUM CARBONATE (ANTACID) CHEW TAB 500 MG 1000 MG: 500 CHEW TAB at 23:20

## 2023-08-24 RX ADMIN — ACETAMINOPHEN 650 MG: 325 TABLET, FILM COATED ORAL at 23:20

## 2023-08-24 RX ADMIN — PROPOFOL 300 MG: 10 INJECTION, EMULSION INTRAVENOUS at 10:27

## 2023-08-24 RX ADMIN — ONDANSETRON 4 MG: 2 INJECTION INTRAMUSCULAR; INTRAVENOUS at 14:08

## 2023-08-24 RX ADMIN — CALCITRIOL CAPSULES 0.25 MCG 0.25 MCG: 0.25 CAPSULE ORAL at 16:08

## 2023-08-24 RX ADMIN — OXYCODONE HYDROCHLORIDE 5 MG: 5 TABLET ORAL at 20:21

## 2023-08-24 RX ADMIN — ONDANSETRON 4 MG: 2 INJECTION INTRAMUSCULAR; INTRAVENOUS at 17:18

## 2023-08-24 RX ADMIN — PHENYLEPHRINE HYDROCHLORIDE 100 MCG: 10 INJECTION INTRAVENOUS at 11:03

## 2023-08-24 RX ADMIN — FENTANYL CITRATE 50 MCG: 50 INJECTION INTRAMUSCULAR; INTRAVENOUS at 14:10

## 2023-08-24 RX ADMIN — PHENYLEPHRINE HYDROCHLORIDE 200 MCG: 10 INJECTION INTRAVENOUS at 11:16

## 2023-08-24 RX ADMIN — LIDOCAINE HYDROCHLORIDE 50 MG: 10 INJECTION, SOLUTION EPIDURAL; INFILTRATION; INTRACAUDAL; PERINEURAL at 10:27

## 2023-08-24 RX ADMIN — SODIUM CHLORIDE 20 MCG: 9 INJECTION, SOLUTION INTRAVENOUS at 10:33

## 2023-08-24 RX ADMIN — ACETAMINOPHEN 650 MG: 325 TABLET, FILM COATED ORAL at 17:29

## 2023-08-24 RX ADMIN — OXYCODONE HYDROCHLORIDE 5 MG: 5 TABLET ORAL at 16:08

## 2023-08-24 RX ADMIN — CEFAZOLIN 3000 MG: 1 INJECTION, POWDER, FOR SOLUTION INTRAMUSCULAR; INTRAVENOUS at 10:33

## 2023-08-24 RX ADMIN — PHENYLEPHRINE HYDROCHLORIDE 100 MCG: 10 INJECTION INTRAVENOUS at 11:28

## 2023-08-24 NOTE — H&P
Surgical Oncology Consultation F/U     [address] [facility]  [facility] [id number]     Patient:  Patricia Miller  1990  [id number]     Primary Care provider:  Ginger Bowman MD  [facility] [address]     Referring provider:  No referring provider defined for this encounter.     Diagnoses and all orders for this visit:     Thyroid nodule               Chief Complaint   Patient presents with   • Follow-up         No follow-ups on file.         Oncology History     No history exists.         History of Present Illness  :   25 yo female presents for eval of thyroid nodule. Has hx hypothyroidism for which she is followed by endo. Seemed to be related to pregnancy time period. Recently stopped taking synthroid due to normalization of hormone levels. Does feel fatigued but has a 1 mo old baby. No other sx of hyper or hypothyroidism. LFT lower pole vs extrathyroid nodule of 1.3 cm detected one year ago, now this nodule is 1.4 cm in size on US. Pt has several complaints of SOB when supine, trouble swallowing, several enlarged nodes throughout the head and neck which seem to wax and wane in size. She states these have been present at least 2-3 years. She had an [place] in July 2021 demonstrating normal-appearing LNs in each of the regions where she describes enlargement. Feels they have grown since that time. Has seen ENT for the above sx who stated after workup that her pharyngeal sx are likely due to allergies and post-nasal drip. Has not be evaluated for sleep apnea.     TSH 4/2023 WNL  Ca 4/2023 WNL     LFT thyroid nodule 1.4 cm that has marginally grown in size since last year. Reassured the patient that this nodule is highly unlikely to be causing compressive-type symptoms given its very small size. Will obtain US-guided bx for histologic eval and surgical planning as needed.  Will obtain CT neck for better anatomic eval of neck and her compressive sx. Pt will f/u after these studies.      Interval:  Bx results:   Final Diagnosis   A-B. Thyroid, Left, lower (ThinPrep and smear preparations):  Suspicious for Malignancy ([place] Category V) - See note. Atypical cells with nuclear grooves, enlargement, and pseudoinclusions seen.     Satisfactory for evaluation.       CT with very small ~ 1 cm nodule within left thyroid. No evidence of compression, deviation. Patient doing well with no change in symptoms.      Review of Systems  Complete ROS Surg Onc:   Constitutional: The patient ENDORSES new or recent history of general fatigue, no recent weight loss, no change in appetite. Eyes: No complaints of visual problems, no scleral icterus. ENT: C/o EAR FULLNESS ear pain, no hoarseness, MILD difficulty swallowing,  no tinnitus and no new masses in head, oral cavity, or neck. Cardiovascular: No complaints of chest pain, no palpitations, no ankle edema. Respiratory: C/O shortness of breath when supine, no cough. Gastrointestinal: No complaints of jaundice, no bloody stools, no pale stools. Genitourinary: No complaints of dysuria, no hematuria, no nocturia, no frequent urination, no urethral discharge. Musculoskeletal: No complaints of weakness, paralysis, joint stiffness or arthralgias. Integumentary: No complaints of rash, no new lesions. Neurological: No complaints of convulsions, no seizures, no dizziness. Hematologic/Lymphatic: No complaints of easy bruising. Endocrine:  No hot or cold intolerance. No polydipsia, polyphagia, or polyuria. Allergy/immunology:  No environmental allergies. No food allergies.   Not immunocompromised.            Patient Active Problem List   Diagnosis   • PCOS (polycystic ovarian syndrome)   • Nontoxic goiter, unspecified   • Morbid obesity ([address])   • Vitamin D deficiency   • Infertility, female   • Hyperlipidemia, mixed   • Elevated liver enzymes   • Anxiety   • Thyroid nodule   • Bilateral carpal tunnel syndrome   • Elevated prolactin level   • History of  delivery, antepartum   • Maternal morbid obesity, antepartum ([address])   • History of gestational hypertension   • Hypothyroidism   • Hirsutism   • Sleep disturbances   • Thyroid disease affecting pregnancy   • Multigravida of advanced maternal age in third trimester   • Abnormal glucose tolerance in pregnancy   • Obesity affecting pregnancy in third trimester   • Vaccine counseling   • Fetal arrhythmia affecting pregnancy, antepartum   • Gestational hypertension   • S/P repeat low transverse    • Hashimoto's thyroiditis   • Abnormal imaging of thyroid   • Enlarged lymph nodes      Medical History        Past Medical History:   Diagnosis Date   • Abnormal Pap smear of cervix       LGSIL - colpo - LGSIL, HPV effect   • Chronic diarrhea of unknown origin       last assessed 6/26/15   • Disease of thyroid gland       nodule   • Dyspepsia       last assessed  10/29/14   • Early satiety       last assessed  10/29/14   • Hidradenitis suppurativa       last assessed  14   • Hypertension       last pregnancy   • Hypothyroidism          • Obesity     • Polycystic ovary syndrome     • Varicella       childhood         Surgical History         Past Surgical History:   Procedure Laterality Date   •  SECTION          and  [facility]2020   • [place]  DELIVERY ONLY N/A 2020     Procedure:  SECTION () REPEAT;  Surgeon: Andrea Brandon MD;  [facility]: [facility];  Service: Obstetrics   • [place]  DELIVERY ONLY N/A 3/8/2023     Procedure:  SECTION () REPEAT;  Surgeon: Debbie Bolden MD;  [facility];  Service: Obstetrics   • US GUIDED THYROID BIOPSY   2023   • WISDOM TOOTH EXTRACTION         all 4         Family History         Family History   Problem Relation Age of Onset   • Hypertension Mother     • No Known Problems Father     • No Known Problems Sister     • No Known Problems Daughter     • No Known Problems Son     • Thyroid disease Maternal Grandmother     • No Known Problems Maternal Grandfather     • Other Paternal Grandmother           pulmonary embolism   • No Known Problems Paternal Grandfather           Social History         Socioeconomic History   • Marital status: /Civil Union       Spouse name: Not on file   • Number of children: Not on file   • Years of education: Not on file   • Highest education level: Not on file   Occupational History   • Occupation:    Tobacco Use   • Smoking status: Former       Packs/day: 0.00       Years: 0.00       Total pack years: 0.00       Types: Cigarettes       Quit date: 3/1/2014       Years since quittin.4   • Smokeless tobacco: Never   • Tobacco comments:        sometimes 1 cig once weekly - none since preg   Vaping Use   • Vaping Use: Never used   Substance and Sexual Activity   • Alcohol use: Not Currently   • Drug use: No   • Sexual activity: Yes       Partners: Male       Birth control/protection: None   Other Topics Concern   • Not on file   Social History Narrative     Caffeine use: 1 cup coffee or ice tea/ day.     Stress at work      Social Determinants of Health      Financial Resource Strain: Not on file   Food Insecurity: Not on file   Transportation Needs: Not on file   Physical Activity: Not on file   Stress: Not on file   Social Connections: Not on file   Intimate Partner Violence: Not on file   Housing Stability: Not on file            Current Outpatient Medications:   •  ergocalciferol (VITAMIN D2) 50,000 units, TAKE 1 CAPSULE BY MOUTH ONE TIME PER WEEK, Disp: 4 capsule, Rfl: 6  •  Prenatal MV & Min w/FA-DHA (PRENATAL ADULT GUMMY/DHA/FA PO), Take 1 tablet by mouth, Disp: , Rfl:   •  semaglutide, 0.25 or 0.5 mg/dose, (Ozempic, 0.25 or 0.5 MG/DOSE,) 2 mg/3 mL injection pen, Inject 0.75 mL (0.5 mg total) under the skin every 7 days (Patient not taking: Reported on 2023), Disp: 3.2 mL, Rfl: 0        Allergies   Allergen Reactions   • A + D Personal Care Lotion  [Dimethicone] Rash   • Calamine-Zinc Oxide Rash   • Keflex [Cephalexin] GI Intolerance   • Pramoxine-Calamine     • Zinc Rash       No med alert bracelet no epi pen             Vitals:     07/25/23 0935   BP: 130/84   Pulse: 98   Resp: 15   Temp: (!) 97 °F (36.1 °C)   SpO2: 97%       /79   Pulse 100   Temp 97.8 °F (36.6 °C) (Temporal)   Resp 18   Ht 5' 9.5" (1.765 m)   Wt 129 kg (285 lb)   SpO2 95%   BMI 41.48 kg/m²       Physical Exam   General: Appears well, appears stated age  Skin: Warm, anicteric  HEENT: Normocephalic, atraumatic; sclera aniceteric, mucous membranes moist; cervical nodes without adenopathy. Several fat pads vs lipomas, more pronounced in RT subclv and RT submandibular region. No thyroid nodules appreciated  Cardiopulmonary: RRR, Easy WOB, no BLE edema  Abd: Flat and soft, nontender, no masses appreciated, no hepatosplenomegaly  MSK: Symmetric, no cyanosis, no overt weakness  Lymphatic: No cervical, axillary or inguinal lymphadenopathy  Neuro: Affect appropriate, no gross motor abnormalities     Labs: Reviewed in EPIC     Imaging     Thyroid US:     IMPRESSION:     1.4 cm left lower pole nodule demonstrates subthreshold enlargement but does appear new since older examinations.     Some level 3 nodes are seen on the left although not significantly enlarged.     Given borderline change, histologic sampling of this nodule on image 56 on the left is suggested.     Correlation with serum parathyroid hormone is also suggested given a possible extrathyroidal location.     I independently reviewed and interpreted the above laboratory and imaging data incl previous and past thyroid US, H&N LN US, labs        Discussion/Summary:   25 yo female with 1.5 cm Gil[place] V nodule of left thyroid.  We discussed today that this nodule has a relatively high risk of representing the most common type of thyroid cancer, a well-differentiated papillary or follicular cancer. I discussed that the typical treatment for this type of cancer would be a partial vs total thyroidectomy. We discussed that based on her ultrasound and biopsy results, I think that a left thyroidectomy would be a safe choice for the patient; however, the patient strongly prefers to avoid surveillance of the right thyroid nodule or undergo an additional surgery. She strongly prefers a total thyroidectomy. We also discussed that the final pathology will determine if additional treatment, with surgery or radioactive iodine, might be necessary. We discussed the natural history of well differentiated thyroid cancer, as well as the proposed procedure and expected postop course. We discussed the risks of total thyroidectomy to include wound healing complications, infection, bleeding, damage to nearby structures, voice hoarseness, voice pitch change, temporary or permanent calcium regulation disruption, surgical airway emergency. We also discussed the risks of surgery with general anesthesia to include MI, PE, stroke, DVT, respiratory failure, PE, death, other medical complication. The patient is at above average risk for complications related to surgery due to history of obesity. The patient expresses understanding and would like to proceed.

## 2023-08-24 NOTE — ANESTHESIA POSTPROCEDURE EVALUATION
Post-Op Assessment Note    CV Status:  Stable  Pain Score: 0    Pain management: adequate  Multimodal analgesia used between 6 hours prior to anesthesia start to PACU discharge    Mental Status:  Awake   Hydration Status:  Stable and euvolemic   PONV Controlled:  None   Airway Patency:  Patent      Post Op Vitals Reviewed: Yes      Staff: CRNA         No notable events documented.     BP   132/77   Temp 97.6   Pulse 108   Resp 18   SpO2 94

## 2023-08-24 NOTE — ANESTHESIA PREPROCEDURE EVALUATION
Procedure:  TOTAL THYROIDECTOMY (Neck)    Relevant Problems   CARDIO   (+) Gestational hypertension   (+) Hyperlipidemia, mixed      ENDO   (+) Hypothyroidism      GYN   (+) Multigravida of advanced maternal age in third trimester      NEURO/PSYCH   (+) Anxiety        Physical Exam    Airway    Mallampati score: I  TM Distance: >3 FB  Neck ROM: full     Dental   No notable dental hx     Cardiovascular  Cardiovascular exam normal    Pulmonary  Pulmonary exam normal     Other Findings        Anesthesia Plan  ASA Score- 2     Anesthesia Type- general with ASA Monitors. Additional Monitors:   Airway Plan: ETT. Comment: Wakes up "violent" after anesthesia. Hx PONV, "itchy"  . Plan Factors-    Chart reviewed. Existing labs reviewed. Patient summary reviewed. Patient is not a current smoker. Induction- intravenous. Postoperative Plan- Plan for postoperative opioid use. Planned trial extubation    Informed Consent- Anesthetic plan and risks discussed with patient. I personally reviewed this patient with the CRNA. Discussed and agreed on the Anesthesia Plan with the CRNA. Jhonatan Tracy

## 2023-08-24 NOTE — OP NOTE
OPERATIVE REPORT  PATIENT NAME: Cathy Ferrell    :  1990  MRN: [id number]  Pt Location: [facility] OR ROOM 03    SURGERY DATE: 2023    Surgeon(s) and Role:     * Nasir Bro MD - Primary     * Odette Plata MD - Assisting    Preop Diagnosis:  Thyroid nodule E04.1    Post-Op Diagnosis Codes: * Thyroid nodule [E04.1]    Procedure(s):  TOTAL THYROIDECTOMY, REIMPLANTATION OF PARATHYROID GLAND    Specimen(s):  ID Type Source Tests Collected by Time Destination   1 :  Tissue Thyroid TISSUE EXAM Nasir Bor MD 2023 1105    2 : level 6 lymph node Tissue Lymph Node TISSUE EXAM Nasir Bro MD 2023 1146    3 : paratracheal lymph node Tissue Lymph Node TISSUE EXAM Nasir Bor MD 2023 1205        Estimated Blood Loss:   100 mL    Drains:  None    Anesthesia Type:   General    Operative Indications:  Thyroid nodule E04.1    Operative Findings:  - Enlarged thyroid gland densely adhered to the anterior trachea with aberrant vasculature  - Multiple palpable bilateral thyroid nodules  - Level six lymphadenopathy    Complications:   None    Procedure and Technique:  After obtaining informed written consent, the patient was brought back to the operating room and placed in the supine position on the operating room table with both arms extended. Bilateral lower extremity SCDs were placed and the patient underwent uneventful induction of general anesthesia. A shoulder roll was then placed under the scapula with the head in the sniffing position, both arms were tucked, the neck was prepped/draped in the usual sterile fashion, and a universal timeout was undertaken where the patient's identity and proposed procedure were confirmed by all in the room.     0.5% Marcaine with epinephrine was infiltrated into the subcutaneous tissues (for a total of 10cc) and a 4cm transverse incision was made approximately three fingerbreadths above the sternal notch within one of the patient's natural skin creases. The subcutaneous tissues were dissected with electrocautery and the platysma was transected. Subplatysmal flaps were created cephalad/caudally and retractors were placed to aid with exposure. The midline strap muscles were identified, divided in the midline, and  to expose the underlying thyroid. We first focused on the left thyroid lobe. The strap muscles were mobilized off it's anterolateral aspect and we immediately encountered aberrant vasculature overlying the left lobe with numerous branches to a large vein coursing almost parallel to the midline. Using traction on the superior pole with silk stay sutures, the superior pole vessels were transected with the harmonic scalpel. The gland was rotated anteriomedially, additional stay sutures were placed at the inferior pole, and the inferior pole vessels were also divided with the harmonic scalpel. The recurrent laryngeal nerve was visualized and spared by means of closed capsular dissection; however, the left inferior parathyroid gland appeared to be devascularized therefore the decision was made to perform reimplantation. Two small tissue pockets were created immediately overlying the left sternocleidomastoid muscle and fragmented pieces of the parathyroid gland were placed within these cavities. The cavities were closed with 4-0 Prolene sutures and the left thyroid lobe was reflected medially. The thyroid nodule was palpable and there was no clear evidence of local invasion but the thyroid gland did appear to be abnormally adhered to the surrounding tissues requiring meticulous dissection. Once the gland was rotated medially, the ligament of Orlean Emms was clamped, transected, and tied off with a 2-0 Vicryl suture until the lobe was able to be freed from the anterior surface of the trachea with electrocautery. At this point we visualized enlarged level six lymph nodes which were excised to be sent to pathology as specimens.  Surgicel was placed within the wound bed and our attention was then directed to the right thyroid gland. The right-sided strap muscles were mobilized off the anterior aspect of the right thyroid lobe and we once again encountered aberrant vascular structures with numerous atypical venous branches surrounding the superior pole and overlying the right lobe. Using silk stay sutures, traction was placed on the superior pole to allow for transection of the superior pole vessels with the harmonic scalpel. The gland was rotated anteriomedially and we saw what appeared to be the parathyroid glands which were preserved by means of close capsular dissection. Additional stay sutures were placed on the inferior pole and, after confirming the position of the recurrent laryngeal nerve, the inferior pole vessels were divided with the harmonic scalpel. The ligament of Hutton Carpen was clamped/tied off with a 2-0 Vicryl, the right lobe was freed from the anterior surface of the trachea with electrocautery, and the entire gland was spent as a specimen to pathology. A Valsalva maneuver was performed and the left/right operative fields were inspected for bleeding. A couple of venous branches were controlled with small metallic clips and multiple pieces of the Surgicel hemostatic agent were placed within the bilateral wound cavities. Once hemostasis was confirmed, the strap muscles were reapproximated in the midline with a running 3-0 Vicryl and the platysma was reapproximated with the same. The dermis was closed with interrupted 4-0 Vicryl, the skin was closed with a subcutaneous running 4-0 Stratafix, and skin glue/Steri-Strips were applied as a dressing,. The patient was then uneventfully extubated and transported to the PACU in stable condition with no immediate complications noted. Dr. Kenneth Su was present and scrubbed for the entire procedure.     Patient Disposition:  PACU       SIGNATURE: Vandana Cantor MD  DATE: August 24, 2023  TIME: 3:24 PM

## 2023-08-24 NOTE — QUICK NOTE
Post Op Check:    Pt reports headache post operatively. Denies SOB, CP, changes in her vision. Notes this was similar to her gestational HTN, 10mg IV labetalol Q4 PRN were given for management. In addition pt reported pain in the R neck area 7/10. Pt reports perioral and finger numbness. Given 1g total of oral Calcium Carbonate     Afebrile, HTN to 168/110, after prn labetolol -155, sats 92 on 2L NC    Ca: 8.3 from 8.7  PTH 1.0 from 1.3    Temp:  [97.8 °F (36.6 °C)-98.5 °F (36.9 °C)] 98.5 °F (36.9 °C)  HR:  [] 117  Resp:  [12-20] 17  BP: (115-168)/() 127/79      Physical Exam:  General: No acute distress, alert and oriented  CV: Well perfused, regular rate  HEENT: neck incision without hematoma or fluctuance.  Steri strips in place, c/d/i  Lungs: Normal work of breathing, no increased respiratory effort  Abdomen: Soft, not tender non distended  Extremities: No edema, clubbing or cyanosis  Skin: Warm, dry      Plan:  Diet Regular; Regular House  -IV labetalol 10mg q4 for HTN  -Calcitriol 0.25mcg daily  -Tums 1g QID  -continue to monitor  -Mg and Phos check    Jenifer Steven MD  PGY-1   08/24/23

## 2023-08-25 ENCOUNTER — TELEPHONE (OUTPATIENT)
Dept: SURGICAL ONCOLOGY | Facility: CLINIC | Age: 33
End: 2023-08-25

## 2023-08-25 VITALS
RESPIRATION RATE: 17 BRPM | DIASTOLIC BLOOD PRESSURE: 89 MMHG | BODY MASS INDEX: 40.8 KG/M2 | TEMPERATURE: 98.5 F | HEART RATE: 99 BPM | HEIGHT: 70 IN | OXYGEN SATURATION: 93 % | SYSTOLIC BLOOD PRESSURE: 132 MMHG | WEIGHT: 285 LBS

## 2023-08-25 LAB
CALCIUM SERPL-MCNC: 8.5 MG/DL (ref 8.4–10.2)
CALCIUM SERPL-MCNC: 9.1 MG/DL (ref 8.4–10.2)
MAGNESIUM SERPL-MCNC: 1.5 MG/DL (ref 1.9–2.7)
PHOSPHATE SERPL-MCNC: 4.5 MG/DL (ref 2.7–4.5)

## 2023-08-25 PROCEDURE — NC001 PR NO CHARGE: Performed by: STUDENT IN AN ORGANIZED HEALTH CARE EDUCATION/TRAINING PROGRAM

## 2023-08-25 PROCEDURE — 99024 POSTOP FOLLOW-UP VISIT: CPT | Performed by: PHYSICIAN ASSISTANT

## 2023-08-25 PROCEDURE — 83735 ASSAY OF MAGNESIUM: CPT | Performed by: STUDENT IN AN ORGANIZED HEALTH CARE EDUCATION/TRAINING PROGRAM

## 2023-08-25 PROCEDURE — 82310 ASSAY OF CALCIUM: CPT | Performed by: STUDENT IN AN ORGANIZED HEALTH CARE EDUCATION/TRAINING PROGRAM

## 2023-08-25 PROCEDURE — 84100 ASSAY OF PHOSPHORUS: CPT | Performed by: STUDENT IN AN ORGANIZED HEALTH CARE EDUCATION/TRAINING PROGRAM

## 2023-08-25 RX ORDER — B-COMPLEX WITH VITAMIN C
2 TABLET ORAL 4 TIMES DAILY
Qty: 168 TABLET | Refills: 0 | Status: SHIPPED | OUTPATIENT
Start: 2023-08-25 | End: 2023-09-15

## 2023-08-25 RX ORDER — CALCIUM CARBONATE 500 MG/1
1 TABLET, CHEWABLE ORAL AS NEEDED
Qty: 60 TABLET | Refills: 0 | Status: SHIPPED | OUTPATIENT
Start: 2023-08-25 | End: 2023-09-15

## 2023-08-25 RX ORDER — TRAMADOL HYDROCHLORIDE 50 MG/1
50 TABLET ORAL EVERY 6 HOURS PRN
Qty: 28 TABLET | Refills: 0 | Status: SHIPPED | OUTPATIENT
Start: 2023-08-25 | End: 2023-09-01

## 2023-08-25 RX ORDER — CALCITRIOL 0.25 UG/1
0.25 CAPSULE, LIQUID FILLED ORAL 2 TIMES DAILY
Qty: 42 CAPSULE | Refills: 0 | Status: SHIPPED | OUTPATIENT
Start: 2023-08-25 | End: 2023-09-15

## 2023-08-25 RX ADMIN — SODIUM CHLORIDE, SODIUM LACTATE, POTASSIUM CHLORIDE, AND CALCIUM CHLORIDE 125 ML/HR: .6; .31; .03; .02 INJECTION, SOLUTION INTRAVENOUS at 01:14

## 2023-08-25 RX ADMIN — CALCIUM CARBONATE (ANTACID) CHEW TAB 500 MG 1000 MG: 500 CHEW TAB at 10:06

## 2023-08-25 RX ADMIN — ACETAMINOPHEN 650 MG: 325 TABLET, FILM COATED ORAL at 05:50

## 2023-08-25 RX ADMIN — Medication 2.5 MG: at 10:08

## 2023-08-25 RX ADMIN — OXYCODONE HYDROCHLORIDE 5 MG: 5 TABLET ORAL at 03:46

## 2023-08-25 RX ADMIN — LEVOTHYROXINE SODIUM 200 MCG: 100 TABLET ORAL at 05:50

## 2023-08-25 RX ADMIN — HEPARIN SODIUM 5000 UNITS: 5000 INJECTION INTRAVENOUS; SUBCUTANEOUS at 05:50

## 2023-08-25 RX ADMIN — CALCITRIOL CAPSULES 0.25 MCG 0.25 MCG: 0.25 CAPSULE ORAL at 10:06

## 2023-08-25 NOTE — DISCHARGE INSTR - AVS FIRST PAGE
Post Thyroidectomy  -Take Calcitriol 0.25mg twice a day  -Take Oscal 2 tablets (1G) four times a day  -Take TUMS 2 tablets additionally as needed   -If you develop any numbness or tingling around the mouth, in the fingertips, or in the toes, TAKE 2 TUMS AND CALL THE OFFICE  -If you develop any significant swelling underneath the incision site (tennis ball, baseball, softball size), call the office or go directly to ED  -If you have any SOB, difficulty breathing or swallowing, call the office or go directly to the ED

## 2023-08-25 NOTE — PROGRESS NOTES
Progress Note - Surgical Oncology  : [facility] Resident on Maria M Conway 26 y.o. female MRN: [id number]  Unit/Bed#: [facility] 920-01 Encounter: [id number]    Assessment:  26 y.o. female with history of hypothyroidism, Christiana V thyroid nodule now status post total thyroidectomy on 8/24 with Dr. Angel Correa, intermittently tachy high 120s, /84 sating 95% on RA    UOP: 400cc voiding freely    Ca: am pending from 9.1 from   PTH <1.0 from 1.3    Plan:  -Calcitriol daily  -PO Ca QID 1g  -regular diet  -switch to [email or web address]  -f/u am labs  -dvt ppx  -oob and ambulation    Subjective/Objective     Subjective: Overnight patient had headache postoperatively denies any shortness of breath chest pain changes in her vision. Was hypertensive to 170s received 10 mg IV labetalol every 4 as needed. Patient reported perioral and finger numbness and was given 2 g IV of calcium and 1 g p.o. of calcium and a repeat calcium was obtained which demonstrated calcium 9.1 from 8.3. This morning patient has resolution of her perioral and hand numbness she is tolerating her diet well without issue denies any issues swallowing no hoarseness. She discloses 6 out of 10 neck discomfort particularly on the right. She is voiding. She has no flatus or bowel movement. Objective:     Physical Exam:  GEN: NAD  HEENT: MMM, R neck slightly edematous, tender R>L, steri strips in place w/o striking c/d/i  CV: RRR  Lung: Normal effort  Ab: Soft, ND/NT   Extrem: No CCE   Neuro: A+Ox3    Vitals: Temp:  [97.8 °F (36.6 °C)-98.8 °F (37.1 °C)] 98.5 °F (36.9 °C)  HR:  [] 111  Resp:  [12-20] 20  BP: (115-168)/() 122/84  Body mass index is 41.48 kg/m².     I/O       08/23 0701  08/24 0700 08/24 0701 08/25 0700    I.V. (mL/kg)  1699.6 (13.2)    Total Intake(mL/kg)  1699.6 (13.2)    Urine (mL/kg/hr)  400    Blood  130    Total Output  530    Net  +1169.6                  Lab, Imaging and other studies: I have personally reviewed pertinent reports.   , CBC with diff: No results found for: "WBC", "HGB", "HCT", "MCV", "PLT", "ADJUSTEDWBC", "RBC", "MCH", "MCHC", "RDW", "MPV", "NRBC", BMP/CMP:   Lab Results   Component Value Date    CALCIUM 9.1 08/24/2023     VTE Pharmacologic Prophylaxis: Heparin  VTE Mechanical Prophylaxis: sequential compression device    Theron Rodarte MD  8/25/2023 4:18 AM

## 2023-08-25 NOTE — TELEPHONE ENCOUNTER
Attempted to call pt regarding auth for medication. Unable to leave [facility]. Will send pt "[employer]" message regarding above info.

## 2023-08-25 NOTE — DISCHARGE SUMMARY
Discharge Summary - Surgical Oncology  Leandra Figueroa 26 y.o. female MRN: [id number]  [facility] Encounter: [id number]    Admission Date: 8/24/2023     Discharge Date: 8/25/2023    Admitting Diagnosis: Thyroid nodule [E04.1]    Discharge Diagnosis: Thyroid nodule status post total thyroidectomy by Dr. Kenneth Su    Attending and Service: Dr. Kenneth Su, surgical oncology services. Consulting Physician(s): None    Imaging and Procedures Performed:   8/24/2023 total thyroidectomy by Dr. Kenneth Su    Pathology: Pending    Hospital Course: Leandra Figueroa is a 70-year-old female with a past medical history of hypothyroidism, hypertension, hidradenitis suppurativa, red nodule presented for elective surgical intervention including total thyroidectomy. Postoperatively she was transferred to the medical surgical floor and monitored for postoperative hypocalcemia symptoms including numbness and tingling periorally. By postoperative day 1 patient was tolerating diet without nausea or vomiting, out of bed and ambulating without any assistance, pain remained controlled on p.o. pain control regimen. She denies any perioral numbness or tingling or numbness or tingling in fingertips or toes. She had no difficulty breathing or swallowing. She was cleared for discharge on postoperative day 1. All discharge instructions were discussed with the patient and she was in agreement with plan. Strict precautions were discussed with the patient including taking calcitriol 0.25 mg twice daily, Os-Rocky 1 g 4 times a day, and Tums as needed for additional symptomology. On discharge, the patient is instructed to follow-up with the patient's primary care provider within the next 2 weeks to review the events of the recent hospitalization. The patient is instructed to follow-up with Dr. Kenneth Su as scheduled. The patient is instructed to follow the provided discharge instructions.      Condition at Discharge: good     Discharge instructions/Information to patient and family:   See after visit summary for information provided to patient and family. Provisions for Follow-Up Care:  See after visit summary for information related to follow-up care and any pertinent home health orders. Disposition: Home    Planned Readmission: No    Discharge Statement   I spent 30 minutes discharging the patient. This time was spent on the day of discharge. I had direct contact with the patient on the day of discharge. Additional documentation is required if more than 30 minutes were spent on discharge. Discharge Medications:  See after visit summary for reconciled discharge medications provided to patient and family.     Bolivar Bautista PA-C  8/25/2023  10:08 AM

## 2023-09-12 PROBLEM — C73 THYROID CANCER (HCC): Status: ACTIVE | Noted: 2023-09-12

## 2023-09-14 ENCOUNTER — OFFICE VISIT (OUTPATIENT)
Dept: SURGICAL ONCOLOGY | Facility: CLINIC | Age: 33
End: 2023-09-14

## 2023-09-14 VITALS
WEIGHT: 277 LBS | RESPIRATION RATE: 14 BRPM | OXYGEN SATURATION: 99 % | SYSTOLIC BLOOD PRESSURE: 118 MMHG | TEMPERATURE: 98.7 F | HEART RATE: 123 BPM | BODY MASS INDEX: 39.65 KG/M2 | DIASTOLIC BLOOD PRESSURE: 80 MMHG | HEIGHT: 70 IN

## 2023-09-14 DIAGNOSIS — C73 THYROID CANCER (HCC): Primary | ICD-10-CM

## 2023-09-14 PROCEDURE — 99024 POSTOP FOLLOW-UP VISIT: CPT | Performed by: STUDENT IN AN ORGANIZED HEALTH CARE EDUCATION/TRAINING PROGRAM

## 2023-09-14 RX ORDER — CALCIUM CARBONATE 500(1250)
TABLET ORAL
COMMUNITY
Start: 2023-08-25 | End: 2023-09-18 | Stop reason: SDUPTHER

## 2023-09-14 NOTE — PROGRESS NOTES
Surgical Oncology Follow Up    [address] [facility] [address] [place] [id number] [phone]    Angelita De Los Santos  1990  [id number]    Diagnoses and all orders for this visit:    Thyroid cancer ([facility])    Other orders  -     calcium carbonate (OYSTER SHELL,OSCAL) 500 mg; TAKE 2 TABLETS BY MOUTH 4 (FOUR) TIMES A DAY        Chief Complaint   Patient presents with   • Post-op       Return in about 6 months (around 3/14/2024). Oncology History   Thyroid cancer ([facility])   8/24/2023 Surgery    A. 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 -- Carcinoma focally involves the inferior aspect of anterior-lateral left lobe surgical margin. - Two additional foci of papillary thyroid carcinoma, solid and classic types. - Two parathyroid glands. - Three lymph nodes, negative for tumor (0/3). - See synoptic report. B. Lymph Node, level 6 lymph node:  - Two (2) of five (5) lymph nodes with rare cytokeratin-positive cells (~10 cells each), cannot exclude metastatic papillary thyroid carcinoma. See note 1.     C. Tissue submitted as "paratracheal lymph node":  - Extensively crushed thyroid tissue. See note 2.  - No definitive lymph node. 9/12/2023 Initial Diagnosis    Thyroid cancer ([facility])       Treatment history: Post total thyroid for tall cell variant multifocal papillary thyroid cancer with close margin  Current treatment: postop  Disease status: postop    History of Present Illness: Martha Oliveira is doing well postop. No swelling, trouble swallowing, voice changes. No s/s infection. Review of Systems  Complete ROS Surg Onc:   Constitutional: The patient denies new or recent history of general fatigue, no recent weight loss, no change in appetite. Eyes: No complaints of visual problems, no scleral icterus.    ENT: no complaints of ear pain, no hoarseness, no difficulty swallowing,  no tinnitus and no new masses in head, oral cavity, or neck. Cardiovascular: No complaints of chest pain, no palpitations, no ankle edema. Respiratory: No complaints of shortness of breath, no cough. Gastrointestinal: No complaints of jaundice, no bloody stools, no pale stools. Genitourinary: No complaints of dysuria, no hematuria, no nocturia, no frequent urination, no urethral discharge. Musculoskeletal: No complaints of weakness, paralysis, joint stiffness or arthralgias. Integumentary: No complaints of rash, no new lesions. Neurological: No complaints of convulsions, no seizures, no dizziness. Hematologic/Lymphatic: No complaints of easy bruising. Endocrine:  No hot or cold intolerance. No polydipsia, polyphagia, or polyuria. Allergy/immunology:  No environmental allergies. No food allergies. Not immunocompromised. Skin:  No pallor or rash. No wound.         Patient Active Problem List   Diagnosis   • PCOS (polycystic ovarian syndrome)   • Nontoxic goiter, unspecified   • Morbid obesity ([address])   • Vitamin D deficiency   • Infertility, female   • Hyperlipidemia, mixed   • Elevated liver enzymes   • Anxiety   • Thyroid nodule   • Bilateral carpal tunnel syndrome   • Elevated prolactin level   • History of  delivery, antepartum   • Maternal morbid obesity, antepartum ([address])   • History of gestational hypertension   • Hypothyroidism   • Hirsutism   • Sleep disturbances   • Thyroid disease affecting pregnancy   • Multigravida of advanced maternal age in third trimester   • Abnormal glucose tolerance in pregnancy   • Obesity affecting pregnancy in third trimester   • Vaccine counseling   • Fetal arrhythmia affecting pregnancy, antepartum   • Gestational hypertension   • S/P repeat low transverse    • Hashimoto's thyroiditis   • Abnormal imaging of thyroid   • Enlarged lymph nodes   • Thyroid cancer ([address])     Past Medical History:   Diagnosis Date   • Abnormal Pap smear of cervix     LGSIL - colpo - LGSIL, HPV effect   • Chronic diarrhea of unknown origin     last assessed 6/26/15   • Disease of thyroid gland     nodule   • Dyspepsia     last assessed  10/29/14   • Early satiety     last assessed  10/29/14   • Hidradenitis suppurativa     last assessed  14   • Hypertension     last pregnancy   • Hypothyroidism        • Obesity    • Polycystic ovary syndrome    • Varicella     childhood     Past Surgical History:   Procedure Laterality Date   •  SECTION       and  [facility]   • [place]  DELIVERY ONLY N/A 2020    Procedure:  SECTION () REPEAT;  Surgeon: Tk Hernandez MD;  [facility];  Service: Obstetrics   • [place]  DELIVERY ONLY N/A 3/8/2023    Procedure:  SECTION () REPEAT;  Surgeon: Samantha Cabrera MD;  [facility];  Service: Obstetrics   • THYROIDECTOMY N/A 2023    Procedure: TOTAL THYROIDECTOMY, reimplantation of parathyroid gland;  Surgeon: Kristin Chavez MD;  [facility];  Service: Surgical Oncology   • US GUIDED THYROID BIOPSY  2023   • WISDOM TOOTH EXTRACTION      all 4     Family History   Problem Relation Age of Onset   • Hypertension Mother    • No Known Problems Father    • No Known Problems Sister    • No Known Problems Daughter    • No Known Problems Son    • Thyroid disease Maternal Grandmother    • No Known Problems Maternal Grandfather    • Other Paternal Grandmother         pulmonary embolism   • No Known Problems Paternal Grandfather      Social History     Socioeconomic History   • Marital status: /Civil Union     Spouse name: Not on file   • Number of children: Not on file   • Years of education: Not on file   • Highest education level: Not on file   Occupational History   • Occupation:    Tobacco Use   • Smoking status: Former     Packs/day: 0.00     Years: 0.00     Total pack years: 0.00     Types: Cigarettes     Quit date: 3/1/2014 Years since quittin.5   • Smokeless tobacco: Never   • Tobacco comments:      sometimes 1 cig once weekly - none since preg   Vaping Use   • Vaping Use: Never used   Substance and Sexual Activity   • Alcohol use: Not Currently   • Drug use: No   • Sexual activity: Yes     Partners: Male     Birth control/protection: None   Other Topics Concern   • Not on file   Social History Narrative    Caffeine use: 1 cup coffee or ice tea/ day. Stress at work     Social Determinants of Health     Financial Resource Strain: Not on file   Food Insecurity: Not on file   Transportation Needs: Not on file   Physical Activity: Not on file   Stress: Not on file   Social Connections: Not on file   Intimate Partner Violence: Not on file   Housing Stability: Not on file       Current Outpatient Medications:   •  calcitriol (ROCALTROL) 0.25 mcg capsule, Take 1 capsule (0.25 mcg total) by mouth 2 (two) times a day for 21 days, Disp: 42 capsule, Rfl: 0  •  calcium carbonate (OYSTER SHELL,OSCAL) 500 mg, TAKE 2 TABLETS BY MOUTH 4 (FOUR) TIMES A DAY, Disp: , Rfl:   •  calcium carbonate (TUMS) 500 mg chewable tablet, Chew 1 tablet (500 mg total) as needed (Numbness/tingling around mouth, fingertips, or toes) for up to 21 days Take additionally 2 tablets as needed for Numbness/tingling around mouth, fingertips, or toes and call the office, Disp: 60 tablet, Rfl: 0  •  calcium carbonate-vitamin D 500 mg-5 mcg per tablet, Take 2 tablets by mouth 4 (four) times a day for 21 days, Disp: 168 tablet, Rfl: 0  •  ergocalciferol (VITAMIN D2) 50,000 units, TAKE 1 CAPSULE BY MOUTH ONE TIME PER WEEK, Disp: 4 capsule, Rfl: 6  •  levothyroxine (Synthroid) 200 mcg tablet, Take 1 tablet (200 mcg total) by mouth daily, Disp: 30 tablet, Rfl: 0  •  liraglutide (Saxenda) injection, Inject 0.6 mg once/day for 1 week. If tolerating, increase to 1.2 mg once/day on the 2nd week.  If tolerating, increase to 1.8 mg/week the 3rd week, then 2.4 mg the 4th week and finally 3 mg the 5th week., Disp: 3 mL, Rfl: 0  •  phentermine 15 MG capsule, Take 1 capsule (15 mg total) by mouth every morning, Disp: 30 capsule, Rfl: 2  •  Prenatal MV & Min w/FA-DHA (PRENATAL ADULT GUMMY/DHA/FA PO), Take 1 tablet by mouth, Disp: , Rfl:   Allergies   Allergen Reactions   • A + D Personal Care Lotion  [Dimethicone] Rash   • Calamine-Zinc Oxide Rash   • Keflex [Cephalexin] GI Intolerance   • Pramoxine-Calamine    • Zinc Rash     No med alert bracelet no epi pen     Vitals:    09/14/23 1514   BP: 118/80   Pulse: (!) 123   Resp: 14   Temp: 98.7 °F (37.1 °C)   SpO2: 99%       Physical Exam  Constitutional: Patient is well-developed and well-nourished who appears the stated age in no acute distress. Patient is pleasant and talkative. HEENT:  Normocephalic. Sclerae are anicteric. Mucous membranes are moist. Neck is supple without adenopathy. No JVD. Chest: Equal chest rise, easy WOB  Cardiac: Heart is regular rate. Abdomen: Abdomen is non-tender, non-distended and without masses. Extremities: There is no clubbing or cyanosis. There is no edema. Symmetric. Neuro: Grossly nonfocal. Gait is normal.     Lymphatic: No evidence of cervical adenopathy bilaterally. No evidence of axillary adenopathy bilaterally. No evidence of inguinal adenopathy bilaterally. Skin: Warm, anicteric. Incision healing well  Psych:  Patient is pleasant and talkative. Pathology:  Final Diagnosis   A. 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 -- Carcinoma focally involves the inferior aspect of anterior-lateral left lobe surgical margin. - Two additional foci of papillary thyroid carcinoma, solid and classic types. - Two parathyroid glands. - Three lymph nodes, negative for tumor (0/3). - See synoptic report.     B.  Lymph Node, level 6 lymph node:  - Two (2) of five (5) lymph nodes with rare cytokeratin-positive cells (~10 cells each), cannot exclude metastatic papillary thyroid carcinoma. See note 1.     C. Tissue submitted as "paratracheal lymph node":  - Extensively crushed thyroid tissue. See note 2.  - No definitive lymph node.     Note 1: In part B (level 6 lymph node, blocks B1 and B2), AE1/AE3 immunostains highlights two minute foci of approximately 10 cells each; however, these foci are not seen in H&E stained slides (including recut levels). Additional TTF1 and CK19 immunostains were performed on part B1, but the focus is no longer present in these stains. As a result, the significance of these small foci is not entirely clear, but is insufficient for a diagnosis of metastatic papillary thyroid carcinoma. Nonetheless, metastatic papillary thyroid carcinoma cannot be definitively excluded.     Note 2: Part C (submitted as paratracheal lymph node) demonstrates crushed thyroid tissue reminiscent of the background non-neoplastic thyroid tissue in part A. TTF1, AE1/AE3, and CK19 immunostains highlight the orderly glandular architecture of this thyroid tissue without confluent or papillary growth. In addition, a BRAF V600E immunostain is negative. Consequently, the morphologic and immunophenotypic findings are favored to represent crushed non-neoplastic thyroid tissue.             Labs:  Reviewed in [facility] personally    Imaging  No results found. I reviewed the above laboratory and imaging data. Discussion/Summary:   27 yo s/p total thyroid with final path tall cell variant multifocal papillary thyroid cancer with close margin. Also with two nodes of questionable significance. Discussed with Dr Seven Rodriguez and will need FIGUEROA. I will see her in 6 mo for surveillance. Pt mentions previous nodes she noted - US 7/2021 with enlarged but normal contour nodes and no suspicious features on recent CT. Will cont to follow these nodes on surveillance imaging.  Cont current dose synthroid

## 2023-09-15 DIAGNOSIS — E04.1 THYROID NODULE: ICD-10-CM

## 2023-09-15 RX ORDER — LEVOTHYROXINE SODIUM 0.2 MG/1
200 TABLET ORAL DAILY
Qty: 30 TABLET | Refills: 1 | Status: SHIPPED | OUTPATIENT
Start: 2023-09-15

## 2023-09-15 NOTE — TELEPHONE ENCOUNTER
Name of medication/s patient is requesting to be refilled calcium and levothyroxine  Medication dosage     How often is medication being taken . Is patient requesting 30 or 90 day supply, 30.     Name of Pharm[facility]    Last Visit 8/4/2023  Next Visit 10/5/2023

## 2023-09-18 DIAGNOSIS — E06.3 HASHIMOTO'S THYROIDITIS: ICD-10-CM

## 2023-09-18 DIAGNOSIS — E04.1 THYROID NODULE: Primary | ICD-10-CM

## 2023-09-18 RX ORDER — CALCITRIOL 0.25 UG/1
0.25 CAPSULE, LIQUID FILLED ORAL 2 TIMES DAILY
Qty: 60 CAPSULE | Refills: 0 | Status: SHIPPED | OUTPATIENT
Start: 2023-09-18 | End: 2023-10-18

## 2023-09-18 RX ORDER — CALCIUM CARBONATE 500(1250)
2 TABLET ORAL 4 TIMES DAILY
Qty: 240 TABLET | Refills: 0 | Status: SHIPPED | OUTPATIENT
Start: 2023-09-18 | End: 2023-10-18

## 2023-10-05 ENCOUNTER — OFFICE VISIT (OUTPATIENT)
Dept: ENDOCRINOLOGY | Facility: CLINIC | Age: 33
End: 2023-10-05
Payer: COMMERCIAL

## 2023-10-05 VITALS
SYSTOLIC BLOOD PRESSURE: 120 MMHG | WEIGHT: 285.4 LBS | HEIGHT: 70 IN | HEART RATE: 108 BPM | DIASTOLIC BLOOD PRESSURE: 84 MMHG | BODY MASS INDEX: 40.86 KG/M2

## 2023-10-05 DIAGNOSIS — E03.9 HYPOTHYROIDISM, UNSPECIFIED TYPE: ICD-10-CM

## 2023-10-05 DIAGNOSIS — E06.3 HASHIMOTO'S THYROIDITIS: ICD-10-CM

## 2023-10-05 DIAGNOSIS — C73 THYROID CANCER (HCC): Primary | ICD-10-CM

## 2023-10-05 DIAGNOSIS — E20.9 HYPOPARATHYROIDISM, UNSPECIFIED HYPOPARATHYROIDISM TYPE (HCC): ICD-10-CM

## 2023-10-05 DIAGNOSIS — E55.9 VITAMIN D DEFICIENCY: ICD-10-CM

## 2023-10-05 DIAGNOSIS — L83 ACANTHOSIS NIGRICANS: ICD-10-CM

## 2023-10-05 PROCEDURE — 99215 OFFICE O/P EST HI 40 MIN: CPT | Performed by: INTERNAL MEDICINE

## 2023-10-05 RX ORDER — CALCITRIOL 0.25 UG/1
0.25 CAPSULE, LIQUID FILLED ORAL 2 TIMES DAILY
Qty: 60 CAPSULE | Refills: 3 | Status: SHIPPED | OUTPATIENT
Start: 2023-10-05 | End: 2024-02-02

## 2023-10-05 NOTE — PROGRESS NOTES
Juan Neighbor 26 y.o. female MRN: [id number]    Encounter: [id number]      Assessment/Plan     Problem List Items Addressed This Visit        Endocrine    Hashimoto's thyroiditis    Relevant Medications    calcitriol (ROCALTROL) 0.25 mcg capsule    Hypoparathyroidism ([address])     She is on calcium and calcitriol-repeat labs and if calcium normal consider decreasing the dose         Relevant Orders    PTH, intact Lab Collect Lab Collect    Phosphorus Lab Collect    Hypothyroidism     Has been on levothyroxine 200 mcg daily since her surgery-we will check thyroid function tests         Relevant Orders    T4, free Lab Collect    TSH, 3rd generation Lab Collect    Thyroid cancer [facility]) - Primary     Pathology reviewed with the patient-tall cell variant are more aggressive so radioactive iodine ablation is warranted. We will refer her to see nuclear medicine to be set up for Thyrogen stimulated radioactive iodine ablation         Relevant Orders    Comprehensive metabolic panel Lab Collect    Thyroglobulin w/ab Lab Collect    NM consultation thyroid cancer treatment       Musculoskeletal and Integument    Acanthosis nigricans    Relevant Orders    HEMOGLOBIN A1C W/ EAG ESTIMATION Lab Collect       Other    Vitamin D deficiency     Continue supplementations         Relevant Orders    Vitamin D 25 hydroxy Lab Collect       CC:   Thyroid cancer     History of Present Illness      HPI:  26 y. o. female with hypothyroidism PCOS, vitamin d deficiency, and thyroid nodule seen in f/u .   She underwent total thyroidectomy in August with final pathology showing papillary thyroid cancer tall cell variant  For hypothyroidism she is currently on lt4 200 mcg daily since surgery and has been taking it regularly and properly     She has also developed postsurgical hypoparathyroidism and is currently on calcium 2 tabs twice or 4 times  a day   Calcitriol 0.25 mcg twice a day     C/o fatigue ,  Yeast infections   Sleeping well   Weight stable Has IUD     + numbness in fingers , not around mouth     Took calcium 1/2 hour back.       Review of Systems    Historical Information   Past Medical History:   Diagnosis Date   • Abnormal Pap smear of cervix     LGSIL - colpo - LGSIL, HPV effect   • Chronic diarrhea of unknown origin     last assessed 6/26/15   • Disease of thyroid gland     nodule   • Dyspepsia     last assessed  10/29/14   • Early satiety     last assessed  10/29/14   • Hidradenitis suppurativa     last assessed  14   • Hypertension     last pregnancy   • Hypothyroidism        • Obesity    • Polycystic ovary syndrome    • Varicella     childhood     Past Surgical History:   Procedure Laterality Date   •  SECTION       and  [facility]    • [place]  DELIVERY [place] N/A 2020    Procedure:  SECTION () REPEAT;  Surgeon: Jason Aguilera MD;  [facility]: [facility];  Service: Obstetrics   • [place]  DELIVERY ONLY N/A 3/8/2023    Procedure: Melissa Krabbe () REPEAT;  Surgeon: Best Harvey MD;  [facility];  Service: Obstetrics   • THYROIDECTOMY N/A 2023    Procedure: TOTAL THYROIDECTOMY, reimplantation of parathyroid gland;  Surgeon: Barbara Mckinnon MD;  [facility];  Service: Surgical Oncology   • US GUIDED THYROID BIOPSY  2023   • WISDOM TOOTH EXTRACTION      all 4     Social History   Social History     Substance and Sexual Activity   Alcohol Use Not Currently     Social History     Substance and Sexual Activity   Drug Use No     Social History     Tobacco Use   Smoking Status Former   • Packs/day: 0.00   • Years: 0.00   • Total pack years: 0.00   • Types: Cigarettes   • Quit date: 3/1/2014   • Years since quittin.6   Smokeless Tobacco Never   Tobacco Comments     sometimes 1 cig once weekly - none since preg     Family History:   Family History   Problem Relation Age of Onset   • Hypertension Mother    • No Known Problems Father    • No Known Problems Sister    • No Known Problems Daughter    • No Known Problems Son    • Thyroid disease Maternal Grandmother    • No Known Problems Maternal Grandfather    • Other Paternal Grandmother         pulmonary embolism   • No Known Problems Paternal Grandfather        Meds/Allergies   Current Outpatient Medications   Medication Sig Dispense Refill   • calcitriol (ROCALTROL) 0.25 mcg capsule Take 1 capsule (0.25 mcg total) by mouth 2 (two) times a day 60 capsule 3   • calcium carbonate (OYSTER SHELL,OSCAL) 500 mg Take 2 tablets by mouth 4 (four) times a day 240 tablet 0   • ergocalciferol (VITAMIN D2) 50,000 units TAKE 1 CAPSULE BY MOUTH ONE TIME PER WEEK 4 capsule 6   • levothyroxine (Synthroid) 200 mcg tablet Take 1 tablet (200 mcg total) by mouth daily 30 tablet 1   • phentermine 15 MG capsule Take 1 capsule (15 mg total) by mouth every morning 30 capsule 2     No current facility-administered medications for this visit. Allergies   Allergen Reactions   • A + D Personal Care Lotion  [Dimethicone] Rash   • Calamine-Zinc Oxide Rash   • Keflex [Cephalexin] GI Intolerance   • Pramoxine-Calamine    • Zinc Rash     No med alert bracelet no epi pen       Objective   Vitals: Blood pressure 120/84, pulse (!) 108, height 5' 9.5" (1.765 m), weight 129 kg (285 lb 6.4 oz), currently breastfeeding. Physical Exam  Vitals reviewed. Constitutional:       General: She is not in acute distress. Appearance: Normal appearance. She is obese. She is not ill-appearing, toxic-appearing or diaphoretic. HENT:      Head: Normocephalic and atraumatic. Eyes:      General: No scleral icterus. Extraocular Movements: Extraocular movements intact. Neck:      Comments: He acanthosis nigricans  Cardiovascular:      Rate and Rhythm: Normal rate and regular rhythm. Heart sounds: Normal heart sounds. No murmur heard. Pulmonary:      Effort: Pulmonary effort is normal. No respiratory distress.       Breath sounds: Normal breath sounds. No wheezing or rales. Abdominal:      General: There is no distension. Palpations: Abdomen is soft. Tenderness: There is no abdominal tenderness. Musculoskeletal:      Cervical back: Neck supple. Right lower leg: No edema. Left lower leg: No edema. Lymphadenopathy:      Cervical: No cervical adenopathy. Skin:     General: Skin is warm and dry. Neurological:      General: No focal deficit present. Mental Status: She is alert and oriented to person, place, and time. Gait: Gait normal.   Psychiatric:         Mood and Affect: Mood normal.         Behavior: Behavior normal.         Thought Content: Thought content normal.         Judgment: Judgment normal.         The history was obtained from the review of the chart, patient. Lab Results:   Lab Results   Component Value Date/Time    TSH 3RD GENERATON 1.679 06/27/2023 02:57 PM    TSH 3RD GENERATON 1.084 04/29/2023 11:12 AM    TSH 3RD GENERATON 1.646 02/16/2023 03:37 PM    Free T4 0.88 06/27/2023 02:57 PM    Free T4 1.28 04/29/2023 11:12 AM   THYROID GLAND (THYROID GLAND: RESECTION - All Specimens) 8th Edition - Protocol posted: 6/30/2021  SPECIMEN  Procedure Total thyroidectomy  . TUMOR  Tumor Focality Multifocal  Tumor Characteristics  Tumor Site Right lobe  Left lobe  Tumor Size Greatest Dimension (Centimeters): 1.3 cm  Histologic Type Papillary carcinoma, tall cell variant  Histologic Type Comment Additional PTC classic type (0.65 cm, right lobe), and PTC  solid type (0.5 cm, right lobe)  Tumor Necrosis Not identified  Angioinvasion (vascular invasion) Not identified  Lymphatic Invasion Not identified  Perineural Invasion Not identified  Extrathyroidal Extension Not identified  Margin Status  Margin(s) Involved by Carcinoma Left lobe, inferior aspect of anterior lateral margin  .   REGIONAL LYMPH NODES  Regional Lymph Node Status Rare cytokerain AE1/AE3-positive cells in level 6 lymph  nodes; cannot exclude metastasis. Number of Lymph Nodes Examined At least: 8  Rashad Level(s) Examined Level VI  Intrathyroidal and perithyroidal  .  PATHOLOGIC STAGE CLASSIFICATION (pTNM, AJCC 8th Edition)  Reporting of pT, pN, and (when applicable) pM categories is based on information available to the pathologist at the time the  report is issued. As per the AJCC (Chapter 1, 8th Ed.) it is the managing physician’s responsibility to establish the final pathologic  stage based upon all pertinent information, including but potentially not limited to this pathology report. TNM Descriptors m (multiple primary tumors)  pT Category pT1b  pN Category pN0a  . ADDITIONAL FINDINGS  Additional Findings Thyroiditis: Chronic lymphocytic thyroiditis. Parathyroid gland(s) present  Number of Parathyroid Glands 2  Location of Parathyroid Gland(s) Right superior  Right inferior  Parathyroid Gland Findings Within normal limitsImaging Studies:         I have personally reviewed pertinent reports. I have spent a total time of 40 minutes on 10/05/23 in caring for this patient including Diagnostic results, Prognosis, Risks and benefits of tx options, Instructions for management, Importance of tx compliance, Risk factor reductions, Impressions, Documenting in the medical record, Reviewing / ordering tests, medicine, procedures   and Obtaining or reviewing history  . Portions of the record may have been created with voice recognition software. Occasional wrong word or "sound a like" substitutions may have occurred due to the inherent limitations of voice recognition software. Read the chart carefully and recognize, using context, where substitutions have occurred.

## 2023-10-05 NOTE — ASSESSMENT & PLAN NOTE
Pathology reviewed with the patient-tall cell variant are more aggressive so radioactive iodine ablation is warranted.   We will refer her to see nuclear medicine to be set up for Thyrogen stimulated radioactive iodine ablation

## 2023-10-06 ENCOUNTER — LAB (OUTPATIENT)
Dept: LAB | Facility: CLINIC | Age: 33
End: 2023-10-06
Payer: COMMERCIAL

## 2023-10-06 DIAGNOSIS — E03.9 HYPOTHYROIDISM, UNSPECIFIED TYPE: ICD-10-CM

## 2023-10-06 DIAGNOSIS — C73 THYROID CANCER (HCC): ICD-10-CM

## 2023-10-06 DIAGNOSIS — E55.9 VITAMIN D DEFICIENCY: ICD-10-CM

## 2023-10-06 DIAGNOSIS — L83 ACANTHOSIS NIGRICANS: ICD-10-CM

## 2023-10-06 DIAGNOSIS — E20.9 HYPOPARATHYROIDISM, UNSPECIFIED HYPOPARATHYROIDISM TYPE (HCC): ICD-10-CM

## 2023-10-06 LAB
25(OH)D3 SERPL-MCNC: 29.8 NG/ML (ref 30–100)
ALBUMIN SERPL BCP-MCNC: 4.3 G/DL (ref 3.5–5)
ALP SERPL-CCNC: 53 U/L (ref 34–104)
ALT SERPL W P-5'-P-CCNC: 20 U/L (ref 7–52)
ANION GAP SERPL CALCULATED.3IONS-SCNC: 10 MMOL/L
AST SERPL W P-5'-P-CCNC: 22 U/L (ref 13–39)
BILIRUB SERPL-MCNC: 0.89 MG/DL (ref 0.2–1)
BUN SERPL-MCNC: 10 MG/DL (ref 5–25)
CALCIUM SERPL-MCNC: 9.3 MG/DL (ref 8.4–10.2)
CHLORIDE SERPL-SCNC: 103 MMOL/L (ref 96–108)
CO2 SERPL-SCNC: 25 MMOL/L (ref 21–32)
CREAT SERPL-MCNC: 0.9 MG/DL (ref 0.6–1.3)
EST. AVERAGE GLUCOSE BLD GHB EST-MCNC: 111 MG/DL
GFR SERPL CREATININE-BSD FRML MDRD: 83 ML/MIN/1.73SQ M
GLUCOSE P FAST SERPL-MCNC: 91 MG/DL (ref 65–99)
HBA1C MFR BLD: 5.5 %
PHOSPHATE SERPL-MCNC: 3.6 MG/DL (ref 2.7–4.5)
POTASSIUM SERPL-SCNC: 4.1 MMOL/L (ref 3.5–5.3)
PROT SERPL-MCNC: 8 G/DL (ref 6.4–8.4)
PTH-INTACT SERPL-MCNC: 18.9 PG/ML (ref 12–88)
SODIUM SERPL-SCNC: 138 MMOL/L (ref 135–147)
T4 FREE SERPL-MCNC: 0.91 NG/DL (ref 0.61–1.12)
TSH SERPL DL<=0.05 MIU/L-ACNC: 8.58 UIU/ML (ref 0.45–4.5)

## 2023-10-06 PROCEDURE — 84439 ASSAY OF FREE THYROXINE: CPT

## 2023-10-06 PROCEDURE — 82306 VITAMIN D 25 HYDROXY: CPT

## 2023-10-06 PROCEDURE — 84443 ASSAY THYROID STIM HORMONE: CPT

## 2023-10-06 PROCEDURE — 84432 ASSAY OF THYROGLOBULIN: CPT

## 2023-10-06 PROCEDURE — 86800 THYROGLOBULIN ANTIBODY: CPT

## 2023-10-06 PROCEDURE — 80053 COMPREHEN METABOLIC PANEL: CPT

## 2023-10-06 PROCEDURE — 83036 HEMOGLOBIN GLYCOSYLATED A1C: CPT

## 2023-10-06 PROCEDURE — 84100 ASSAY OF PHOSPHORUS: CPT

## 2023-10-06 PROCEDURE — 83970 ASSAY OF PARATHORMONE: CPT

## 2023-10-06 PROCEDURE — 36415 COLL VENOUS BLD VENIPUNCTURE: CPT

## 2023-10-08 DIAGNOSIS — E20.9 HYPOPARATHYROIDISM, UNSPECIFIED HYPOPARATHYROIDISM TYPE (HCC): ICD-10-CM

## 2023-10-08 DIAGNOSIS — E03.9 HYPOTHYROIDISM, UNSPECIFIED TYPE: Primary | ICD-10-CM

## 2023-10-08 NOTE — RESULT ENCOUNTER NOTE
Calcium and PTH and vitamin D 25-hydroxy has improved . Continue Drisdol once a week  Take calcium 2 tablets twice a day with meals. Decrease calcitriol to once a day. Repeat calcium, PTH and phosphorus in 2 weeks. TSH is slightly high-increase levothyroxine to 1 tablet Monday through Saturday 1-1/2 on Sunday.   Repeat TSH and free T4 in 6 weeks  Will order labs

## 2023-10-15 DIAGNOSIS — E04.1 THYROID NODULE: ICD-10-CM

## 2023-10-15 LAB
THYROGLOB AB SERPL-ACNC: 11.4 IU/ML (ref 0–0.9)
THYROGLOB SERPL-MCNC: 3.5 NG/ML

## 2023-10-16 RX ORDER — CALCIUM CARBONATE 500(1250)
2 TABLET ORAL 4 TIMES DAILY
Qty: 240 TABLET | Refills: 0 | Status: SHIPPED | OUTPATIENT
Start: 2023-10-16

## 2023-10-27 ENCOUNTER — HOSPITAL ENCOUNTER (OUTPATIENT)
Dept: RADIOLOGY | Age: 33
Discharge: HOME | End: 2023-10-27

## 2023-10-27 DIAGNOSIS — C73 THYROID CANCER (HCC): ICD-10-CM

## 2023-11-10 DIAGNOSIS — E28.2 PCOS (POLYCYSTIC OVARIAN SYNDROME): Primary | ICD-10-CM

## 2023-11-10 DIAGNOSIS — E89.0 H/O THYROIDECTOMY: ICD-10-CM

## 2023-11-10 RX ORDER — METFORMIN HYDROCHLORIDE 500 MG/1
1000 TABLET, EXTENDED RELEASE ORAL 2 TIMES DAILY WITH MEALS
Qty: 180 TABLET | Refills: 1 | Status: SHIPPED | OUTPATIENT
Start: 2023-11-10

## 2023-11-12 DIAGNOSIS — E04.1 THYROID NODULE: ICD-10-CM

## 2023-11-13 DIAGNOSIS — E04.1 THYROID NODULE: ICD-10-CM

## 2023-11-13 RX ORDER — CALCIUM CARBONATE 500(1250)
2 TABLET ORAL 4 TIMES DAILY
Qty: 240 TABLET | Refills: 0 | Status: SHIPPED | OUTPATIENT
Start: 2023-11-13

## 2023-11-13 RX ORDER — LEVOTHYROXINE SODIUM 0.2 MG/1
200 TABLET ORAL DAILY
Qty: 30 TABLET | Refills: 1 | Status: SHIPPED | OUTPATIENT
Start: 2023-11-13

## 2023-12-19 ENCOUNTER — LAB (OUTPATIENT)
Dept: LAB | Facility: CLINIC | Age: 33
End: 2023-12-19
Payer: COMMERCIAL

## 2023-12-19 DIAGNOSIS — E03.9 HYPOTHYROIDISM, UNSPECIFIED TYPE: ICD-10-CM

## 2023-12-19 DIAGNOSIS — E28.2 PCOS (POLYCYSTIC OVARIAN SYNDROME): ICD-10-CM

## 2023-12-19 DIAGNOSIS — E89.0 H/O THYROIDECTOMY: ICD-10-CM

## 2023-12-19 DIAGNOSIS — E20.9 HYPOPARATHYROIDISM, UNSPECIFIED HYPOPARATHYROIDISM TYPE (HCC): ICD-10-CM

## 2023-12-19 LAB
ALBUMIN SERPL BCP-MCNC: 4.2 G/DL (ref 3.5–5)
CALCIUM SERPL-MCNC: 8.9 MG/DL (ref 8.4–10.2)
GLUCOSE P FAST SERPL-MCNC: 141 MG/DL (ref 65–99)
PHOSPHATE SERPL-MCNC: 3.7 MG/DL (ref 2.7–4.5)
PTH-INTACT SERPL-MCNC: 33.2 PG/ML (ref 12–88)
T4 FREE SERPL-MCNC: 1.06 NG/DL (ref 0.61–1.12)
TSH SERPL DL<=0.05 MIU/L-ACNC: 6.05 UIU/ML (ref 0.45–4.5)

## 2023-12-19 PROCEDURE — 36415 COLL VENOUS BLD VENIPUNCTURE: CPT

## 2023-12-19 PROCEDURE — 84439 ASSAY OF FREE THYROXINE: CPT

## 2023-12-19 PROCEDURE — 84100 ASSAY OF PHOSPHORUS: CPT

## 2023-12-19 PROCEDURE — 82040 ASSAY OF SERUM ALBUMIN: CPT

## 2023-12-19 PROCEDURE — 82947 ASSAY GLUCOSE BLOOD QUANT: CPT

## 2023-12-19 PROCEDURE — 83970 ASSAY OF PARATHORMONE: CPT

## 2023-12-19 PROCEDURE — 82310 ASSAY OF CALCIUM: CPT

## 2023-12-19 PROCEDURE — 84443 ASSAY THYROID STIM HORMONE: CPT

## 2023-12-20 DIAGNOSIS — E20.9 HYPOPARATHYROIDISM, UNSPECIFIED HYPOPARATHYROIDISM TYPE (HCC): Primary | ICD-10-CM

## 2023-12-20 DIAGNOSIS — E03.9 HYPOTHYROIDISM, UNSPECIFIED TYPE: ICD-10-CM

## 2023-12-20 DIAGNOSIS — E04.1 THYROID NODULE: ICD-10-CM

## 2023-12-20 RX ORDER — LEVOTHYROXINE SODIUM 0.2 MG/1
TABLET ORAL
Qty: 30 TABLET | Refills: 1 | Status: SHIPPED | OUTPATIENT
Start: 2023-12-20

## 2023-12-20 NOTE — RESULT ENCOUNTER NOTE
Please call the patient regarding labs -TSH improved but still high-increase levothyroxine to 1 tablet Monday to Saturday, 1-1/2 on Sunday.  Repeat TSH and free T4 in 6 weeks  Calcium is normal, PTH improved.  For now discontinue calcitriol, continue calcium and vitamin D supplementations.  Will order labs to be done in the next 4 to 6 weeks.  If she starts having any numbness or tingling around her mouth or fingertips please let us know so a calcium can be checked

## 2023-12-20 NOTE — RESULT ENCOUNTER NOTE
Replied to the patient through [employer] message-advised to increase levothyroxine to 1 tablet Monday through Saturday and 2 on Sunday

## 2023-12-21 ENCOUNTER — TELEPHONE (OUTPATIENT)
Dept: ENDOCRINOLOGY | Facility: CLINIC | Age: 33
End: 2023-12-21

## 2023-12-21 DIAGNOSIS — R73.01 ABNORMAL FASTING GLUCOSE: Primary | ICD-10-CM

## 2023-12-21 DIAGNOSIS — R20.2 NUMBNESS AND TINGLING: ICD-10-CM

## 2023-12-21 DIAGNOSIS — R20.0 NUMBNESS AND TINGLING: ICD-10-CM

## 2023-12-21 NOTE — TELEPHONE ENCOUNTER
Reviewed [employer] message. Called patient to discuss further over phone. Agreed to order b12 level and hgbA1C. Advised patient to follow up with PCP regarding management of B12. Will follow up with hgba1c results.

## 2024-01-18 DIAGNOSIS — E04.1 THYROID NODULE: ICD-10-CM

## 2024-01-19 ENCOUNTER — OFFICE VISIT (OUTPATIENT)
Dept: FAMILY MEDICINE CLINIC | Facility: CLINIC | Age: 34
End: 2024-01-19
Payer: COMMERCIAL

## 2024-01-19 VITALS
BODY MASS INDEX: 41.95 KG/M2 | OXYGEN SATURATION: 97 % | SYSTOLIC BLOOD PRESSURE: 116 MMHG | HEART RATE: 96 BPM | HEIGHT: 70 IN | TEMPERATURE: 97.4 F | DIASTOLIC BLOOD PRESSURE: 80 MMHG | RESPIRATION RATE: 15 BRPM | WEIGHT: 293 LBS

## 2024-01-19 DIAGNOSIS — J01.00 ACUTE NON-RECURRENT MAXILLARY SINUSITIS: Primary | ICD-10-CM

## 2024-01-19 PROCEDURE — 99213 OFFICE O/P EST LOW 20 MIN: CPT | Performed by: FAMILY MEDICINE

## 2024-01-19 RX ORDER — AMOXICILLIN 500 MG/1
500 CAPSULE ORAL EVERY 8 HOURS SCHEDULED
Qty: 30 CAPSULE | Refills: 0 | Status: SHIPPED | OUTPATIENT
Start: 2024-01-19 | End: 2024-01-29

## 2024-01-19 RX ORDER — LEVOTHYROXINE SODIUM 0.2 MG/1
TABLET ORAL
Qty: 30 TABLET | Refills: 1 | Status: SHIPPED | OUTPATIENT
Start: 2024-01-19

## 2024-01-19 NOTE — PROGRESS NOTES
Name: Crissy Soler      : 1990      MRN: [id number]  Encounter Provider: Romeo Villarreal MD  Encounter Date: 2024   Encounter department: [facility]    No chief complaint on file.    Health Maintenance   Topic Date Due    Hepatitis C Screening  Never done    Depression Screening  2022    Annual Physical  2023    Influenza Vaccine (1) 2023    COVID-19 Vaccine (4 -  season) 2023    Cervical Cancer Screening  2027    DTaP,Tdap,and Td Vaccines (4 - Td or Tdap) 2033    Zoster Vaccine (1 of 2) 2038    HIV Screening  Completed    Pneumococcal Vaccine: Pediatrics (0 to 5 Years) and At-Risk Patients (6 to 64 Years)  Aged Out    HIB Vaccine  Aged Out    IPV Vaccine  Aged Out    Hepatitis A Vaccine  Aged Out    Meningococcal ACWY Vaccine  Aged Out    HPV Vaccine  Aged Out       Assessment & Plan     1. Acute non-recurrent maxillary sinusitis  -     amoxicillin (AMOXIL) 500 mg capsule; Take 1 capsule (500 mg total) by mouth every 8 (eight) hours for 10 days    A lot of fluids and rest. Tylenol or motrin for fever.   Give amoxicillin. SE educated pt.   May use Flonase. Side effects educated pt. Educated pt correct way to use it.   May use 1/4 to 1/2 tsp of table salt to 8 oz of warm water for sore throat.   Call office if symptoms no improving or worse.          Subjective      HPI    Pt is here by herself.     Sick for more than 1 month.   Headache and face pressure.   Sore throat.   Cough with phlegm yellow, no wheezing or SOB.   Denies chest pain.   Left front neck lymph node painful.   Felt nausea.   Diarrhea watery, 5/day. No blood in it but mucous.   Last night fever, chills, did not check temp.   Feels tired.   Her kid was sick and got antibiotics.   Tried OTC mucinex but no help.       Review of Systems   Constitutional:  Positive for fatigue and fever. Negative for appetite change and chills.   HENT:  Positive for congestion and sore throat.  "Negative for ear pain and sinus pain.    Eyes:  Negative for discharge and itching.   Respiratory:  Positive for cough. Negative for apnea, chest tightness, shortness of breath and wheezing.    Cardiovascular:  Negative for chest pain, palpitations and leg swelling.   Gastrointestinal:  Negative for abdominal pain, anal bleeding, constipation, diarrhea, nausea and vomiting.   Endocrine: Negative for cold intolerance, heat intolerance and polyuria.   Genitourinary:  Negative for difficulty urinating and dysuria.   Musculoskeletal:  Negative for arthralgias, back pain and myalgias.   Skin:  Negative for rash.   Neurological:  Positive for headaches. Negative for dizziness.   Psychiatric/Behavioral:  Negative for agitation.        Current Outpatient Medications on File Prior to Visit   Medication Sig    calcitriol (ROCALTROL) 0.25 mcg capsule Take 1 capsule (0.25 mcg total) by mouth 2 (two) times a day    calcium carbonate (OYSTER SHELL,OSCAL) 500 mg TAKE 2 TABLETS BY MOUTH 4 TIMES A DAY    ergocalciferol (VITAMIN D2) 50,000 units TAKE 1 CAPSULE BY MOUTH ONE TIME PER WEEK    levothyroxine 200 mcg tablet TAKE 1 TABLET BY MOUTH EVERY DAY    metFORMIN (GLUCOPHAGE-XR) 500 mg 24 hr tablet Take 2 tablets (1,000 mg total) by mouth 2 (two) times a day with meals    phentermine 15 MG capsule Take 1 capsule (15 mg total) by mouth every morning    [DISCONTINUED] levothyroxine 200 mcg tablet 1 tab mon-sat and 2 on Sunday       Objective     /80   Pulse 96   Temp (!) 97.4 °F (36.3 °C) (Temporal)   Resp 15   Ht 5' 9.5\" (1.765 m)   Wt 134 kg (296 lb 6.4 oz)   SpO2 97%   BMI 43.14 kg/m²     Physical Exam  Constitutional:       General: She is not in acute distress.     Appearance: She is well-developed.   HENT:      Head: Normocephalic.      Comments: Face tenderness     Right Ear: External ear normal.      Left Ear: External ear normal.      Mouth/Throat:      Pharynx: Posterior oropharyngeal erythema present. No " oropharyngeal exudate.   Eyes:      General:         Right eye: No discharge.         Left eye: No discharge.      Conjunctiva/sclera: Conjunctivae normal.   Neck:      Thyroid: No thyromegaly.      Comments: Left lymph node tenderness  Cardiovascular:      Rate and Rhythm: Normal rate and regular rhythm.      Heart sounds: Normal heart sounds. No murmur heard.     No friction rub. No gallop.   Pulmonary:      Effort: Pulmonary effort is normal. No respiratory distress.      Breath sounds: Normal breath sounds. No wheezing or rales.   Chest:      Chest wall: No tenderness.   Abdominal:      General: Bowel sounds are normal. There is no distension.      Palpations: Abdomen is soft. There is no mass.      Tenderness: There is no abdominal tenderness. There is no guarding or rebound.   Musculoskeletal:         General: Normal range of motion.      Cervical back: Normal range of motion.   Lymphadenopathy:      Cervical: Cervical adenopathy present.   Neurological:      Mental Status: She is alert.       Romeo Villarreal MD

## 2024-02-06 ENCOUNTER — LAB (OUTPATIENT)
Dept: LAB | Facility: CLINIC | Age: 34
End: 2024-02-06
Payer: COMMERCIAL

## 2024-02-06 DIAGNOSIS — R73.01 ABNORMAL FASTING GLUCOSE: ICD-10-CM

## 2024-02-06 DIAGNOSIS — E03.9 HYPOTHYROIDISM, UNSPECIFIED TYPE: ICD-10-CM

## 2024-02-06 DIAGNOSIS — E20.9 HYPOPARATHYROIDISM, UNSPECIFIED HYPOPARATHYROIDISM TYPE (HCC): ICD-10-CM

## 2024-02-06 DIAGNOSIS — R20.2 NUMBNESS AND TINGLING: ICD-10-CM

## 2024-02-06 DIAGNOSIS — R20.0 NUMBNESS AND TINGLING: ICD-10-CM

## 2024-02-06 LAB
CALCIUM SERPL-MCNC: 8.4 MG/DL (ref 8.4–10.2)
EST. AVERAGE GLUCOSE BLD GHB EST-MCNC: 117 MG/DL
HBA1C MFR BLD: 5.7 %
PTH-INTACT SERPL-MCNC: 28.6 PG/ML (ref 12–88)
T4 FREE SERPL-MCNC: 1.12 NG/DL (ref 0.61–1.12)
TSH SERPL DL<=0.05 MIU/L-ACNC: 2.52 UIU/ML (ref 0.45–4.5)
VIT B12 SERPL-MCNC: 358 PG/ML (ref 180–914)

## 2024-02-06 PROCEDURE — 83036 HEMOGLOBIN GLYCOSYLATED A1C: CPT

## 2024-02-06 PROCEDURE — 84439 ASSAY OF FREE THYROXINE: CPT

## 2024-02-06 PROCEDURE — 83970 ASSAY OF PARATHORMONE: CPT

## 2024-02-06 PROCEDURE — 36415 COLL VENOUS BLD VENIPUNCTURE: CPT

## 2024-02-06 PROCEDURE — 84443 ASSAY THYROID STIM HORMONE: CPT

## 2024-02-06 PROCEDURE — 84432 ASSAY OF THYROGLOBULIN: CPT

## 2024-02-06 PROCEDURE — 86800 THYROGLOBULIN ANTIBODY: CPT

## 2024-02-06 PROCEDURE — 82607 VITAMIN B-12: CPT

## 2024-02-09 ENCOUNTER — TELEPHONE (OUTPATIENT)
Dept: ENDOCRINOLOGY | Facility: CLINIC | Age: 34
End: 2024-02-09

## 2024-02-09 NOTE — TELEPHONE ENCOUNTER
I did call [employer] insurance and CPT codes 25795,[id number], do not require P.A. Reference # for the call [id number]

## 2024-02-14 LAB
THYROGLOB AB SERPL-ACNC: 5.6 IU/ML (ref 0–0.9)
THYROGLOB SERPL-MCNC: <2 NG/ML

## 2024-02-15 ENCOUNTER — OFFICE VISIT (OUTPATIENT)
Dept: ENDOCRINOLOGY | Facility: CLINIC | Age: 34
End: 2024-02-15
Payer: COMMERCIAL

## 2024-02-15 VITALS
HEIGHT: 70 IN | BODY MASS INDEX: 41.95 KG/M2 | HEART RATE: 90 BPM | OXYGEN SATURATION: 98 % | DIASTOLIC BLOOD PRESSURE: 78 MMHG | SYSTOLIC BLOOD PRESSURE: 118 MMHG | WEIGHT: 293 LBS

## 2024-02-15 DIAGNOSIS — E55.9 VITAMIN D DEFICIENCY: ICD-10-CM

## 2024-02-15 DIAGNOSIS — L68.0 HIRSUTISM: ICD-10-CM

## 2024-02-15 DIAGNOSIS — E03.9 HYPOTHYROIDISM, UNSPECIFIED TYPE: Primary | ICD-10-CM

## 2024-02-15 DIAGNOSIS — E28.2 PCOS (POLYCYSTIC OVARIAN SYNDROME): ICD-10-CM

## 2024-02-15 DIAGNOSIS — E66.01 MORBID OBESITY (HCC): ICD-10-CM

## 2024-02-15 DIAGNOSIS — R73.03 PRE-DIABETES: ICD-10-CM

## 2024-02-15 DIAGNOSIS — E20.9 HYPOPARATHYROIDISM, UNSPECIFIED HYPOPARATHYROIDISM TYPE (HCC): ICD-10-CM

## 2024-02-15 DIAGNOSIS — Z85.850 HISTORY OF THYROID CANCER: ICD-10-CM

## 2024-02-15 DIAGNOSIS — C73 THYROID CANCER (HCC): ICD-10-CM

## 2024-02-15 DIAGNOSIS — E04.1 THYROID NODULE: ICD-10-CM

## 2024-02-15 PROCEDURE — 99214 OFFICE O/P EST MOD 30 MIN: CPT

## 2024-02-15 RX ORDER — METFORMIN HYDROCHLORIDE 500 MG/1
1000 TABLET, EXTENDED RELEASE ORAL 2 TIMES DAILY WITH MEALS
Qty: 120 TABLET | Refills: 2 | Status: SHIPPED | OUTPATIENT
Start: 2024-02-15

## 2024-02-15 RX ORDER — LEVOTHYROXINE SODIUM 0.2 MG/1
TABLET ORAL
Qty: 52 TABLET | Refills: 2 | Status: SHIPPED | OUTPATIENT
Start: 2024-02-15

## 2024-02-15 RX ORDER — SPIRONOLACTONE 25 MG/1
25 TABLET ORAL DAILY
Qty: 30 TABLET | Refills: 0 | Status: SHIPPED | OUTPATIENT
Start: 2024-02-15

## 2024-02-15 NOTE — PROGRESS NOTES
Established Patient Progress Note    CC: Follow up for history of papillary thyroid cancer s/p total thyroidectomy, hirsutism, PCOS    Impression & Plan:    Problem List Items Addressed This Visit       Morbid obesity (HCC)     Advise patient start Wegovy after FIGUEROA has been completed. Discontinue phentermine as she has not had any efficacy with weight loss on this agent. No history of pancreatitis, MEN syndrome or MTC.          Vitamin D deficiency     Continue with ergocalciferol          Thyroid nodule    Relevant Medications    levothyroxine 200 mcg tablet    Hypothyroidism - Primary     Target TSH 0.1 to 0.5.     - increase levothyroxine to 2 tablets on Sat. Continue with 2 tablets on Sun and 1 tablet Mon-Fri. Repeat lab work in 6 weeks.         Relevant Medications    levothyroxine 200 mcg tablet    Other Relevant Orders    TSH, 3rd generation    T4, free    Hirsutism     She has IUD. No plans for pregnancy at this time. She has completed breastfeeding. She is interested in resuming therapy with spironolactone. 25 mg daily prescribed. Check BMP in 2 weeks.         Relevant Medications    spironolactone (ALDACTONE) 25 mg tablet    Other Relevant Orders    Basic metabolic panel    Thyroid cancer (HCC)     FIGUEROA scheduled for end of February. Advised patient to complete US with lymph node mapping and TG panel 3 months afterward. Baseline TG is <2. She has positive Tg antibodies.          Relevant Medications    levothyroxine 200 mcg tablet    Hypoparathyroidism (HCC)     Improved. Continues with calcium supplements 2,000 mg daily. No longer on calcitriol.         Pre-diabetes     She would benefit from weight loss.     - discontinue phentermine and start wegovy. Advised to start after FIGUEROA treatment is completed.          Relevant Medications    Semaglutide-Weight Management (WEGOVY) 0.25 MG/0.5ML    Semaglutide-Weight Management (WEGOVY) 0.25 MG/0.5ML     Other Visit Diagnoses       History of thyroid cancer         Relevant Orders    Thyroglobulin Panel    US head neck lymph node mapping            Orders Placed This Encounter   Procedures    US head neck lymph node mapping     Standing Status:   Future     Standing Expiration Date:   2/15/2028     Scheduling Instructions:      No prep required.        Please bring your insurance cards and a form of photo ID.  Bring your order/script for the test if from a non - [facility]'s provider.        Arrive 15 minutes prior to your appointment time to register.            To schedule this appointment, please contact Central Scheduling at [phone].          TSH, 3rd generation     This is a patient instruction: This test is non-fasting. Please drink two glasses of water morning of bloodwork.        Standing Status:   Future     Standing Expiration Date:   2/15/2025    T4, free     Standing Status:   Future     Standing Expiration Date:   2/15/2025    Basic metabolic panel     This is a patient instruction: Patient fasting for 8 hours or longer recommended.     Standing Status:   Future     Standing Expiration Date:   2/15/2025    Thyroglobulin Panel     Standing Status:   Future     Standing Expiration Date:   2/15/2025       History of Present Illness:     Crissy Soler is a 34 y.o. female with history of thyroid cancer s/p total thyroidectomy Aug 2023 by Dr. Esposito. She developed post-operative hypoparathyroidism. Calcium normalized and calcitriol was discontinued. 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 Dr. Bolden recommended TG stimulated FIGUEROA. This has been scheduled for end of February.     For post-operative hypothyroidism, she is currently taking 1 tablet Mon-Sat and 2 tablets on Sunday. She reports feeling tired, too hot/too cold, thirsty and hungry. Most recent A1C is 5.7.     Patient is concerned about enlarged lymph nodes in her neck. She denies recent URI or illness.     Patient Active Problem List   Diagnosis    PCOS (polycystic ovarian syndrome)     Nontoxic goiter, unspecified    Morbid obesity (HCC)    Vitamin D deficiency    Infertility, female    Hyperlipidemia, mixed    Elevated liver enzymes    Anxiety    Thyroid nodule    Bilateral carpal tunnel syndrome    Elevated prolactin level    History of  delivery, antepartum    Maternal morbid obesity, antepartum (HCC)    History of gestational hypertension    Hypothyroidism    Hirsutism    Sleep disturbances    Thyroid disease affecting pregnancy    Multigravida of advanced maternal age in third trimester    Abnormal glucose tolerance in pregnancy    Obesity affecting pregnancy in third trimester    Vaccine counseling    Fetal arrhythmia affecting pregnancy, antepartum    Gestational hypertension    S/P repeat low transverse     Hashimoto's thyroiditis    Abnormal imaging of thyroid    Enlarged lymph nodes    Thyroid cancer (HCC)    Hypoparathyroidism (HCC)    Acanthosis nigricans    Pre-diabetes      Past Medical History:   Diagnosis Date    Abnormal Pap smear of cervix     LGSIL - colpo - LGSIL, HPV effect    Chronic diarrhea of unknown origin     last assessed 6/26/15    Disease of thyroid gland     nodule    Dyspepsia     last assessed  10/29/14    Early satiety     last assessed  10/29/14    Hidradenitis suppurativa     last assessed  14    Hypertension     last pregnancy    Hypothyroidism         Obesity     Polycystic ovary syndrome     Varicella     childhood      Past Surgical History:   Procedure Laterality Date     SECTION       and  [facility]     [place]  DELIVERY ONLY N/A 2020    Procedure:  SECTION () REPEAT;  Surgeon: Rufino Barrera MD;  Location: [facility];  Service: Obstetrics    [place]  DELIVERY ONLY N/A 3/8/2023    Procedure:  SECTION () REPEAT;  Surgeon: Anya Isbell MD;  [facility]: [facility];  Service: Obstetrics    THYROIDECTOMY N/A 2023    Procedure: TOTAL THYROIDECTOMY,  reimplantation of parathyroid gland;  Surgeon: Zahra Esposito MD;  Location: [facility];  Service: Surgical Oncology    US GUIDED THYROID BIOPSY  2023    WISDOM TOOTH EXTRACTION      all 4      Family History   Problem Relation Age of Onset    Hypertension Mother     No Known Problems Father     No Known Problems Sister     No Known Problems Daughter     No Known Problems Son     Thyroid disease Maternal Grandmother     No Known Problems Maternal Grandfather     Other Paternal Grandmother         pulmonary embolism    No Known Problems Paternal Grandfather      Social History     Tobacco Use    Smoking status: Former     Current packs/day: 0.00     Types: Cigarettes     Quit date: 3/1/2014     Years since quittin.9     Passive exposure: Never    Smokeless tobacco: Never    Tobacco comments:      sometimes 1 cig once weekly - none since preg   Substance Use Topics    Alcohol use: Not Currently     Allergies   Allergen Reactions    A + D Personal Care Lotion  [Dimethicone] Rash    Calamine-Zinc Oxide Rash    Keflex [Cephalexin] GI Intolerance    Pramoxine-Calamine     Zinc Rash     No med alert bracelet no epi pen         Current Outpatient Medications:     ergocalciferol (VITAMIN D2) 50,000 units, TAKE 1 CAPSULE BY MOUTH ONE TIME PER WEEK, Disp: 4 capsule, Rfl: 6    levothyroxine 200 mcg tablet, TAKE 1 TABLET BY MOUTH MON-FRI AND 2 TABLETS ON SAT AND SUN, Disp: 52 tablet, Rfl: 2    Semaglutide-Weight Management (WEGOVY) 0.25 MG/0.5ML, Inject 0.5 mL (0.25 mg total) under the skin once a week for 4 doses, Disp: 2.12 mL, Rfl: 0    Semaglutide-Weight Management (WEGOVY) 0.25 MG/0.5ML, Inject 1 mL (0.5 mg total) under the skin once a week for 12 doses, Disp: 4 mL, Rfl: 2    spironolactone (ALDACTONE) 25 mg tablet, Take 1 tablet (25 mg total) by mouth daily, Disp: 30 tablet, Rfl: 0    metFORMIN (GLUCOPHAGE-XR) 500 mg 24 hr tablet, TAKE 2 TABLETS BY MOUTH TWICE A DAY WITH FOOD, Disp: 120 tablet, Rfl: 2    Review  "of Systems   Constitutional:  Negative for chills and fever.   HENT:  Negative for ear pain and sore throat.    Eyes:  Negative for pain and visual disturbance.   Respiratory:  Negative for cough and shortness of breath.    Cardiovascular:  Negative for chest pain and palpitations.   Gastrointestinal:  Negative for abdominal pain and vomiting.   Genitourinary:  Negative for dysuria and hematuria.   Musculoskeletal:  Negative for arthralgias and back pain.   Skin:  Negative for color change and rash.   Neurological:  Negative for seizures and syncope.   All other systems reviewed and are negative.      Physical Exam:  Body mass index is 42.74 kg/m².  /78 (BP Location: Left arm, Patient Position: Sitting, Cuff Size: Adult)   Pulse 90   Ht 5' 9.5\" (1.765 m)   Wt 133 kg (293 lb 9.6 oz)   SpO2 98%   BMI 42.74 kg/m²    Wt Readings from Last 3 Encounters:   02/15/24 133 kg (293 lb 9.6 oz)   01/19/24 134 kg (296 lb 6.4 oz)   10/05/23 129 kg (285 lb 6.4 oz)       Physical Exam  Vitals reviewed.   Constitutional:       Appearance: Normal appearance.   HENT:      Head: Normocephalic and atraumatic.   Cardiovascular:      Rate and Rhythm: Normal rate.   Pulmonary:      Effort: Pulmonary effort is normal.   Musculoskeletal:      Cervical back: Neck supple. No tenderness.   Lymphadenopathy:      Cervical: No cervical adenopathy.   Neurological:      Mental Status: She is alert and oriented to person, place, and time.   Psychiatric:         Mood and Affect: Mood normal.         Behavior: Behavior normal.         Labs:   Lab Results   Component Value Date    HGBA1C 5.7 (H) 02/06/2024    HGBA1C 5.5 10/06/2023    HGBA1C 5.3 04/29/2023     Lab Results   Component Value Date    CREATININE 0.90 10/06/2023    CREATININE 0.84 08/11/2023    CREATININE 0.86 04/29/2023    BUN 10 10/06/2023     06/21/2016    K 4.1 10/06/2023     10/06/2023    CO2 25 10/06/2023     eGFR   Date Value Ref Range Status   10/06/2023 83 " ml/min/1.73sq m Final     Lab Results   Component Value Date    CHOL 151 06/21/2016    HDL 32 (L) 04/15/2022    TRIG 166 (H) 04/15/2022     Lab Results   Component Value Date    ALT 20 10/06/2023    AST 22 10/06/2023    ALKPHOS 53 10/06/2023    BILITOT 1.3 (H) 06/21/2016     Lab Results   Component Value Date    VKD1UUDGTZAT 2.518 02/06/2024    FRT4BSAVOUAK 6.050 (H) 12/19/2023    UCD9QYDYIJNE 8.582 (H) 10/06/2023     Lab Results   Component Value Date    FREET4 1.12 02/06/2024         Patient Instructions   Calcium citrate 500 mg take 2 tablets with breakfast and 2 tablets with dinner      Discussed with the patient and all questioned fully answered. She will call me if any problems arise.

## 2024-02-15 NOTE — ASSESSMENT & PLAN NOTE
She would benefit from weight loss.     - discontinue phentermine and start wegovy. Advised to start after FIGUEROA treatment is completed.

## 2024-02-15 NOTE — ASSESSMENT & PLAN NOTE
Advise patient start Wegovy after FIGUEROA has been completed. Discontinue phentermine as she has not had any efficacy with weight loss on this agent. No history of pancreatitis, MEN syndrome or MTC.

## 2024-02-15 NOTE — ASSESSMENT & PLAN NOTE
FIGUEROA scheduled for end of February. Advised patient to complete US with lymph node mapping and TG panel 3 months afterward. Baseline TG is <2. She has positive Tg antibodies.

## 2024-02-15 NOTE — ASSESSMENT & PLAN NOTE
She has IUD. No plans for pregnancy at this time. She has completed breastfeeding. She is interested in resuming therapy with spironolactone. 25 mg daily prescribed. Check BMP in 2 weeks.

## 2024-02-15 NOTE — ASSESSMENT & PLAN NOTE
Target TSH 0.1 to 0.5.     - increase levothyroxine to 2 tablets on Sat. Continue with 2 tablets on Sun and 1 tablet Mon-Fri. Repeat lab work in 6 weeks.

## 2024-02-20 DIAGNOSIS — C73 THYROID CANCER (HCC): Primary | ICD-10-CM

## 2024-02-26 ENCOUNTER — APPOINTMENT (OUTPATIENT)
Dept: LAB | Facility: CLINIC | Age: 34
End: 2024-02-26
Payer: COMMERCIAL

## 2024-02-26 ENCOUNTER — HOSPITAL ENCOUNTER (OUTPATIENT)
Dept: RADIOLOGY | Age: 34
Discharge: HOME | End: 2024-02-26
Payer: COMMERCIAL

## 2024-02-26 DIAGNOSIS — C73 MALIGNANT NEOPLASM OF THYROID GLAND (HCC): ICD-10-CM

## 2024-02-26 DIAGNOSIS — C73 THYROID CANCER (HCC): ICD-10-CM

## 2024-02-26 LAB — B-HCG SERPL-ACNC: <1 MIU/ML (ref 0–5)

## 2024-02-26 PROCEDURE — 96372 THER/PROPH/DIAG INJ SC/IM: CPT

## 2024-02-26 PROCEDURE — 84702 CHORIONIC GONADOTROPIN TEST: CPT

## 2024-02-26 PROCEDURE — 36415 COLL VENOUS BLD VENIPUNCTURE: CPT

## 2024-02-26 RX ADMIN — THYROTROPIN ALFA 0.9 MG: 0.9 INJECTION, POWDER, FOR SOLUTION INTRAMUSCULAR at 08:23

## 2024-02-27 ENCOUNTER — HOSPITAL ENCOUNTER (OUTPATIENT)
Dept: RADIOLOGY | Age: 34
Discharge: HOME | End: 2024-02-27
Payer: COMMERCIAL

## 2024-02-27 ENCOUNTER — APPOINTMENT (OUTPATIENT)
Dept: RADIOLOGY | Age: 34
End: 2024-02-27
Payer: COMMERCIAL

## 2024-02-27 RX ADMIN — THYROTROPIN ALFA 0.9 MG: 0.9 INJECTION, POWDER, FOR SOLUTION INTRAMUSCULAR at 08:15

## 2024-02-28 ENCOUNTER — APPOINTMENT (OUTPATIENT)
Dept: RADIOLOGY | Age: 34
End: 2024-02-28
Payer: COMMERCIAL

## 2024-03-04 ENCOUNTER — HOSPITAL ENCOUNTER (OUTPATIENT)
Dept: RADIOLOGY | Age: 34
Discharge: HOME | End: 2024-03-04
Payer: COMMERCIAL

## 2024-03-04 ENCOUNTER — APPOINTMENT (OUTPATIENT)
Dept: RADIOLOGY | Age: 34
End: 2024-03-04
Payer: COMMERCIAL

## 2024-03-04 ENCOUNTER — APPOINTMENT (OUTPATIENT)
Dept: LAB | Facility: CLINIC | Age: 34
End: 2024-03-04
Payer: COMMERCIAL

## 2024-03-04 DIAGNOSIS — C73 MALIGNANT NEOPLASM OF THYROID GLAND (HCC): ICD-10-CM

## 2024-03-04 PROCEDURE — A9509 IODINE I-123 SOD IODIDE MIL: HCPCS

## 2024-03-04 PROCEDURE — 96372 THER/PROPH/DIAG INJ SC/IM: CPT

## 2024-03-04 PROCEDURE — 78018 THYROID MET IMAGING BODY: CPT

## 2024-03-04 RX ADMIN — THYROTROPIN ALFA 0.9 MG: 0.9 INJECTION, POWDER, FOR SOLUTION INTRAMUSCULAR at 08:16

## 2024-03-05 ENCOUNTER — HOSPITAL ENCOUNTER (OUTPATIENT)
Dept: RADIOLOGY | Age: 34
Discharge: HOME | End: 2024-03-05
Payer: COMMERCIAL

## 2024-03-05 RX ADMIN — THYROTROPIN ALFA 0.9 MG: 0.9 INJECTION, POWDER, FOR SOLUTION INTRAMUSCULAR at 08:20

## 2024-03-06 ENCOUNTER — APPOINTMENT (OUTPATIENT)
Dept: LAB | Age: 34
End: 2024-03-06
Payer: COMMERCIAL

## 2024-03-06 ENCOUNTER — HOSPITAL ENCOUNTER (OUTPATIENT)
Dept: RADIOLOGY | Age: 34
Discharge: HOME | End: 2024-03-06
Payer: COMMERCIAL

## 2024-03-06 DIAGNOSIS — E03.9 HYPOTHYROIDISM, UNSPECIFIED TYPE: ICD-10-CM

## 2024-03-06 DIAGNOSIS — L68.0 HIRSUTISM: ICD-10-CM

## 2024-03-06 DIAGNOSIS — C73 MALIGNANT NEOPLASM OF THYROID GLAND (HCC): ICD-10-CM

## 2024-03-06 DIAGNOSIS — C73 THYROID CANCER (HCC): ICD-10-CM

## 2024-03-06 PROCEDURE — A9517 I131 IODIDE CAP, RX: HCPCS

## 2024-03-06 PROCEDURE — 79005 NUCLEAR RX ORAL ADMIN: CPT

## 2024-03-06 PROCEDURE — 36415 COLL VENOUS BLD VENIPUNCTURE: CPT

## 2024-03-06 PROCEDURE — 86800 THYROGLOBULIN ANTIBODY: CPT

## 2024-03-06 PROCEDURE — 84432 ASSAY OF THYROGLOBULIN: CPT

## 2024-03-07 DIAGNOSIS — E04.1 THYROID NODULE: ICD-10-CM

## 2024-03-07 RX ORDER — LEVOTHYROXINE SODIUM 0.2 MG/1
TABLET ORAL
Qty: 52 TABLET | Refills: 2 | Status: SHIPPED | OUTPATIENT
Start: 2024-03-07

## 2024-03-11 ENCOUNTER — HOSPITAL ENCOUNTER (OUTPATIENT)
Dept: RADIOLOGY | Age: 34
Discharge: HOME | End: 2024-03-11
Payer: COMMERCIAL

## 2024-03-11 DIAGNOSIS — C73 MALIGNANT NEOPLASM OF THYROID GLAND (HCC): ICD-10-CM

## 2024-03-11 PROCEDURE — 78018 THYROID MET IMAGING BODY: CPT

## 2024-03-13 DIAGNOSIS — E28.2 PCOS (POLYCYSTIC OVARIAN SYNDROME): ICD-10-CM

## 2024-03-13 RX ORDER — METFORMIN HYDROCHLORIDE 500 MG/1
1000 TABLET, EXTENDED RELEASE ORAL 2 TIMES DAILY WITH MEALS
Qty: 360 TABLET | Refills: 1 | Status: SHIPPED | OUTPATIENT
Start: 2024-03-13

## 2024-03-14 LAB
THYROGLOB AB SERPL-ACNC: 4.2 IU/ML (ref 0–0.9)
THYROGLOB SERPL-MCNC: 2.3 NG/ML

## 2024-03-21 DIAGNOSIS — L68.0 HIRSUTISM: ICD-10-CM

## 2024-03-21 RX ORDER — SPIRONOLACTONE 25 MG/1
25 TABLET ORAL DAILY
Qty: 90 TABLET | Refills: 1 | Status: SHIPPED | OUTPATIENT
Start: 2024-03-21

## 2024-03-29 DIAGNOSIS — E66.01 CLASS 3 SEVERE OBESITY DUE TO EXCESS CALORIES WITH BODY MASS INDEX (BMI) OF 40.0 TO 44.9 IN ADULT, UNSPECIFIED WHETHER SERIOUS COMORBIDITY PRESENT (HCC): Primary | ICD-10-CM

## 2024-03-29 RX ORDER — PHENTERMINE HYDROCHLORIDE 15 MG/1
15 CAPSULE ORAL EVERY MORNING
Qty: 30 CAPSULE | Refills: 2 | Status: SHIPPED | OUTPATIENT
Start: 2024-03-29

## 2024-04-13 ENCOUNTER — APPOINTMENT (OUTPATIENT)
Dept: LAB | Facility: CLINIC | Age: 34
End: 2024-04-13
Payer: COMMERCIAL

## 2024-04-13 DIAGNOSIS — Z85.850 HISTORY OF THYROID CANCER: ICD-10-CM

## 2024-04-13 LAB
ANION GAP SERPL CALCULATED.3IONS-SCNC: 8 MMOL/L (ref 4–13)
BUN SERPL-MCNC: 12 MG/DL (ref 5–25)
CALCIUM SERPL-MCNC: 8.9 MG/DL (ref 8.4–10.2)
CHLORIDE SERPL-SCNC: 105 MMOL/L (ref 96–108)
CO2 SERPL-SCNC: 25 MMOL/L (ref 21–32)
CREAT SERPL-MCNC: 0.82 MG/DL (ref 0.6–1.3)
GFR SERPL CREATININE-BSD FRML MDRD: 92 ML/MIN/1.73SQ M
GLUCOSE P FAST SERPL-MCNC: 98 MG/DL (ref 65–99)
POTASSIUM SERPL-SCNC: 4.3 MMOL/L (ref 3.5–5.3)
SODIUM SERPL-SCNC: 138 MMOL/L (ref 135–147)
T4 FREE SERPL-MCNC: 1.35 NG/DL (ref 0.61–1.12)
TSH SERPL DL<=0.05 MIU/L-ACNC: 0.22 UIU/ML (ref 0.45–4.5)

## 2024-04-13 PROCEDURE — 84432 ASSAY OF THYROGLOBULIN: CPT

## 2024-04-13 PROCEDURE — 84443 ASSAY THYROID STIM HORMONE: CPT

## 2024-04-13 PROCEDURE — 84439 ASSAY OF FREE THYROXINE: CPT

## 2024-04-13 PROCEDURE — 80048 BASIC METABOLIC PNL TOTAL CA: CPT

## 2024-04-13 PROCEDURE — 86800 THYROGLOBULIN ANTIBODY: CPT

## 2024-04-15 ENCOUNTER — OFFICE VISIT (OUTPATIENT)
Dept: SURGICAL ONCOLOGY | Facility: CLINIC | Age: 34
End: 2024-04-15
Payer: COMMERCIAL

## 2024-04-15 VITALS
DIASTOLIC BLOOD PRESSURE: 82 MMHG | HEART RATE: 113 BPM | WEIGHT: 279 LBS | SYSTOLIC BLOOD PRESSURE: 132 MMHG | HEIGHT: 70 IN | BODY MASS INDEX: 39.94 KG/M2 | TEMPERATURE: 97.3 F | OXYGEN SATURATION: 96 % | RESPIRATION RATE: 18 BRPM

## 2024-04-15 DIAGNOSIS — C73 THYROID CANCER (HCC): Primary | ICD-10-CM

## 2024-04-15 PROCEDURE — 99214 OFFICE O/P EST MOD 30 MIN: CPT | Performed by: STUDENT IN AN ORGANIZED HEALTH CARE EDUCATION/TRAINING PROGRAM

## 2024-04-15 NOTE — PROGRESS NOTES
"Surgical Oncology Follow Up    [facility] [address] [address] [phone]    Crissy Soler  1990  [id number]    Diagnoses and all orders for this visit:    Thyroid cancer (HCC)        Chief Complaint   Patient presents with    Follow-up       No follow-ups on file.      Oncology History   Thyroid cancer (HCC)   8/24/2023 Surgery    A. 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    -- Carcinoma focally involves the inferior aspect of anterior-lateral left lobe surgical margin.  - Two additional foci of papillary thyroid carcinoma, solid and classic types.  - Two parathyroid glands.  - Three lymph nodes, negative for tumor (0/3).  - See synoptic report.     B. Lymph Node, level 6 lymph node:  - Two (2) of five (5) lymph nodes with rare cytokeratin-positive cells (~10 cells each), cannot exclude metastatic papillary thyroid carcinoma. See note 1.     C. Tissue submitted as \"paratracheal lymph node\":  - Extensively crushed thyroid tissue. See note 2.  - No definitive lymph node.     9/12/2023 Initial Diagnosis    Thyroid cancer (HCC)     11/29/2023 -  Cancer Staged    Staging form: Thyroid - Differentiated and Anaplastic, AJCC 8th Edition  - Pathologic: Stage I (pT1b, pN0a, cM0, Age at diagnosis: < 55 years) - Signed by Zahra Esposito MD on 11/29/2023  Lymph node metastasis: Absent         Treatment history: Post total thyroid for tall cell variant multifocal papillary thyroid cancer with close margin  Current treatment: obs  Disease status: obs    History of Present Illness: Crissy is doing well. She completed FIGUEROA beginning of March and has been seeing endo regularly. They plan to repeat labs and thyroid US in May, and will then f/u in June with endo. She denies any issues of trouble swallowing, pain. No sx hyper or hypothyroid. TSH appropriately suppressed and ab less than " pre-FIGUEROA    Review of Systems  Complete ROS Surg Onc:   Constitutional: The patient denies new or recent history of general fatigue, no recent weight loss, no change in appetite.   Eyes: No complaints of visual problems, no scleral icterus.   ENT: no complaints of ear pain, no hoarseness, no difficulty swallowing,  no tinnitus and no new masses in head, oral cavity, or neck.   Cardiovascular: No complaints of chest pain, no palpitations, no ankle edema.   Respiratory: No complaints of shortness of breath, no cough.   Gastrointestinal: No complaints of jaundice, no bloody stools, no pale stools.   Genitourinary: No complaints of dysuria, no hematuria, no nocturia, no frequent urination, no urethral discharge.   Musculoskeletal: No complaints of weakness, paralysis, joint stiffness or arthralgias.  Integumentary: No complaints of rash, no new lesions.   Neurological: No complaints of convulsions, no seizures, no dizziness.   Hematologic/Lymphatic: No complaints of easy bruising.   Endocrine:  No hot or cold intolerance.  No polydipsia, polyphagia, or polyuria.  Allergy/immunology:  No environmental allergies.  No food allergies.  Not immunocompromised.  Skin:  No pallor or rash.  No wound.        Patient Active Problem List   Diagnosis    PCOS (polycystic ovarian syndrome)    Nontoxic goiter, unspecified    Morbid obesity (HCC)    Vitamin D deficiency    Infertility, female    Hyperlipidemia, mixed    Elevated liver enzymes    Anxiety    Thyroid nodule    Bilateral carpal tunnel syndrome    Elevated prolactin level    History of  delivery, antepartum    Maternal morbid obesity, antepartum (HCC)    History of gestational hypertension    Hypothyroidism    Hirsutism    Sleep disturbances    Thyroid disease affecting pregnancy    Multigravida of advanced maternal age in third trimester    Abnormal glucose tolerance in pregnancy    Obesity affecting pregnancy in third trimester    Vaccine counseling    Fetal  arrhythmia affecting pregnancy, antepartum    Gestational hypertension    S/P repeat low transverse     Hashimoto's thyroiditis    Abnormal imaging of thyroid    Enlarged lymph nodes    Thyroid cancer (HCC)    Hypoparathyroidism (HCC)    Acanthosis nigricans    Pre-diabetes     Past Medical History:   Diagnosis Date    Abnormal Pap smear of cervix     LGSIL - colpo - LGSIL, HPV effect    Chronic diarrhea of unknown origin     last assessed 6/26/15    Disease of thyroid gland     nodule    Dyspepsia     last assessed  10/29/14    Early satiety     last assessed  10/29/14    Hidradenitis suppurativa     last assessed  14    Hypertension     last pregnancy    Hypothyroidism         Obesity     Polycystic ovary syndrome     Varicella     childhood     Past Surgical History:   Procedure Laterality Date     SECTION       and  [facility]     [facility]  DELIVERY ONLY N/A 2020    Procedure:  SECTION () REPEAT;  Surgeon: Rufino Barerra MD;  [facility]: [facility];  Service: Obstetrics    [facility]  DELIVERY ONLY N/A 3/8/2023    Procedure:  SECTION () REPEAT;  Surgeon: Anya Isbell MD;  [facility]: [facility];  Service: Obstetrics    THYROIDECTOMY N/A 2023    Procedure: TOTAL THYROIDECTOMY, reimplantation of parathyroid gland;  Surgeon: Zahra Esposito MD;  Location: [facility];  Service: Surgical Oncology    US GUIDED THYROID BIOPSY  2023    WISDOM TOOTH EXTRACTION      all 4     Family History   Problem Relation Age of Onset    Hypertension Mother     No Known Problems Father     No Known Problems Sister     No Known Problems Daughter     No Known Problems Son     Thyroid disease Maternal Grandmother     No Known Problems Maternal Grandfather     Other Paternal Grandmother         pulmonary embolism    No Known Problems Paternal Grandfather      Social History     Socioeconomic History    Marital status: /Civil Union      Spouse name: Not on file    Number of children: Not on file    Years of education: Not on file    Highest education level: Not on file   Occupational History    Occupation:    Tobacco Use    Smoking status: Former     Current packs/day: 0.00     Types: Cigarettes     Quit date: 3/1/2014     Years since quitting: 10.1     Passive exposure: Never    Smokeless tobacco: Never    Tobacco comments:      sometimes 1 cig once weekly - none since preg   Vaping Use    Vaping status: Never Used   Substance and Sexual Activity    Alcohol use: Not Currently    Drug use: No    Sexual activity: Yes     Partners: Male     Birth control/protection: None   Other Topics Concern    Not on file   Social History Narrative    Caffeine use: 1 cup coffee or ice tea/ day.    Stress at work     Social Determinants of Health     Financial Resource Strain: Not on file   Food Insecurity: Not on file   Transportation Needs: Not on file   Physical Activity: Not on file   Stress: Not on file   Social Connections: Not on file   Intimate Partner Violence: Not on file   Housing Stability: Not on file       Current Outpatient Medications:     ergocalciferol (VITAMIN D2) 50,000 units, TAKE 1 CAPSULE BY MOUTH ONE TIME PER WEEK, Disp: 4 capsule, Rfl: 6    levothyroxine 200 mcg tablet, TAKE 1 TABLET BY MOUTH MON-FRI AND 2 TABLETS ON SAT AND SUN, Disp: 52 tablet, Rfl: 2    metFORMIN (GLUCOPHAGE-XR) 500 mg 24 hr tablet, TAKE 2 TABLETS BY MOUTH TWICE A DAY WITH FOOD, Disp: 360 tablet, Rfl: 1    phentermine 15 MG capsule, Take 1 capsule (15 mg total) by mouth every morning prior to food or 1-2 hours after breakfast, Disp: 30 capsule, Rfl: 2    spironolactone (ALDACTONE) 25 mg tablet, TAKE 1 TABLET (25 MG TOTAL) BY MOUTH DAILY., Disp: 90 tablet, Rfl: 1  Allergies   Allergen Reactions    A + D Personal Care Lotion  [Dimethicone] Rash    Calamine-Zinc Oxide Rash    Keflex [Cephalexin] GI Intolerance    Pramoxine-Calamine     Zinc Rash     No med  "alert bracelet no epi pen     Vitals:    04/15/24 1541   BP: 132/82   Pulse: (!) 113   Resp: 18   Temp: (!) 97.3 °F (36.3 °C)   SpO2: 96%       Physical Exam  Constitutional: Patient is well-developed and well-nourished who appears the stated age in no acute distress. Patient is pleasant and talkative.     HEENT:  Normocephalic.  Sclerae are anicteric. Mucous membranes are moist. Neck is supple without adenopathy. No JVD.     Chest: Equal chest rise, easy WOB  Cardiac: Heart is regular rate.     Abdomen: Abdomen is non-tender, non-distended and without masses.     Extremities: There is no clubbing or cyanosis. There is no edema.  Symmetric.  Neuro: Grossly nonfocal. Gait is normal.     Lymphatic: No evidence of cervical adenopathy bilaterally. No evidence of axillary adenopathy bilaterally. No evidence of inguinal adenopathy bilaterally.     Skin: Warm, anicteric.  Incision healing well  Psych:  Patient is pleasant and talkative.    Pathology:  Final Diagnosis   A. 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    -- Carcinoma focally involves the inferior aspect of anterior-lateral left lobe surgical margin.  - Two additional foci of papillary thyroid carcinoma, solid and classic types.  - Two parathyroid glands.  - Three lymph nodes, negative for tumor (0/3).  - See synoptic report.     B. Lymph Node, level 6 lymph node:  - Two (2) of five (5) lymph nodes with rare cytokeratin-positive cells (~10 cells each), cannot exclude metastatic papillary thyroid carcinoma. See note 1.     C. Tissue submitted as \"paratracheal lymph node\":  - Extensively crushed thyroid tissue. See note 2.  - No definitive lymph node.     Note 1: In part B (level 6 lymph node, blocks B1 and B2), AE1/AE3 immunostains highlights two minute foci of approximately 10 cells each; however, these foci are not seen in H&E stained slides (including recut levels). Additional TTF1 and CK19 immunostains were performed " on part B1, but the focus is no longer present in these stains. As a result, the significance of these small foci is not entirely clear, but is insufficient for a diagnosis of metastatic papillary thyroid carcinoma. Nonetheless, metastatic papillary thyroid carcinoma cannot be definitively excluded.     Note 2: Part C (submitted as paratracheal lymph node) demonstrates crushed thyroid tissue reminiscent of the background non-neoplastic thyroid tissue in part A. TTF1, AE1/AE3, and CK19 immunostains highlight the orderly glandular architecture of this thyroid tissue without confluent or papillary growth. In addition, a BRAF V600E immunostain is negative. Consequently, the morphologic and immunophenotypic findings are favored to represent crushed non-neoplastic thyroid tissue.             Labs:  Reviewed in [facility] personally    Imaging  No results found.    I personally reviewed and interpreted the above laboratory and imaging data incl labs, nuc med notes, uptake scans, op notes, endo notes.     Discussion/Summary:   32 yo s/p total thyroid with final path tall cell variant multifocal papillary thyroid cancer with close margin. Also with two nodes of questionable significance. Discussed with Dr Mendoza and she completed FIGUEROA early march. Numbers overall reassuring - repeating labs and US in May and she will see endo in June. I will see her in 6 mo for surveillance.

## 2024-04-18 ENCOUNTER — TELEPHONE (OUTPATIENT)
Dept: ENDOCRINOLOGY | Facility: CLINIC | Age: 34
End: 2024-04-18

## 2024-04-18 NOTE — TELEPHONE ENCOUNTER
----- Message from ABI Unger sent at 4/18/2024  1:19 PM EDT -----  TSH is in target range. If she is not having any adverse symptoms (palpitations, tremors, anxiety, mood disturbances, trouble sleeping, sweating), then she may continue current regimen. thanks  
Patient saw result through [facility]. Left message advising levels on target and to call if there is any issues.  
DISCHARGE

## 2024-04-19 DIAGNOSIS — E66.01 CLASS 3 SEVERE OBESITY DUE TO EXCESS CALORIES WITH BODY MASS INDEX (BMI) OF 40.0 TO 44.9 IN ADULT, UNSPECIFIED WHETHER SERIOUS COMORBIDITY PRESENT (HCC): ICD-10-CM

## 2024-04-19 RX ORDER — PHENTERMINE HYDROCHLORIDE 30 MG/1
30 CAPSULE ORAL EVERY MORNING
Qty: 30 CAPSULE | Refills: 0 | Status: SHIPPED | OUTPATIENT
Start: 2024-04-19

## 2024-05-02 DIAGNOSIS — E66.01 CLASS 3 SEVERE OBESITY DUE TO EXCESS CALORIES WITH BODY MASS INDEX (BMI) OF 40.0 TO 44.9 IN ADULT, UNSPECIFIED WHETHER SERIOUS COMORBIDITY PRESENT (HCC): ICD-10-CM

## 2024-05-02 RX ORDER — PHENTERMINE HYDROCHLORIDE 30 MG/1
30 CAPSULE ORAL EVERY MORNING
Qty: 30 CAPSULE | Refills: 0 | Status: SHIPPED | OUTPATIENT
Start: 2024-05-02 | End: 2024-05-09

## 2024-05-09 DIAGNOSIS — E66.01 CLASS 3 SEVERE OBESITY DUE TO EXCESS CALORIES WITH BODY MASS INDEX (BMI) OF 40.0 TO 44.9 IN ADULT, UNSPECIFIED WHETHER SERIOUS COMORBIDITY PRESENT (HCC): ICD-10-CM

## 2024-05-09 RX ORDER — PHENTERMINE HYDROCHLORIDE 30 MG/1
30 CAPSULE ORAL EVERY MORNING
Qty: 30 CAPSULE | Refills: 2 | Status: SHIPPED | OUTPATIENT
Start: 2024-05-09

## 2024-05-13 LAB
THYROGLOB AB SERPL-ACNC: 22.3 IU/ML (ref 0–0.9)
THYROGLOB SERPL-MCNC: <2 NG/ML

## 2024-05-24 ENCOUNTER — TELEPHONE (OUTPATIENT)
Dept: ENDOCRINOLOGY | Facility: CLINIC | Age: 34
End: 2024-05-24

## 2024-05-24 NOTE — TELEPHONE ENCOUNTER
----- Message from Bailey Bolden MD sent at 5/23/2024 11:29 PM EDT -----  Please call the patient regarding labs -thyroglobulin antibodies are higher-will monitor and get neck ultrasound in the next few months.  Discuss further on upcoming visit

## 2024-06-12 DIAGNOSIS — E66.01 CLASS 3 SEVERE OBESITY DUE TO EXCESS CALORIES WITH BODY MASS INDEX (BMI) OF 40.0 TO 44.9 IN ADULT, UNSPECIFIED WHETHER SERIOUS COMORBIDITY PRESENT (HCC): ICD-10-CM

## 2024-06-13 RX ORDER — PHENTERMINE HYDROCHLORIDE 30 MG/1
30 CAPSULE ORAL EVERY MORNING
Qty: 30 CAPSULE | Refills: 1 | Status: SHIPPED | OUTPATIENT
Start: 2024-06-13

## 2024-07-24 DIAGNOSIS — Z00.6 ENCOUNTER FOR EXAMINATION FOR NORMAL COMPARISON OR CONTROL IN CLINICAL RESEARCH PROGRAM: ICD-10-CM

## 2024-07-28 DIAGNOSIS — E04.1 THYROID NODULE: ICD-10-CM

## 2024-07-28 RX ORDER — LEVOTHYROXINE SODIUM 0.2 MG/1
TABLET ORAL
Qty: 34 TABLET | Refills: 5 | Status: SHIPPED | OUTPATIENT
Start: 2024-07-28

## 2024-10-06 DIAGNOSIS — L68.0 HIRSUTISM: ICD-10-CM

## 2024-10-07 RX ORDER — SPIRONOLACTONE 25 MG/1
25 TABLET ORAL DAILY
Qty: 30 TABLET | Refills: 0 | Status: SHIPPED | OUTPATIENT
Start: 2024-10-07

## 2024-11-11 ENCOUNTER — OFFICE VISIT (OUTPATIENT)
Dept: ENDOCRINOLOGY | Facility: CLINIC | Age: 34
End: 2024-11-11
Payer: COMMERCIAL

## 2024-11-11 VITALS
BODY MASS INDEX: 37.45 KG/M2 | WEIGHT: 261.6 LBS | HEIGHT: 70 IN | DIASTOLIC BLOOD PRESSURE: 80 MMHG | SYSTOLIC BLOOD PRESSURE: 128 MMHG

## 2024-11-11 DIAGNOSIS — E78.2 HYPERLIPIDEMIA, MIXED: ICD-10-CM

## 2024-11-11 DIAGNOSIS — E66.813 CLASS 3 SEVERE OBESITY DUE TO EXCESS CALORIES WITH BODY MASS INDEX (BMI) OF 40.0 TO 44.9 IN ADULT, UNSPECIFIED WHETHER SERIOUS COMORBIDITY PRESENT (HCC): ICD-10-CM

## 2024-11-11 DIAGNOSIS — E06.3 HASHIMOTO'S THYROIDITIS: ICD-10-CM

## 2024-11-11 DIAGNOSIS — E55.9 VITAMIN D DEFICIENCY: ICD-10-CM

## 2024-11-11 DIAGNOSIS — E28.2 PCOS (POLYCYSTIC OVARIAN SYNDROME): ICD-10-CM

## 2024-11-11 DIAGNOSIS — C73 THYROID CANCER (HCC): ICD-10-CM

## 2024-11-11 DIAGNOSIS — E04.1 THYROID NODULE: ICD-10-CM

## 2024-11-11 DIAGNOSIS — E66.01 CLASS 3 SEVERE OBESITY DUE TO EXCESS CALORIES WITH BODY MASS INDEX (BMI) OF 40.0 TO 44.9 IN ADULT, UNSPECIFIED WHETHER SERIOUS COMORBIDITY PRESENT (HCC): ICD-10-CM

## 2024-11-11 DIAGNOSIS — L68.0 HIRSUTISM: ICD-10-CM

## 2024-11-11 DIAGNOSIS — E20.9 HYPOPARATHYROIDISM, UNSPECIFIED HYPOPARATHYROIDISM TYPE (HCC): ICD-10-CM

## 2024-11-11 DIAGNOSIS — E03.9 HYPOTHYROIDISM, UNSPECIFIED TYPE: Primary | ICD-10-CM

## 2024-11-11 DIAGNOSIS — R73.03 PRE-DIABETES: ICD-10-CM

## 2024-11-11 PROCEDURE — 99214 OFFICE O/P EST MOD 30 MIN: CPT | Performed by: PHYSICIAN ASSISTANT

## 2024-11-11 RX ORDER — LEVOTHYROXINE SODIUM 200 UG/1
TABLET ORAL
Qty: 40 TABLET | Refills: 5 | Status: SHIPPED | OUTPATIENT
Start: 2024-11-11

## 2024-11-11 RX ORDER — PHENTERMINE HYDROCHLORIDE 30 MG/1
30 CAPSULE ORAL EVERY MORNING
Qty: 30 CAPSULE | Refills: 1 | Status: SHIPPED | OUTPATIENT
Start: 2024-11-11

## 2024-11-11 RX ORDER — SPIRONOLACTONE 50 MG/1
50 TABLET, FILM COATED ORAL DAILY
Qty: 30 TABLET | Refills: 5 | Status: SHIPPED | OUTPATIENT
Start: 2024-11-11

## 2024-11-11 RX ORDER — ERGOCALCIFEROL 1.25 MG/1
50000 CAPSULE, LIQUID FILLED ORAL WEEKLY
Qty: 4 CAPSULE | Refills: 6 | Status: SHIPPED | OUTPATIENT
Start: 2024-11-11

## 2024-11-11 NOTE — ASSESSMENT & PLAN NOTE
Continue to pursue weight loss and dietary modifications  Was unable to afford Wegovy  Wishes to resume phentermine - prescription sent    Orders:    Lipid panel; Future    Comprehensive metabolic panel; Future

## 2024-11-11 NOTE — ASSESSMENT & PLAN NOTE
Orders:    levothyroxine 200 mcg tablet; TAKE 1 TABLET BY MOUTH MON-FRI AND 2 TABLETS ON SAT AND SUN

## 2024-11-11 NOTE — ASSESSMENT & PLAN NOTE
Patient wishes to resume phentermine   Persistent hirsutism despite spironolactone, increased to 50mg today  Orders:    Lipid panel; Future    Comprehensive metabolic panel; Future

## 2024-11-11 NOTE — ASSESSMENT & PLAN NOTE
Continue omega-3 supplement  Monitor routine lipid panel  Continue to pursue weight loss and dietary modifications

## 2024-11-11 NOTE — ASSESSMENT & PLAN NOTE
Orders:    spironolactone (ALDACTONE) 50 mg tablet; Take 1 tablet (50 mg total) by mouth daily

## 2024-11-11 NOTE — ASSESSMENT & PLAN NOTE
Continue ergocalciferol 50,000 weekly (has not been taking this, ran out)  Monitor vitamin D level    Orders:    Vitamin D 25 hydroxy; Future    ergocalciferol (VITAMIN D2) 50,000 units; Take 1 capsule (50,000 Units total) by mouth once a week

## 2024-11-11 NOTE — PATIENT INSTRUCTIONS
Ensure you are taking your thyroid medication at least one hour prior to meals, on an empty stomach and 4 hours before any vitamins/supplements  Obtain labs prior to follow-up appointment  Hold all biotin-containing supplements for three days prior to thyroid lab draws

## 2024-11-11 NOTE — ASSESSMENT & PLAN NOTE
TSH target: 0.1-0.5  Levothyroxine 200 mcg daily with 2 tablets on Saturday and Sunday  Has only been taking one daily because she has been running out  Most recent TSH from April: 0.218  Repeat TSH and adjust accordingly    Orders:    TSH, 3rd generation with Free T4 reflex; Future

## 2024-11-11 NOTE — ASSESSMENT & PLAN NOTE
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

## 2024-11-11 NOTE — PROGRESS NOTES
Established Patient Progress Note      Chief Complaint   Patient presents with    Hypothyroidism        Impression & Plan:    Assessment & Plan  PCOS (polycystic ovarian syndrome)  Patient wishes to resume phentermine   Persistent hirsutism despite spironolactone, increased to 50mg today  Orders:    Lipid panel; Future    Comprehensive metabolic panel; Future    Hypothyroidism, unspecified type  TSH target: 0.1-0.5  Levothyroxine 200 mcg daily with 2 tablets on Saturday and Sunday  Has only been taking one daily because she has been running out  Most recent TSH from April: 0.218  Repeat TSH and adjust accordingly    Orders:    TSH, 3rd generation with Free T4 reflex; Future    Thyroid cancer (HCC)  Papillary thyroid carcinoma, tall 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)   Thyroidectomy 8/24/2023 with lymph node involvement  FIGUEROA march 2024  TSH goal: 0.1-0.5  US head and neck with Lymph Node mapping pending - patient advised to complete           Hashimoto's thyroiditis         Hyperlipidemia, mixed  Continue omega-3 supplement  Monitor routine lipid panel  Continue to pursue weight loss and dietary modifications         Vitamin D deficiency  Continue ergocalciferol 50,000 weekly (has not been taking this, ran out)  Monitor vitamin D level    Orders:    Vitamin D 25 hydroxy; Future    ergocalciferol (VITAMIN D2) 50,000 units; Take 1 capsule (50,000 Units total) by mouth once a week    Pre-diabetes  Continue to pursue weight loss and dietary modifications  Was unable to afford Wegovy  Wishes to resume phentermine - prescription sent    Orders:    Lipid panel; Future    Comprehensive metabolic panel; Future    Hypoparathyroidism, unspecified hypoparathyroidism type (HCC)  Previously on Calcitrol  Continue calcium 2000 mg daily         Thyroid nodule    Orders:    levothyroxine 200 mcg tablet; TAKE 1 TABLET BY MOUTH MON-FRI AND 2 TABLETS ON SAT AND SUN    Class 3 severe obesity due to excess calories with body mass index (BMI) of 40.0  to 44.9 in adult, unspecified whether serious comorbidity present ([facility])    Orders:    phentermine 30 MG capsule; Take 1 capsule (30 mg total) by mouth every morning prior to food or 1-2 hours after breakfast    Hirsutism    Orders:    spironolactone (ALDACTONE) 50 mg tablet; Take 1 tablet (50 mg total) by mouth daily      History of Present Illness:   Crissy Soler is a 34 y.o. female with history of papillary thyroid cancer status, tall cell variant post total thyroidectomy 2023 and FIGUEROA 2024.  Patient developed postoperative hypoparathyroidism.  Calcium has since been normalized and Calcitrol discontinued. Continues on calcium supplement.      Patient currently maintained on 200 mcg levothyroxine Monday through Friday and 400 mcg Saturday and . However she has not been taking it this way as she has not been dispensed enough through pharmacy. Renewed patient prescriptions.     She reports some temperature intolerance, thinning of hair and eyebrows. Denies fatigue, difficulty breathing, swallowing or change in voice. Has occasional tingling sensation of lip which she attributes to her altered calcium levels. This improves if she takes TUMS.      Patient Active Problem List   Diagnosis    PCOS (polycystic ovarian syndrome)    Nontoxic goiter, unspecified    Morbid obesity ([facility])    Vitamin D deficiency    Infertility, female    Hyperlipidemia, mixed    Elevated liver enzymes    Anxiety    Thyroid nodule    Bilateral carpal tunnel syndrome    Elevated prolactin level    History of  delivery, antepartum    Maternal morbid obesity, antepartum ([facility])    History of gestational hypertension    Hypothyroidism    Hirsutism    Sleep disturbances    Thyroid disease affecting pregnancy    Multigravida of advanced maternal age in third trimester    Abnormal glucose tolerance in pregnancy    Obesity affecting pregnancy in third trimester    Vaccine counseling    Fetal arrhythmia affecting pregnancy,  antepartum    Gestational hypertension    S/P repeat low transverse     Hashimoto's thyroiditis    Abnormal imaging of thyroid    Enlarged lymph nodes    Thyroid cancer (HCC)    Hypoparathyroidism (HCC)    Acanthosis nigricans    Pre-diabetes      Past Medical History:   Diagnosis Date    Abnormal Pap smear of cervix     LGSIL - colpo - LGSIL, HPV effect    Chronic diarrhea of unknown origin     last assessed 6/26/15    Disease of thyroid gland     nodule    Dyspepsia     last assessed  10/29/14    Early satiety     last assessed  10/29/14    Hidradenitis suppurativa     last assessed  14    Hypertension     last pregnancy    Hypothyroidism         Obesity     Polycystic ovary syndrome     Varicella     childhood      Past Surgical History:   Procedure Laterality Date     SECTION       and  [facility]     [place]  DELIVERY ONLY N/A 2020    Procedure:  SECTION () REPEAT;  Surgeon: Rufino Barrera MD;  [facility]: [facility];  Service: Obstetrics    [place]  DELIVERY ONLY N/A 3/8/2023    Procedure:  SECTION () REPEAT;  Surgeon: Anya Isbell MD;  [facility]: [facility];  Service: Obstetrics    THYROIDECTOMY N/A 2023    Procedure: TOTAL THYROIDECTOMY, reimplantation of parathyroid gland;  Surgeon: Zahra Esposito MD;  [facility];  Service: Surgical Oncology    US GUIDED THYROID BIOPSY  2023    WISDOM TOOTH EXTRACTION      all 4      Family History   Problem Relation Age of Onset    Hypertension Mother     No Known Problems Father     No Known Problems Sister     No Known Problems Daughter     No Known Problems Son     Thyroid disease Maternal Grandmother     No Known Problems Maternal Grandfather     Other Paternal Grandmother         pulmonary embolism    No Known Problems Paternal Grandfather      Social History     Tobacco Use    Smoking status: Former     Current packs/day: 0.00     Types: Cigarettes      "Quit date: 3/1/2014     Years since quitting: 10.7     Passive exposure: Never    Smokeless tobacco: Never    Tobacco comments:      sometimes 1 cig once weekly - none since preg   Substance Use Topics    Alcohol use: Not Currently     Allergies   Allergen Reactions    A + D Personal Care Lotion  [Dimethicone] Rash    Calamine-Zinc Oxide Rash    Keflex [Cephalexin] GI Intolerance    Pramoxine-Calamine     Zinc Rash     No med alert bracelet no epi pen         Current Outpatient Medications:     ergocalciferol (VITAMIN D2) 50,000 units, Take 1 capsule (50,000 Units total) by mouth once a week, Disp: 4 capsule, Rfl: 6    levothyroxine 200 mcg tablet, TAKE 1 TABLET BY MOUTH MON-FRI AND 2 TABLETS ON SAT AND SUN, Disp: 40 tablet, Rfl: 5    metFORMIN (GLUCOPHAGE-XR) 500 mg 24 hr tablet, TAKE 2 TABLETS BY MOUTH TWICE A DAY WITH FOOD, Disp: 360 tablet, Rfl: 1    phentermine 30 MG capsule, Take 1 capsule (30 mg total) by mouth every morning prior to food or 1-2 hours after breakfast, Disp: 30 capsule, Rfl: 1    spironolactone (ALDACTONE) 50 mg tablet, Take 1 tablet (50 mg total) by mouth daily, Disp: 30 tablet, Rfl: 5    Review of Systems   Constitutional:  Positive for unexpected weight change. Negative for fatigue.   HENT:  Negative for trouble swallowing and voice change.    Gastrointestinal:  Negative for abdominal pain, diarrhea, nausea and vomiting.   Endocrine: Positive for cold intolerance and heat intolerance.   Psychiatric/Behavioral:  The patient is not nervous/anxious.        Physical Exam:  Body mass index is 38.08 kg/m².  /80 (BP Location: Right arm, Patient Position: Sitting, Cuff Size: Adult)   Ht 5' 9.5\" (1.765 m)   Wt 119 kg (261 lb 9.6 oz)   BMI 38.08 kg/m²    Wt Readings from Last 3 Encounters:   11/11/24 119 kg (261 lb 9.6 oz)   04/15/24 127 kg (279 lb)   02/15/24 133 kg (293 lb 9.6 oz)       Physical Exam  Vitals and nursing note reviewed.   Constitutional:       General: She is not in acute " distress.     Appearance: She is well-developed. She is morbidly obese.   HENT:      Head: Normocephalic and atraumatic.   Eyes:      General: No scleral icterus.     Conjunctiva/sclera: Conjunctivae normal.   Neck:      Comments: Thyroid absent  Cardiovascular:      Rate and Rhythm: Normal rate and regular rhythm.      Heart sounds: No murmur heard.  Pulmonary:      Effort: Pulmonary effort is normal. No respiratory distress.      Breath sounds: Normal breath sounds. No wheezing or rales.   Abdominal:      General: There is no distension.      Palpations: Abdomen is soft.      Tenderness: There is no abdominal tenderness.   Skin:     General: Skin is warm and dry.      Findings: No erythema or rash.   Neurological:      Mental Status: She is alert.   Psychiatric:         Mood and Affect: Mood and affect normal.         Labs:   Lab Results   Component Value Date    HGBA1C 5.7 (H) 02/06/2024    HGBA1C 5.5 10/06/2023    HGBA1C 5.3 04/29/2023     Lab Results   Component Value Date    CREATININE 0.82 04/13/2024    CREATININE 0.90 10/06/2023    CREATININE 0.84 08/11/2023    BUN 12 04/13/2024     06/21/2016    K 4.3 04/13/2024     04/13/2024    CO2 25 04/13/2024     eGFR   Date Value Ref Range Status   04/13/2024 92 ml/min/1.73sq m Final     Lab Results   Component Value Date    CHOL 151 06/21/2016    HDL 32 (L) 04/15/2022    TRIG 166 (H) 04/15/2022     Lab Results   Component Value Date    ALT 20 10/06/2023    AST 22 10/06/2023    ALKPHOS 53 10/06/2023    BILITOT 1.3 (H) 06/21/2016     Lab Results   Component Value Date    HIU4DOTMHRFV 0.218 (L) 04/13/2024    QXZ5ITNANOXR 2.518 02/06/2024    QUZ3RXSPEMGF 6.050 (H) 12/19/2023     Lab Results   Component Value Date    FREET4 1.35 (H) 04/13/2024       Orders Placed This Encounter   Procedures    TSH, 3rd generation with Free T4 reflex     Standing Status:   Future     Standing Expiration Date:   11/11/2025    Vitamin D 25 hydroxy     Standing Status:   Future      Standing Expiration Date:   11/11/2025    Lipid panel     This is a patient instruction: This test requires patient fasting for 10-12 hours or longer. Drinking of black coffee or black tea is acceptable.     Standing Status:   Future     Standing Expiration Date:   11/11/2025    Comprehensive metabolic panel     This is a patient instruction: Patient fasting for 8 hours or longer recommended.     Standing Status:   Future     Standing Expiration Date:   11/11/2025       Patient Instructions   Ensure you are taking your thyroid medication at least one hour prior to meals, on an empty stomach and 4 hours before any vitamins/supplements  Obtain labs prior to follow-up appointment  Hold all biotin-containing supplements for three days prior to thyroid lab draws    Discussed with the patient and all questioned fully answered. She will call me if any problems arise.    Yolanda Morris PA-C

## 2024-12-14 ENCOUNTER — APPOINTMENT (OUTPATIENT)
Dept: LAB | Facility: CLINIC | Age: 34
End: 2024-12-14
Payer: COMMERCIAL

## 2024-12-14 DIAGNOSIS — E03.9 HYPOTHYROIDISM, UNSPECIFIED TYPE: ICD-10-CM

## 2024-12-14 DIAGNOSIS — Z00.6 ENCOUNTER FOR EXAMINATION FOR NORMAL COMPARISON OR CONTROL IN CLINICAL RESEARCH PROGRAM: ICD-10-CM

## 2024-12-14 LAB
T4 FREE SERPL-MCNC: 2.04 NG/DL (ref 0.61–1.12)
TSH SERPL DL<=0.05 MIU/L-ACNC: 0.18 UIU/ML (ref 0.45–4.5)

## 2024-12-14 PROCEDURE — 36415 COLL VENOUS BLD VENIPUNCTURE: CPT

## 2024-12-14 PROCEDURE — 84443 ASSAY THYROID STIM HORMONE: CPT

## 2024-12-14 PROCEDURE — 84439 ASSAY OF FREE THYROXINE: CPT

## 2024-12-20 PROBLEM — Z85.850 ENCOUNTER FOR FOLLOW-UP SURVEILLANCE OF THYROID CANCER: Status: ACTIVE | Noted: 2024-12-20

## 2024-12-20 PROBLEM — Z08 ENCOUNTER FOR FOLLOW-UP SURVEILLANCE OF THYROID CANCER: Status: ACTIVE | Noted: 2024-12-20

## 2024-12-20 PROBLEM — Z85.850 HISTORY OF THYROID CANCER: Status: ACTIVE | Noted: 2023-09-12

## 2024-12-21 ENCOUNTER — HOSPITAL ENCOUNTER (OUTPATIENT)
Dept: ULTRASOUND IMAGING | Facility: HOSPITAL | Age: 34
Discharge: HOME | End: 2024-12-21
Payer: COMMERCIAL

## 2024-12-21 DIAGNOSIS — Z85.850 HISTORY OF THYROID CANCER: ICD-10-CM

## 2024-12-21 PROCEDURE — 76536 US EXAM OF HEAD AND NECK: CPT

## 2024-12-23 ENCOUNTER — TELEPHONE (OUTPATIENT)
Age: 34
End: 2024-12-23

## 2024-12-23 ENCOUNTER — TELEPHONE (OUTPATIENT)
Dept: ENDOCRINOLOGY | Facility: CLINIC | Age: 34
End: 2024-12-23

## 2024-12-23 NOTE — TELEPHONE ENCOUNTER
[facility]  There are significant findings on US head neck lymph node mapping.     Please advise.

## 2024-12-24 ENCOUNTER — OFFICE VISIT (OUTPATIENT)
Dept: SURGICAL ONCOLOGY | Facility: CLINIC | Age: 34
End: 2024-12-24
Payer: COMMERCIAL

## 2024-12-24 ENCOUNTER — RESULTS FOLLOW-UP (OUTPATIENT)
Dept: ENDOCRINOLOGY | Facility: CLINIC | Age: 34
End: 2024-12-24

## 2024-12-24 VITALS
DIASTOLIC BLOOD PRESSURE: 78 MMHG | OXYGEN SATURATION: 96 % | TEMPERATURE: 98.4 F | BODY MASS INDEX: 37.8 KG/M2 | WEIGHT: 264 LBS | RESPIRATION RATE: 16 BRPM | HEIGHT: 70 IN | SYSTOLIC BLOOD PRESSURE: 118 MMHG | HEART RATE: 100 BPM

## 2024-12-24 DIAGNOSIS — E04.1 THYROID NODULE: Primary | ICD-10-CM

## 2024-12-24 DIAGNOSIS — Z08 ENCOUNTER FOR FOLLOW-UP SURVEILLANCE OF THYROID CANCER: Primary | ICD-10-CM

## 2024-12-24 DIAGNOSIS — Z85.850 ENCOUNTER FOR FOLLOW-UP SURVEILLANCE OF THYROID CANCER: Primary | ICD-10-CM

## 2024-12-24 DIAGNOSIS — Z85.850 HISTORY OF THYROID CANCER: ICD-10-CM

## 2024-12-24 PROCEDURE — 99214 OFFICE O/P EST MOD 30 MIN: CPT | Performed by: STUDENT IN AN ORGANIZED HEALTH CARE EDUCATION/TRAINING PROGRAM

## 2024-12-24 NOTE — PROGRESS NOTES
"Surgical Oncology Follow Up    [facility] [address] [id number] [phone]    Crissy Soler  1990  [id number]      Assessment and plan    1. Encounter for follow-up surveillance of thyroid cancer  2. History of thyroid cancer  Assessment & Plan:  Patient is doing well.  She has no complaints today.  I will reach out to endocrinology to ensure appropriate head neck ultrasound follow-up is completed.  I will see her in 6 months again for repeat history and physical.        Chief Complaint   Patient presents with    office visit       Return in about 6 months (around 6/24/2025).      Oncology History   History of thyroid cancer   8/24/2023 Surgery    A. 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    -- Carcinoma focally involves the inferior aspect of anterior-lateral left lobe surgical margin.  - Two additional foci of papillary thyroid carcinoma, solid and classic types.  - Two parathyroid glands.  - Three lymph nodes, negative for tumor (0/3).  - See synoptic report.     B. Lymph Node, level 6 lymph node:  - Two (2) of five (5) lymph nodes with rare cytokeratin-positive cells (~10 cells each), cannot exclude metastatic papillary thyroid carcinoma. See note 1.     C. Tissue submitted as \"paratracheal lymph node\":  - Extensively crushed thyroid tissue. See note 2.  - No definitive lymph node.    [id number]     11/29/2023 -  Cancer Staged    Staging form: Thyroid - Differentiated and Anaplastic, AJCC 8th Edition  - Pathologic: Stage I (pT1b, pN0a, cM0, Age at diagnosis: < 55 years) - Signed by Zahra Esposito MD on 11/29/2023  Lymph node metastasis: Absent       3/6/2024 -  Radiation    FIGUEROA       Treatment history: Post total thyroid for tall cell variant multifocal papillary thyroid cancer with close margin  Current treatment: obs  Disease status: obs    History of Present Illness: " Crissy is doing well. She completed FIGUEROA 2024 and has been seeing endo regularly, last in Nov.  Recent head and neck ultrasound showed a mildly enlarged node with overall benign profile and recommendation for short interval repeat in 3 months. She denies any issues of trouble swallowing, pain. No sx hyper or hypothyroid. TSH appropriately suppressed; last ab in April.      Review of Systems  Complete ROS Surg Onc:   Constitutional: The patient denies new or recent history of general fatigue, no recent weight loss, no change in appetite.   Eyes: No complaints of visual problems, no scleral icterus.   ENT: no complaints of ear pain, no hoarseness, no difficulty swallowing,  no tinnitus and no new masses in head, oral cavity, or neck.   Cardiovascular: No complaints of chest pain, no palpitations, no ankle edema.   Respiratory: No complaints of shortness of breath, no cough.   Gastrointestinal: No complaints of jaundice, no bloody stools, no pale stools.   Genitourinary: No complaints of dysuria, no hematuria, no nocturia, no frequent urination, no urethral discharge.   Musculoskeletal: No complaints of weakness, paralysis, joint stiffness or arthralgias.  Integumentary: No complaints of rash, no new lesions.   Neurological: No complaints of convulsions, no seizures, no dizziness.   Hematologic/Lymphatic: No complaints of easy bruising.   Endocrine:  No hot or cold intolerance.  No polydipsia, polyphagia, or polyuria.  Allergy/immunology:  No environmental allergies.  No food allergies.  Not immunocompromised.  Skin:  No pallor or rash.  No wound.        Patient Active Problem List   Diagnosis    PCOS (polycystic ovarian syndrome)    Nontoxic goiter, unspecified    Morbid obesity (HCC)    Vitamin D deficiency    Infertility, female    Hyperlipidemia, mixed    Elevated liver enzymes    Anxiety    Thyroid nodule    Bilateral carpal tunnel syndrome    Elevated prolactin level    History of  delivery,  antepartum    Maternal morbid obesity, antepartum (HCC)    History of gestational hypertension    Hypothyroidism    Hirsutism    Sleep disturbances    Thyroid disease affecting pregnancy    Multigravida of advanced maternal age in third trimester    Abnormal glucose tolerance in pregnancy    Obesity affecting pregnancy in third trimester    Vaccine counseling    Fetal arrhythmia affecting pregnancy, antepartum    Gestational hypertension    S/P repeat low transverse     Hashimoto's thyroiditis    Abnormal imaging of thyroid    Enlarged lymph nodes    History of thyroid cancer    Hypoparathyroidism (HCC)    Acanthosis nigricans    Pre-diabetes    Encounter for follow-up surveillance of thyroid cancer     Past Medical History:   Diagnosis Date    Abnormal Pap smear of cervix     LGSIL - colpo - LGSIL, HPV effect    Chronic diarrhea of unknown origin     last assessed 6/26/15    Disease of thyroid gland     nodule    Dyspepsia     last assessed  10/29/14    Early satiety     last assessed  10/29/14    Hidradenitis suppurativa     last assessed  14    Hypertension     last pregnancy    Hypothyroidism         Obesity     Polycystic ovary syndrome     Varicella     childhood     Past Surgical History:   Procedure Laterality Date     SECTION       and  [facility]     [place]  DELIVERY ONLY N/A 2020    Procedure:  SECTION () REPEAT;  Surgeon: Rufino Barrera MD;  [facility]: [facility];  Service: Obstetrics    [place]  DELIVERY ONLY N/A 3/8/2023    Procedure:  SECTION () REPEAT;  Surgeon: Anya Isbell MD;  [facility];  Service: Obstetrics    THYROIDECTOMY N/A 2023    Procedure: TOTAL THYROIDECTOMY, reimplantation of parathyroid gland;  Surgeon: Zahra Esposito MD;  [facility];  Service: Surgical Oncology    US GUIDED THYROID BIOPSY  2023    WISDOM TOOTH EXTRACTION      all 4     Family History    Problem Relation Age of Onset    Hypertension Mother     No Known Problems Father     No Known Problems Sister     No Known Problems Daughter     No Known Problems Son     Thyroid disease Maternal Grandmother     No Known Problems Maternal Grandfather     Other Paternal Grandmother         pulmonary embolism    No Known Problems Paternal Grandfather      Social History     Socioeconomic History    Marital status: /Civil Union     Spouse name: Not on file    Number of children: Not on file    Years of education: Not on file    Highest education level: Not on file   Occupational History    Occupation:    Tobacco Use    Smoking status: Former     Current packs/day: 0.00     Types: Cigarettes     Quit date: 3/1/2014     Years since quitting: 10.8     Passive exposure: Never    Smokeless tobacco: Never    Tobacco comments:      sometimes 1 cig once weekly - none since preg   Vaping Use    Vaping status: Never Used   Substance and Sexual Activity    Alcohol use: Not Currently    Drug use: No    Sexual activity: Yes     Partners: Male     Birth control/protection: None   Other Topics Concern    Not on file   Social History Narrative    Caffeine use: 1 cup coffee or ice tea/ day.    Stress at work     Social Drivers of Health     Financial Resource Strain: Not on file   Food Insecurity: Not on file   Transportation Needs: Not on file   Physical Activity: Not on file   Stress: Not on file   Social Connections: Not on file   Intimate Partner Violence: Not on file   Housing Stability: Not on file       Current Outpatient Medications:     ergocalciferol (VITAMIN D2) 50,000 units, Take 1 capsule (50,000 Units total) by mouth once a week, Disp: 4 capsule, Rfl: 6    levothyroxine 200 mcg tablet, TAKE 1 TABLET BY MOUTH MON-FRI AND 2 TABLETS ON SAT AND SUN, Disp: 40 tablet, Rfl: 5    metFORMIN (GLUCOPHAGE-XR) 500 mg 24 hr tablet, TAKE 2 TABLETS BY MOUTH TWICE A DAY WITH FOOD, Disp: 360 tablet, Rfl: 1     phentermine 30 MG capsule, Take 1 capsule (30 mg total) by mouth every morning prior to food or 1-2 hours after breakfast, Disp: 30 capsule, Rfl: 1    spironolactone (ALDACTONE) 50 mg tablet, Take 1 tablet (50 mg total) by mouth daily, Disp: 30 tablet, Rfl: 5  Allergies   Allergen Reactions    A + D Personal Care Lotion  [Dimethicone] Rash    Calamine-Zinc Oxide Rash    Keflex [Cephalexin] GI Intolerance    Pramoxine-Calamine     Zinc Rash     No med alert bracelet no epi pen     Vitals:    12/24/24 0953   BP: 118/78   Pulse: 100   Resp: 16   Temp: 98.4 °F (36.9 °C)   SpO2: 96%       Physical Exam  Constitutional: Patient is well-developed and well-nourished who appears the stated age in no acute distress. Patient is pleasant and talkative.     HEENT:  Normocephalic.  Sclerae are anicteric. Mucous membranes are moist. Neck is supple without adenopathy. No JVD.     Chest: Equal chest rise, easy WOB  Cardiac: Heart is regular rate.     Abdomen: Abdomen is non-tender, non-distended and without masses.     Extremities: There is no clubbing or cyanosis. There is no edema.  Symmetric.  Neuro: Grossly nonfocal. Gait is normal.     Lymphatic: No evidence of cervical adenopathy bilaterally. No evidence of axillary adenopathy bilaterally. No evidence of inguinal adenopathy bilaterally.     Skin: Warm, anicteric.  Incision healing well  Psych:  Patient is pleasant and talkative.    Pathology:  Final Diagnosis   A. 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    -- Carcinoma focally involves the inferior aspect of anterior-lateral left lobe surgical margin.  - Two additional foci of papillary thyroid carcinoma, solid and classic types.  - Two parathyroid glands.  - Three lymph nodes, negative for tumor (0/3).  - See synoptic report.     B. Lymph Node, level 6 lymph node:  - Two (2) of five (5) lymph nodes with rare cytokeratin-positive cells (~10 cells each), cannot exclude metastatic  "papillary thyroid carcinoma. See note 1.     C. Tissue submitted as \"paratracheal lymph node\":  - Extensively crushed thyroid tissue. See note 2.  - No definitive lymph node.     Note 1: In part B (level 6 lymph node, blocks B1 and B2), AE1/AE3 immunostains highlights two minute foci of approximately 10 cells each; however, these foci are not seen in H&E stained slides (including recut levels). Additional TTF1 and CK19 immunostains were performed on part B1, but the focus is no longer present in these stains. As a result, the significance of these small foci is not entirely clear, but is insufficient for a diagnosis of metastatic papillary thyroid carcinoma. Nonetheless, metastatic papillary thyroid carcinoma cannot be definitively excluded.     Note 2: Part C (submitted as paratracheal lymph node) demonstrates crushed thyroid tissue reminiscent of the background non-neoplastic thyroid tissue in part A. TTF1, AE1/AE3, and CK19 immunostains highlight the orderly glandular architecture of this thyroid tissue without confluent or papillary growth. In addition, a BRAF [id number] immunostain is negative. Consequently, the morphologic and immunophenotypic findings are favored to represent crushed non-neoplastic thyroid tissue.             Labs:  Reviewed in Epic personally    Imaging  No results found.    I personally reviewed and interpreted the above laboratory and imaging data incl labs, nuc med notes, uptake scans, op notes, endo notes.       "

## 2024-12-24 NOTE — ASSESSMENT & PLAN NOTE
Patient is doing well.  She has no complaints today.  I will reach out to endocrinology to ensure appropriate head neck ultrasound follow-up is completed.  I will see her in 6 months again for repeat history and physical.

## 2024-12-30 LAB
APOB+LDLR+PCSK9 GENE MUT ANL BLD/T: NOT DETECTED
BRCA1+BRCA2 DEL+DUP + FULL MUT ANL BLD/T: NOT DETECTED
MLH1+MSH2+MSH6+PMS2 GN DEL+DUP+FUL M: NOT DETECTED

## 2025-02-04 ENCOUNTER — TELEPHONE (OUTPATIENT)
Dept: ENDOCRINOLOGY | Facility: CLINIC | Age: 35
End: 2025-02-04

## 2025-02-17 DIAGNOSIS — E20.9 HYPOPARATHYROIDISM, UNSPECIFIED HYPOPARATHYROIDISM TYPE (HCC): Primary | ICD-10-CM

## 2025-02-23 ENCOUNTER — APPOINTMENT (OUTPATIENT)
Dept: LAB | Facility: CLINIC | Age: 35
End: 2025-02-23
Payer: COMMERCIAL

## 2025-02-23 DIAGNOSIS — R73.03 PRE-DIABETES: ICD-10-CM

## 2025-02-23 DIAGNOSIS — E28.2 PCOS (POLYCYSTIC OVARIAN SYNDROME): ICD-10-CM

## 2025-02-23 DIAGNOSIS — E55.9 VITAMIN D DEFICIENCY: ICD-10-CM

## 2025-02-23 DIAGNOSIS — E20.9 HYPOPARATHYROIDISM, UNSPECIFIED HYPOPARATHYROIDISM TYPE (HCC): ICD-10-CM

## 2025-02-23 LAB
25(OH)D3 SERPL-MCNC: 21.9 NG/ML (ref 30–100)
ALBUMIN SERPL BCG-MCNC: 4.2 G/DL (ref 3.5–5)
ALP SERPL-CCNC: 40 U/L (ref 34–104)
ALT SERPL W P-5'-P-CCNC: 14 U/L (ref 7–52)
ANION GAP SERPL CALCULATED.3IONS-SCNC: 10 MMOL/L (ref 4–13)
AST SERPL W P-5'-P-CCNC: 16 U/L (ref 13–39)
BILIRUB SERPL-MCNC: 1.81 MG/DL (ref 0.2–1)
BUN SERPL-MCNC: 14 MG/DL (ref 5–25)
CALCIUM SERPL-MCNC: 8.8 MG/DL (ref 8.4–10.2)
CHLORIDE SERPL-SCNC: 101 MMOL/L (ref 96–108)
CHOLEST SERPL-MCNC: 195 MG/DL (ref ?–200)
CO2 SERPL-SCNC: 26 MMOL/L (ref 21–32)
CREAT SERPL-MCNC: 0.99 MG/DL (ref 0.6–1.3)
GFR SERPL CREATININE-BSD FRML MDRD: 73 ML/MIN/1.73SQ M
GLUCOSE P FAST SERPL-MCNC: 99 MG/DL (ref 65–99)
HDLC SERPL-MCNC: 48 MG/DL
LDLC SERPL CALC-MCNC: 121 MG/DL (ref 0–100)
NONHDLC SERPL-MCNC: 147 MG/DL
POTASSIUM SERPL-SCNC: 3.7 MMOL/L (ref 3.5–5.3)
PROT SERPL-MCNC: 7.9 G/DL (ref 6.4–8.4)
SODIUM SERPL-SCNC: 137 MMOL/L (ref 135–147)
TRIGL SERPL-MCNC: 128 MG/DL (ref ?–150)
VIT B12 SERPL-MCNC: 353 PG/ML (ref 180–914)

## 2025-02-23 PROCEDURE — 80053 COMPREHEN METABOLIC PANEL: CPT

## 2025-02-23 PROCEDURE — 36415 COLL VENOUS BLD VENIPUNCTURE: CPT

## 2025-02-23 PROCEDURE — 82306 VITAMIN D 25 HYDROXY: CPT

## 2025-02-23 PROCEDURE — 80061 LIPID PANEL: CPT

## 2025-02-23 PROCEDURE — 82607 VITAMIN B-12: CPT

## 2025-02-28 DIAGNOSIS — E66.813 CLASS 3 SEVERE OBESITY DUE TO EXCESS CALORIES WITH BODY MASS INDEX (BMI) OF 40.0 TO 44.9 IN ADULT, UNSPECIFIED WHETHER SERIOUS COMORBIDITY PRESENT (HCC): ICD-10-CM

## 2025-02-28 DIAGNOSIS — E66.01 CLASS 3 SEVERE OBESITY DUE TO EXCESS CALORIES WITH BODY MASS INDEX (BMI) OF 40.0 TO 44.9 IN ADULT, UNSPECIFIED WHETHER SERIOUS COMORBIDITY PRESENT (HCC): ICD-10-CM

## 2025-03-03 RX ORDER — PHENTERMINE HYDROCHLORIDE 30 MG/1
30 CAPSULE ORAL EVERY MORNING
Qty: 30 CAPSULE | Refills: 3 | Status: SHIPPED | OUTPATIENT
Start: 2025-03-03

## 2025-03-06 ENCOUNTER — OFFICE VISIT (OUTPATIENT)
Dept: FAMILY MEDICINE CLINIC | Facility: CLINIC | Age: 35
End: 2025-03-06
Payer: COMMERCIAL

## 2025-03-06 VITALS
WEIGHT: 274 LBS | OXYGEN SATURATION: 99 % | SYSTOLIC BLOOD PRESSURE: 124 MMHG | BODY MASS INDEX: 39.22 KG/M2 | HEIGHT: 70 IN | DIASTOLIC BLOOD PRESSURE: 78 MMHG | RESPIRATION RATE: 18 BRPM | HEART RATE: 102 BPM | TEMPERATURE: 96.7 F

## 2025-03-06 DIAGNOSIS — R21 RASH: ICD-10-CM

## 2025-03-06 DIAGNOSIS — M25.40 SWOLLEN JOINT: Primary | ICD-10-CM

## 2025-03-06 DIAGNOSIS — R59.1 LYMPHADENOPATHY: ICD-10-CM

## 2025-03-06 DIAGNOSIS — Z11.59 NEED FOR HEPATITIS C SCREENING TEST: ICD-10-CM

## 2025-03-06 DIAGNOSIS — Z23 ENCOUNTER FOR IMMUNIZATION: ICD-10-CM

## 2025-03-06 DIAGNOSIS — Z00.00 ANNUAL PHYSICAL EXAM: ICD-10-CM

## 2025-03-06 DIAGNOSIS — Z85.850 HISTORY OF THYROID CANCER: ICD-10-CM

## 2025-03-06 PROBLEM — C73 THYROID CANCER (HCC): Status: ACTIVE | Noted: 2025-03-06

## 2025-03-06 PROCEDURE — 99395 PREV VISIT EST AGE 18-39: CPT | Performed by: FAMILY MEDICINE

## 2025-03-06 PROCEDURE — 90471 IMMUNIZATION ADMIN: CPT

## 2025-03-06 PROCEDURE — 99214 OFFICE O/P EST MOD 30 MIN: CPT | Performed by: FAMILY MEDICINE

## 2025-03-06 PROCEDURE — 90656 IIV3 VACC NO PRSV 0.5 ML IM: CPT

## 2025-03-06 NOTE — PATIENT INSTRUCTIONS
"Patient Education     Routine physical for adults   The Basics   Written by the doctors and editors at [facility]   What is a physical? -- A physical is a routine visit, or \"check-up,\" with your doctor. You might also hear it called a \"wellness visit\" or \"preventive visit.\"  During each visit, the doctor will:   Ask about your physical and mental health   Ask about your habits, behaviors, and lifestyle   Do an exam   Give you vaccines if needed   Talk to you about any medicines you take   Give advice about your health   Answer your questions  Getting regular check-ups is an important part of taking care of your health. It can help your doctor find and treat any problems you have. But it's also important for preventing health problems.  A routine physical is different from a \"sick visit.\" A sick visit is when you see a doctor because of a health concern or problem. Since physicals are scheduled ahead of time, you can think about what you want to ask the doctor.  How often should I get a physical? -- It depends on your age and health. In general, for people age 21 years and older:   If you are younger than 50 years, you might be able to get a physical every 3 years.   If you are 50 years or older, your doctor might recommend a physical every year.  If you have an ongoing health condition, like diabetes or high blood pressure, your doctor will probably want to see you more often.  What happens during a physical? -- In general, each visit will include:   Physical exam - The doctor or nurse will check your height, weight, heart rate, and blood pressure. They will also look at your eyes and ears. They will ask about how you are feeling and whether you have any symptoms that bother you.   Medicines - It's a good idea to bring a list of all the medicines you take to each doctor visit. Your doctor will talk to you about your medicines and answer any questions. Tell them if you are having any side effects that bother you. You " "should also tell them if you are having trouble paying for any of your medicines.   Habits and behaviors - This includes:   Your diet   Your exercise habits   Whether you smoke, drink alcohol, or use drugs   Whether you are sexually active   Whether you feel safe at home  Your doctor will talk to you about things you can do to improve your health and lower your risk of health problems. They will also offer help and support. For example, if you want to quit smoking, they can give you advice and might prescribe medicines. If you want to improve your diet or get more physical activity, they can help you with this, too.   Lab tests, if needed - The tests you get will depend on your age and situation. For example, your doctor might want to check your:   Cholesterol   Blood sugar   Iron level   Vaccines - The recommended vaccines will depend on your age, health, and what vaccines you already had. Vaccines are very important because they can prevent certain serious or deadly infections.   Discussion of screening - \"Screening\" means checking for diseases or other health problems before they cause symptoms. Your doctor can recommend screening based on your age, risk, and preferences. This might include tests to check for:   Cancer, such as breast, prostate, cervical, ovarian, colorectal, prostate, lung, or skin cancer   Sexually transmitted infections, such as chlamydia and gonorrhea   Mental health conditions like depression and anxiety  Your doctor will talk to you about the different types of screening tests. They can help you decide which screenings to have. They can also explain what the results might mean.   Answering questions - The physical is a good time to ask the doctor or nurse questions about your health. If needed, they can refer you to other doctors or specialists, too.  Adults older than 65 years often need other care, too. As you get older, your doctor will talk to you about:   How to prevent falling at " home   Hearing or vision tests   Memory testing   How to take your medicines safely   Making sure that you have the help and support you need at home  All topics are updated as new evidence becomes available and our peer review process is complete.  This topic retrieved from [employer] on: May 02, 2024.  Topic 034143 Version 1.0  Release: 32.4.3 - C32.122  © 2024 UpToDate, Inc. and/or its affiliates. All rights reserved.  Consumer Information Use and Disclaimer   Disclaimer: This generalized information is a limited summary of diagnosis, treatment, and/or medication information. It is not meant to be comprehensive and should be used as a tool to help the user understand and/or assess potential diagnostic and treatment options. It does NOT include all information about conditions, treatments, medications, side effects, or risks that may apply to a specific patient. It is not intended to be medical advice or a substitute for the medical advice, diagnosis, or treatment of a health care provider based on the health care provider's examination and assessment of a patient's specific and unique circumstances. Patients must speak with a health care provider for complete information about their health, medical questions, and treatment options, including any risks or benefits regarding use of medications. This information does not endorse any treatments or medications as safe, effective, or approved for treating a specific patient. UpToDate, Inc. and its affiliates disclaim any warranty or liability relating to this information or the use thereof.The use of this information is governed by the Terms of Use, available at https://www.woltersGlowpointuwer.com/en/know/clinical-effectiveness-terms. 2024© UpToDate, Inc. and its affiliates and/or licensors. All rights reserved.  Copyright   © 2024 UpToDate, Inc. and/or its affiliates. All rights reserved.

## 2025-03-06 NOTE — PROGRESS NOTES
Adult Annual Physical  Name: Crissy Soler      : 1990      MRN: [id number]  Encounter Provider: Romeo Villarreal MD  Encounter Date: 3/6/2025   Encounter department: [facility]    Assessment & Plan  Swollen joint  Check labs to r/o RA, lupus etc.   Orders:    C-reactive protein; Future    Cyclic citrul peptide antibody, IgG; Future    RF Screen w/ Reflex to Titer; Future    MONA Screen w/Reflex Cascade; Future    Rash    Orders:    C-reactive protein; Future    Cyclic citrul peptide antibody, IgG; Future    RF Screen w/ Reflex to Titer; Future    MONA Screen w/Reflex Cascade; Future    Lymphadenopathy  Refer to ENT. Advised pt to make an appt for US lymph node mapping.   Orders:    Ambulatory Referral to Otolaryngology; Future    History of thyroid cancer  [facility] surg/onc and endocrinology.        Need for hepatitis C screening test    Orders:    Hepatitis C Antibody; Future    Encounter for immunization    Orders:    influenza vaccine preservative-free 0.5 mL IM (Fluzone, Afluria, Fluarix, Flulaval)    Annual physical exam           Give flu shot today.   Got Tdap in .   Pap  negative.       Immunizations and preventive care screenings were discussed with patient today. Appropriate education was printed on patient's after visit summary.    Counseling:  Alcohol/drug use: discussed moderation in alcohol intake, the recommendations for healthy alcohol use, and avoidance of illicit drug use.  Dental Health: discussed importance of regular tooth brushing, flossing, and dental visits.  Injury prevention: discussed safety/seat belts, safety helmets, smoke detectors, carbon monoxide detectors, and smoking near bedding or upholstery.  Sexual health: discussed sexually transmitted diseases, partner selection, use of condoms, avoidance of unintended pregnancy, and contraceptive alternatives.  Exercise: the importance of regular exercise/physical activity was discussed. Recommend exercise 3-5 times  "per week for at least 30 minutes.          History of Present Illness       Pt is here by herself.     C/o hands swollen for 3 weeks.   Stiffness for hours.   Rash on face 2 weeks ago, last for 1 week, went away now.   Some rash on arms come and go.   Denies Fhx of lupus or RA.     Left lymph nodes swollen for a while. Would like to see ENT.   Hx of thyroid cancer---Had thyroidectomy in 8/2023.   12/2024 US lymph node mapping done.   \"There is a mildly enlarged left zone IV lymph node, with short axis measurement of 1.3 cm. This node does have a prominent central fatty hilum which favors benignity (image 137.) Recommend short interval follow-up neck ultrasound in 3 months, with   particular attention to reassess this node.\"   Advised pt to make an appt.     Hypothyroidism---FU endocrinology. She is on levothyroxine  200mcg daily.     PCOS---[facility] endocrinology. She is on metformin and spironolactone.       Adult Annual Physical:  Patient presents for annual physical.     Diet and Physical Activity:  - Diet/Nutrition: well balanced diet.  - Exercise: walking.    Depression Screening:  - PHQ-2 Score: 0    General Health:  - Sleep: sleeps well.  - Hearing: normal hearing bilateral ears.  - Vision: goes for regular eye exams.  - Dental: regular dental visits.    /GYN Health:  - Follows with GYN: yes.     Review of Systems   Constitutional:  Negative for appetite change, chills and fever.   HENT:  Negative for congestion, ear pain, sinus pain and sore throat.    Eyes:  Negative for discharge and itching.   Respiratory:  Negative for apnea, cough, chest tightness, shortness of breath and wheezing.    Cardiovascular:  Negative for chest pain, palpitations and leg swelling.   Gastrointestinal:  Negative for abdominal pain, anal bleeding, constipation, diarrhea, nausea and vomiting.   Endocrine: Negative for cold intolerance, heat intolerance and polyuria.   Genitourinary:  Negative for difficulty urinating and dysuria. " "  Musculoskeletal:  Positive for joint swelling. Negative for arthralgias, back pain and myalgias.   Skin:  Negative for rash.   Neurological:  Negative for dizziness and headaches.   Psychiatric/Behavioral:  Negative for agitation.          Objective   /78   Pulse 102   Temp (!) 96.7 °F (35.9 °C) (Tympanic)   Resp 18   Ht 5' 9.5\" (1.765 m)   Wt 124 kg (274 lb)   SpO2 99%   Breastfeeding No   BMI 39.88 kg/m²     Physical Exam  Constitutional:       General: She is not in acute distress.     Appearance: She is well-developed.   HENT:      Head: Normocephalic.   Eyes:      General:         Right eye: No discharge.         Left eye: No discharge.      Conjunctiva/sclera: Conjunctivae normal.   Neck:      Thyroid: No thyromegaly.   Cardiovascular:      Rate and Rhythm: Normal rate and regular rhythm.      Heart sounds: Normal heart sounds. No murmur heard.     No friction rub. No gallop.   Pulmonary:      Effort: Pulmonary effort is normal. No respiratory distress.      Breath sounds: Normal breath sounds. No wheezing or rales.   Chest:      Chest wall: No tenderness.   Abdominal:      General: Bowel sounds are normal. There is no distension.      Palpations: Abdomen is soft. There is no mass.      Tenderness: There is no abdominal tenderness. There is no guarding or rebound.   Musculoskeletal:         General: Swelling present. No tenderness or deformity. Normal range of motion.      Cervical back: Normal range of motion.   Lymphadenopathy:      Cervical: No cervical adenopathy.   Neurological:      Mental Status: She is alert.         "

## 2025-04-11 ENCOUNTER — HOSPITAL ENCOUNTER (OUTPATIENT)
Dept: ULTRASOUND IMAGING | Facility: HOSPITAL | Age: 35
Discharge: HOME | End: 2025-04-11
Payer: COMMERCIAL

## 2025-04-11 DIAGNOSIS — E04.1 THYROID NODULE: ICD-10-CM

## 2025-04-11 PROCEDURE — 76536 US EXAM OF HEAD AND NECK: CPT

## 2025-04-14 ENCOUNTER — RESULTS FOLLOW-UP (OUTPATIENT)
Dept: ENDOCRINOLOGY | Facility: CLINIC | Age: 35
End: 2025-04-14

## 2025-04-14 DIAGNOSIS — E04.1 THYROID NODULE: Primary | ICD-10-CM

## 2025-04-16 DIAGNOSIS — E04.1 THYROID NODULE: ICD-10-CM

## 2025-04-16 DIAGNOSIS — L68.0 HIRSUTISM: ICD-10-CM

## 2025-04-16 RX ORDER — LEVOTHYROXINE SODIUM 200 UG/1
TABLET ORAL
Qty: 120 TABLET | Refills: 1 | Status: SHIPPED | OUTPATIENT
Start: 2025-04-16

## 2025-04-16 RX ORDER — SPIRONOLACTONE 50 MG/1
50 TABLET, FILM COATED ORAL DAILY
Qty: 90 TABLET | Refills: 2 | Status: SHIPPED | OUTPATIENT
Start: 2025-04-16

## 2025-04-26 ENCOUNTER — APPOINTMENT (OUTPATIENT)
Dept: LAB | Facility: CLINIC | Age: 35
End: 2025-04-26
Payer: COMMERCIAL

## 2025-04-26 DIAGNOSIS — Z11.59 NEED FOR HEPATITIS C SCREENING TEST: ICD-10-CM

## 2025-04-26 DIAGNOSIS — Z85.850 HISTORY OF THYROID CANCER: ICD-10-CM

## 2025-04-26 DIAGNOSIS — M25.40 SWOLLEN JOINT: ICD-10-CM

## 2025-04-26 DIAGNOSIS — R21 RASH: ICD-10-CM

## 2025-04-26 LAB
CRP SERPL QL: 1.7 MG/L
RHEUMATOID FACT SERPL-ACNC: <10 IU/ML

## 2025-04-26 PROCEDURE — 86803 HEPATITIS C AB TEST: CPT

## 2025-04-26 PROCEDURE — 84432 ASSAY OF THYROGLOBULIN: CPT

## 2025-04-26 PROCEDURE — 86225 DNA ANTIBODY NATIVE: CPT

## 2025-04-26 PROCEDURE — 86140 C-REACTIVE PROTEIN: CPT

## 2025-04-26 PROCEDURE — 36415 COLL VENOUS BLD VENIPUNCTURE: CPT

## 2025-04-26 PROCEDURE — 86038 ANTINUCLEAR ANTIBODIES: CPT

## 2025-04-26 PROCEDURE — 86200 CCP ANTIBODY: CPT

## 2025-04-26 PROCEDURE — 86431 RHEUMATOID FACTOR QUANT: CPT

## 2025-04-26 PROCEDURE — 86800 THYROGLOBULIN ANTIBODY: CPT

## 2025-04-27 LAB — HCV AB SER QL: NORMAL

## 2025-04-28 ENCOUNTER — RESULTS FOLLOW-UP (OUTPATIENT)
Dept: FAMILY MEDICINE CLINIC | Facility: CLINIC | Age: 35
End: 2025-04-28

## 2025-04-28 LAB
DSDNA IGG SERPL IA-ACNC: 1.5 IU/ML (ref ?–15)
NUCLEAR IGG SER IA-RTO: 0.3 RATIO (ref ?–1)

## 2025-04-29 LAB — CCP AB SER IA-ACNC: 2 (ref ?–10)

## 2025-05-08 DIAGNOSIS — E04.1 THYROID NODULE: ICD-10-CM

## 2025-05-09 LAB
THYROGLOB AB SERPL-ACNC: 1.1 IU/ML (ref 0–0.9)
THYROGLOB SERPL-MCNC: <2 NG/ML

## 2025-05-09 RX ORDER — LEVOTHYROXINE SODIUM 200 UG/1
TABLET ORAL
Qty: 108 TABLET | Refills: 1 | Status: SHIPPED | OUTPATIENT
Start: 2025-05-09

## 2025-05-10 DIAGNOSIS — L68.0 HIRSUTISM: ICD-10-CM

## 2025-05-12 RX ORDER — SPIRONOLACTONE 50 MG/1
50 TABLET, FILM COATED ORAL DAILY
Qty: 90 TABLET | Refills: 1 | Status: SHIPPED | OUTPATIENT
Start: 2025-05-12

## 2025-06-04 ENCOUNTER — TELEPHONE (OUTPATIENT)
Dept: SURGICAL ONCOLOGY | Facility: CLINIC | Age: 35
End: 2025-06-04

## 2025-06-04 NOTE — TELEPHONE ENCOUNTER
Called patient and left message with the [employer] number to call back so that we can reschedule her office visit with Dr. Esposito as she will be [place] that day. I also stated that she can schedule with Dr. Esposito's NP ABI Cain if her schedule works better.

## 2025-06-05 ENCOUNTER — TELEPHONE (OUTPATIENT)
Dept: SURGICAL ONCOLOGY | Facility: CLINIC | Age: 35
End: 2025-06-05

## 2025-06-05 NOTE — TELEPHONE ENCOUNTER
Called patient and left message with the [employer] number to call back so we can get her office visit with Dr. Esposito rescheduled as she will be [place] that day. I did also state that she can see ABI Cain if her schedule is more convenient for her.

## (undated) DEVICE — SUT SILK 3-0 SH CR/8 18 IN C013D

## (undated) DEVICE — ANTIBACTERIAL UNDYED BRAIDED (POLYGLACTIN 910), SYNTHETIC ABSORBABLE SUTURE: Brand: COATED VICRYL

## (undated) DEVICE — SKIN MARKER DUAL TIP WITH RULER CAP, FLEXIBLE RULER AND LABELS: Brand: DEVON

## (undated) DEVICE — GLOVE PI ULTRA TOUCH SZ.7.0

## (undated) DEVICE — SUT SILK 3-0 18 IN A184H

## (undated) DEVICE — GLOVE SRG BIOGEL 7

## (undated) DEVICE — CHLORAPREP HI-LITE 26ML ORANGE

## (undated) DEVICE — SUT VICRYL 0 CT-1 27 IN J260H

## (undated) DEVICE — DRESSING MEPILEX AG BORDER 4 X 12 IN

## (undated) DEVICE — SUT MONOCRYL 4-0 PS-2 27 IN Y426H

## (undated) DEVICE — PACK C-SECTION PBDS

## (undated) DEVICE — TELFA NON-ADHERENT ABSORBENT DRESSING: Brand: TELFA

## (undated) DEVICE — HOOK ELASTIC STAY 12MM BLUNT SNGL STRL

## (undated) DEVICE — GAUZE SPONGES,16 PLY: Brand: CURITY

## (undated) DEVICE — SUT PLAIN 2-0 CTX 27 IN 872H

## (undated) DEVICE — INTENDED FOR TISSUE SEPARATION, AND OTHER PROCEDURES THAT REQUIRE A SHARP SURGICAL BLADE TO PUNCTURE OR CUT.: Brand: BARD-PARKER SAFETY BLADES SIZE 15, STERILE

## (undated) DEVICE — HARMONIC 1100 SHEARS, 20CM SHAFT LENGTH: Brand: HARMONIC

## (undated) DEVICE — PACK UNIVERSAL NECK

## (undated) DEVICE — ADHESIVE SKIN HIGH VISCOSITY EXOFIN 1ML

## (undated) DEVICE — SUT VICRYL 0 CTX 36 IN J978H

## (undated) DEVICE — SUT VICRYL 4-0 PS-2 27 IN J426H

## (undated) DEVICE — PLUMEPEN PRO 10FT

## (undated) DEVICE — NEEDLE 25G X 1 1/2

## (undated) DEVICE — SUT SILK 2-0 SH CR/8 18 IN C012D

## (undated) DEVICE — SUT VICRYL 0 CT-1 36 IN J946H

## (undated) DEVICE — SPONGE LAP 18 X 18 IN STRL RFD

## (undated) DEVICE — POOLE SUCTION HANDLE: Brand: CARDINAL HEALTH

## (undated) DEVICE — Device

## (undated) DEVICE — DRESSING MEPILEX AG BORDER POST-OP 4 X 12 IN

## (undated) DEVICE — GLOVE SRG BIOGEL 6.5

## (undated) DEVICE — GLOVE INDICATOR PI UNDERGLOVE SZ 7 BLUE

## (undated) DEVICE — 3M™ STERI-STRIP™ REINFORCED ADHESIVE SKIN CLOSURES, R1547, 1/2 IN X 4 IN (12 MM X 100 MM), 6 STRIPS/ENVELOPE: Brand: 3M™ STERI-STRIP™

## (undated) DEVICE — MINOR PROCEDURE DRAPE: Brand: CONVERTORS

## (undated) DEVICE — GLOVE SRG BIOGEL 7.5

## (undated) DEVICE — SUT STRATAFIX SPIRAL 4-0 PGA/PCL 30 X 30 CM SXMD2B409

## (undated) DEVICE — ABDOMINAL PAD: Brand: DERMACEA

## (undated) DEVICE — SUT VICRYL 3-0 SH 27 IN J416H

## (undated) DEVICE — SUT SILK 2-0 18 IN A185H

## (undated) DEVICE — SUT MONOCRYL 0 CTX 36 IN Y398H

## (undated) DEVICE — SURGICEL 4 X 8

## (undated) DEVICE — ELECTRODE BLADE MOD E-Z CLEAN 2.5IN 6.4CM -0012M